# Patient Record
Sex: FEMALE | Race: WHITE | NOT HISPANIC OR LATINO | Employment: FULL TIME | URBAN - METROPOLITAN AREA
[De-identification: names, ages, dates, MRNs, and addresses within clinical notes are randomized per-mention and may not be internally consistent; named-entity substitution may affect disease eponyms.]

---

## 2017-03-14 ENCOUNTER — OFFICE VISIT (OUTPATIENT)
Dept: PSYCHIATRY | Facility: CLINIC | Age: 32
End: 2017-03-14
Attending: PSYCHOLOGIST
Payer: COMMERCIAL

## 2017-03-14 DIAGNOSIS — F50.20 BULIMIA NERVOSA: Primary | ICD-10-CM

## 2017-03-14 NOTE — MR AVS SNAPSHOT
After Visit Summary   3/14/2017    Arlet Segura    MRN: [id number]           Patient Information     Date Of Birth          1985        Visit Information        Provider Department      3/14/2017 8:00 AM Reyna Grey, PhD [facility]        Today's Diagnoses     Bulimia nervosa    -  1       Follow-ups after your visit        Who to contact     Please call your clinic at [phone] to:    Ask questions about your health    Make or cancel appointments    Discuss your medicines    Learn about your test results    Speak to your doctor   If you have compliments or concerns about an experience at your clinic, or if you wish to file a complaint, please contact [facility] Patient Relations at [phone] or email us at [email or web address]         Additional Information About Your Visit        MyChart Information     [employer] gives you secure access to your electronic health record. If you see a primary care provider, you can also send messages to your care team and make appointments. If you have questions, please call your primary care clinic.  If you do not have a primary care provider, please call [phone] and they will assist you.      [employer] is an electronic gateway that provides easy, online access to your medical records. With [employer], you can request a clinic appointment, read your test results, renew a prescription or communicate with your care team.     To access your existing account, please contact your [facility] or call [phone] for assistance.        Care EveryWhere ID     This is your Care EveryWhere ID. This could be used by other organizations to access your [facility] medical records  [id number]         Blood Pressure from Last 3 Encounters:   11/14/16 123/59   03/12/15 124/81   02/09/15 (!) 136/91    Weight from Last 3 Encounters:   11/14/16 56.6 kg (124 lb 11.2 oz)   03/12/15 54.9 kg  (121 lb 1.6 oz)   02/09/15 56 kg (123 lb 6.4 oz)              Today, you had the following     No orders found for display       Primary Care Provider Office Phone # Fax #    Dorothy Myesha Holder [phone]       [address] [place] [address]        Thank you!     Thank you for choosing PSY[facility]IATRY CLINIC  for your care. Our goal is always to provide you with excellent care. Hearing back from our patients is one way we can continue to improve our services. Please take a few minutes to complete the written survey that you may receive in the mail after your visit with us. Thank you!             Your Updated Medication List - Protect others around you: Learn how to safely use, store and throw away your medicines at www.disposemymeds.org.          This list is accurate as of: 3/14/17 11:59 PM.  Always use your most recent med list.                   Brand Name Dispense Instructions for use    fluticasone 50 MCG/ACT spray    FLONASE    1 Bottle    Spray 1 spray into both nostrils daily
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
## 2017-03-29 ENCOUNTER — OFFICE VISIT (OUTPATIENT)
Dept: PSYCHIATRY | Facility: CLINIC | Age: 32
End: 2017-03-29
Attending: CLINICAL NURSE SPECIALIST
Payer: COMMERCIAL

## 2017-03-29 VITALS
DIASTOLIC BLOOD PRESSURE: 83 MMHG | HEART RATE: 54 BPM | WEIGHT: 123.4 LBS | SYSTOLIC BLOOD PRESSURE: 120 MMHG | BODY MASS INDEX: 21.52 KG/M2

## 2017-03-29 DIAGNOSIS — F90.0 ATTENTION DEFICIT HYPERACTIVITY DISORDER (ADHD), PREDOMINANTLY INATTENTIVE TYPE: ICD-10-CM

## 2017-03-29 DIAGNOSIS — F50.20 BULIMIA NERVOSA, PURGING TYPE: Primary | Chronic | ICD-10-CM

## 2017-03-29 PROCEDURE — 99212 OFFICE O/P EST SF 10 MIN: CPT | Mod: ZF

## 2017-03-29 RX ORDER — DEXTROAMPHETAMINE SACCHARATE, AMPHETAMINE ASPARTATE MONOHYDRATE, DEXTROAMPHETAMINE SULFATE AND AMPHETAMINE SULFATE 5; 5; 5; 5 MG/1; MG/1; MG/1; MG/1
20 CAPSULE, EXTENDED RELEASE ORAL DAILY
Qty: 30 CAPSULE | Refills: 0 | Status: SHIPPED | OUTPATIENT
Start: 2017-03-29 | End: 2017-04-12

## 2017-03-29 RX ORDER — DEXTROAMPHETAMINE SACCHARATE, AMPHETAMINE ASPARTATE MONOHYDRATE, DEXTROAMPHETAMINE SULFATE AND AMPHETAMINE SULFATE 5; 5; 5; 5 MG/1; MG/1; MG/1; MG/1
20 CAPSULE, EXTENDED RELEASE ORAL DAILY
Qty: 20 CAPSULE | Refills: 0 | Status: SHIPPED | OUTPATIENT
Start: 2017-03-29 | End: 2017-03-29

## 2017-03-29 RX ORDER — LISDEXAMFETAMINE DIMESYLATE 20 MG/1
20 CAPSULE ORAL EVERY MORNING
Qty: 30 CAPSULE | Refills: 0 | Status: SHIPPED | OUTPATIENT
Start: 2017-03-29 | End: 2017-03-29
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
## 2017-03-29 NOTE — NURSING NOTE
Chief Complaint   Patient presents with     Eval/Assessment     Reviewed allergies, smoking status, and pharmacy preference  Administered abuse screening questions   Obtained weight, blood pressure and heart rate   Catalina Russell LPN

## 2017-03-29 NOTE — MR AVS SNAPSHOT
After Visit Summary   3/29/2017    Arlet Segura    MRN: [id number]           Patient Information     Date Of Birth          1985        Visit Information        Provider Department      3/29/2017 10:45 AM Eli Chambers APRN CNS Psychiatry Clinic        Today's Diagnoses     Bulimia nervosa, purging type    -  1    Attention deficit hyperactivity disorder (ADHD), predominantly inattentive type           Follow-ups after your visit        Follow-up notes from your care team     Return in about 2 weeks (around 4/12/2017).      Your next 10 appointments already scheduled     Apr 12, 2017  8:15 AM CDT   Adult Med Follow UP with DRISS Garduno CNS   Psychiatry Clinic ([facility])    [address] [facility] [address] [phone]              Who to contact     Please call your clinic at [phone] to:    Ask questions about your health    Make or cancel appointments    Discuss your medicines    Learn about your test results    Speak to your doctor   If you have compliments or concerns about an experience at your clinic, or if you wish to file a complaint, please contact [facility] Patient Relations at [phone] or email us at [email or web address]         Additional Information About Your Visit        MyChart Information     [employer] gives you secure access to your electronic health record. If you see a primary care provider, you can also send messages to your care team and make appointments. If you have questions, please call your primary care clinic.  If you do not have a primary care provider, please call [phone] and they will assist you.      [employer] is an electronic gateway that provides easy, online access to your medical records. With [employer], you can request a clinic appointment, read your test results, renew a prescription or communicate with your care team.     To  access your existing account, please contact your [facility] or call [phone] for assistance.        Care EveryWhere ID     This is your Care EveryWhere ID. This could be used by other organizations to access your [facility] medical records  [id number]        Your Vitals Were     Pulse BMI (Body Mass Index)                54 21.52 kg/m2           Blood Pressure from Last 3 Encounters:   03/29/17 120/83   11/14/16 123/59   03/12/15 124/81    Weight from Last 3 Encounters:   03/29/17 56 kg (123 lb 6.4 oz)   11/14/16 56.6 kg (124 lb 11.2 oz)   03/12/15 54.9 kg (121 lb 1.6 oz)              Today, you had the following     No orders found for display         Today's Medication Changes          These changes are accurate as of: 3/29/17 11:59 PM.  If you have any questions, ask your nurse or doctor.               Start taking these medicines.        Dose/Directions    amphetamine-dextroamphetamine 20 MG per 24 hr capsule   Commonly known as:  ADDERALL XR   Used for:  Attention deficit hyperactivity disorder (ADHD), predominantly inattentive type, Bulimia nervosa, purging type   Started by:  Eli Chambers APRN CNS        Dose:  20 mg   Take 1 capsule (20 mg) by mouth daily   Quantity:  30 capsule   Refills:  0            Where to get your medicines      Some of these will need a paper prescription and others can be bought over the counter.  Ask your nurse if you have questions.     Bring a paper prescription for each of these medications     amphetamine-dextroamphetamine 20 MG per 24 hr capsule                Primary Care Provider Office Phone # Fax #    Dorothy Holder [phone]       [facility] [address] [facility] [address]  [facility] [address]        Thank you!     Thank you for choosing PS[facility]ATRY CLINIC  for your care. Our goal is always to provide you with excellent care. Hearing back from our patients is one way we can continue to improve our  services. Please take a few minutes to complete the written survey that you may receive in the mail after your visit with us. Thank you!             Your Updated Medication List - Protect others around you: Learn how to safely use, store and throw away your medicines at www.disposemymeds.org.          This list is accurate as of: 3/29/17 11:59 PM.  Always use your most recent med list.                   Brand Name Dispense Instructions for use    amphetamine-dextroamphetamine 20 MG per 24 hr capsule    ADDERALL XR    30 capsule    Take 1 capsule (20 mg) by mouth daily       fluticasone 50 MCG/ACT spray    FLONASE    1 Bottle    Spray 1 spray into both nostrils daily

## 2017-04-12 ENCOUNTER — OFFICE VISIT (OUTPATIENT)
Dept: PSYCHIATRY | Facility: CLINIC | Age: 32
End: 2017-04-12
Attending: CLINICAL NURSE SPECIALIST
Payer: COMMERCIAL

## 2017-04-12 VITALS
WEIGHT: 121.2 LBS | SYSTOLIC BLOOD PRESSURE: 121 MMHG | HEART RATE: 65 BPM | DIASTOLIC BLOOD PRESSURE: 81 MMHG | BODY MASS INDEX: 21.13 KG/M2

## 2017-04-12 DIAGNOSIS — F90.0 ADHD (ATTENTION DEFICIT HYPERACTIVITY DISORDER), INATTENTIVE TYPE: Primary | ICD-10-CM

## 2017-04-12 DIAGNOSIS — F50.20 BULIMIA NERVOSA, PURGING TYPE: Chronic | ICD-10-CM

## 2017-04-12 LAB
T4 FREE SERPL-MCNC: 0.87 NG/DL (ref 0.76–1.46)
TSH SERPL DL<=0.05 MIU/L-ACNC: 5.08 MU/L (ref 0.4–4)

## 2017-04-12 PROCEDURE — 36415 COLL VENOUS BLD VENIPUNCTURE: CPT | Performed by: CLINICAL NURSE SPECIALIST

## 2017-04-12 PROCEDURE — 84443 ASSAY THYROID STIM HORMONE: CPT | Performed by: CLINICAL NURSE SPECIALIST

## 2017-04-12 PROCEDURE — 99212 OFFICE O/P EST SF 10 MIN: CPT | Mod: ZF

## 2017-04-12 PROCEDURE — 84439 ASSAY OF FREE THYROXINE: CPT | Performed by: CLINICAL NURSE SPECIALIST

## 2017-04-12 RX ORDER — DEXTROAMPHETAMINE SACCHARATE, AMPHETAMINE ASPARTATE MONOHYDRATE, DEXTROAMPHETAMINE SULFATE AND AMPHETAMINE SULFATE 5; 5; 5; 5 MG/1; MG/1; MG/1; MG/1
20 CAPSULE, EXTENDED RELEASE ORAL DAILY
Qty: 30 CAPSULE | Refills: 0 | Status: SHIPPED | OUTPATIENT
Start: 2017-04-26 | End: 2017-05-10 | Stop reason: DRUGHIGH

## 2017-04-12 NOTE — MR AVS SNAPSHOT
After Visit Summary   4/12/2017    Arlet Segura    MRN: [id number]           Patient Information     Date Of Birth          1985        Visit Information        Provider Department      4/12/2017 8:15 AM Eli Chambers APRN CNS [facility]        Today's Diagnoses     ADHD (attention deficit hyperactivity disorder), inattentive type    -  1    Bulimia nervosa, purging type           Follow-ups after your visit        Follow-up notes from your care team     Return in about 4 weeks (around 5/10/2017).      Your next 10 appointments already scheduled     Apr 25, 2017  7:30 AM CDT   (Arrive by 7:15 AM)   Return Visit with Kasey Oh MD   [facility] ([facility])    [address] [place] [address] [phone]            May 10, 2017  8:15 AM CDT   Adult Med Follow UP with DRISS Garduno CNS   Psychiatry Clinic ([facility])    [address]  [address] [facility] [id number] [phone]            Jun 01, 2017  3:00 PM CDT   (Arrive by 2:45 PM)   PHYSICAL with Dorothy Holder MD   [facility] ([facility])    [address] [place] [address] [phone]              Who to contact     Please call your clinic at [phone] to:    Ask questions about your health    Make or cancel appointments    Discuss your medicines    Learn about your test results    Speak to your doctor   If you have compliments or concerns about an experience at your clinic, or if you wish to file a complaint, please contact [facility] Patient Relations at [phone] or email us at [email or web address]         Additional Information About Your Visit        MyChart Information     MyChart gives you secure access to your electronic health record. If you see a primary care  provider, you can also send messages to your care team and make appointments. If you have questions, please call your primary care clinic.  If you do not have a primary care provider, please call [phone] and they will assist you.      BangTango is an electronic gateway that provides easy, online access to your medical records. With BangTango, you can request a clinic appointment, read your test results, renew a prescription or communicate with your care team.     To access your existing account, please contact your [facility] or call [phone] for assistance.        Care EveryWhere ID     This is your Care EveryWhere ID. This could be used by other organizations to access your [place] medical records  [id number]        Your Vitals Were     Pulse BMI (Body Mass Index)                65 21.13 kg/m2           Blood Pressure from Last 3 Encounters:   04/12/17 121/81   03/29/17 120/83   11/14/16 123/59    Weight from Last 3 Encounters:   04/12/17 55 kg (121 lb 3.2 oz)   03/29/17 56 kg (123 lb 6.4 oz)   11/14/16 56.6 kg (124 lb 11.2 oz)              We Performed the Following     T4 FREE     TSH          Today's Medication Changes          These changes are accurate as of: 4/12/17 11:59 PM.  If you have any questions, ask your nurse or doctor.               Start taking these medicines.        Dose/Directions    FLUoxetine 20 MG capsule   Commonly known as:  PROzac   Started by:  Eli Chambers APRN CNS        Dose:  20 mg   Take 1 capsule (20 mg) by mouth daily   Quantity:  30 capsule   Refills:  1            Where to get your medicines      These medications were sent to [facility] [address]  [address] [address]     Phone:  [phone]     FLUoxetine 20 MG capsule         Some of these will need a paper prescription and others can be bought over the counter.  Ask your nurse if you have questions.     Bring a  paper prescription for each of these medications     amphetamine-dextroamphetamine 20 MG per 24 hr capsule                Primary Care Provider Office Phone # Fax #    Dorothy Holder [phone]       [address]  [facility] [address]        Thank you!     Thank you for choosing PSYCHIATRY CLINIC  for your care. Our goal is always to provide you with excellent care. Hearing back from our patients is one way we can continue to improve our services. Please take a few minutes to complete the written survey that you may receive in the mail after your visit with us. Thank you!             Your Updated Medication List - Protect others around you: Learn how to safely use, store and throw away your medicines at www.disposemymeds.org.          This list is accurate as of: 4/12/17 11:59 PM.  Always use your most recent med list.                   Brand Name Dispense Instructions for use    * amphetamine-dextroamphetamine 30 MG per 24 hr capsule    ADDERALL XR    30 capsule    Take 1 capsule (30 mg) by mouth daily       * amphetamine-dextroamphetamine 20 MG per 24 hr capsule   Start taking on:  4/26/2017    ADDERALL XR    30 capsule    Take 1 capsule (20 mg) by mouth daily       FLUoxetine 20 MG capsule    PROzac    30 capsule    Take 1 capsule (20 mg) by mouth daily       fluticasone 50 MCG/ACT spray    FLONASE    1 Bottle    Spray 1 spray into both nostrils daily       * Notice:  This list has 2 medication(s) that are the same as other medications prescribed for you. Read the directions carefully, and ask your doctor or other care provider to review them with you.

## 2017-04-12 NOTE — NURSING NOTE
Chief Complaint   Patient presents with     Recheck Medication       Bulimia nervosa, purging type    Reviewed allergies, smoking status, and pharmacy preference  Administered abuse screening questions   Obtained weight, blood pressure and heart rate

## 2017-04-12 NOTE — PROGRESS NOTES
Outpatient Psychiatry Progress Note     Provider: DRISS Garduno CNS  Date: 2017  Service:  Medication follow up with counseling.   Patient Identification: Arlet Segura  : 1985   MRN: [id number]    Arlet Segura is a 31 year old year old female who presents for ongoing psychiatric care.  Arlet Segura was last seen in clinic on 3/29/17.   At that time,   Assessment & Plan      Assessment:   Arlet Segura is a 31 year old female who presents for treatment of ADHD inattentive type which she was diagnosed with in  by Dr. Epps. In addition she has history of Bulimia with binge eating, purging about 2 times per week. Bulimia started her second year of college in . At this time she would like to restart a stimulant since this was helpful with bulimia and concentration and focus. She agrees to starting Adderall XR at 20mg since she has not been taking it for several years and will consider increase back to 30mg at follow up. Vyvanse was considered but decided on Adderall due to the copay being higher on Vyvanse. We also discussed considering trial of an SSRI due to rumination about food, eating and weight. She has not been prescribed an SSRI in the past.     Diagnosis  Axis 1: Bulimia Nervosa, purging type, ADHD inattentive type  Axis 2: none  Axis 3: no current medical problems        Plan:   Medication: Adderall XR 20mg due to cost of Vyvanse  OTC Recommendations: none  Lab Orders: Consider Thyroid, nutritional, electrolytes  Referrals: none  Release of Information: none  Future Treatment Considerations: consider increase in Adderall to previous dose of 30mg, consider SSRI trial  Return for Follow Up: 2 weeks      2017  Today Arlet reports she has found Adderall to be helpful with focus and purging. Has not had any purging since she started it.  She talked with her the SSRI that she takes. Sister takes Fluoxetine at unknown dose and has found it to be  helpful.  Side effects of medication include: at times feels more anxious which she wonders if is related to Adderall  Psychiatric Review of Systems:  The patient endorses symptoms of depression: In the last 2 weeks per PHQ 9 several days anhedonia, feeling depressed, sleep disturbance, feelings of failure, concentration problems.  She  patient endorses symptoms of anxiety : continued preoccupation with food and weight.  She endorses symptoms of adrian including none.    She endorses symptoms of psychosis including no psychotic symptoms.       Review of Medical Systems:  Sleep: stable  Energy: stable  Concentration: improved with some continued difficulty  Appetite: see subjective  GI Concerns: none  Cardiac concerns: none  Neurological concerns: none  Other medical concerns: no new concerns  Current Substance Use:  Alcohol:denies frequent use or abuse  Other drugs:denies  Caffeine:not reviewed  Nicotine: none  Past Medical History:   Past Medical History:   Diagnosis Date     Abnormal cervical Pap smear with positive HPV DNA test     last pap 2/2013 nl     Bulimia      Patient Active Problem List   Diagnosis     Bulimia since 2008     ADHD (attention deficit hyperactivity disorder), inattentive type       Allergies: No Known Allergies       Current Medications     Current Outpatient Prescriptions Ordered in [facility]   Medication Sig Dispense Refill     amphetamine-dextroamphetamine (ADDERALL XR) 20 MG per 24 hr capsule Take 1 capsule (20 mg) by mouth daily 30 capsule 0     fluticasone (FLONASE) 50 MCG/ACT nasal spray Spray 1 spray into both nostrils daily 1 Bottle 0     No current Epic-ordered facility-administered medications on file.         Mental Status Exam     Appearance:  Casually dressed and Well groomed  Behavior/relationship to examiner/demeanor:  Cooperative  Orientation: Oriented to person, place, time and situation  Psychomotor: normal form  Speech Rate:  Normal  Speech Spontaneity:  Normal  Mood:   "\"okay\"  Affect:  Appropriate/mood-congruent  Thought Process (Associations):  Goal directed  Thought Content:  no overt psychosis, denies suicidal ideation, intent or thoughts and patient does not appear to be responding to internal stimuli  Abnormal Perception:  None  Attention/Concentration:  Normal  Language:  Intact  Insight:  Good  Judgment:  Good      Results     Vital signs: /81  Pulse 65  Wt 55 kg (121 lb 3.2 oz)  BMI 21.13 kg/m2    Laboratory Data:  none available    Assessment & Plan      Arlet Segura is seen today for follow up and reports she has not purged since restarting Adderall but is interested in trying an SSRI for eating disorder to help with preoccupation with food and eating.  Her sister has found Fluoxetine helpful and she agrees to trial of this.  Will continue current dose of Adderall XR 20mg instead of increase to 30mg to avoid difficulty identifying cause of side effects if they occur.  Diagnosis  Axis 1: Bulimia Nervosa Purging type, ADHD inattentive type  Axis 2: none  Axis 3: See problem list in the medical record    Plan:  Medication: Continue Adderall XR 20mg and add Fluoxetine 20mg  OTC Recommendations: none  Lab Orders:  TSH and Free T4  Referrals: none  Release of Information: none  Future Treatment Considerations:per response to medication changes  Return for Follow Up:4 weeks   The risks, benefits, alternatives and side effects have been discussed and are understood by the patient. The patient understands the risks of using street drugs or alcohol. There are no medical contraindications, the patient agrees to treatment, and has the capacity to do so. The patient understands to call 911 or come to the nearest ED if life threatening or urgent symptoms present.  Over 50% of this time was spent counseling the patient and/or coordinating care regarding review of social and occupational functioning.  In addition patient was counseled on health and wellness practices to " augment medication treatment of symptoms. See note for details.    Eli Chambers, APRN CNS 4/12/2017

## 2017-04-14 ASSESSMENT — PATIENT HEALTH QUESTIONNAIRE - PHQ9: SUM OF ALL RESPONSES TO PHQ QUESTIONS 1-9: 8

## 2017-05-04 ENCOUNTER — OFFICE VISIT (OUTPATIENT)
Dept: INTERNAL MEDICINE | Facility: CLINIC | Age: 32
End: 2017-05-04

## 2017-05-04 VITALS
WEIGHT: 117 LBS | BODY MASS INDEX: 20.4 KG/M2 | HEART RATE: 74 BPM | SYSTOLIC BLOOD PRESSURE: 131 MMHG | DIASTOLIC BLOOD PRESSURE: 87 MMHG

## 2017-05-04 DIAGNOSIS — I73.00 RAYNAUD'S DISEASE WITHOUT GANGRENE: Primary | ICD-10-CM

## 2017-05-04 ASSESSMENT — PAIN SCALES - GENERAL: PAINLEVEL: NO PAIN (0)

## 2017-05-04 NOTE — MR AVS SNAPSHOT
After Visit Summary   5/4/2017    Arlet Segura    MRN: [id number]           Patient Information     Date Of Birth          1985        Visit Information        Provider Department      5/4/2017 3:50 PM Truong Chapman MD [facility]        Today's Diagnoses     Raynaud's disease without gangrene    -  1      Care Instructions    Primary Care Center Medication Refill Request Information:  * Please contact your pharmacy regarding ANY request for medication refills.  ** [facility] Prescription Fax = [phone]  * Please allow 3 business days for routine medication refills.  * Please allow 5 business days for controlled substance medication refills.     Primary Care Center Test Result notification information:  *You will be notified within 7-10 days of your appointment day regarding the results of your test.  If you are on MyChart you will be notified as soon as the provider has reviewed the results and signed off on them.          Follow-ups after your visit        Follow-up notes from your care team     Return in 4 weeks (on 6/1/2017) for previously scheduled appt with Dr Holder.      Your next 10 appointments already scheduled     Jun 01, 2017  3:00 PM CDT   (Arrive by 2:45 PM)   PHYSICAL with Dorothy Holder MD   [facility] ([facility])    [address] [address] [phone]            Jun 08, 2017  9:15 AM CDT   Adult Med Follow UP with DRISS aGrduno CNS   Psychiatry Clinic ([facility])    [address] [facility] [id number] [phone]              Who to contact     Please call your clinic at [phone] to:    Ask questions about your health    Make or cancel appointments    Discuss your medicines    Learn about your test results    Speak to your doctor   If you have compliments or concerns about an experience at  your clinic, or if you wish to file a complaint, please contact [facility] Patient Relations at [phone] or email us at [email or web address]         Additional Information About Your Visit        The Simplehart Information     Adtradet gives you secure access to your electronic health record. If you see a primary care provider, you can also send messages to your care team and make appointments. If you have questions, please call your primary care clinic.  If you do not have a primary care provider, please call [phone] and they will assist you.      DNAdigest is an electronic gateway that provides easy, online access to your medical records. With DNAdigest, you can request a clinic appointment, read your test results, renew a prescription or communicate with your care team.     To access your existing account, please contact your [facility] or call [phone] for assistance.        Care EveryWhere ID     This is your Care EveryWhere ID. This could be used by other organizations to access your [facility] medical records  [id number]        Your Vitals Were     Pulse BMI (Body Mass Index)                74 20.4 kg/m2           Blood Pressure from Last 3 Encounters:   05/04/17 131/87   11/14/16 123/59   03/12/15 124/81    Weight from Last 3 Encounters:   05/04/17 53.1 kg (117 lb)   11/14/16 56.6 kg (124 lb 11.2 oz)   03/12/15 54.9 kg (121 lb 1.6 oz)              Today, you had the following     No orders found for display       Primary Care Provider Office Phone # Fax #    Dorothykasia Holder [phone]       [address]  [facility] [address]        Thank you!     Thank you for choosing [facility]  for your care. Our goal is always to provide you with excellent care. Hearing back from our patients is one way we can continue to improve our services. Please take a few minutes to complete  the written survey that you may receive in the mail after your visit with us. Thank you!             Your Updated Medication List - Protect others around you: Learn how to safely use, store and throw away your medicines at www.disposemymeds.org.          This list is accurate as of: 5/4/17 11:59 PM.  Always use your most recent med list.                   Brand Name Dispense Instructions for use    amphetamine-dextroamphetamine 20 MG per 24 hr capsule    ADDERALL XR    30 capsule    Take 1 capsule (20 mg) by mouth daily       fluticasone 50 MCG/ACT spray    FLONASE    1 Bottle    Spray 1 spray into both nostrils daily

## 2017-05-04 NOTE — PROGRESS NOTES
SUBJECTIVE:   Arlet Segura is a 31 year old year old female with history of bullemia and ADHD who presents with abnormal TSH, normal T4.    Patient was at a visit with her psychiatrist on 4/12 and noted that she had been feeling fatigued and noted some mild hair loss. TSH was checked and was elevated at 5.08. Her reflex T4 was normal, 0.87. She was also started on fluoxetine at that visit for bullemia. It was suggested that she visit clinic to discuss the results. Notes that her mother and sister both have hypothyroidism and that her sister also has DM I.    She does note that she has been looking up Raynaud's because her fingers are unusually sensitive to the cold and that she does note that her fingers turn blue in the cold with associated pain. Has not had ulceration of her fingers. She has been managing with gloves and avoidance of cold. She wonders if there are any other options for management of her symptoms.    Review of systems:  10 point ROS negative except as noted above.    Past Medical History:   Diagnosis Date     Abnormal cervical Pap smear with positive HPV DNA test     last pap 2/2013 nl     Bulimia      Bulimia since 2008 1/19/2014          Current Outpatient Prescriptions on File Prior to Visit:  amphetamine-dextroamphetamine (ADDERALL XR) 30 MG per 24 hr capsule Take 1 capsule (30 mg) by mouth daily   FLUoxetine (PROZAC) 20 MG capsule Take 1 capsule (20 mg) by mouth daily   amphetamine-dextroamphetamine (ADDERALL XR) 20 MG per 24 hr capsule Take 1 capsule (20 mg) by mouth daily   fluticasone (FLONASE) 50 MCG/ACT nasal spray Spray 1 spray into both nostrils daily     No current facility-administered medications on file prior to visit.      OBJECTIVE:  Physical exam:  /87  Pulse 74  Wt 53.1 kg (117 lb)  BMI 20.4 kg/m2  Body mass index is 20.4 kg/(m^2).   Gen: Pleasant, well-developed, well-nourished and in no apparent distress  HEENT: normocephalic, atraumatic, EOMI, no scleral icterus,  grossly normal hair thickness  Neck: supple, no thyromegaly, no nodules  CV: regular rate and rhythm, normal S1 S2, no S3 or S4 and no murmur, click, or rub  Resp: clear to ausculation bilaterally, no increased WOB  Ext: WWP, no LE edema  Skin: warm and dry, no color change in fingers, no pain to palpation, no sclerosis.  Neuro: alert and oriented, 5+ strength throughout, normal gait, CN II-XII grossly intact  Psych: normal mood, normal affect    ASSESSMENT and PLAN:     Subclinical hypothyroidism  Patent's T4 normal despite elevated TSH. Patient has strong family history of hypothyroidism and autoimmune disease in her mother and sister. Discussed that the risk of developing hypothyroidism is not 100% but that she is likely at increased risk given her family history. Would monitor TSH and T4 levels given this risk.  - Repeat TSH/T4 in 6 months (10/2017)  - Yearly TSH/T4 after  - Monitor for worsening weight gain, fatigue, hair loss, dry skin and return if she develops these symptoms    Raynaud phenomenon  Patient fulfills criteria for Raynaud. Her fingers are unusually sensitive to the cold and change color to blue. Symptoms relatively mild and no hx of digital ischemia. Patient has been appropriately practicing avoidance and wearing gloves. Discussed option of using low-dose amlodipine specifically in the Winter. Patient politely declined at this time but said that she would continue to consider it.    Return to clinic 6/1 for previously scheduled appt with Dr Holder    Patient seen and discussed with Dr. Conrad who agrees with this plan.    Scott Chapman MD  Internal Medicine, PGY-1  Pager [phone]    The Patient was seen in Resident Continuity Clinic by MIKE CHAPMAN. Patient was seen and examined by me with the resident.   I reviewed the history & exam. Assessment and plan were jointly made.    Magui Conrad MD

## 2017-05-04 NOTE — PATIENT INSTRUCTIONS
Primary Care Center Medication Refill Request Information:  * Please contact your pharmacy regarding ANY request for medication refills.  ** [facility] Prescription Fax = [phone]  * Please allow 3 business days for routine medication refills.  * Please allow 5 business days for controlled substance medication refills.     Primary Care Center Test Result notification information:  *You will be notified within 7-10 days of your appointment day regarding the results of your test.  If you are on MyChart you will be notified as soon as the provider has reviewed the results and signed off on them.

## 2017-05-04 NOTE — NURSING NOTE
Chief Complaint   Patient presents with     Results     Pt is here to go over lab results.      Niurka Lundberg LPN May 4, 2017 4:01 PM

## 2017-05-10 ENCOUNTER — OFFICE VISIT (OUTPATIENT)
Dept: PSYCHIATRY | Facility: CLINIC | Age: 32
End: 2017-05-10
Attending: CLINICAL NURSE SPECIALIST
Payer: COMMERCIAL

## 2017-05-10 VITALS
BODY MASS INDEX: 21.73 KG/M2 | WEIGHT: 124.6 LBS | HEART RATE: 65 BPM | SYSTOLIC BLOOD PRESSURE: 132 MMHG | DIASTOLIC BLOOD PRESSURE: 76 MMHG

## 2017-05-10 DIAGNOSIS — F90.0 ADHD (ATTENTION DEFICIT HYPERACTIVITY DISORDER), INATTENTIVE TYPE: Primary | ICD-10-CM

## 2017-05-10 DIAGNOSIS — F50.20 BULIMIA NERVOSA: ICD-10-CM

## 2017-05-10 PROCEDURE — 99212 OFFICE O/P EST SF 10 MIN: CPT | Mod: ZF

## 2017-05-10 RX ORDER — DEXTROAMPHETAMINE SACCHARATE, AMPHETAMINE ASPARTATE MONOHYDRATE, DEXTROAMPHETAMINE SULFATE AND AMPHETAMINE SULFATE 7.5; 7.5; 7.5; 7.5 MG/1; MG/1; MG/1; MG/1
30 CAPSULE, EXTENDED RELEASE ORAL DAILY
Qty: 30 CAPSULE | Refills: 0 | Status: SHIPPED | OUTPATIENT
Start: 2017-05-10 | End: 2017-06-08

## 2017-05-10 NOTE — MR AVS SNAPSHOT
After Visit Summary   5/10/2017    Arlet Segura    MRN: [id number]           Patient Information     Date Of Birth          1985        Visit Information        Provider Department      5/10/2017 8:15 AM Eli Chambers APRN CNS [facility]        Today's Diagnoses     ADHD (attention deficit hyperactivity disorder), inattentive type    -  1    Bulimia nervosa           Follow-ups after your visit        Follow-up notes from your care team     Return in about 4 weeks (around 6/7/2017).      Your next 10 appointments already scheduled     Jun 01, 2017  3:00 PM CDT   (Arrive by 2:45 PM)   PHYSICAL with Dorothy Holder MD   [facility] ([facility] [place])    [address] [facility]  [address] [facility] [address] [phone]            Jun 08, 2017  9:15 AM CDT   Adult Med Follow UP with DRISS Garduno CNS   Psychiatry Clinic ([facility])    [address] [facility] [address] [phone]              Who to contact     Please call your clinic at [phone] to:    Ask questions about your health    Make or cancel appointments    Discuss your medicines    Learn about your test results    Speak to your doctor   If you have compliments or concerns about an experience at your clinic, or if you wish to file a complaint, please contact [facility] Patient Relations at [phone] or email us at [email or web address]         Additional Information About Your Visit        Larryharbarbi Information     Joanne gives you secure access to your electronic health record. If you see a primary care provider, you can also send messages to your care team and make appointments. If you have questions, please call your primary care clinic.  If you do not have a primary care provider, please call [phone] and they will assist you.      Joanne is an  electronic gateway that provides easy, online access to your medical records. With SendtoNews, you can request a clinic appointment, read your test results, renew a prescription or communicate with your care team.     To access your existing account, please contact your [facility] or call [phone] for assistance.        Care EveryWhere ID     This is your Care EveryWhere ID. This could be used by other organizations to access your [facility] medical records  [id number]        Your Vitals Were     Pulse BMI (Body Mass Index)                65 21.73 kg/m2           Blood Pressure from Last 3 Encounters:   05/10/17 132/76   05/04/17 131/87   04/12/17 121/81    Weight from Last 3 Encounters:   05/10/17 56.5 kg (124 lb 9.6 oz)   05/04/17 53.1 kg (117 lb)   04/12/17 55 kg (121 lb 3.2 oz)              Today, you had the following     No orders found for display         Today's Medication Changes          These changes are accurate as of: 5/10/17  1:30 PM.  If you have any questions, ask your nurse or doctor.               These medicines have changed or have updated prescriptions.        Dose/Directions    amphetamine-dextroamphetamine 30 MG per 24 hr capsule   Commonly known as:  ADDERALL XR   This may have changed:  Another medication with the same name was removed. Continue taking this medication, and follow the directions you see here.   Used for:  ADHD (attention deficit hyperactivity disorder), inattentive type   Changed by:  Eli Chambers APRN CNS        Dose:  30 mg   Take 1 capsule (30 mg) by mouth daily   Quantity:  30 capsule   Refills:  0            Where to get your medicines      These medications were sent to [facility] [place], [place] - [address]  [address] [address]     Phone:  [phone]     FLUoxetine 20 MG capsule         Some of these will need a paper prescription and others can be bought over the counter.  Ask  your nurse if you have questions.     Bring a paper prescription for each of these medications     amphetamine-dextroamphetamine 30 MG per 24 hr capsule                Primary Care Provider Office Phone # Fax #    Dorothy Holder [phone]       [address]  [facility] [address]        Thank you!     Thank you for choosing PSYCHIATRY CLINIC  for your care. Our goal is always to provide you with excellent care. Hearing back from our patients is one way we can continue to improve our services. Please take a few minutes to complete the written survey that you may receive in the mail after your visit with us. Thank you!             Your Updated Medication List - Protect others around you: Learn how to safely use, store and throw away your medicines at www.disposemymeds.org.          This list is accurate as of: 5/10/17  1:30 PM.  Always use your most recent med list.                   Brand Name Dispense Instructions for use    amphetamine-dextroamphetamine 30 MG per 24 hr capsule    ADDERALL XR    30 capsule    Take 1 capsule (30 mg) by mouth daily       FLUoxetine 20 MG capsule    PROzac    30 capsule    Take 1 capsule (20 mg) by mouth daily       fluticasone 50 MCG/ACT spray    FLONASE    1 Bottle    Spray 1 spray into both nostrils daily

## 2017-05-10 NOTE — PROGRESS NOTES
Outpatient Psychiatry Progress Note     Provider: DRISS Garduno CNS  Date: 5/10/2017  Service:  Medication follow up with counseling.   Patient Identification: Arlet Segura  : 1985   MRN: [id number]    Arlet Segura is a 31 year old year old female who presents for ongoing psychiatric care.  Arlet Segura was last seen in clinic on 17.   At that time,   Assessment & Plan      Arlet Segura is seen today for follow up and reports she has not purged since restarting Adderall but is interested in trying an SSRI for eating disorder to help with preoccupation with food and eating. Her sister has found Fluoxetine helpful and she agrees to trial of this.  Will continue current dose of Adderall XR 20mg instead of increase to 30mg to avoid difficulty identifying cause of side effects if they occur.  Diagnosis  Axis 1: Bulimia Nervosa Purging type, ADHD inattentive type  Axis 2: none  Axis 3: See problem list in the medical record     Plan:  Medication: Continue Adderall XR 20mg and add Fluoxetine 20mg  OTC Recommendations: none  Lab Orders: TSH and Free T4  Referrals: none  Release of Information: none  Future Treatment Considerations:per response to medication changes  Return for Follow Up:4 weeks      ____________________________________________________________________________________________________________________________________________    05/10/2017   Patient phoned on 16 and requested the Adderall be increased back to 30mg which she had been on last year.  Today Arlet reports she continued to notice a positive effect from  Adderall. Not sure of effect of Fluoxetine yet.  Finished graduate school yesterday. Excited about being done.  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  Side effects of medication include: maybe difficulty waking up in the am.   Psychiatric Review of Systems:  The patient endorses  "symptoms of depression: denies symptoms  She  patient endorses symptoms of anxiety : no change  She endorses symptoms of adrian including none.    She endorses symptoms of psychosis including no psychotic symptoms.       Review of Medical Systems:  Sleep: little harder to get up  Energy: stable  Concentration: improved  Appetite: has had 2 episodes of binging and purging  GI Concerns: none  Cardiac concerns: none  Neurological concerns: none  Other medical concerns: no new concerns, did follow up with PCP about  TSH  Current Substance Use:  Alcohol:denies frequent use or abuse  Other drugs:none  Caffeine:no change  Nicotine: none  Past Medical History:   Past Medical History:   Diagnosis Date     Abnormal cervical Pap smear with positive HPV DNA test     last pap 2/2013 nl     Bulimia      Bulimia since 2008 1/19/2014     Patient Active Problem List   Diagnosis     Bulimia since 2008     ADHD (attention deficit hyperactivity disorder), inattentive type       Allergies: No Known Allergies       Current Medications     Current Outpatient Prescriptions Ordered in [facility]   Medication Sig Dispense Refill     amphetamine-dextroamphetamine (ADDERALL XR) 30 MG per 24 hr capsule Take 1 capsule (30 mg) by mouth daily 30 capsule 0     FLUoxetine (PROZAC) 20 MG capsule Take 1 capsule (20 mg) by mouth daily 30 capsule 1     amphetamine-dextroamphetamine (ADDERALL XR) 20 MG per 24 hr capsule Take 1 capsule (20 mg) by mouth daily 30 capsule 0     fluticasone (FLONASE) 50 MCG/ACT nasal spray Spray 1 spray into both nostrils daily 1 Bottle 0     No current Epic-ordered facility-administered medications on file.         Mental Status Exam     Appearance:  Casually dressed and Well groomed  Behavior/relationship to examiner/demeanor:  Cooperative  Orientation: Oriented to person, place, time and situation  Psychomotor: normal form  Speech Rate:  Normal  Speech Spontaneity:  Normal  Mood:  \"okay\"  Affect:  " Appropriate/mood-congruent  Thought Process (Associations):  Goal directed  Thought Content:  no overt psychosis, denies suicidal ideation, intent or thoughts and patient does not appear to be responding to internal stimuli  Abnormal Perception:  None  Attention/Concentration:  Normal  Language:  Intact  Insight:  Good  Judgment:  Good      Results     Vital signs: /76  Pulse 65  Wt 56.5 kg (124 lb 9.6 oz)  BMI 21.73 kg/m2    Laboratory Data:  reviewed data from orders at last appointment. She has followed up with   PCP for slightly elevated TSH and no treatment recommended at this time.    Assessment & Plan      Arlet Segura is seen today for follow up and reports she has noticed improvement with Adderall XR but not much from addition of Fluoxetine yet. Reviewed mechanism of action with Fluoxetine and length of time for effect.  She is also considering therapy in the future to help with obsessive thoughts and how to talk with family and friends about bulimia.    Diagnosis  Axis 1: Bulimia, ADHD inattentive type  Axis 2: none  Axis 3: See problem list in the medical record    Plan:  Medication: Continue Fluoxetine 20mg and Adderall XR 30mg  OTC Recommendations: none  Lab Orders:  none  Referrals: none  Release of Information: none  Future Treatment Considerations:consider increase in Fluoxetine if no improvement  Return for Follow Up:4 weeks   The risks, benefits, alternatives and side effects have been discussed and are understood by the patient. The patient understands the risks of using street drugs or alcohol. There are no medical contraindications, the patient agrees to treatment, and has the capacity to do so. The patient understands to call 911 or come to the nearest ED if life threatening or urgent symptoms present.  Over 50% of this time was spent counseling the patient and/or coordinating care regarding review of social and occupational functioning.  In addition patient was counseled on health  and wellness practices to augment medication treatment of symptoms. See note for details.    Eli Chambers, APRN CNS 5/10/2017

## 2017-05-10 NOTE — NURSING NOTE
Chief Complaint   Patient presents with     Recheck Medication     ADHD    Reviewed allergies, smoking status, and pharmacy preference  Administered abuse screening questions   Obtained weight, blood pressure and heart rate

## 2017-06-08 ENCOUNTER — OFFICE VISIT (OUTPATIENT)
Dept: PSYCHIATRY | Facility: CLINIC | Age: 32
End: 2017-06-08
Attending: CLINICAL NURSE SPECIALIST
Payer: COMMERCIAL

## 2017-06-08 VITALS
WEIGHT: 116.6 LBS | HEART RATE: 69 BPM | DIASTOLIC BLOOD PRESSURE: 90 MMHG | BODY MASS INDEX: 20.33 KG/M2 | SYSTOLIC BLOOD PRESSURE: 132 MMHG

## 2017-06-08 DIAGNOSIS — F50.20 BULIMIA NERVOSA: ICD-10-CM

## 2017-06-08 DIAGNOSIS — F90.0 ADHD (ATTENTION DEFICIT HYPERACTIVITY DISORDER), INATTENTIVE TYPE: Primary | ICD-10-CM

## 2017-06-08 PROCEDURE — 99212 OFFICE O/P EST SF 10 MIN: CPT | Mod: ZF

## 2017-06-08 RX ORDER — DEXTROAMPHETAMINE SACCHARATE, AMPHETAMINE ASPARTATE MONOHYDRATE, DEXTROAMPHETAMINE SULFATE AND AMPHETAMINE SULFATE 7.5; 7.5; 7.5; 7.5 MG/1; MG/1; MG/1; MG/1
30 CAPSULE, EXTENDED RELEASE ORAL DAILY
Qty: 30 CAPSULE | Refills: 0 | Status: SHIPPED | OUTPATIENT
Start: 2017-07-06 | End: 2017-07-17

## 2017-06-08 RX ORDER — DEXTROAMPHETAMINE SACCHARATE, AMPHETAMINE ASPARTATE MONOHYDRATE, DEXTROAMPHETAMINE SULFATE AND AMPHETAMINE SULFATE 7.5; 7.5; 7.5; 7.5 MG/1; MG/1; MG/1; MG/1
30 CAPSULE, EXTENDED RELEASE ORAL DAILY
Qty: 30 CAPSULE | Refills: 0 | Status: SHIPPED | OUTPATIENT
Start: 2017-06-08 | End: 2017-08-11

## 2017-06-08 NOTE — NURSING NOTE
Chief Complaint   Patient presents with     RECHECK     ADHD     Reviewed allergies, smoking status, and pharmacy preference  Administered abuse screening questions-done 3/29/17   Obtained weight, blood pressure and heart rate   Catalina Russell LPN

## 2017-06-08 NOTE — MR AVS SNAPSHOT
After Visit Summary   6/8/2017    Arlet Segura    MRN: [id number]           Patient Information     Date Of Birth          1985        Visit Information        Provider Department      6/8/2017 9:15 AM Eli Chambers APRN CNS Psych[facility]ry Clinic        Today's Diagnoses     ADHD (attention deficit hyperactivity disorder), inattentive type    -  1    Bulimia nervosa           Follow-ups after your visit        Follow-up notes from your care team     Return in about 6 weeks (around 7/20/2017).      Your next 10 appointments already scheduled     Jul 05, 2017  4:00 PM CDT   (Arrive by 3:45 PM)   PHYSICAL with Dorothy Holder MD   [facility] ([facility])    [address] [facility]  4th [facility] [address] [phone]            Jul 20, 2017  8:15 AM CDT   Adult Med Follow UP with DRISS Garduno CNS   Psychiatry Clinic ([facility])    [address] [facility] [address] [phone]              Who to contact     Please call your clinic at [phone] to:    Ask questions about your health    Make or cancel appointments    Discuss your medicines    Learn about your test results    Speak to your doctor   If you have compliments or concerns about an experience at your clinic, or if you wish to file a complaint, please contact [facility] Patient Relations at [phone] or email us at [email or web address]         Additional Information About Your Visit        Larryharbarbi Information     Joanne gives you secure access to your electronic health record. If you see a primary care provider, you can also send messages to your care team and make appointments. If you have questions, please call your primary care clinic.  If you do not have a primary care provider, please call [phone] and they will assist you.      Joanne is an  electronic gateway that provides easy, online access to your medical records. With [employer], you can request a clinic appointment, read your test results, renew a prescription or communicate with your care team.     To access your existing account, please contact your [facility] or call [phone] for assistance.        Care EveryWhere ID     This is your Care EveryWhere ID. This could be used by other organizations to access your [facility] medical records  [id number]        Your Vitals Were     Pulse BMI (Body Mass Index)                69 20.33 kg/m2           Blood Pressure from Last 3 Encounters:   06/08/17 132/90   05/10/17 132/76   05/04/17 131/87    Weight from Last 3 Encounters:   06/08/17 52.9 kg (116 lb 9.6 oz)   05/10/17 56.5 kg (124 lb 9.6 oz)   05/04/17 53.1 kg (117 lb)              Today, you had the following     No orders found for display         Today's Medication Changes          These changes are accurate as of: 6/8/17 11:59 PM.  If you have any questions, ask your nurse or doctor.               These medicines have changed or have updated prescriptions.        Dose/Directions    * amphetamine-dextroamphetamine 30 MG per 24 hr capsule   Commonly known as:  ADDERALL XR   This may have changed:  Another medication with the same name was added. Make sure you understand how and when to take each.   Used for:  ADHD (attention deficit hyperactivity disorder), inattentive type   Changed by:  Eli Chambers APRN CNS        Dose:  30 mg   Take 1 capsule (30 mg) by mouth daily   Quantity:  30 capsule   Refills:  0       * amphetamine-dextroamphetamine 30 MG per 24 hr capsule   Commonly known as:  ADDERALL XR   This may have changed:  You were already taking a medication with the same name, and this prescription was added. Make sure you understand how and when to take each.   Used for:  ADHD (attention deficit hyperactivity disorder), inattentive type   Changed by:   Eli Chambers APRN CNS        Dose:  30 mg   Start taking on:  7/6/2017   Take 1 capsule (30 mg) by mouth daily   Quantity:  30 capsule   Refills:  0       * Notice:  This list has 2 medication(s) that are the same as other medications prescribed for you. Read the directions carefully, and ask your doctor or other care provider to review them with you.         Where to get your medicines      These medications were sent to [place], [place] - [address]  [address] [facility] [address] [address]     Phone:  [phone]     FLUoxetine 20 MG capsule         Some of these will need a paper prescription and others can be bought over the counter.  Ask your nurse if you have questions.     Bring a paper prescription for each of these medications     amphetamine-dextroamphetamine 30 MG per 24 hr capsule    amphetamine-dextroamphetamine 30 MG per 24 hr capsule                Primary Care Provider Office Phone # Fax #    Dorothy Holder [phone]       [address]  [facility] [address]        Thank you!     Thank you for choosing PSYCHIATRY CLINIC  for your care. Our goal is always to provide you with excellent care. Hearing back from our patients is one way we can continue to improve our services. Please take a few minutes to complete the written survey that you may receive in the mail after your visit with us. Thank you!             Your Updated Medication List - Protect others around you: Learn how to safely use, store and throw away your medicines at www.disposemymeds.org.          This list is accurate as of: 6/8/17 11:59 PM.  Always use your most recent med list.                   Brand Name Dispense Instructions for use    * amphetamine-dextroamphetamine 30 MG per 24 hr capsule    ADDERALL XR    30 capsule    Take 1 capsule (30 mg) by mouth daily       * amphetamine-dextroamphetamine 30 MG per 24 hr capsule   Start  taking on:  7/6/2017    ADDERALL XR    30 capsule    Take 1 capsule (30 mg) by mouth daily       FLUoxetine 20 MG capsule    PROzac    30 capsule    Take 1 capsule (20 mg) by mouth daily       fluticasone 50 MCG/ACT spray    FLONASE    1 Bottle    Spray 1 spray into both nostrils daily       * Notice:  This list has 2 medication(s) that are the same as other medications prescribed for you. Read the directions carefully, and ask your doctor or other care provider to review them with you.

## 2017-06-08 NOTE — PROGRESS NOTES
"  Outpatient Psychiatry Progress Note     Provider: DRISS Garduno CNS  Date: 2017  Service:  Medication follow up with counseling.   Patient Identification: Arlet Segura  : 1985   MRN: [id number]    Arlet Segura is a 32 year old year old female who presents for ongoing psychiatric care.  Arlet Segura was last seen in clinic on 5/10/17.   At that time,   Assessment & Plan       Arlet Segura is seen today for follow up and reports she has noticed improvement with Adderall XR but not much from addition of Fluoxetine yet. Reviewed mechanism of action with Fluoxetine and length of time for effect.  She is also considering therapy in the future to help with obsessive thoughts and how to talk with family and friends about bulimia.     Diagnosis  Axis 1: Bulimia, ADHD inattentive type  Axis 2: none  Axis 3: See problem list in the medical record     Plan:  Medication: Continue Fluoxetine 20mg and Adderall XR 30mg  OTC Recommendations: none  Lab Orders:  none  Referrals: none  Release of Information: none  Future Treatment Considerations:consider increase in Fluoxetine if no improvement  Return for Follow Up:4 weeks      ____________________________________________________________________________________________________________________________________________    2017  Today Arlet reports she has not noticed much effect with Fluoxetine for sure although has not binged or purged since last appointment.  Has been on vacation last 3 weeks, so difficult to see if anxiety is stable because of vacation or medication.   Reports she set the goal of not binging or purging as she turned to 32's.  She has not shared this goal to others, but \"wanted to verbalize it.\"    Side effects of medication include: some initial insomnia, in the am, harder to get up.   Getting up is harder no matter what time in AM.    Psychiatric Review of Systems:  The patient endorses symptoms of depression: In " "the last 2 weeks per PHQ 9 several days sleep disturbance, fatigue, feelings of failure, concentration problems.   She  patient endorses symptoms of anxiety : about the same. Hasn't been in a routine and was on vacation for several weeks.   She endorses symptoms of adrian including none.    She endorses symptoms of psychosis including no psychotic symptoms.       Review of Medical Systems:  Sleep: initial insomnia, difficulty waking up  Energy: stable  Concentration: stable  Appetite: stable  GI Concerns: denies  Cardiac concerns: denies  Neurological concerns: denies  Other medical concerns: denies    Current Substance Use:  Alcohol:denies frequent use or abuse  Other drugs:denies  Caffeine:no change  Nicotine: denies use    Past Medical History:   Past Medical History:   Diagnosis Date     Abnormal cervical Pap smear with positive HPV DNA test     last pap 2/2013 nl     Bulimia      Bulimia since 2008 1/19/2014     Patient Active Problem List   Diagnosis     Bulimia since 2008     ADHD (attention deficit hyperactivity disorder), inattentive type     Bulimia nervosa       Allergies: No Known Allergies       Current Medications     Current Outpatient Prescriptions Ordered in [facility]   Medication Sig Dispense Refill     FLUoxetine (PROZAC) 20 MG capsule Take 1 capsule (20 mg) by mouth daily 30 capsule 1     amphetamine-dextroamphetamine (ADDERALL XR) 30 MG per 24 hr capsule Take 1 capsule (30 mg) by mouth daily 30 capsule 0     fluticasone (FLONASE) 50 MCG/ACT nasal spray Spray 1 spray into both nostrils daily 1 Bottle 0     No current Epic-ordered facility-administered medications on file.         Mental Status Exam     Appearance:  Casually dressed and Adequately groomed  Behavior/relationship to examiner/demeanor:  Cooperative, Engaged and Pleasant  Orientation: Oriented to person, place, time and situation  Psychomotor: normal form  Speech Rate:  Normal  Speech Spontaneity:  Normal  Mood:  \"good\"  Affect:  " Appropriate/mood-congruent  Thought Process (Associations):  Goal directed  Thought Content:  no overt psychosis, denies suicidal ideation, intent or thoughts and patient does not appear to be responding to internal stimuli  Abnormal Perception:  None  Attention/Concentration:  Fair  Language:  Intact  Insight:  Good  Judgment:  Good      Results     Vital signs: /90  Pulse 69  Wt 52.9 kg (116 lb 9.6 oz)  BMI 20.33 kg/m2    Laboratory Data:  no new data    Assessment & Plan      Arlet Segura is seen today for follow up and reports has not noticed any change since the medication change, however has not had any binging or purging.  Pt has been on vacation, so difficult to determine if any improvement in anxiety.  Will continue current medication regimen to determine if any improvement in anxiety with return from vacation.  Some difficulty with waking up, but will continue to monitor.    Diagnosis  Axis 1: Bulimia, ADHD inattentive type  Axis 2: none  Axis 3: See problem list in the medical record      Plan:  Medication: Continue current medications  OTC Recommendations: none  Lab Orders:  none  Referrals: none  Release of Information: none  Future Treatment Considerations:per symptoms  Return for Follow Up:6 weeks   The risks, benefits, alternatives and side effects have been discussed and are understood by the patient. The patient understands the risks of using street drugs or alcohol. There are no medical contraindications, the patient agrees to treatment, and has the capacity to do so. The patient understands to call 911 or come to the nearest ED if life threatening or urgent symptoms present.  Over 50% of this time was spent counseling the patient and/or coordinating care regarding review of social and occupational functioning.  In addition patient was counseled on health and wellness practices to augment medication treatment of symptoms. See note for details.    Natalie Ortiz, Psychiatric/Mental  Health Nurse Practitioner Student, 6/8/2017      Eli Chambers, APRN CNS 6/8/2017

## 2017-07-05 ENCOUNTER — OFFICE VISIT (OUTPATIENT)
Dept: INTERNAL MEDICINE | Facility: CLINIC | Age: 32
End: 2017-07-05

## 2017-07-05 VITALS
BODY MASS INDEX: 20.91 KG/M2 | HEIGHT: 63 IN | SYSTOLIC BLOOD PRESSURE: 132 MMHG | RESPIRATION RATE: 20 BRPM | DIASTOLIC BLOOD PRESSURE: 89 MMHG | HEART RATE: 59 BPM | WEIGHT: 118 LBS

## 2017-07-05 DIAGNOSIS — E03.9 HYPOTHYROIDISM, UNSPECIFIED TYPE: ICD-10-CM

## 2017-07-05 DIAGNOSIS — Z12.4 SCREENING FOR MALIGNANT NEOPLASM OF CERVIX: Primary | ICD-10-CM

## 2017-07-05 DIAGNOSIS — L70.9 ACNE, UNSPECIFIED ACNE TYPE: ICD-10-CM

## 2017-07-05 PROBLEM — I73.00 RAYNAUD'S DISEASE WITHOUT GANGRENE: Status: ACTIVE | Noted: 2017-07-05

## 2017-07-05 ASSESSMENT — ENCOUNTER SYMPTOMS
CLAUDICATION: 0
WEIGHT LOSS: 0
DISTURBANCES IN COORDINATION: 0
ORTHOPNEA: 0
JAUNDICE: 0
WHEEZING: 0
MUSCLE CRAMPS: 0
DOUBLE VISION: 0
POLYPHAGIA: 0
STIFFNESS: 0
CHILLS: 0
TINGLING: 0
LEG SWELLING: 0
BREAST MASS: 0
BREAST PAIN: 0
SNORES LOUDLY: 0
LEG PAIN: 0
DECREASED LIBIDO: 0
POSTURAL DYSPNEA: 0
SHORTNESS OF BREATH: 0
WEAKNESS: 0
JOINT SWELLING: 0
NECK PAIN: 0
PARALYSIS: 0
TACHYCARDIA: 0
DIFFICULTY URINATING: 0
EXTREMITY NUMBNESS: 0
DIARRHEA: 0
SEIZURES: 0
BRUISES/BLEEDS EASILY: 0
EYE PAIN: 0
HEMATURIA: 0
ALTERED TEMPERATURE REGULATION: 0
TASTE DISTURBANCE: 0
MEMORY LOSS: 0
HEADACHES: 0
PANIC: 0
WEIGHT GAIN: 0
SMELL DISTURBANCE: 0
SINUS PAIN: 0
POOR WOUND HEALING: 0
LOSS OF CONSCIOUSNESS: 0
SORE THROAT: 0
DIZZINESS: 0
FATIGUE: 0
HYPOTENSION: 0
SKIN CHANGES: 0
VOMITING: 0
FLANK PAIN: 0
COUGH DISTURBING SLEEP: 0
NERVOUS/ANXIOUS: 0
NUMBNESS: 0
ARTHRALGIAS: 0
NECK MASS: 0
EXERCISE INTOLERANCE: 0
MUSCLE WEAKNESS: 0
SYNCOPE: 0
PALPITATIONS: 0
INSOMNIA: 0
RECTAL BLEEDING: 0
NAUSEA: 0
SINUS CONGESTION: 0
EYE REDNESS: 0
FEVER: 0
SWOLLEN GLANDS: 0
BLOOD IN STOOL: 0
BOWEL INCONTINENCE: 0
EYE IRRITATION: 0
NIGHT SWEATS: 0
TROUBLE SWALLOWING: 0
HEARTBURN: 0
DYSURIA: 0
SPUTUM PRODUCTION: 0
CONSTIPATION: 0
TREMORS: 0
SPEECH CHANGE: 0
RESPIRATORY PAIN: 0
DECREASED APPETITE: 0
HALLUCINATIONS: 0
RECTAL PAIN: 0
DECREASED CONCENTRATION: 0
HOT FLASHES: 0
COUGH: 0
EYE WATERING: 0
DYSPNEA ON EXERTION: 0
HYPERTENSION: 0
BLOATING: 0
BACK PAIN: 0
SLEEP DISTURBANCES DUE TO BREATHING: 0
INCREASED ENERGY: 0
LIGHT-HEADEDNESS: 0
DEPRESSION: 0
NAIL CHANGES: 0
ABDOMINAL PAIN: 0
HEMOPTYSIS: 0
POLYDIPSIA: 0
MYALGIAS: 0
HOARSE VOICE: 0

## 2017-07-05 ASSESSMENT — ACTIVITIES OF DAILY LIVING (ADL)
DO_MEMBERS_OF_YOUR_HOUSEHOLD_WEAR_SEAT_BELTS?: Y
ARE_THERE_SMOKE_DETECTORS_IN_YOUR_HOME?: Y
DO_MEMBERS_OF_YOUR_HOUSEHOLD_USE_SUNSCREEN?: Y
ARE_THERE_FIREARMS_IN_YOUR_HOME?: N
ARE_THERE_CARBON_MONOXIDE_DETECTORS_IN_YOUR_HOME?: Y
DO_MEMBERS_OF_YOUR_HOUSEHOLD_USE_SAFETY_HELMETS?: Y

## 2017-07-05 ASSESSMENT — PAIN SCALES - GENERAL: PAINLEVEL: NO PAIN (0)

## 2017-07-05 NOTE — PATIENT INSTRUCTIONS
Primary Care Center Medication Refill Request Information:  * Please contact your pharmacy regarding ANY request for medication refills.  ** [facility] Prescription Fax = [phone]  * Please allow 3 business days for routine medication refills.  * Please allow 5 business days for controlled substance medication refills.     Primary Care Center Test Result notification information:  *You will be notified within 7-10 days of your appointment day regarding the results of your test.  If you are on MyChart you will be notified as soon as the provider has reviewed the results and signed off on them.

## 2017-07-05 NOTE — NURSING NOTE
Chief Complaint   Patient presents with     Physical     Pt is here for a physical.      Niurka Lundberg LPN July 5, 2017 3:58 PM

## 2017-07-05 NOTE — MR AVS SNAPSHOT
After Visit Summary   7/5/2017    Arlet Segura    MRN: [id number]           Patient Information     Date Of Birth          1985        Visit Information        Provider Department      7/5/2017 4:00 PM Dorothy Holder MD [facility]        Today's Diagnoses     Screening for malignant neoplasm of cervix    -  1    Acne, unspecified acne type        Hypothyroidism, unspecified type          Care Instructions    Primary Care Center Medication Refill Request Information:  * Please contact your pharmacy regarding ANY request for medication refills.  ** [facility] Prescription Fax = [phone]  * Please allow 3 business days for routine medication refills.  * Please allow 5 business days for controlled substance medication refills.     Primary Care Center Test Result notification information:  *You will be notified within 7-10 days of your appointment day regarding the results of your test.  If you are on MyChart you will be notified as soon as the provider has reviewed the results and signed off on them.            Follow-ups after your visit        Your next 10 appointments already scheduled     Jul 20, 2017  8:15 AM CDT   Adult Med Follow UP with DRISS Garduno CNS   Psychiatry Clinic ([facility])    [address] [facility] [address] [phone]              Future tests that were ordered for you today     Open Future Orders        Priority Expected Expires Ordered    TSH with free T4 reflex Routine 7/5/2017 7/19/2017 7/5/2017            Who to contact     Please call your clinic at [phone] to:    Ask questions about your health    Make or cancel appointments    Discuss your medicines    Learn about your test results    Speak to your doctor   If you have compliments or concerns about an experience at your clinic, or if you wish to file a complaint, please contact [facility]  "Patient Relations at [phone] or email us at [email or web address]         Additional Information About Your Visit        [employer] Information     [employer] gives you secure access to your electronic health record. If you see a primary care provider, you can also send messages to your care team and make appointments. If you have questions, please call your primary care clinic.  If you do not have a primary care provider, please call [phone] and they will assist you.      [employer] is an electronic gateway that provides easy, online access to your medical records. With [employer], you can request a clinic appointment, read your test results, renew a prescription or communicate with your care team.     To access your existing account, please contact your [facility] or call [phone] for assistance.        Care EveryWhere ID     This is your Care EveryWhere ID. This could be used by other organizations to access your [place] medical records  [id number]        Your Vitals Were     Pulse Respirations Height Last Period BMI (Body Mass Index)       59 20 1.608 m (5' 3.31\") 06/21/2017 (Approximate) 20.7 kg/m2        Blood Pressure from Last 3 Encounters:   07/05/17 132/89   06/08/17 132/90   05/10/17 132/76    Weight from Last 3 Encounters:   07/05/17 53.5 kg (118 lb)   06/08/17 52.9 kg (116 lb 9.6 oz)   05/10/17 56.5 kg (124 lb 9.6 oz)              We Performed the Following     HPV High Risk Types DNA Cervical     Pap imaged thin layer screen with HPV - recommended age 30 - 65 years (select HPV order below)          Today's Medication Changes          These changes are accurate as of: 7/5/17  4:46 PM.  If you have any questions, ask your nurse or doctor.               Start taking these medicines.        Dose/Directions    tretinoin (Facial Wrinkles) 0.02 % Crea   Commonly known as:  RENOVA   Used for:  Acne, unspecified acne type   Started by:  Dorothy Holder, " MD        Dose:  0.5 inch   Apply 0.5 inches topically At Bedtime Spread a pea size amount into affected area.  Use sunscreen SPF>20.   Quantity:  60 g   Refills:  11            Where to get your medicines      These medications were sent to [facility] [place], [place] - [address]  [address], [facility] [address]     Phone:  [phone]     tretinoin (Facial Wrinkles) 0.02 % Crea                Primary Care Provider Office Phone # Fax #    Dorothy Myesha Holder [phone]       [facility] [address]  [facility] [address]        Equal Access to Services     JOSEFINA NIX : Hadii ingris ku hadasho Soomaali, waaxda luqadaha, qaybta kaalmada adeegyada, vidhi rhoadesin haydeisi rhodes. So [facility] [phone].    ATENCIÓN: Si habla español, tiene a varela disposición servicios gratuitos de asistencia lingüística. Llame al [phone].    We comply with applicable federal civil rights laws and [place] laws. We do not discriminate on the basis of race, color, national origin, age, disability sex, sexual orientation or gender identity.            Thank you!     Thank you for choosing [facility]  for your care. Our goal is always to provide you with excellent care. Hearing back from our patients is one way we can continue to improve our services. Please take a few minutes to complete the written survey that you may receive in the mail after your visit with us. Thank you!             Your Updated Medication List - Protect others around you: Learn how to safely use, store and throw away your medicines at www.disposemymeds.org.          This list is accurate as of: 7/5/17  4:46 PM.  Always use your most recent med list.                   Brand Name Dispense Instructions for use Diagnosis    * amphetamine-dextroamphetamine 30 MG per 24 hr capsule    ADDERALL XR    30 capsule    Take 1 capsule (30 mg) by mouth daily    ADHD (attention  deficit hyperactivity disorder), inattentive type       * amphetamine-dextroamphetamine 30 MG per 24 hr capsule   Start taking on:  7/6/2017    ADDERALL XR    30 capsule    Take 1 capsule (30 mg) by mouth daily    ADHD (attention deficit hyperactivity disorder), inattentive type       FLUoxetine 20 MG capsule    PROzac    30 capsule    Take 1 capsule (20 mg) by mouth daily    Bulimia nervosa       fluticasone 50 MCG/ACT spray    FLONASE    1 Bottle    Spray 1 spray into both nostrils daily    Acute non-recurrent frontal sinusitis       tretinoin (Facial Wrinkles) 0.02 % Crea    RENOVA    60 g    Apply 0.5 inches topically At Bedtime Spread a pea size amount into affected area.  Use sunscreen SPF>20.    Acne, unspecified acne type       * Notice:  This list has 2 medication(s) that are the same as other medications prescribed for you. Read the directions carefully, and ask your doctor or other care provider to review them with you.

## 2017-07-05 NOTE — PROGRESS NOTES
CC: Annual physical exam    HPI:    Arlet Segura is here for a routine physical.  She is overall doing well.  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  Very happy with the lab and the people she works with.    Working with Eli for bulemia.  She reports this is going very well.  Considering therapy again now that she is done with school, but meds have been very helpful.    Has had trouble with acne.  Reponded well to retinoids in the past.    Gyne:  Periods are regular  Not currently sexually active.  Used nuva ring in thepast.    Depression screen:  PHQ-2 Score:     PHQ-2 ( 1999 Pfizer) 3/12/2015 2/18/2014   Q1: Little interest or pleasure in doing things 0 1   Q2: Feeling down, depressed or hopeless 0 1   PHQ-2 Score 0 2       Tobacco screen:  History   Smoking Status     Never Smoker   Smokeless Tobacco     Never Used       Obesity screen:  Body mass index is 20.7 kg/(m^2).  Exercises regularly playing soccer    Review of Systems     Constitutional:  Negative for fever, chills, weight loss, weight gain, fatigue, decreased appetite, night sweats, recent stressors, height gain, height loss, post-operative complications, incisional pain, hallucinations, increased energy, hyperactivity and confused.   HENT:  Negative for ear pain, hearing loss, tinnitus, nosebleeds, trouble swallowing, hoarse voice, mouth sores, sore throat, ear discharge, tooth pain, gum tenderness, taste disturbance, smell disturbance, hearing aid, bleeding gums, dry mouth, sinus pain, sinus congestion and neck mass.    Eyes:  Negative for double vision, pain, redness, eye pain, decreased vision, eye watering, eye bulging, eye dryness, flashing lights, spots, floaters, strabismus, tunnel vision, jaundice and eye irritation.   Respiratory:   Negative for cough, hemoptysis, sputum production, shortness of breath, wheezing, sleep disturbances due to breathing, snores loudly, respiratory pain, dyspnea on exertion, cough  disturbing sleep and postural dyspnea.    Cardiovascular:  Negative for chest pain, dyspnea on exertion, palpitations, orthopnea, claudication, leg swelling, fingers/toes turn blue, hypertension, hypotension, syncope, history of heart murmur, chest pain on exertion, chest pain at rest, pacemaker, few scattered varicosities, leg pain, sleep disturbances due to breathing, tachycardia, light-headedness, exercise intolerance and edema.   Gastrointestinal:  Negative for heartburn, nausea, vomiting, abdominal pain, diarrhea, constipation, blood in stool, melena, rectal pain, bloating, hemorrhoids, bowel incontinence, jaundice, rectal bleeding, coffee ground emesis and change in stool.   Genitourinary:  Negative for bladder incontinence, dysuria, urgency, hematuria, flank pain, vaginal discharge, difficulty urinating, genital sores, dyspareunia, decreased libido, nocturia, voiding less frequently, arousal difficulty, abnormal vaginal bleeding, excessive menstruation, menstrual changes, hot flashes, vaginal dryness and postmenopausal bleeding.   Musculoskeletal:  Negative for myalgias, back pain, joint swelling, arthralgias, stiffness, muscle cramps, neck pain, bone pain, muscle weakness and fracture.   Skin:  Positive for acne and Raynaud's phenomenon. Negative for nail changes, itching, poor wound healing, rash, hair changes, skin changes, warts, poor wound healing, scarring, flaky skin, sensitivity to sunlight and skin thickening.   Neurological:  Negative for dizziness, tingling, tremors, speech change, seizures, loss of consciousness, weakness, light-headedness, numbness, headaches, disturbances in coordination, extremity numbness, memory loss, difficulty walking and paralysis.   Endo/Heme:  Negative for anemia, swollen glands and bruises/bleeds easily.   Psychiatric/Behavioral:  Negative for depression, hallucinations, memory loss, decreased concentration, mood swings and panic attacks.    Breast:  Negative for breast  "discharge, breast mass, breast pain and nipple retraction.   Endocrine:  Negative for altered temperature regulation, polyphagia, polydipsia, unwanted hair growth and change in facial hair.      Medications, allergies, family history, and social history were reviewed and updated in the chart    Past medical history was reviewed and updated  Patient Active Problem List   Diagnosis     Bulimia since 2008     ADHD (attention deficit hyperactivity disorder), inattentive type     Bulimia nervosa       OBJECTIVE:  Physical Exam:  /89  Pulse 59  Resp 20  Ht 1.608 m (5' 3.31\")  Wt 53.5 kg (118 lb)  LMP 06/21/2017 (Approximate)  BMI 20.7 kg/m2    GENERAL APPEARANCE: healthy, alert and no distress     EYES: EOMI     NECK: no adenopathy, no asymmetry, masses, or scars and thyroid normal to palpation     RESP: lungs clear to auscultation - no rales, rhonchi or wheezes     BREAST: normal without masses, tenderness or nipple discharge and no palpable axillary masses or adenopathy     CV: regular rates and rhythm, normal S1 S2, no S3 or S4 and no murmur, click or rub -     ABDOMEN:  soft, nontender, no HSM or masses and bowel sounds normal     : normal cervix, adnexae, and uterus without masses or discharge     MS: extremities normal- no gross deformities noted     SKIN: no suspicious lesions or rashes     NEURO:  mentation intact and speech normal     PSYCH: mentation appears normal. and affect normal/bright     LYMPHATICS: No axillary, cervical, inguinal, or supraclavicular nodes      ASSESSMENT/PLAN:  # Preventive Care Visit:     Screening for malignant neoplasm of cervix  - Pap imaged thin layer screen with HPV - recommended age 30 - 65 years (select HPV order below)  - HPV High Risk Types DNA Cervical    Acne, unspecified acne type  - tretinoin, Facial Wrinkles, (RENOVA) 0.02 % CREA  Dispense: 60 g; Refill: 11    Hypothyroidism, unspecified type  Just checked and normal.  Repeat in 1 year  - TSH with free T4 " reflex      RTC: prdanielle Holder MD

## 2017-07-11 ENCOUNTER — MYC MEDICAL ADVICE (OUTPATIENT)
Dept: INTERNAL MEDICINE | Facility: CLINIC | Age: 32
End: 2017-07-11

## 2017-07-11 DIAGNOSIS — L70.9 ACNE: Primary | ICD-10-CM

## 2017-07-11 LAB
COPATH REPORT: ABNORMAL
PAP: ABNORMAL

## 2017-07-12 LAB
FINAL DIAGNOSIS: ABNORMAL
HPV HR 12 DNA CVX QL NAA+PROBE: POSITIVE
HPV16 DNA SPEC QL NAA+PROBE: POSITIVE
HPV18 DNA SPEC QL NAA+PROBE: NEGATIVE
SPECIMEN DESCRIPTION: ABNORMAL

## 2017-07-13 ENCOUNTER — MYC MEDICAL ADVICE (OUTPATIENT)
Dept: INTERNAL MEDICINE | Facility: CLINIC | Age: 32
End: 2017-07-13

## 2017-07-13 NOTE — TELEPHONE ENCOUNTER
Can we check with the pharmacy as to what her insurance will cover?  I'm happy to increase the dose if needed.

## 2017-07-14 RX ORDER — TRETINOIN 0.25 MG/G
CREAM TOPICAL
Qty: 45 G | Refills: 11 | Status: SHIPPED | OUTPATIENT
Start: 2017-07-14 | End: 2019-07-08

## 2017-07-17 ENCOUNTER — OFFICE VISIT (OUTPATIENT)
Dept: OBGYN | Facility: CLINIC | Age: 32
End: 2017-07-17
Attending: OBSTETRICS & GYNECOLOGY
Payer: COMMERCIAL

## 2017-07-17 VITALS
BODY MASS INDEX: 20.55 KG/M2 | HEART RATE: 72 BPM | WEIGHT: 116 LBS | SYSTOLIC BLOOD PRESSURE: 118 MMHG | HEIGHT: 63 IN | DIASTOLIC BLOOD PRESSURE: 76 MMHG

## 2017-07-17 DIAGNOSIS — R87.810 CERVICAL HIGH RISK HUMAN PAPILLOMAVIRUS (HPV) DNA TEST POSITIVE: ICD-10-CM

## 2017-07-17 DIAGNOSIS — R87.612 PAPANICOLAOU SMEAR OF CERVIX WITH LOW GRADE SQUAMOUS INTRAEPITHELIAL LESION (LGSIL): Primary | ICD-10-CM

## 2017-07-17 PROCEDURE — 81025 URINE PREGNANCY TEST: CPT | Mod: ZF | Performed by: OBSTETRICS & GYNECOLOGY

## 2017-07-17 PROCEDURE — 57454 BX/CURETT OF CERVIX W/SCOPE: CPT | Mod: ZF | Performed by: OBSTETRICS & GYNECOLOGY

## 2017-07-17 PROCEDURE — 88305 TISSUE EXAM BY PATHOLOGIST: CPT | Performed by: OBSTETRICS & GYNECOLOGY

## 2017-07-17 NOTE — MR AVS SNAPSHOT
After Visit Summary   7/17/2017    Arlet Segura    MRN: [id number]           Patient Information     Date Of Birth          1985        Visit Information        Provider Department      7/17/2017 1:15 PM Kim Hernandez MD; [facility] RESOURCE PROCEDURE [facility]        Today's Diagnoses     Papanicolaou smear of cervix with low grade squamous intraepithelial lesion (LGSIL)    -  1    Cervical high risk human papillomavirus (HPV) DNA test positive          Care Instructions      Colposcopy Post-Procedure Patient Instructions      You may experience any of the following for a couple of days following colposcopy:    Mild cramping    Vaginal bleeding     Vaginal discharge that looks black and clumpy    Please call your healthcare provider if you have any of the following symptoms:    Fever--Temperature greater than 100 degrees    Cramping after 48 hours    Bleeding heavier than a normal period in the first 24-48 hours    If you are bleeding and soaking more than one pad an hour    Or any other worrisome problems.    We recommend that:    You use pads, not tampons for the bleeding.    You may resume sexual activity in 2-3 days, or after bleeding stops.    You may use Tylenol or ibuprofen (Motrin or Advil) for any discomfort.      Kim Hernandez MD          Follow-ups after your visit        Follow-up notes from your care team     Return if symptoms worsen or fail to improve, for pending Pathology results.      Your next 10 appointments already scheduled     Jul 31, 2017 10:30 AM CDT   Colposcopy with Dana Deshpande MD, RD PROC [facility] ([facility])    [address] [place] [id number] [phone]              Who to contact     Please call your clinic at [phone] to:    Ask questions about your health    Make or cancel appointments    Discuss your medicines    Learn about your test results    Speak to your  "doctor   If you have compliments or concerns about an experience at your clinic, or if you wish to file a complaint, please contact [facility] Patient Relations at [phone] or email us at [email or web address]         Additional Information About Your Visit        MyChart Information     [employer] gives you secure access to your electronic health record. If you see a primary care provider, you can also send messages to your care team and make appointments. If you have questions, please call your primary care clinic.  If you do not have a primary care provider, please call [phone] and they will assist you.      [employer] is an electronic gateway that provides easy, online access to your medical records. With [employer], you can request a clinic appointment, read your test results, renew a prescription or communicate with your care team.     To access your existing account, please contact your [facility] or call [phone] for assistance.        Care EveryWhere ID     This is your Care EveryWhere ID. This could be used by other organizations to access your [place] medical records  [id number]        Your Vitals Were     Pulse Height Last Period BMI (Body Mass Index)          72 1.608 m (5' 3.31\") 06/21/2017 (Approximate) 20.35 kg/m2         Blood Pressure from Last 3 Encounters:   07/17/17 118/76   07/05/17 132/89   05/04/17 131/87    Weight from Last 3 Encounters:   07/17/17 52.6 kg (116 lb)   07/05/17 53.5 kg (118 lb)   05/04/17 53.1 kg (117 lb)              We Performed the Following     Colposcopy Cervix/Upper Vagina with Biopsy of Cervix/Endocervical Curettage [10620]     hCG qual urine POCT [POC7]     Surgical pathology exam [[id number]]          Today's Medication Changes          These changes are accurate as of: 7/17/17 11:59 PM.  If you have any questions, ask your nurse or doctor.               These medicines have changed or have " updated prescriptions.        Dose/Directions    amphetamine-dextroamphetamine 30 MG per 24 hr capsule   Commonly known as:  ADDERALL XR   This may have changed:  Another medication with the same name was removed. Continue taking this medication, and follow the directions you see here.   Used for:  ADHD (attention deficit hyperactivity disorder), inattentive type   Changed by:  Eli Chambers APRN CNS        Dose:  30 mg   Take 1 capsule (30 mg) by mouth daily   Quantity:  30 capsule   Refills:  0         Stop taking these medicines if you haven't already. Please contact your care team if you have questions.     fluticasone 50 MCG/ACT spray   Commonly known as:  FLONASE   Stopped by:  Kim Hernandez MD                    Primary Care Provider Office Phone # Fax #    Dorothy Myesha Holder [phone]       [address]  [facility] [address]        Equal Access to Services     [facility]: Hadii aad ku hadasho Soomaali, waaxda luqadaha, qaybta kaalmada adeegyada, waxay jfin hayaan tito roldan . So [facility] [phone].    ATENCIÓN: Si habla español, tiene a varela disposición servicios gratuitos de asistencia lingüística. Cinthya al [phone].    We comply with applicable federal civil rights laws and [place] laws. We do not discriminate on the basis of race, color, national origin, age, disability sex, sexual orientation or gender identity.            Thank you!     Thank you for choosing [facility]  for your care. Our goal is always to provide you with excellent care. Hearing back from our patients is one way we can continue to improve our services. Please take a few minutes to complete the written survey that you may receive in the mail after your visit with us. Thank you!             Your Updated Medication List - Protect others around you: Learn how to safely use, store and throw away your medicines at www.disposemymeds.org.           This list is accurate as of: 7/17/17 11:59 PM.  Always use your most recent med list.                   Brand Name Dispense Instructions for use Diagnosis    amphetamine-dextroamphetamine 30 MG per 24 hr capsule    ADDERALL XR    30 capsule    Take 1 capsule (30 mg) by mouth daily    ADHD (attention deficit hyperactivity disorder), inattentive type       FLUoxetine 20 MG capsule    PROzac    30 capsule    Take 1 capsule (20 mg) by mouth daily    Bulimia nervosa       tretinoin 0.025 % cream    RETIN-A    45 g    Spread a pea size amount into affected area topically at bedtime.  Use sunscreen SPF>20.    Acne

## 2017-07-17 NOTE — PROGRESS NOTES
"Colposcopy Visit/Procedure Note:    Arlet Segura is an 32 year old, , who comes in for diagnosis of abnormal pap screen.  Patient had recent pap smear that was significant for LSIL and HPV 16 and other HR HPV positive status.  As such, she was referred here for evaluation with colposcopy.  Patient states she has a history of abnormal pap smear about 6 years ago but had colposcopy and had appropriate pap follow-up afterwards and all subsequent pap smears had been normal until this most recent pap smear.  Patient is familiar with the exam and desires to proceed with this today.     Menstrual History:  Menstrual History 2016   LAST MENSTRUAL PERIOD 2016       Lab Results   Component Value Date    PAP LSIL 2017    PAP NIL 2014       Last   Lab Results   Component Value Date    HPV16 Positive 2017     Last   Lab Results   Component Value Date    HPV18 Negative 2017     Last   Lab Results   Component Value Date    HRHPV Positive 2017     History   Smoking Status     Never Smoker   Smokeless Tobacco     Never Used       Allergies as of 2017     (No Known Allergies)      Colposcopy Procedure:    Consent:  Details of the colposcopic procedure were reviewed. Risks, benefits of treatment, and alternate forms of evaluation were discussed.  Patient's questions were elicited and answered.   Written consent was obtained and scanned into medical record.     Verification of Procedure  Just before the procedure begins, through verbal and active participation of team members, I verified:   Initials   Patient Name [facility]   Patient  [facility]   Procedure to be performed [facility]       OBJECTIVE: /76  Pulse 72  Ht 1.608 m (5' 3.31\")  Wt 52.6 kg (116 lb)  LMP 2017 (Approximate)  BMI 20.35 kg/m2    Pelvic Exam:  EG/BUS: Normal genital architecture without lesions, erythema or abnormal secretions. Bartholin's, Urethra, [place]'s glands are normal.   Vagina: moist, " pink, rugae with physiologic discharge  Cervix: Nulliparous,, no lesions and pink, moist, closed, without lesion or CMT    PROCEDURE:  Acetic acid applied to cervix.  Colposcopic exam of the cervix and apex of the vagina was conducted in the usual fashion.     Findings:  SCJ was not seen entirely and the exam unsatisfactory.    There were acetowhite area from 11:00 to 12:00, and 4-5 o'clock and punctation noted at 4:00    Biopsies were obtained at 11 o'clock and 4 o'clock and placed in labeled Formalin Jar.    ECC: was obtained and placed in labeled Formalin Jar.    Assessment: 31 yo nulligravid female presents for colposcopy secondary to recent pap smear significant for LSIL, HPV 16 and other HR HPV positive    Plan:   1) Colposcopy completed today and impression of CIN1/2, more concerning at 4 o'clock due to appearance in that area, but no obvious changes significant for malignancy.  Biopsies sent to pathology.  Will contact patient with results and recommended follow-up plan.  Will call patient with results at [phone] and discuss results and appropriate follow-up given results.  Patient advised to contact clinic with heavy vaginal bleeding, fever over 101 degrees F, or any other concerns.  Verbalized understanding and agreement with plan.    Kim Hernandez MD

## 2017-07-17 NOTE — LETTER
2017       RE: Arlet Segura  [address] [facility]  [address] [place] [address]     Dear Colleague,    Thank you for referring your patient, Arlet Segura, to the [facility] at [facility]. Please see a copy of my visit note below.    Colposcopy Visit/Procedure Note:    Arlet Segura is an 32 year old, , who comes in for diagnosis of abnormal pap screen.  Patient had recent pap smear that was significant for LSIL and HPV 16 and other HR HPV positive status.  As such, she was referred here for evaluation with colposcopy.  Patient states she has a history of abnormal pap smear about 6 years ago but had colposcopy and had appropriate pap follow-up afterwards and all subsequent pap smears had been normal until this most recent pap smear.  Patient is familiar with the exam and desires to proceed with this today.     Menstrual History:  Menstrual History 2016   LAST MENSTRUAL PERIOD 2016       Lab Results   Component Value Date    PAP LSIL 2017    PAP NIL 2014       Last   Lab Results   Component Value Date    HPV16 Positive 2017     Last   Lab Results   Component Value Date    HPV18 Negative 2017     Last   Lab Results   Component Value Date    HRHPV Positive 2017     History   Smoking Status     Never Smoker   Smokeless Tobacco     Never Used       Allergies as of 2017     (No Known Allergies)      Colposcopy Procedure:    Consent:  Details of the colposcopic procedure were reviewed. Risks, benefits of treatment, and alternate forms of evaluation were discussed.  Patient's questions were elicited and answered.   Written consent was obtained and scanned into medical record.     Verification of Procedure  Just before the procedure begins, through verbal and active participation of team members, I verified:   Initials   Patient Name [facility]   Patient  [facility]   Procedure to be performed [facility]  "      OBJECTIVE: /76  Pulse 72  Ht 1.608 m (5' 3.31\")  Wt 52.6 kg (116 lb)  LMP 06/21/2017 (Approximate)  BMI 20.35 kg/m2    Pelvic Exam:  EG/BUS: Normal genital architecture without lesions, erythema or abnormal secretions. Bartholin's, Urethra, [place]'s glands are normal.   Vagina: moist, pink, rugae with physiologic discharge  Cervix: Nulliparous,, no lesions and pink, moist, closed, without lesion or CMT    PROCEDURE:  Acetic acid applied to cervix.  Colposcopic exam of the cervix and apex of the vagina was conducted in the usual fashion.     Findings:  SCJ was not seen entirely and the exam unsatisfactory.    There were acetowhite area from 11:00 to 12:00, and 4-5 o'clock and punctation noted at 4:00    Biopsies were obtained at 11 o'clock and 4 o'clock and placed in labeled Formalin Jar.    ECC: was obtained and placed in labeled Formalin Jar.    Assessment: 33 yo nulligravid female presents for colposcopy secondary to recent pap smear significant for LSIL, HPV 16 and other HR HPV positive    Plan:   1) Colposcopy completed today and impression of CIN1/2, more concerning at 4 o'clock due to appearance in that area, but no obvious changes significant for malignancy.  Biopsies sent to pathology.  Will contact patient with results and recommended follow-up plan.  Will call patient with results at [phone] and discuss results and appropriate follow-up given results.  Patient advised to contact clinic with heavy vaginal bleeding, fever over 101 degrees F, or any other concerns.  Verbalized understanding and agreement with plan.    Kim Hernandez MD            "

## 2017-07-17 NOTE — PATIENT INSTRUCTIONS

## 2017-07-19 LAB — COPATH REPORT: NORMAL

## 2017-07-25 ENCOUNTER — TELEPHONE (OUTPATIENT)
Dept: OBGYN | Facility: CLINIC | Age: 32
End: 2017-07-25

## 2017-07-25 NOTE — TELEPHONE ENCOUNTER
Called patient with results of colposcopy showing CHRISTIANE-3 in one of the biopsies.  Recommend proceeding with LEEP.  Patient is agreeable and will call to schedule when works for her in the near future,  Kim Hernandez MD

## 2017-08-11 ENCOUNTER — TELEPHONE (OUTPATIENT)
Dept: PSYCHIATRY | Facility: CLINIC | Age: 32
End: 2017-08-11

## 2017-08-11 NOTE — TELEPHONE ENCOUNTER
T/C to patient to let her know that the appointment she was given today was an error and that time was to be blocked because I have a medical appointment. Advised to call and reschedule and contact nursing staff If a refill in needed since she may not be able to get in for several weeks. Patient failed appointment in July and also yesterday at 7:45 am.  Eli MAIN, APRN

## 2017-08-24 ENCOUNTER — OFFICE VISIT (OUTPATIENT)
Dept: OBGYN | Facility: CLINIC | Age: 32
End: 2017-08-24
Attending: OBSTETRICS & GYNECOLOGY
Payer: COMMERCIAL

## 2017-08-24 VITALS
HEART RATE: 59 BPM | HEIGHT: 63 IN | SYSTOLIC BLOOD PRESSURE: 123 MMHG | BODY MASS INDEX: 20.59 KG/M2 | DIASTOLIC BLOOD PRESSURE: 79 MMHG | WEIGHT: 116.2 LBS

## 2017-08-24 DIAGNOSIS — D06.1 CARCINOMA IN SITU OF EXOCERVIX: Primary | ICD-10-CM

## 2017-08-24 LAB
HCG UR QL: NEGATIVE
INTERNAL QC OK POCT: YES

## 2017-08-24 PROCEDURE — 88307 TISSUE EXAM BY PATHOLOGIST: CPT | Performed by: STUDENT IN AN ORGANIZED HEALTH CARE EDUCATION/TRAINING PROGRAM

## 2017-08-24 PROCEDURE — 99212 OFFICE O/P EST SF 10 MIN: CPT | Mod: ZF

## 2017-08-24 PROCEDURE — 57461 CONZ OF CERVIX W/SCOPE LEEP: CPT | Mod: ZF | Performed by: OBSTETRICS & GYNECOLOGY

## 2017-08-24 NOTE — MR AVS SNAPSHOT
After Visit Summary   8/24/2017    Arlet Segura    MRN: [id number]           Patient Information     Date Of Birth          1985        Visit Information        Provider Department      8/24/2017 8:30 AM Gabby Hanks MD; [facility] RESOURCE PROCEDURE [facility]        Today's Diagnoses     Carcinoma in situ of exocervix    -  1      Care Instructions      LEEP (LOOP EXCISION ELECTROCAUTERY PROCEDURE)    The LEEP procedure is done using a local anesthetic to numb the cervix.  While visualizing the area with a colposcopy, a small thing wire loopexcises the abnormal tissue from the cervix.  Cautery is used to control any bleeding.    POST-LEEP INSTRUCTIONS      Limit your activity to 1-2 days following the LEEP procedure.  Avoid strenuous exercises for one week.    To prevent infection or trauma to the surgical site, you should not put anything into the vagina for three weeks.  This means no tampons, no intercourse and no douching.    You may have brown, yellowish, pink or red discharge for a few days after the procedure.  As the cervix heals, your vaginal discharge may be clear and watery for a few weeks then gradually tapering off.    You may also experience some bleeding immediately after the LEEP procedure and/or again 7-10 days later as the cervical area heals.  The bleeding should not be heavier than a normal menses.    Do not be concerned if your next menses is delayed and/or the flow is heavier than normal.  This can also be due to the LEEP procedure.    CALL for any of the following concerns:    Fever over 101 degrees F.    Pain not controlled by over the counter analgesics such as Tylenol or Ibuprofen.    Foul-smelling discharge.    Bleeding: more than one pad an hour for two hours.    Any questions regarding the LEEP procedure.    [facility]: [phone] 24 hours a day *Ask for the MD on call          Follow-ups after your visit       "  Follow-up notes from your care team     Return if symptoms worsen or fail to improve, for pending Pathology results.      Your next 10 appointments already scheduled     Aug 29, 2017  8:15 AM CDT   Adult Med Follow UP with DRISS Garduno CNS   Psychiatry Clinic ([facility])    [address]  [address] [facility] [id number] [phone]              Who to contact     Please call your clinic at [phone] to:    Ask questions about your health    Make or cancel appointments    Discuss your medicines    Learn about your test results    Speak to your doctor   If you have compliments or concerns about an experience at your clinic, or if you wish to file a complaint, please contact [facility] Patient Relations at [phone] or email us at [email or web address]         Additional Information About Your Visit        Must See [employer] Information     [employer] gives you secure access to your electronic health record. If you see a primary care provider, you can also send messages to your care team and make appointments. If you have questions, please call your primary care clinic.  If you do not have a primary care provider, please call [phone] and they will assist you.      [employer] is an electronic gateway that provides easy, online access to your medical records. With [employer], you can request a clinic appointment, read your test results, renew a prescription or communicate with your care team.     To access your existing account, please contact your [facility] or call [phone] for assistance.        Care EveryWhere ID     This is your Care EveryWhere ID. This could be used by other organizations to access your [facility] medical records  [id number]        Your Vitals Were     Pulse Height Last Period BMI (Body Mass Index)          59 1.608 m (5' 3.31\") 08/15/2017 (Exact Date) 20.38 kg/m2         " Blood Pressure from Last 3 Encounters:   08/24/17 123/79   07/17/17 118/76   07/05/17 132/89    Weight from Last 3 Encounters:   08/24/17 52.7 kg (116 lb 3.2 oz)   07/17/17 52.6 kg (116 lb)   07/05/17 53.5 kg (118 lb)              We Performed the Following     COLP CERVIX/UPPER VAGINA W LOOP ELEC CONIZATION CERVIX [96537]     Surgical pathology exam [[id number]]        Primary Care Provider Office Phone # Fax #    Dorothy Myesha Holder [phone]       [address] [place] [address] [address]  [facility] [address]        Equal Access to Services     JOSEFINA NIX : Hadii ingris barono Soganesh, waaxda farrahqadaha, qaybta kaalmada adefelisha, vidhi rhodes. So [facility] [phone].    ATENCIÓN: Si habla español, tiene a varela disposición servicios gratuitos de asistencia lingüística. Llame al [phone].    We comply with applicable federal civil rights laws and [place] laws. We do not discriminate on the basis of race, color, national origin, age, disability sex, sexual orientation or gender identity.            Thank you!     Thank you for choosing [facility]  for your care. Our goal is always to provide you with excellent care. Hearing back from our patients is one way we can continue to improve our services. Please take a few minutes to complete the written survey that you may receive in the mail after your visit with us. Thank you!             Your Updated Medication List - Protect others around you: Learn how to safely use, store and throw away your medicines at www.disposemymeds.org.          This list is accurate as of: 8/24/17  3:20 PM.  Always use your most recent med list.                   Brand Name Dispense Instructions for use Diagnosis    amphetamine-dextroamphetamine 30 MG per 24 hr capsule    ADDERALL XR    30 capsule    Take 1 capsule (30 mg) by mouth daily    ADHD (attention deficit hyperactivity disorder), inattentive type       FLUoxetine 20 MG  capsule    PROzac    60 capsule    Take 2 capsules (40 mg) by mouth daily    Bulimia nervosa       tretinoin 0.025 % cream    RETIN-A    45 g    Spread a pea size amount into affected area topically at bedtime.  Use sunscreen SPF>20.    Acne

## 2017-08-24 NOTE — PATIENT INSTRUCTIONS
LEEP (LOOP EXCISION ELECTROCAUTERY PROCEDURE)    The LEEP procedure is done using a local anesthetic to numb the cervix.  While visualizing the area with a colposcopy, a small thing wire loopexcises the abnormal tissue from the cervix.  Cautery is used to control any bleeding.    POST-LEEP INSTRUCTIONS      Limit your activity to 1-2 days following the LEEP procedure.  Avoid strenuous exercises for one week.    To prevent infection or trauma to the surgical site, you should not put anything into the vagina for three weeks.  This means no tampons, no intercourse and no douching.    You may have brown, yellowish, pink or red discharge for a few days after the procedure.  As the cervix heals, your vaginal discharge may be clear and watery for a few weeks then gradually tapering off.    You may also experience some bleeding immediately after the LEEP procedure and/or again 7-10 days later as the cervical area heals.  The bleeding should not be heavier than a normal menses.    Do not be concerned if your next menses is delayed and/or the flow is heavier than normal.  This can also be due to the LEEP procedure.    CALL for any of the following concerns:    Fever over 101 degrees F.    Pain not controlled by over the counter analgesics such as Tylenol or Ibuprofen.    Foul-smelling discharge.    Bleeding: more than one pad an hour for two hours.    Any questions regarding the LEEP procedure.    [facility]: [phone] 24 hours a day *Ask for the MD on call

## 2017-08-24 NOTE — PROGRESS NOTES
"  LEEP NOTE    Arlet Segura is a 33yo  who recently had a pap smear significant for LSIL and HPV 16 and other HR HPV positive status. She then had a colposcopy with cervical biopsy showing CHRISTIANE 3 and was recommended for follow up with a LEEP procedure.     Menstrual History:  Menstrual History 2016   LAST MENSTRUAL PERIOD 2016 2017 8/15/2017       Time Out - \"Pause for the Cause\"  Just before the procedure begins, through verbal and active participation of team members, verify:   Initials   Patient Name mlz   Patient  mlz   Procedure to be performed [facility]                     Faculty:  Dr. Hanks  Resident:  Valorie Mccormack, PGY-1    Indication/History:  CHRISTIANE 3, HPV16 and other HR HPV+    Consent: Verbal and written consent obtained, prior to sedative, discussed risks and benefits of treatment including damage to surrounding tissues, increased risk of  labor, bleeding, infection, and pain and no treatment including risk of dysplasia progression.    UPT: Negative    Colposcopy findings: Decreased uptake of Lugol's solution from 8:00 to 10:00 and at 12:00.     Anesthesia: Intracervical block using 1% Lidocaine with EPI in radial pattern: 15 mL    Prep: Lugol's solution    EBL:  2 mL   Complications:  none    Settings:   Cut 50      Coag 50       Blend 1       LOOP size/type: 12x15  Post LEEP ECC was not done:     Bleeding controlled with ball tipped cautery and Monsel's solution.    Pathology:  Jar #1:  cervix  Jar #2:  Posterior lip of cervix      Follow up:     Discharge Instructions:  Nothing in the vagina and no swimming for at least 3 weeks  Call if bleeding > 1 pph, fever, abdominal pain, foul smelling discharge.      Valorie Mccormack MD  OB/Gyn PGY-1  2017    I was present and participated for entire procedure.   Gabby Hanks MD    "

## 2017-08-24 NOTE — LETTER
"2017       RE: Arlet Segura  [address]  [address] [place] [address]     Dear Colleague,    Thank you for referring your patient, Arlet Segura, to the [facility] at [facility]. Please see a copy of my visit note below.      LEEP NOTE    Arlet Segura is a 31yo  who recently had a pap smear significant for LSIL and HPV 16 and other HR HPV positive status. She then had a colposcopy with cervical biopsy showing CHRISTIANE 3 and was recommended for follow up with a LEEP procedure.     Menstrual History:  Menstrual History 2016   LAST MENSTRUAL PERIOD 2016 2017 8/15/2017       Time Out - \"Pause for the Cause\"  Just before the procedure begins, through verbal and active participation of team members, verify:   Initials   Patient Name z   Patient  mlz   Procedure to be performed [facility]                Faculty:  Dr. Hanks  Resident:  Valorie Mccormack, PGY-1    Indication/History:  CHRISTIANE 3, HPV16 and other HR HPV+    Consent: Verbal and written consent obtained, prior to sedative, discussed risks and benefits of treatment including damage to surrounding tissues, increased risk of  labor, bleeding, infection, and pain and no treatment including risk of dysplasia progression.    UPT: Negative    Colposcopy findings: Decreased uptake of Lugol's solution from 8:00 to 10:00 and at 12:00.     Anesthesia: Intracervical block using 1% Lidocaine with EPI in radial pattern: 15 mL    Prep: Lugol's solution    EBL:  2 mL   Complications:  none    Settings:   Cut 50      Coag 50       Blend 1       LOOP size/type: 12x15  Post LEEP ECC was not done:     Bleeding controlled with ball tipped cautery and Monsel's solution.    Pathology:  Jar #1:  cervix  Jar #2:  Posterior lip of cervix      Follow up:     Discharge Instructions:  Nothing in the vagina and no swimming for at least 3 weeks  Call if bleeding > 1 pph, fever, " abdominal pain, foul smelling discharge.      Valorie Mccormack MD  OB/Gyn PGY-1  8/24/2017    I was present and participated for entire procedure.   Gabby Hanks MD

## 2017-08-29 ENCOUNTER — OFFICE VISIT (OUTPATIENT)
Dept: PSYCHIATRY | Facility: CLINIC | Age: 32
End: 2017-08-29
Attending: CLINICAL NURSE SPECIALIST
Payer: COMMERCIAL

## 2017-08-29 VITALS
WEIGHT: 118.8 LBS | HEART RATE: 76 BPM | BODY MASS INDEX: 20.84 KG/M2 | DIASTOLIC BLOOD PRESSURE: 88 MMHG | SYSTOLIC BLOOD PRESSURE: 145 MMHG

## 2017-08-29 DIAGNOSIS — F90.0 ADHD (ATTENTION DEFICIT HYPERACTIVITY DISORDER), INATTENTIVE TYPE: Primary | ICD-10-CM

## 2017-08-29 DIAGNOSIS — Z86.39 H/O IRON DEFICIENCY: ICD-10-CM

## 2017-08-29 DIAGNOSIS — F50.20 BULIMIA NERVOSA: ICD-10-CM

## 2017-08-29 LAB
COPATH REPORT: NORMAL
CRP SERPL-MCNC: <2.9 MG/L (ref 0–8)
ERYTHROCYTE [DISTWIDTH] IN BLOOD BY AUTOMATED COUNT: 13.3 % (ref 10–15)
FERRITIN SERPL-MCNC: 6 NG/ML (ref 12–150)
HCT VFR BLD AUTO: 40.5 % (ref 35–47)
HGB BLD-MCNC: 12.9 G/DL (ref 11.7–15.7)
IRON SATN MFR SERPL: 13 % (ref 15–46)
IRON SERPL-MCNC: 61 UG/DL (ref 35–180)
MCH RBC QN AUTO: 27.9 PG (ref 26.5–33)
MCHC RBC AUTO-ENTMCNC: 31.9 G/DL (ref 31.5–36.5)
MCV RBC AUTO: 88 FL (ref 78–100)
PLATELET # BLD AUTO: 425 10E9/L (ref 150–450)
RBC # BLD AUTO: 4.62 10E12/L (ref 3.8–5.2)
TIBC SERPL-MCNC: 452 UG/DL (ref 240–430)
WBC # BLD AUTO: 6.7 10E9/L (ref 4–11)

## 2017-08-29 PROCEDURE — 36415 COLL VENOUS BLD VENIPUNCTURE: CPT | Performed by: CLINICAL NURSE SPECIALIST

## 2017-08-29 PROCEDURE — 86140 C-REACTIVE PROTEIN: CPT | Performed by: CLINICAL NURSE SPECIALIST

## 2017-08-29 PROCEDURE — 83540 ASSAY OF IRON: CPT | Performed by: CLINICAL NURSE SPECIALIST

## 2017-08-29 PROCEDURE — 82728 ASSAY OF FERRITIN: CPT | Performed by: CLINICAL NURSE SPECIALIST

## 2017-08-29 PROCEDURE — 99212 OFFICE O/P EST SF 10 MIN: CPT | Mod: ZF

## 2017-08-29 PROCEDURE — 85027 COMPLETE CBC AUTOMATED: CPT | Performed by: CLINICAL NURSE SPECIALIST

## 2017-08-29 PROCEDURE — 83550 IRON BINDING TEST: CPT | Performed by: CLINICAL NURSE SPECIALIST

## 2017-08-29 RX ORDER — DEXTROAMPHETAMINE SACCHARATE, AMPHETAMINE ASPARTATE MONOHYDRATE, DEXTROAMPHETAMINE SULFATE AND AMPHETAMINE SULFATE 7.5; 7.5; 7.5; 7.5 MG/1; MG/1; MG/1; MG/1
30 CAPSULE, EXTENDED RELEASE ORAL DAILY
Qty: 30 CAPSULE | Refills: 0 | Status: SHIPPED | OUTPATIENT
Start: 2017-09-11 | End: 2017-12-20 | Stop reason: DRUGHIGH

## 2017-08-29 RX ORDER — DEXTROAMPHETAMINE SACCHARATE, AMPHETAMINE ASPARTATE MONOHYDRATE, DEXTROAMPHETAMINE SULFATE AND AMPHETAMINE SULFATE 7.5; 7.5; 7.5; 7.5 MG/1; MG/1; MG/1; MG/1
30 CAPSULE, EXTENDED RELEASE ORAL DAILY
Qty: 30 CAPSULE | Refills: 0 | Status: SHIPPED | OUTPATIENT
Start: 2017-10-09 | End: 2017-12-20 | Stop reason: DRUGHIGH

## 2017-08-29 RX ORDER — FLUOXETINE 40 MG/1
40 CAPSULE ORAL DAILY
Qty: 30 CAPSULE | Refills: 2 | Status: SHIPPED | OUTPATIENT
Start: 2017-08-29 | End: 2017-12-20

## 2017-08-29 RX ORDER — DEXTROAMPHETAMINE SACCHARATE, AMPHETAMINE ASPARTATE MONOHYDRATE, DEXTROAMPHETAMINE SULFATE AND AMPHETAMINE SULFATE 7.5; 7.5; 7.5; 7.5 MG/1; MG/1; MG/1; MG/1
30 CAPSULE, EXTENDED RELEASE ORAL DAILY
Qty: 30 CAPSULE | Refills: 0 | Status: SHIPPED | OUTPATIENT
Start: 2017-11-06 | End: 2017-12-20 | Stop reason: DRUGHIGH

## 2017-08-29 NOTE — MR AVS SNAPSHOT
After Visit Summary   8/29/2017    Arlet Segura    MRN: [id number]           Patient Information     Date Of Birth          1985        Visit Information        Provider Department      8/29/2017 8:15 AM Eli Chambers APRN [facility]        Today's Diagnoses     ADHD (attention deficit hyperactivity disorder), inattentive type    -  1    Bulimia nervosa        H/O iron deficiency           Follow-ups after your visit        Follow-up notes from your care team     Return in about 3 months (around 11/29/2017).      Your next 10 appointments already scheduled     Nov 21, 2017  8:15 AM [facility]   Adult Med Follow UP with DRISS Garduno CNS   Psychiatry Clinic ([facility])    [address] [facility] [address] [phone]              Who to contact     Please call your clinic at [phone] to:    Ask questions about your health    Make or cancel appointments    Discuss your medicines    Learn about your test results    Speak to your doctor   If you have compliments or concerns about an experience at your clinic, or if you wish to file a complaint, please contact [facility] Patient Relations at [phone] or email us at [email or web address]         Additional Information About Your Visit        MyChart Information     [employer] gives you secure access to your electronic health record. If you see a primary care provider, you can also send messages to your care team and make appointments. If you have questions, please call your primary care clinic.  If you do not have a primary care provider, please call [phone] and they will assist you.      [employer] is an electronic gateway that provides easy, online access to your medical records. With [employer], you can request a clinic appointment, read your test results, renew a prescription or communicate with your care team.     To  access your existing account, please contact your [facility] or call [phone] for assistance.        Care EveryWhere ID     This is your Care EveryWhere ID. This could be used by other organizations to access your [facility] [place] medical records  [id number]        Your Vitals Were     Pulse Last Period BMI (Body Mass Index)             76 08/15/2017 (Exact Date) 20.84 kg/m2          Blood Pressure from Last 3 Encounters:   08/29/17 145/88   08/24/17 123/79   07/17/17 118/76    Weight from Last 3 Encounters:   08/29/17 53.9 kg (118 lb 12.8 oz)   08/24/17 52.7 kg (116 lb 3.2 oz)   07/17/17 52.6 kg (116 lb)              We Performed the Following     CBC with platelets     CRP inflammation     Ferritin     Iron and iron binding capacity          Today's Medication Changes          These changes are accurate as of: 8/29/17 11:01 AM.  If you have any questions, ask your nurse or doctor.               These medicines have changed or have updated prescriptions.        Dose/Directions    * amphetamine-dextroamphetamine 30 MG per 24 hr capsule   Commonly known as:  ADDERALL XR   This may have changed:  You were already taking a medication with the same name, and this prescription was added. Make sure you understand how and when to take each.   Used for:  ADHD (attention deficit hyperactivity disorder), inattentive type   Changed by:  Eli Chambers APRN CNS        Dose:  30 mg   Start taking on:  9/11/2017   Take 1 capsule (30 mg) by mouth daily   Quantity:  30 capsule   Refills:  0       * amphetamine-dextroamphetamine 30 MG per 24 hr capsule   Commonly known as:  ADDERALL XR   This may have changed:  You were already taking a medication with the same name, and this prescription was added. Make sure you understand how and when to take each.   Used for:  ADHD (attention deficit hyperactivity disorder), inattentive type   Changed by:  Eli Chambers APRN CNS        Dose:  30 mg    Start taking on:  10/9/2017   Take 1 capsule (30 mg) by mouth daily   Quantity:  30 capsule   Refills:  0       * amphetamine-dextroamphetamine 30 MG per 24 hr capsule   Commonly known as:  ADDERALL XR   This may have changed:  Another medication with the same name was added. Make sure you understand how and when to take each.   Used for:  ADHD (attention deficit hyperactivity disorder), inattentive type   Changed by:  Eli Chambers APRN CNS        Dose:  30 mg   Start taking on:  11/6/2017   Take 1 capsule (30 mg) by mouth daily   Quantity:  30 capsule   Refills:  0       FLUoxetine 40 MG capsule   Commonly known as:  PROzac   This may have changed:  medication strength   Used for:  Bulimia nervosa   Changed by:  Eli Chambers APRN CNS        Dose:  40 mg   Take 1 capsule (40 mg) by mouth daily   Quantity:  30 capsule   Refills:  2       * Notice:  This list has 3 medication(s) that are the same as other medications prescribed for you. Read the directions carefully, and ask your doctor or other care provider to review them with you.         Where to get your medicines      These medications were sent to [facility] [address]  [address] [facility] [address] [address]     Phone:  [phone]     FLUoxetine 40 MG capsule         Some of these will need a paper prescription and others can be bought over the counter.  Ask your nurse if you have questions.     Bring a paper prescription for each of these medications     amphetamine-dextroamphetamine 30 MG per 24 hr capsule    amphetamine-dextroamphetamine 30 MG per 24 hr capsule    amphetamine-dextroamphetamine 30 MG per 24 hr capsule                Primary Care Provider Office Phone # Fax #    Dorothy Holder [phone]       [address] [facility] [address]  [facility] [address]        Equal Access to Services     JOSEFINA NIX AH: lauri Albright qaybta  vidhi rubiogi roldan ah. [facility] [phone].    ATENCIÓN: Si mie murguia, tiene a varela disposición servicios gratuitos de asistencia lingüística. Cinthya al [phone].    We comply with applicable federal civil rights laws and [place] laws. We do not discriminate on the basis of race, color, national origin, age, disability sex, sexual orientation or gender identity.            Thank you!     Thank you for choosing PSYCHIATRY CLINIC  for your care. Our goal is always to provide you with excellent care. Hearing back from our patients is one way we can continue to improve our services. Please take a few minutes to complete the written survey that you may receive in the mail after your visit with us. Thank you!             Your Updated Medication List - Protect others around you: Learn how to safely use, store and throw away your medicines at www.disposemymeds.org.          This list is accurate as of: 8/29/17 11:01 AM.  Always use your most recent med list.                   Brand Name Dispense Instructions for use Diagnosis    * amphetamine-dextroamphetamine 30 MG per 24 hr capsule   Start taking on:  9/11/2017    ADDERALL XR    30 capsule    Take 1 capsule (30 mg) by mouth daily    ADHD (attention deficit hyperactivity disorder), inattentive type       * amphetamine-dextroamphetamine 30 MG per 24 hr capsule   Start taking on:  10/9/2017    ADDERALL XR    30 capsule    Take 1 capsule (30 mg) by mouth daily    ADHD (attention deficit hyperactivity disorder), inattentive type       * amphetamine-dextroamphetamine 30 MG per 24 hr capsule   Start taking on:  11/6/2017    ADDERALL XR    30 capsule    Take 1 capsule (30 mg) by mouth daily    ADHD (attention deficit hyperactivity disorder), inattentive type       FLUoxetine 40 MG capsule    PROzac    30 capsule    Take 1 capsule (40 mg) by mouth daily    Bulimia nervosa       tretinoin 0.025 % cream    RETIN-A    45 g    Spread a  pea size amount into affected area topically at bedtime.  Use sunscreen SPF>20.    Acne       * Notice:  This list has 3 medication(s) that are the same as other medications prescribed for you. Read the directions carefully, and ask your doctor or other care provider to review them with you.
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
## 2017-08-29 NOTE — NURSING NOTE
Chief Complaint   Patient presents with     Recheck Medication     ADHD     Reviewed allergies, smoking status, and pharmacy preference  Administered abuse screening question  Obtained weight, blood pressure and heart rate

## 2017-12-20 ENCOUNTER — OFFICE VISIT (OUTPATIENT)
Dept: PSYCHIATRY | Facility: CLINIC | Age: 32
End: 2017-12-20
Attending: CLINICAL NURSE SPECIALIST
Payer: COMMERCIAL

## 2017-12-20 VITALS
DIASTOLIC BLOOD PRESSURE: 76 MMHG | HEART RATE: 81 BPM | SYSTOLIC BLOOD PRESSURE: 126 MMHG | BODY MASS INDEX: 19.79 KG/M2 | WEIGHT: 112.8 LBS

## 2017-12-20 DIAGNOSIS — F90.0 ADHD (ATTENTION DEFICIT HYPERACTIVITY DISORDER), INATTENTIVE TYPE: Primary | ICD-10-CM

## 2017-12-20 DIAGNOSIS — F50.20 BULIMIA NERVOSA: ICD-10-CM

## 2017-12-20 PROCEDURE — 99212 OFFICE O/P EST SF 10 MIN: CPT | Mod: ZF

## 2017-12-20 RX ORDER — FLUOXETINE 40 MG/1
40 CAPSULE ORAL DAILY
Qty: 90 CAPSULE | Refills: 2 | Status: SHIPPED | OUTPATIENT
Start: 2017-12-20 | End: 2019-07-08

## 2017-12-20 RX ORDER — DEXTROAMPHETAMINE SACCHARATE, AMPHETAMINE ASPARTATE MONOHYDRATE, DEXTROAMPHETAMINE SULFATE AND AMPHETAMINE SULFATE 5; 5; 5; 5 MG/1; MG/1; MG/1; MG/1
20 CAPSULE, EXTENDED RELEASE ORAL DAILY
Qty: 30 CAPSULE | Refills: 0 | Status: SHIPPED | OUTPATIENT
Start: 2017-12-20 | End: 2018-01-19 | Stop reason: ALTCHOICE

## 2017-12-20 ASSESSMENT — PATIENT HEALTH QUESTIONNAIRE - PHQ9: SUM OF ALL RESPONSES TO PHQ QUESTIONS 1-9: 3
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
## 2017-12-20 NOTE — MR AVS SNAPSHOT
After Visit Summary   12/20/2017    Arlet Segura    MRN: [id number]           Patient Information     Date Of Birth          1985        Visit Information        Provider Department      12/20/2017 8:15 AM Eli Chambers APRN [facility]        Today's Diagnoses     ADHD (attention deficit hyperactivity disorder), inattentive type    -  1    Bulimia nervosa           Follow-ups after your visit        Follow-up notes from your care team     Return in about 4 weeks (around 1/17/2018).      Who to contact     Please call your clinic at [phone] to:    Ask questions about your health    Make or cancel appointments    Discuss your medicines    Learn about your test results    Speak to your doctor   If you have compliments or concerns about an experience at your clinic, or if you wish to file a complaint, please contact [facility] Patient Relations at [phone] or email us at [email or web address]         Additional Information About Your Visit        MyChart Information     Riiid gives you secure access to your electronic health record. If you see a primary care provider, you can also send messages to your care team and make appointments. If you have questions, please call your primary care clinic.  If you do not have a primary care provider, please call [phone] and they will assist you.      Riiid is an electronic gateway that provides easy, online access to your medical records. With Riiid, you can request a clinic appointment, read your test results, renew a prescription or communicate with your care team.     To access your existing account, please contact your [facility] or call [phone] for assistance.        Care EveryWhere ID     This is your Care EveryWhere ID. This could be used by other organizations to access your [facility] medical records  [id number]        Your Vitals Were      Pulse BMI (Body Mass Index)                81 19.79 kg/m2           Blood Pressure from Last 3 Encounters:   12/20/17 126/76   08/29/17 145/88   08/24/17 123/79    Weight from Last 3 Encounters:   12/20/17 51.2 kg (112 lb 12.8 oz)   08/29/17 53.9 kg (118 lb 12.8 oz)   08/24/17 52.7 kg (116 lb 3.2 oz)              Today, you had the following     No orders found for display         Today's Medication Changes          These changes are accurate as of: 12/20/17 11:59 PM.  If you have any questions, ask your nurse or doctor.               Start taking these medicines.        Dose/Directions    amphetamine-dextroamphetamine 20 MG per 24 hr capsule   Commonly known as:  ADDERALL XR   Used for:  ADHD (attention deficit hyperactivity disorder), inattentive type   Replaces:  amphetamine-dextroamphetamine 30 MG per 24 hr capsule   Started by:  Eli Chambers APRN CNS        Dose:  20 mg   Take 1 capsule (20 mg) by mouth daily   Quantity:  30 capsule   Refills:  0         Stop taking these medicines if you haven't already. Please contact your care team if you have questions.     amphetamine-dextroamphetamine 30 MG per 24 hr capsule   Commonly known as:  ADDERALL XR   Replaced by:  amphetamine-dextroamphetamine 20 MG per 24 hr capsule   Stopped by:  Eli Chambers APRN CNS                Where to get your medicines      These medications were sent to [facility] [place], [place] - [address]  [address] [address]     Phone:  [phone]     FLUoxetine 40 MG capsule         Some of these will need a paper prescription and others can be bought over the counter.  Ask your nurse if you have questions.     Bring a paper prescription for each of these medications     amphetamine-dextroamphetamine 20 MG per 24 hr capsule                Primary Care Provider Office Phone # Fax #    Dorothy Holder [phone]       [address] [place] [address]  7[address]  [facility] [address]        Equal Access to Services     [facility] DINAH : Hadii ingris moreno loraineelena Ryderali, wageorgida jacquesjavidha, qakavonta rebeccajaunvidhi mohamud. So [facility] [phone].    ATENCIÓN: Si habla español, tiene a varela disposición servicios gratuitos de asistencia lingüística. Domoniqueame al [phone].    We comply with applicable federal civil rights laws and [place] laws. We do not discriminate on the basis of race, color, national origin, age, disability, sex, sexual orientation, or gender identity.            Thank you!     Thank you for choosing [facility]  for your care. Our goal is always to provide you with excellent care. Hearing back from our patients is one way we can continue to improve our services. Please take a few minutes to complete the written survey that you may receive in the mail after your visit with us. Thank you!             Your Updated Medication List - Protect others around you: Learn how to safely use, store and throw away your medicines at www.disposemymeds.org.          This list is accurate as of: 12/20/17 11:59 PM.  Always use your most recent med list.                   Brand Name Dispense Instructions for use Diagnosis    amphetamine-dextroamphetamine 20 MG per 24 hr capsule    ADDERALL XR    30 capsule    Take 1 capsule (20 mg) by mouth daily    ADHD (attention deficit hyperactivity disorder), inattentive type       FLUoxetine 40 MG capsule    PROzac    90 capsule    Take 1 capsule (40 mg) by mouth daily    Bulimia nervosa       tretinoin 0.025 % cream    RETIN-A    45 g    Spread a pea size amount into affected area topically at bedtime.  Use sunscreen SPF>20.    Acne

## 2018-01-11 ENCOUNTER — MYC REFILL (OUTPATIENT)
Dept: PSYCHIATRY | Facility: CLINIC | Age: 33
End: 2018-01-11

## 2018-01-11 DIAGNOSIS — F90.0 ADHD (ATTENTION DEFICIT HYPERACTIVITY DISORDER), INATTENTIVE TYPE: ICD-10-CM

## 2018-01-11 DIAGNOSIS — F50.20 BULIMIA NERVOSA: ICD-10-CM

## 2018-01-11 RX ORDER — DEXTROAMPHETAMINE SACCHARATE, AMPHETAMINE ASPARTATE MONOHYDRATE, DEXTROAMPHETAMINE SULFATE AND AMPHETAMINE SULFATE 5; 5; 5; 5 MG/1; MG/1; MG/1; MG/1
20 CAPSULE, EXTENDED RELEASE ORAL DAILY
Qty: 30 CAPSULE | Refills: 0 | Status: CANCELLED | OUTPATIENT
Start: 2018-01-11

## 2018-01-11 RX ORDER — FLUOXETINE 40 MG/1
40 CAPSULE ORAL DAILY
Qty: 90 CAPSULE | Refills: 2 | Status: CANCELLED | OUTPATIENT
Start: 2018-01-11

## 2018-01-18 NOTE — TELEPHONE ENCOUNTER
Message from [facility]:   From: JESSICA SHAH   Sent: Thu Jan 18, 2018 12:56 PM   To: Psychiatry Nurses[facility]  Subject: RE: Medication Renewal Request  Tomorrow morning 1/19 at 8:15 works for me.     Thanks!  Flora  ----- Message -----  From: Rubia DANG  Sent: 1/18/2018 9:09 AM CST  To: Flora Shah  Subject: RE: Medication Renewal Request    Hi Flora,    Would you be able to make an appointment tomorrow morning (1/19/18) at one of these times:     7:45 am  8:15 am  8:45 am    Thanks !  RACHEL Garcia    ----- Message -----   From: Flora Shah   Sent: 1/17/2018 4:26 PM CST   To: Eli CHAU  Subject: RE: Medication Renewal Request    Hi Rubia,    Sounds good. Im available between 7:00-9:30am any day this week and next week if she has any openings.   If she's willing to refill the amphetamine-dextroamphetamine XR once so that I'm able to pick it up at the pharmacy tomorrow afternoon that would be great, and I'll see her for an appointment as soon as possible.     Thanks!  Flora  ----- Message -----  From: Rubia DANG  Sent: 1/11/2018 8:17 AM CST  To: Flora Shah  Subject: RE: Medication Renewal Request    Doyle Hines,    You should have refills of the fluoxetine at the pharmacy. Please contact them directly so they are aware you need a refill (ph: [phone]).     It looks like you last refilled the amphetamine-dextroamphetamine XR on 12/20/17 and Eli had wanted to see you back in clinic in a month. Ideally we would like you to come in for an appointment in order to obtain the next refill. Eli has appointments available this week and next. Please let me know what dates and times would work best for you.     Thanks !  RACHEL Garcia    ----- Message -----   From: Flora Shah   Sent: 1/11/2018 1:10 AM CST   To: Eli CHAU  Subject: Medication Renewal Request    Original authorizing provider: DRISS Garduno would like a refill of the following medications:  FLUoxetine (PROZAC) 40 MG  capsule [Eli Chambers, DRISS CNS]  amphetamine-dextroamphetamine (ADDERALL XR) 20 MG per 24 hr capsule [DRISS Garduno CNS]    Preferred pharmacy: [place], [place] - [address]    Comment:

## 2018-01-19 ENCOUNTER — OFFICE VISIT (OUTPATIENT)
Dept: PSYCHIATRY | Facility: CLINIC | Age: 33
End: 2018-01-19
Attending: CLINICAL NURSE SPECIALIST
Payer: COMMERCIAL

## 2018-01-19 VITALS
WEIGHT: 116.6 LBS | BODY MASS INDEX: 20.45 KG/M2 | HEART RATE: 60 BPM | SYSTOLIC BLOOD PRESSURE: 126 MMHG | DIASTOLIC BLOOD PRESSURE: 85 MMHG

## 2018-01-19 DIAGNOSIS — F90.0 ADHD (ATTENTION DEFICIT HYPERACTIVITY DISORDER), INATTENTIVE TYPE: Primary | ICD-10-CM

## 2018-01-19 DIAGNOSIS — F50.20 BULIMIA NERVOSA: ICD-10-CM

## 2018-01-19 DIAGNOSIS — E61.1 IRON DEFICIENCY: ICD-10-CM

## 2018-01-19 PROCEDURE — G0463 HOSPITAL OUTPT CLINIC VISIT: HCPCS | Mod: ZF

## 2018-01-19 RX ORDER — DEXTROAMPHETAMINE SACCHARATE, AMPHETAMINE ASPARTATE, DEXTROAMPHETAMINE SULFATE AND AMPHETAMINE SULFATE 2.5; 2.5; 2.5; 2.5 MG/1; MG/1; MG/1; MG/1
10 TABLET ORAL 2 TIMES DAILY
Qty: 60 TABLET | Refills: 0 | Status: SHIPPED | OUTPATIENT
Start: 2018-01-19 | End: 2019-07-08

## 2018-01-19 RX ORDER — PRENATAL VIT/IRON FUM/FOLIC AC 27MG-0.8MG
1 TABLET ORAL DAILY
COMMUNITY
Start: 2018-01-19

## 2018-01-19 ASSESSMENT — PAIN SCALES - GENERAL: PAINLEVEL: NO PAIN (0)

## 2018-01-19 NOTE — PROGRESS NOTES
Outpatient Psychiatry Progress Note     Provider: DRISS Garduno CNS  Date: 2018  Service:  Medication follow up with counseling.   Patient Identification: Arlet Segura  : 1985   MRN: [id number]    Arlet Segura is a 32 year old year old female who presents for ongoing psychiatric care.  Arlet Segura was last seen in clinic on 17.   At that time,   Assessment & Plan       Arlet Segura is seen today for follow up and reports her mood has been stable. Discussed weight loss since first appointment and patient notes concern that Adderall maybe adding to problems with bulimia.  She requests to decrease the dose and taper off. Had been unable to return at recommended time due to jury duty.      Diagnosis  Bulimia Nervosa  ADHD inattentive type  Iron deficiency        Plan:  Medication: Decrease Adderall XR to 20mg and continue Fluoxetine 40mg   OTC Recommendations: consider prenatal vitamin. Increase dose of iron to twice in daily  Lab Orders:  Recheck Ferritin 2  Months  Referrals: none  Release of Information: none  Future Treatment Considerations:per symptoms  Return for Follow Up:4 weeks      ____________________________________________________________________________________________________________________________________________    2018  Today Arlet reports she does think that iron is helping. She also started a prenatal.  She has noticed being tired since decrease in Adderall but purging is better and only once a week.   Stress is better now. Maternal uncle passes away complications from colon cancer but was not expected to die.  Her mother was very upset.   Has found that she keeps busy in the late afternoon, evening it is easier.   Side effects of medication include: no new side effects.  Psychiatric Review of Systems:  Depression: In the last 2 weeks per PHQ 9 several days anhedonia, feeling depressed, sleep disturbance, fatigue, appetite disturbance,  "feelings of failure, concentration problems  Anxiety : stable  Corinne none   Psychosis  none.   ADHD decrease in focus and concentration    Review of Medical Systems:  Sleep: mild  Energy: mild  Concentration: mild   Appetite: see subjective  GI Concerns: none  Cardiac concerns: none  Neurological concerns: none  Other medical concerns: no new concerns  Current Substance Use:  Alcohol:denies frequent use or abuse  Other drugs:denies  Caffeine:no change  Nicotine: none  Past Medical History:   Past Medical History:   Diagnosis Date     Abnormal cervical Pap smear with positive HPV DNA test     last pap 2/2013 nl     Bulimia      Bulimia since 2008 1/19/2014     Patient Active Problem List   Diagnosis     ADHD (attention deficit hyperactivity disorder), inattentive type     Bulimia nervosa     Raynaud's disease without gangrene       Allergies: No Known Allergies       Current Medications     Current Outpatient Prescriptions Ordered in [facility]   Medication Sig Dispense Refill     FLUoxetine (PROZAC) 40 MG capsule Take 1 capsule (40 mg) by mouth daily 90 capsule 2     amphetamine-dextroamphetamine (ADDERALL XR) 20 MG per 24 hr capsule Take 1 capsule (20 mg) by mouth daily 30 capsule 0     tretinoin (RETIN-A) 0.025 % cream Spread a pea size amount into affected area topically at bedtime.  Use sunscreen SPF>20. 45 g 11     No current [facility]-ordered facility-administered medications on file.         Mental Status Exam     Appearance:  Casually dressed and Well groomed  Behavior/relationship to examiner/demeanor:  Cooperative  Orientation: Oriented to person, place, time and situation  Psychomotor: normal form  Speech Rate:  Normal  Speech Spontaneity:  Normal  Mood:  \"okay\"  Affect:  Appropriate/mood-congruent  Thought Process (Associations):  Goal directed  Thought Content:  no overt psychosis, denies suicidal ideation, intent or thoughts and patient does not appear to be responding to internal stimuli  Abnormal Perception:  " None  Attention/Concentration:  Normal  Insight:  Good  Judgment:  Good      Results     Vital signs: /85  Pulse 60  Wt 52.9 kg (116 lb 9.6 oz)  BMI 20.45 kg/m2    Laboratory Data:  no new data    Assessment & Plan      Arlet Segura is seen today for follow up and reports she would like to consider increase in fluoxetine since decrease in Adderall has helped but bulimia symptoms still occur at least once a week. Will also try IR formulation of Adderall which might help she might be able to take it at a time that will not cause overeating in the early evening.    Diagnosis  Bulimia Nervosa  ADHD inattentive type  Iron deficiency      Plan:  Medication: Increase Fluoxetine to 60mg and also change Adderall to IR 10mg bid.    OTC Recommendations: none  Lab Orders:  none  Referrals: none  Release of Information: none  Future Treatment Considerations:per response to changes made today  Return for Follow Up:4 weeks   The risks, benefits, alternatives and side effects have been discussed and are understood by the patient. The patient understands the risks of using street drugs or alcohol. There are no medical contraindications, the patient agrees to treatment, and has the capacity to do so. The patient understands to call 911 or come to the nearest ED if life threatening or urgent symptoms present.  In addition time was spent counseling the patient and/or coordinating care regarding review of social and occupational functioning.  In addition patient was counseled on health and wellness practices to augment medication treatment of symptoms. See note for details.    Eli Chambers, APRN CNS 1/19/2018

## 2018-01-19 NOTE — MR AVS SNAPSHOT
After Visit Summary   1/19/2018    Arlet Segura    MRN: [id number]           Patient Information     Date Of Birth          1985        Visit Information        Provider Department      1/19/2018 8:15 AM Eli Chambers APRN [facility]        Today's Diagnoses     ADHD (attention deficit hyperactivity disorder), inattentive type    -  1    Bulimia nervosa        Iron deficiency           Follow-ups after your visit        Follow-up notes from your care team     Return in about 4 weeks (around 2/16/2018).      Who to contact     Please call your clinic at [phone] to:    Ask questions about your health    Make or cancel appointments    Discuss your medicines    Learn about your test results    Speak to your doctor   If you have compliments or concerns about an experience at your clinic, or if you wish to file a complaint, please contact [facility] Patient Relations at [phone] or email us at [email or web address]         Additional Information About Your Visit        MyChart Information     Mobicious gives you secure access to your electronic health record. If you see a primary care provider, you can also send messages to your care team and make appointments. If you have questions, please call your primary care clinic.  If you do not have a primary care provider, please call [phone] and they will assist you.      Mobicious is an electronic gateway that provides easy, online access to your medical records. With Mobicious, you can request a clinic appointment, read your test results, renew a prescription or communicate with your care team.     To access your existing account, please contact your [facility] or call [phone] for assistance.        Care EveryWhere ID     This is your Care EveryWhere ID. This could be used by other organizations to access your [facility] medical records  [id number]         Your Vitals Were     Pulse BMI (Body Mass Index)                60 20.45 kg/m2           Blood Pressure from Last 3 Encounters:   01/19/18 126/85   12/20/17 126/76   08/29/17 145/88    Weight from Last 3 Encounters:   01/19/18 52.9 kg (116 lb 9.6 oz)   12/20/17 51.2 kg (112 lb 12.8 oz)   08/29/17 53.9 kg (118 lb 12.8 oz)              Today, you had the following     No orders found for display         Today's Medication Changes          These changes are accurate as of 1/19/18 11:59 PM.  If you have any questions, ask your nurse or doctor.               Start taking these medicines.        Dose/Directions    amphetamine-dextroamphetamine 10 MG per tablet   Commonly known as:  ADDERALL   Used for:  ADHD (attention deficit hyperactivity disorder), inattentive type   Replaces:  amphetamine-dextroamphetamine 20 MG per 24 hr capsule   Started by:  Eli Chambers APRN CNS        Dose:  10 mg   Take 1 tablet (10 mg) by mouth 2 times daily   Quantity:  60 tablet   Refills:  0       FLUoxetine HCl (PMDD) 20 MG Caps   Used for:  Bulimia nervosa   Started by:  Eli Chambers APRN CNS        Dose:  20 mg   Take 20 mg by mouth daily With 40mg for total dose of 60mg every morning   Quantity:  90 capsule   Refills:  1         Stop taking these medicines if you haven't already. Please contact your care team if you have questions.     amphetamine-dextroamphetamine 20 MG per 24 hr capsule   Commonly known as:  ADDERALL XR   Replaced by:  amphetamine-dextroamphetamine 10 MG per tablet   Stopped by:  Eli Chambers APRN CNS                Where to get your medicines      These medications were sent to [facility] [place], [place] - [address]  [address] [place] [address], [facility] [address]     Phone:  [phone]     FLUoxetine HCl (PMDD) 20 MG Caps         Some of these will need a paper prescription and others can be bought over the counter.  Ask your nurse if you have questions.     Bring a  paper prescription for each of these medications     amphetamine-dextroamphetamine 10 MG per tablet                Primary Care Provider Office Phone # Fax #    Dorothy Myesha Holder [phone]       [address] [place] [address] [address] [place] [address]        Equal Access to Services     JOSEFINA NIX : Hadii aad ku hadasho Soomaali, waaxda luqadaha, qaybta kaalmada adeegyada, waxay german haydeisi valladaresnadinejavier rhodes. So [facility] [phone].    ATENCIÓN: Si habla español, tiene a varela disposición servicios gratuitos de asistencia lingüística. Ll[facility] [phone].    We comply with applicable federal civil rights laws and [place] laws. We do not discriminate on the basis of race, color, national origin, age, disability, sex, sexual orientation, or gender identity.            Thank you!     Thank you for choosing [facility]  for your care. Our goal is always to provide you with excellent care. Hearing back from our patients is one way we can continue to improve our services. Please take a few minutes to complete the written survey that you may receive in the mail after your visit with us. Thank you!             Your Updated Medication List - Protect others around you: Learn how to safely use, store and throw away your medicines at www.disposemymeds.org.          This list is accurate as of 1/19/18 11:59 PM.  Always use your most recent med list.                   Brand Name Dispense Instructions for use Diagnosis    amphetamine-dextroamphetamine 10 MG per tablet    ADDERALL    60 tablet    Take 1 tablet (10 mg) by mouth 2 times daily    ADHD (attention deficit hyperactivity disorder), inattentive type       Ferrous Bisglycinate Chelate 15 MG Tabs      Taking 27 mg once daily    Iron deficiency       FLUoxetine 40 MG capsule    PROzac    90 capsule    Take 1 capsule (40 mg) by mouth daily    Bulimia nervosa       FLUoxetine HCl (PMDD) 20 MG Caps     90 capsule    Take 20 mg by mouth daily With  40mg for total dose of 60mg every morning    Bulimia nervosa       prenatal multivitamin plus iron 27-0.8 MG Tabs per tablet      Take 1 tablet by mouth daily    Iron deficiency       tretinoin 0.025 % cream    RETIN-A    45 g    Spread a pea size amount into affected area topically at bedtime.  Use sunscreen SPF>20.    Acne

## 2018-01-19 NOTE — NURSING NOTE
Chief Complaint   Patient presents with     Recheck Medication     Reviewed allergies, medications, pharmacy and smoking status.     Obtained weight, pain level, blood pressure and heart rate

## 2018-10-30 ENCOUNTER — HEALTH MAINTENANCE LETTER (OUTPATIENT)
Age: 33
End: 2018-10-30

## 2019-05-28 ENCOUNTER — RESULT FOLLOW UP (OUTPATIENT)
Dept: MIDWIFE SERVICES | Facility: CLINIC | Age: 34
End: 2019-05-28

## 2019-05-28 ENCOUNTER — OFFICE VISIT (OUTPATIENT)
Dept: MIDWIFE SERVICES | Facility: CLINIC | Age: 34
End: 2019-05-28
Payer: COMMERCIAL

## 2019-05-28 VITALS
WEIGHT: 121 LBS | SYSTOLIC BLOOD PRESSURE: 116 MMHG | DIASTOLIC BLOOD PRESSURE: 82 MMHG | BODY MASS INDEX: 21.22 KG/M2 | HEART RATE: 52 BPM

## 2019-05-28 DIAGNOSIS — N63.24 BREAST LUMP ON LEFT SIDE AT 8 O'CLOCK POSITION: Primary | ICD-10-CM

## 2019-05-28 DIAGNOSIS — Z98.890 H/O LEEP: ICD-10-CM

## 2019-05-28 DIAGNOSIS — Z11.51 SCREENING FOR HUMAN PAPILLOMAVIRUS: ICD-10-CM

## 2019-05-28 DIAGNOSIS — Z12.4 SCREENING FOR MALIGNANT NEOPLASM OF CERVIX: ICD-10-CM

## 2019-05-28 PROCEDURE — 99214 OFFICE O/P EST MOD 30 MIN: CPT | Mod: 25 | Performed by: ADVANCED PRACTICE MIDWIFE

## 2019-05-28 PROCEDURE — 99385 PREV VISIT NEW AGE 18-39: CPT | Performed by: ADVANCED PRACTICE MIDWIFE

## 2019-05-28 PROCEDURE — 88175 CYTOPATH C/V AUTO FLUID REDO: CPT | Performed by: ADVANCED PRACTICE MIDWIFE

## 2019-05-28 PROCEDURE — 87624 HPV HI-RISK TYP POOLED RSLT: CPT | Performed by: ADVANCED PRACTICE MIDWIFE

## 2019-05-28 NOTE — PROGRESS NOTES
Arlet is a 33 year old  female who presents for annual exam.     Menses are regular q 28-30 days and normal lasting 5 days.  Menses flow: normal.  Patient's last menstrual period was 2019.. Using condoms for contraception.  She is not currently considering pregnancy.  Besides routine health maintenance, she has no other health concerns today .  Patient concerned about left breast lump , states first felt lump 3 days ago, needs annual today.    GYNECOLOGIC HISTORY:  Menarche: 17  Arlet is sexually active with 1 male partner(s) and is currently in monogamous relationship with boyfriend.    History sexually transmitted infections:No STD history  STI testing offered?  Declined  RAPHAEL exposure: Unknown  History of abnormal Pap smear:yes   Family history of breast CA: No  Family history of uterine/ovarian CA: No    Family history of colon CA: No    HEALTH MAINTENANCE:  Cholesterol: (No results found for: CHOL History of abnormal lipids: No    Mammo:  . History of abnormal Mammo: Not applicable.  Regular Self Breast Exams: Yes    Current or Past (Physical, Sexual or Emotional):  No  Do you feel safe in your environment:  Yes    Calcium/Vitamin D intake: source:  dairy, dietary supplement(s) Adequate? Yes   Last TDAP?  Offered today? No    TSH: (  TSH   Date Value Ref Range Status   2017 5.08 (H) 0.40 - 4.00 mU/L Final    )  Pap; (  Lab Results   Component Value Date    PAP LSIL 2017    PAP NIL 2014    )    HISTORY:  OB History    Para Term  AB Living   0 0 0 0 0 0   SAB TAB Ectopic Multiple Live Births   0 0 0 0 0   Obstetric Comments   lmp 14     Past Medical History:   Diagnosis Date     Abnormal cervical Pap smear with positive HPV DNA test     last pap 2013 nl     Bulimia      Bulimia since 2014     Past Surgical History:   Procedure Laterality Date     NO HISTORY OF SURGERY       Family History   Problem Relation Age of Onset     Hypertension Father       Diabetes Sister         type 1 dx age 3     Thyroid Disease Mother         hypo     Psychotic Disorder Maternal Aunt         bi-polar     Psychotic Disorder Sister         depression     Hypertension Brother      Social History     Socioeconomic History     Marital status: Single     Spouse name: None     Number of children: None     Years of education: None     Highest education level: None   Occupational History     None   Social Needs     Financial resource strain: None     Food insecurity:     Worry: None     Inability: None     Transportation needs:     Medical: None     Non-medical: None   Tobacco Use     Smoking status: Never Smoker     Smokeless tobacco: Never Used   Substance and Sexual Activity     Alcohol use: Yes     Comment: Five drinks a week, average     Drug use: No     Sexual activity: Not Currently     Partners: Male   Lifestyle     Physical activity:     Days per week: None     Minutes per session: None     Stress: None   Relationships     Social connections:     Talks on phone: None     Gets together: None     Attends Quaker service: None     Active member of club or organization: None     Attends meetings of clubs or organizations: None     Relationship status: None     Intimate partner violence:     Fear of current or ex partner: None     Emotionally abused: None     Physically abused: None     Forced sexual activity: None   Other Topics Concern      Service No     Blood Transfusions No     Caffeine Concern No     Occupational Exposure No     Hobby Hazards No     Sleep Concern No     Stress Concern Yes     Weight Concern Yes     Special Diet No     Back Care No     Exercise Yes     Comment: running and soccer 6-7 x a week      Bike Helmet Yes     Seat Belt Yes     Self-Exams Not Asked   Social History Narrative      Moved here mid-November from [facility].  She is working here at the [employer]. Lives with a roommate.       Current Outpatient Medications:       amphetamine-dextroamphetamine (ADDERALL) 10 MG per tablet, Take 1 tablet (10 mg) by mouth 2 times daily (Patient not taking: Reported on 5/28/2019), Disp: 60 tablet, Rfl: 0     Ferrous Bisglycinate Chelate 15 MG TABS, Taking 27 mg once daily, Disp: , Rfl:      FLUoxetine (PROZAC) 40 MG capsule, Take 1 capsule (40 mg) by mouth daily (Patient not taking: Reported on 5/28/2019), Disp: 90 capsule, Rfl: 2     FLUoxetine HCl, PMDD, 20 MG CAPS, Take 20 mg by mouth daily With 40mg for total dose of 60mg every morning (Patient not taking: Reported on 5/28/2019), Disp: 90 capsule, Rfl: 1     Prenatal Vit-Fe Fumarate-FA (PRENATAL MULTIVITAMIN PLUS IRON) 27-0.8 MG TABS per tablet, Take 1 tablet by mouth daily, Disp: , Rfl:      tretinoin (RETIN-A) 0.025 % cream, Spread a pea size amount into affected area topically at bedtime.  Use sunscreen SPF>20. (Patient not taking: Reported on 5/28/2019), Disp: 45 g, Rfl: 11   No Known Allergies    Past medical, surgical, social and family history were reviewed and updated in EPIC.    ROS:   C:     NEGATIVE for fever, chills, change in weight  I:       NEGATIVE for worrisome rashes, moles or lesions  E:     NEGATIVE for vision changes or irritation  E/M: NEGATIVE for ear, mouth and throat problems  R:     NEGATIVE for significant cough or SOB  CV:   NEGATIVE for chest pain, palpitations or peripheral edema  GI:     NEGATIVE for nausea, abdominal pain, heartburn, or change in bowel habits  :   NEGATIVE for frequency, dysuria, hematuria, vaginal discharge, or irregular bleeding  M:     NEGATIVE for significant arthralgias or myalgia  N:      NEGATIVE for weakness, dizziness or paresthesias  E:      NEGATIVE for temperature intolerance, skin/hair changes  P:      NEGATIVE for changes in mood or affect.    EXAM:  /82   Pulse 52   Wt 54.9 kg (121 lb)   LMP 04/29/2019   BMI 21.22 kg/m     BMI: Body mass index is 21.22 kg/m .  Constitutional: healthy, alert and no distress  Head:  Normocephalic. No masses, lesions, tenderness or abnormalities  Neck: Neck supple. Trachea midline. No adenopathy. Thyroid symmetric, normal size.   Cardiovascular: RRR.   Respiratory: Negative.   Breast: Breasts reveal mild symmetric fibrocystic densities, but there are no dominant, discrete, fixed or suspicious masses found.  Multiple fibrocystic areas,   Left breast lump @ 0800 about 1 cm away from nipple, mobile, 1 cm x 2 cm long, slightly tender  Gastrointestinal: Abdomen soft, non-tender, non-distended. No masses, organomegaly.  :  Vulva:  No external lesions, normal female hair distribution, no inguinal adenopathy.    Urethra:  Midline, non-tender, well supported, no discharge  Vagina:  Moist, pink, no abnormal discharge, no lesions  Uterus:  Normal size, anteverted , non-tender, freely mobile  Ovaries:  No masses appreciated, non-tender, mobile  Rectal Exam: deferred  Musculoskeletal: extremities normal  Skin: no suspicious lesions or rashes  Psychiatric: Affect appropriate, cooperative,mentation appears normal.     COUNSELING:   Reviewed preventive health counseling, as reflected in patient instructions  Special attention given to:        Contraception       breast lump   reports that she has never smoked. She has never used smokeless tobacco.    Body mass index is 21.22 kg/m .    FRAX Risk Assessment    ASSESSMENT:  33 year old female with satisfactory annual exam  (N63.24) Breast lump on left side at 8 o'clock position  (primary encounter diagnosis)    Plan: MA Diagnostic Digital Bilateral            (Z98.890) H/O LEEP    Discussed breast lump and reassured that likely fibrocystic cyst, will send to breast center for mammogram and likely breast ultrasound.  Patient will continue to use condoms.  Pap smear obtained.   Suggested vitamin E daily if having pain before menses.     rtc as needed or next year  Irene Centeno CNM

## 2019-05-28 NOTE — NURSING NOTE
"Chief Complaint   Patient presents with     Physical       Initial /82   Pulse 52   Wt 54.9 kg (121 lb)   LMP 2019   BMI 21.22 kg/m   Estimated body mass index is 21.22 kg/m  as calculated from the following:    Height as of 17: 1.608 m (5' 3.31\").    Weight as of this encounter: 54.9 kg (121 lb).  BP completed using cuff size: regular    Questioned patient about current smoking habits.  Pt. has never smoked.          The following HM Due: NONE      The following patient reported/Care Every where data was sent to:  P ABSTRACT QUALITY INITIATIVES [[id number]]        N/a      Heidi Dodd MA                "

## 2019-05-29 NOTE — PROGRESS NOTES
Asked by lab to enter PAP order for specimen that was collected by Irene Centeno CNM at clinic visit on 5/28/19.  Soto Trinidad CNM

## 2019-05-31 LAB
COPATH REPORT: NORMAL
PAP: NORMAL

## 2019-06-04 LAB
FINAL DIAGNOSIS: ABNORMAL
HPV HR 12 DNA CVX QL NAA+PROBE: POSITIVE
HPV16 DNA SPEC QL NAA+PROBE: NEGATIVE
HPV18 DNA SPEC QL NAA+PROBE: NEGATIVE
SPECIMEN DESCRIPTION: ABNORMAL
SPECIMEN SOURCE CVX/VAG CYTO: ABNORMAL

## 2019-06-04 NOTE — PROGRESS NOTES
07/05/17: LSIL Pap, + HR HPV types 16 and other.  07/17/17:[place] Bx CHRISTIANE 3  08/24/17: LEEP CHRISTIANE I, margins free of Dysplasia.  5/28/19: NIL Pap, + HR HPV (not 16 or 18) result. Plan [place].   06/5/19:Pt was advised.  07/08/19: [place] Bx CHRISTIANE I, ECC Neg for Dysplasia. Plan cotest in 1 year. (done at the [facility]). Provider advised the pt of the results and recommendations.

## 2019-07-08 ENCOUNTER — OFFICE VISIT (OUTPATIENT)
Dept: OBGYN | Facility: CLINIC | Age: 34
End: 2019-07-08
Attending: OBSTETRICS & GYNECOLOGY
Payer: COMMERCIAL

## 2019-07-08 VITALS
BODY MASS INDEX: 22.2 KG/M2 | HEART RATE: 54 BPM | SYSTOLIC BLOOD PRESSURE: 129 MMHG | WEIGHT: 125.3 LBS | DIASTOLIC BLOOD PRESSURE: 77 MMHG | HEIGHT: 63 IN

## 2019-07-08 DIAGNOSIS — Z98.890 H/O LEEP: ICD-10-CM

## 2019-07-08 DIAGNOSIS — R87.618 PAP SMEAR ABNORMALITY OF CERVIX/HUMAN PAPILLOMAVIRUS (HPV) POSITIVE: Primary | ICD-10-CM

## 2019-07-08 DIAGNOSIS — R87.810 PAP SMEAR OF CERVIX SHOWS HIGH RISK HPV PRESENT: ICD-10-CM

## 2019-07-08 PROCEDURE — 88305 TISSUE EXAM BY PATHOLOGIST: CPT | Performed by: OBSTETRICS & GYNECOLOGY

## 2019-07-08 PROCEDURE — 57454 BX/CURETT OF CERVIX W/SCOPE: CPT

## 2019-07-08 ASSESSMENT — MIFFLIN-ST. JEOR: SCORE: 1252.41

## 2019-07-08 NOTE — LETTER
7/8/2019       RE: Arlet Segura  [address]  [place] [address]     Dear Colleague,    Thank you for referring your patient, Arlet Segura, to the [facility] at [facility]. Please see a copy of my visit note below.        COLPOSCOPY PROCEDURE NOTE    34 year old  female presents for colposcopy.     Pap 5/28/2019: NILM; HPV non 16/18: (+)  LEEP 8/24/2019: LGSIL (ECC not done)  07/05/17: LSIL Pap, + HR HPV types 16 and other.  07/17/17:[place] Bx CHRISTIANE 3        Procedure for colposcopy and biopsy has been explained to the patient.  Consent:  Written consent signed and scanned into medical record.    PROCEDURE:  Speculum placed in vagina and excellent visualization of cervix   acheived, cervix swabbed x 3 with acetic acid solution.    FINDINGS:  Cervix: acetowhite lesion(s) noted at 8-12 o'clock; .  Cx bx and ECC done    ASSESSMENT: HPV related changes.    PLAN:   -Specimens sent to pathology  -Will base further treatment on pathology findings.    -Will call to discuss Pathology results when they are available        Again, thank you for allowing me to participate in the care of your patient.      Sincerely,    Avinash Calvin MD

## 2019-07-08 NOTE — PROGRESS NOTES
COLPOSCOPY PROCEDURE NOTE    34 year old  female presents for colposcopy.     Pap 5/28/2019: NILM; HPV non 16/18: (+)  LEEP 8/24/2019: LGSIL (ECC not done)  07/05/17: LSIL Pap, + HR HPV types 16 and other.  07/17/17:[place] Bx CHRISTIANE 3        Procedure for colposcopy and biopsy has been explained to the patient.  Consent:  Written consent signed and scanned into medical record.    PROCEDURE:  Speculum placed in vagina and excellent visualization of cervix   acheived, cervix swabbed x 3 with acetic acid solution.    FINDINGS:  Cervix: acetowhite lesion(s) noted at 8-12 o'clock; .  Cx bx and ECC done    ASSESSMENT: HPV related changes.    PLAN:   -Specimens sent to pathology  -Will base further treatment on pathology findings.    -Will call to discuss Pathology results when they are available

## 2019-07-10 LAB — COPATH REPORT: NORMAL

## 2019-07-12 ENCOUNTER — ANCILLARY PROCEDURE (OUTPATIENT)
Dept: MAMMOGRAPHY | Facility: CLINIC | Age: 34
End: 2019-07-12
Attending: ADVANCED PRACTICE MIDWIFE
Payer: COMMERCIAL

## 2019-07-12 DIAGNOSIS — N63.24 BREAST LUMP ON LEFT SIDE AT 8 O'CLOCK POSITION: ICD-10-CM

## 2019-07-15 ENCOUNTER — ANCILLARY PROCEDURE (OUTPATIENT)
Dept: MAMMOGRAPHY | Facility: CLINIC | Age: 34
End: 2019-07-15
Attending: ADVANCED PRACTICE MIDWIFE
Payer: COMMERCIAL

## 2019-07-15 DIAGNOSIS — N63.24 BREAST LUMP ON LEFT SIDE AT 8 O'CLOCK POSITION: ICD-10-CM

## 2019-07-15 RX ORDER — LIDOCAINE HYDROCHLORIDE 10 MG/ML
10 INJECTION, SOLUTION EPIDURAL; INFILTRATION; INTRACAUDAL; PERINEURAL ONCE
Status: COMPLETED | OUTPATIENT
Start: 2019-07-15 | End: 2019-07-15

## 2019-07-15 RX ADMIN — LIDOCAINE HYDROCHLORIDE 10 ML: 10 INJECTION, SOLUTION EPIDURAL; INFILTRATION; INTRACAUDAL; PERINEURAL at 14:27

## 2019-07-17 LAB — COPATH REPORT: NORMAL

## 2019-07-18 ENCOUNTER — TELEPHONE (OUTPATIENT)
Dept: MAMMOGRAPHY | Facility: CLINIC | Age: 34
End: 2019-07-18

## 2019-07-18 NOTE — TELEPHONE ENCOUNTER
Spoke to Flora about the benign finding of a fibroadenoma found during her breast biopsy done earlier this week.  We discussed the Radiologist's recommendation of clinical follow up if she notices the area getting bigger or if it becomes painful.  Flora verbalized understanding and all questions and concerns were answered at this time.

## 2019-10-15 ENCOUNTER — OFFICE VISIT (OUTPATIENT)
Dept: PSYCHIATRY | Facility: CLINIC | Age: 34
End: 2019-10-15
Attending: CLINICAL NURSE SPECIALIST
Payer: COMMERCIAL

## 2019-10-15 VITALS
HEART RATE: 57 BPM | BODY MASS INDEX: 19.72 KG/M2 | DIASTOLIC BLOOD PRESSURE: 79 MMHG | WEIGHT: 112.4 LBS | SYSTOLIC BLOOD PRESSURE: 118 MMHG

## 2019-10-15 DIAGNOSIS — E61.1 IRON DEFICIENCY: ICD-10-CM

## 2019-10-15 DIAGNOSIS — Z23 NEED FOR PROPHYLACTIC VACCINATION AND INOCULATION AGAINST INFLUENZA: Primary | ICD-10-CM

## 2019-10-15 DIAGNOSIS — F50.20 BULIMIA NERVOSA: ICD-10-CM

## 2019-10-15 DIAGNOSIS — F90.0 ADHD (ATTENTION DEFICIT HYPERACTIVITY DISORDER), INATTENTIVE TYPE: ICD-10-CM

## 2019-10-15 LAB
ERYTHROCYTE [DISTWIDTH] IN BLOOD BY AUTOMATED COUNT: 12.6 % (ref 10–15)
FERRITIN SERPL-MCNC: 18 NG/ML (ref 12–150)
HCT VFR BLD AUTO: 41.8 % (ref 35–47)
HGB BLD-MCNC: 13.3 G/DL (ref 11.7–15.7)
MCH RBC QN AUTO: 30 PG (ref 26.5–33)
MCHC RBC AUTO-ENTMCNC: 31.8 G/DL (ref 31.5–36.5)
MCV RBC AUTO: 94 FL (ref 78–100)
PLATELET # BLD AUTO: 348 10E9/L (ref 150–450)
RBC # BLD AUTO: 4.43 10E12/L (ref 3.8–5.2)
WBC # BLD AUTO: 5.6 10E9/L (ref 4–11)

## 2019-10-15 PROCEDURE — 25000128 H RX IP 250 OP 636: Mod: ZF

## 2019-10-15 PROCEDURE — 36415 COLL VENOUS BLD VENIPUNCTURE: CPT | Performed by: CLINICAL NURSE SPECIALIST

## 2019-10-15 PROCEDURE — 82728 ASSAY OF FERRITIN: CPT | Performed by: CLINICAL NURSE SPECIALIST

## 2019-10-15 PROCEDURE — 85027 COMPLETE CBC AUTOMATED: CPT | Performed by: CLINICAL NURSE SPECIALIST

## 2019-10-15 PROCEDURE — G0463 HOSPITAL OUTPT CLINIC VISIT: HCPCS | Mod: ZF

## 2019-10-15 PROCEDURE — G0008 ADMIN INFLUENZA VIRUS VAC: HCPCS

## 2019-10-15 PROCEDURE — G0008 ADMIN INFLUENZA VIRUS VAC: HCPCS | Mod: ZF

## 2019-10-15 PROCEDURE — 90686 IIV4 VACC NO PRSV 0.5 ML IM: CPT | Mod: ZF

## 2019-10-15 RX ORDER — DEXTROAMPHETAMINE SACCHARATE, AMPHETAMINE ASPARTATE, DEXTROAMPHETAMINE SULFATE AND AMPHETAMINE SULFATE 2.5; 2.5; 2.5; 2.5 MG/1; MG/1; MG/1; MG/1
10 TABLET ORAL 2 TIMES DAILY
Qty: 60 TABLET | Refills: 0 | Status: SHIPPED | OUTPATIENT
Start: 2019-10-15 | End: 2019-10-15

## 2019-10-15 RX ORDER — DEXTROAMPHETAMINE SACCHARATE, AMPHETAMINE ASPARTATE, DEXTROAMPHETAMINE SULFATE AND AMPHETAMINE SULFATE 2.5; 2.5; 2.5; 2.5 MG/1; MG/1; MG/1; MG/1
10 TABLET ORAL 2 TIMES DAILY
Qty: 60 TABLET | Refills: 0 | Status: SHIPPED | OUTPATIENT
Start: 2019-10-15 | End: 2019-11-13

## 2019-10-15 ASSESSMENT — PATIENT HEALTH QUESTIONNAIRE - PHQ9: SUM OF ALL RESPONSES TO PHQ QUESTIONS 1-9: 9

## 2019-10-15 ASSESSMENT — PAIN SCALES - GENERAL: PAINLEVEL: NO PAIN (0)

## 2019-10-15 NOTE — NURSING NOTE
Clinic Administered Medication Documentation    MEDICATION LIST:   Injectable Medication Documentation    Patient was given FLUZONE. Prior to medication administration, verified patients identity using patient s name and date of birth. Please see MAR and medication order for additional information. Patient instructed to remain in clinic for 15 minutes and report any adverse reaction to staff immediately .      Was entire vial of medication used? Yes  Vial/Syringe: Syringe  Expiration Date:  6/30/20  Was this medication supplied by the patient? No

## 2019-10-15 NOTE — PROGRESS NOTES
Outpatient Psychiatry Progress Note     Provider: DRISS Garduno CNS  Date: 10/15/2019  Service:  Medication follow up with counseling.   Patient Identification: Arlet Segura  : 1985   MRN: [id number]    Arlet Segura is a 34 year old year old female who presents for ongoing psychiatric care.  Arlet Segura was last seen in clinic on 18.   At that time,   Assessment & Plan       Arlet Segura is seen today for follow up and reports she would like to consider increase in fluoxetine since decrease in Adderall has helped but bulimia symptoms still occur at least once a week. Will also try IR formulation of Adderall which might help she might be able to take it at a time that will not cause overeating in the early evening.     Diagnosis  Bulimia Nervosa  ADHD inattentive type  Iron deficiency        Plan:  Medication: Increase Fluoxetine to 60mg and also change Adderall to IR 10mg bid.    OTC Recommendations: none  Lab Orders:  none  Referrals: none  Release of Information: none  Future Treatment Considerations:per response to changes made today  Return for Follow Up:4 weeks      ____________________________________________________________________________________________________________________________________________    10/15/2019  Today Arlet reports she is not taking medication at this time besides iron. Didn't really notice improvement with Fluoxetine. Thought she would just try and tough it out but concentration and focus have been difficult. 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 Bulimia is much better. Eliientiny is now here ( was in [place]) and they are living together. Evenings are better with less thoughts of binging and purging. Attending a group for this now and that helps.  Eliiend now knows about this and is support.  She decided to come back in and consider medication due to continued problems with concentration and  focus  Side effects of medication include: na  Psychiatric Review of Systems:  Depression: In the last 2 weeks per PHQ-9 score:   PHQ-9 SCORE 3/29/2017 12/20/2017 10/15/2019   PHQ-9 Total Score - - -   PHQ-9 Total Score 8 3 9        Anxiety : managable and seems related to ADHD  Corinne na   Psychosis  na.   ADHD difficulty concentration, focus, staying on task, starting and completing things.     Review of Medical Systems:  Sleep: some problems falling asleep - only about 10 minutes sleeps through the night  Energy: mild  Concentration: see above  Appetite: occasional binging and purging, lost weight over the summer but was doing marathon training with a friend  GI Concerns: none  Cardiac concerns: none  Neurological concerns: none  Other medical concerns: no new concerns  Current Substance Use:  Alcohol:denies frequent use or abuse  Other drugs:none  Caffeine:one cup coffee a day  Nicotine: none  Past Medical History:   Past Medical History:   Diagnosis Date     Abnormal cervical Pap smear with positive HPV DNA test     last pap 2/2013 nl     Bulimia      Bulimia since 2008 1/19/2014     Cervical high risk HPV (human papillomavirus) test positive 05/28/2019    See problem list.      Patient Active Problem List   Diagnosis     ADHD (attention deficit hyperactivity disorder), inattentive type     Bulimia nervosa     Raynaud's disease without gangrene     H/O LEEP       Allergies: No Known Allergies       Current Medications     Current Outpatient Medications Ordered in Epic   Medication Sig Dispense Refill     Ferrous Bisglycinate Chelate 15 MG TABS Taking 27 mg once daily       Prenatal Vit-Fe Fumarate-FA (PRENATAL MULTIVITAMIN PLUS IRON) 27-0.8 MG TABS per tablet Take 1 tablet by mouth daily       No current [facility]-ordered facility-administered medications on file.         Mental Status Exam     Appearance:  Casually dressed and Well groomed  Behavior/relationship to examiner/demeanor:  Cooperative  Orientation:  "Oriented to person, place, time and situation  Psychomotor: normal form  Speech Rate:  Normal  Speech Spontaneity:  Normal  Mood:  \"good\"  Affect:  Appropriate/mood-congruent  Thought Process (Associations):  Goal directed  Thought Content:  no overt psychosis, denies suicidal ideation, intent or thoughts and patient does not appear to be responding to internal stimuli  Abnormal Perception:  None  Attention/Concentration:  Normal  Insight:  Good  Judgment:  Good      Results     Vital signs: /79   Pulse 57   Wt 51 kg (112 lb 6.4 oz)   BMI 19.72 kg/m      Laboratory Data:  no new data    Assessment & Plan      Arlet Segura is seen today for follow up she would like to restart ADHD medication. Stopped fluoxetine since even at 60 mg she didn't think it had been helping. In a better place now that she has finished graduate school and her boyfriend is living in [place] with her now.  Diagnosis  Encounter Diagnoses   Name Primary?     ADHD (attention deficit hyperactivity disorder), inattentive type      Iron deficiency      Need for prophylactic vaccination and inoculation against influenza Yes     Bulimia nervosa        Plan:  Medication:  Start Adderall IR 10mg bid.  After 2 weeks consider increase to 15mg bid but to send me a My Chart message first  OTC Recommendations: none  Lab Orders:  Ferritin. CBC  Referrals: none  Release of Information: none,  Future Treatment Considerations:per response   Return for Follow Up:3 weeks   The risks, benefits, alternatives and side effects have been discussed and are understood by the patient. The patient understands the risks of using street drugs or alcohol. There are no medical contraindications, the patient agrees to treatment, and has the capacity to do so. The patient understands to call 911 or come to the nearest ED if life threatening or urgent symptoms present.  In addition time was spent counseling the patient and/or coordinating care regarding review of social " and occupational functioning.  In addition patient was counseled on health and wellness practices to augment medication treatment of symptoms. See note for details.    Eli Chambers, APRN CNS 10/15/2019

## 2019-11-13 ENCOUNTER — OFFICE VISIT (OUTPATIENT)
Dept: PSYCHIATRY | Facility: CLINIC | Age: 34
End: 2019-11-13
Attending: CLINICAL NURSE SPECIALIST
Payer: COMMERCIAL

## 2019-11-13 VITALS
DIASTOLIC BLOOD PRESSURE: 85 MMHG | BODY MASS INDEX: 19.3 KG/M2 | SYSTOLIC BLOOD PRESSURE: 126 MMHG | WEIGHT: 110 LBS | HEART RATE: 68 BPM

## 2019-11-13 DIAGNOSIS — F90.0 ADHD (ATTENTION DEFICIT HYPERACTIVITY DISORDER), INATTENTIVE TYPE: Primary | ICD-10-CM

## 2019-11-13 DIAGNOSIS — Z23 NEED FOR PROPHYLACTIC VACCINATION AND INOCULATION AGAINST INFLUENZA: ICD-10-CM

## 2019-11-13 PROCEDURE — G0463 HOSPITAL OUTPT CLINIC VISIT: HCPCS | Mod: ZF

## 2019-11-13 RX ORDER — DEXTROAMPHETAMINE SACCHARATE, AMPHETAMINE ASPARTATE, DEXTROAMPHETAMINE SULFATE AND AMPHETAMINE SULFATE 5; 5; 5; 5 MG/1; MG/1; MG/1; MG/1
TABLET ORAL
Qty: 60 TABLET | Refills: 0 | Status: SHIPPED | OUTPATIENT
Start: 2019-11-13 | End: 2019-12-10

## 2019-11-13 RX ORDER — DEXTROAMPHETAMINE SACCHARATE, AMPHETAMINE ASPARTATE, DEXTROAMPHETAMINE SULFATE AND AMPHETAMINE SULFATE 2.5; 2.5; 2.5; 2.5 MG/1; MG/1; MG/1; MG/1
TABLET ORAL
Qty: 120 TABLET | Refills: 0 | Status: SHIPPED | OUTPATIENT
Start: 2019-11-13 | End: 2019-11-13

## 2019-11-13 RX ORDER — DEXTROAMPHETAMINE SACCHARATE, AMPHETAMINE ASPARTATE, DEXTROAMPHETAMINE SULFATE AND AMPHETAMINE SULFATE 1.25; 1.25; 1.25; 1.25 MG/1; MG/1; MG/1; MG/1
TABLET ORAL
Qty: 60 TABLET | Refills: 0 | Status: SHIPPED | OUTPATIENT
Start: 2019-11-13 | End: 2019-12-10

## 2019-11-13 ASSESSMENT — PATIENT HEALTH QUESTIONNAIRE - PHQ9: SUM OF ALL RESPONSES TO PHQ QUESTIONS 1-9: 4

## 2019-11-13 ASSESSMENT — PAIN SCALES - GENERAL: PAINLEVEL: NO PAIN (0)

## 2019-11-13 NOTE — PROGRESS NOTES
Outpatient Psychiatry Progress Note     Provider: DRISS Garduno CNS  Date: 2019  Service:  Medication follow up with counseling.   Patient Identification: Arlet Segura  : 1985   MRN: [id number]    Arlet Segura is a 34 year old year old female who presents for ongoing psychiatric care.  Arlet Segura was last seen in clinic on 10/15/19.   At that time,   Assessment & Plan       Arlet Segura is seen today for follow up she would like to restart ADHD medication. Stopped fluoxetine since even at 60 mg she didn't think it had been helping. In a better place now that she has finished graduate school and her boyfriend is living in [place] with her now.  Diagnosis       Encounter Diagnoses   Name Primary?     ADHD (attention deficit hyperactivity disorder), inattentive type       Iron deficiency       Need for prophylactic vaccination and inoculation against influenza Yes     Bulimia nervosa           Plan:  Medication:  Start Adderall IR 10mg bid.  After 2 weeks consider increase to 15mg bid but to send me a My Chart message first  OTC Recommendations: none  Lab Orders:  Ferritin. CBC  Referrals: none  Release of Information: none,  Future Treatment Considerations:per response   Return for Follow Up:3 weeks      ____________________________________________________________________________________________________________________________________________    2019  Today Arlet reports she has been functioning a lot better since restarting Adderall. Has had one been busy traveling with boyfriend to wedding.   Has been taking Adderall as 20mg in the am as 10mg did not seem to have much effect. It wears off around 3pm and sometimes this is problem.  Had one incident of binging and purging since last appointment.  Mild depression/feelings of failure.  Side effects of medication include: none  Psychiatric Review of Systems:  Depression: In the last 2 weeks per PHQ-9 score:   PHQ-9 SCORE  3/29/2017 12/20/2017 10/15/2019   PHQ-9 Total Score - - -   PHQ-9 Total Score 8 3 9        Anxiety : stable, had one binging and purging episode  Corinne na   Psychosis  na.   ADHD improved but 10mg at a time is not very effective. Has been taking 20mg in the am which wears off at around 3pm    Review of Medical Systems:  Sleep: no problem, better than when taking XR Adderall  Energy: stable  Concentration: some improvement  Appetite: stable  GI Concerns: none   Cardiac concerns: none  Neurological concerns: none  Other medical concerns: no new concerns  Current Substance Use:  Alcohol:denies frequent use or abuse  Other drugs:denies  Caffeine:no change  Nicotine: none  Past Medical History:   Past Medical History:   Diagnosis Date     Abnormal cervical Pap smear with positive HPV DNA test     last pap 2/2013 nl     Bulimia      Bulimia since 2008 1/19/2014     Cervical high risk HPV (human papillomavirus) test positive 05/28/2019    See problem list.      Patient Active Problem List   Diagnosis     ADHD (attention deficit hyperactivity disorder), inattentive type     Bulimia nervosa     Raynaud's disease without gangrene     H/O LEEP       Allergies: No Known Allergies       Current Medications     Current Outpatient Medications Ordered in Epic   Medication Sig Dispense Refill     amphetamine-dextroamphetamine (ADDERALL) 10 MG tablet Take 1 tablet (10 mg) by mouth 2 times daily 60 tablet 0     Ferrous Bisglycinate Chelate 15 MG TABS Taking 27 mg once daily       Prenatal Vit-Fe Fumarate-FA (PRENATAL MULTIVITAMIN PLUS IRON) 27-0.8 MG TABS per tablet Take 1 tablet by mouth daily       No current [facility]-ordered facility-administered medications on file.         Mental Status Exam     Appearance:  Casually dressed and Well groomed  Behavior/relationship to examiner/demeanor:  Cooperative  Orientation: Oriented to person, place, time and situation  Psychomotor: normal form  Speech Rate:  Normal  Speech Spontaneity:   "Normal  Mood:  \"good\"  Affect:  Appropriate/mood-congruent  Thought Process (Associations):  Goal directed  Thought Content:  no overt psychosis, denies suicidal ideation, intent or thoughts and patient does not appear to be responding to internal stimuli  Abnormal Perception:  None  Attention/Concentration:  Normal  Insight:  Good  Judgment:  Good      Results     Vital signs: /85   Pulse 68   Wt 49.9 kg (110 lb)   BMI 19.30 kg/m      Laboratory Data:  Ferritin of 18    Assessment & Plan      Arlet Segura is seen today for follow up and reports restarting Adderall has been helpful but dose needs adjustment. Some improvement in binging/purging but weight is down 2 lbs so need to monitor.    Diagnosis  Encounter Diagnoses   Name Primary?     Need for prophylactic vaccination and inoculation against influenza      ADHD (attention deficit hyperactivity disorder), inattentive type Yes       Plan:  Medication: Continue Adderall IR but increase to 15mg or 20mg bid  OTC Recommendations: discussed dose of iron supplement  Lab Orders:  None today. Will recheck at next appointment  Referrals: none  Release of Information: none  Future Treatment Considerations:per symptoms,side effects, episodes of eating disorder symptoms  Return for Follow Up:3 weeks   The risks, benefits, alternatives and side effects have been discussed and are understood by the patient. The patient understands the risks of using street drugs or alcohol. There are no medical contraindications, the patient agrees to treatment, and has the capacity to do so. The patient understands to call 911 or come to the nearest ED if life threatening or urgent symptoms present.  In addition time was spent counseling the patient and/or coordinating care regarding review of social and occupational functioning.  In addition patient was counseled on health and wellness practices to augment medication treatment of symptoms. See note for details.    Eli Cortez" DRISS Chambers [facility] 11/13/2019

## 2019-11-13 NOTE — NURSING NOTE
Chief Complaint   Patient presents with     Recheck Medication     ADHD (attention deficit hyperactivity disorder), inattentive type

## 2019-12-10 ENCOUNTER — OFFICE VISIT (OUTPATIENT)
Dept: PSYCHIATRY | Facility: CLINIC | Age: 34
End: 2019-12-10
Attending: CLINICAL NURSE SPECIALIST
Payer: COMMERCIAL

## 2019-12-10 VITALS
WEIGHT: 113.4 LBS | DIASTOLIC BLOOD PRESSURE: 85 MMHG | SYSTOLIC BLOOD PRESSURE: 127 MMHG | HEART RATE: 59 BPM | BODY MASS INDEX: 19.89 KG/M2

## 2019-12-10 DIAGNOSIS — E61.1 IRON DEFICIENCY: ICD-10-CM

## 2019-12-10 DIAGNOSIS — F90.0 ADHD (ATTENTION DEFICIT HYPERACTIVITY DISORDER), INATTENTIVE TYPE: Primary | ICD-10-CM

## 2019-12-10 DIAGNOSIS — F50.20 BULIMIA NERVOSA: ICD-10-CM

## 2019-12-10 PROCEDURE — G0463 HOSPITAL OUTPT CLINIC VISIT: HCPCS | Mod: ZF

## 2019-12-10 RX ORDER — DEXTROAMPHETAMINE SACCHARATE, AMPHETAMINE ASPARTATE, DEXTROAMPHETAMINE SULFATE AND AMPHETAMINE SULFATE 5; 5; 5; 5 MG/1; MG/1; MG/1; MG/1
10 TABLET ORAL 2 TIMES DAILY
Qty: 30 TABLET | Refills: 0 | Status: SHIPPED | OUTPATIENT
Start: 2019-12-10 | End: 2020-03-20

## 2019-12-10 RX ORDER — DEXTROAMPHETAMINE SACCHARATE, AMPHETAMINE ASPARTATE, DEXTROAMPHETAMINE SULFATE AND AMPHETAMINE SULFATE 5; 5; 5; 5 MG/1; MG/1; MG/1; MG/1
TABLET ORAL
Qty: 30 TABLET | Refills: 0 | Status: SHIPPED | OUTPATIENT
Start: 2019-12-10 | End: 2020-01-30

## 2019-12-10 RX ORDER — DEXTROAMPHETAMINE SACCHARATE, AMPHETAMINE ASPARTATE, DEXTROAMPHETAMINE SULFATE AND AMPHETAMINE SULFATE 1.25; 1.25; 1.25; 1.25 MG/1; MG/1; MG/1; MG/1
TABLET ORAL
Qty: 60 TABLET | Refills: 0 | Status: SHIPPED | OUTPATIENT
Start: 2020-02-04 | End: 2020-03-20

## 2019-12-10 RX ORDER — DEXTROAMPHETAMINE SACCHARATE, AMPHETAMINE ASPARTATE, DEXTROAMPHETAMINE SULFATE AND AMPHETAMINE SULFATE 1.25; 1.25; 1.25; 1.25 MG/1; MG/1; MG/1; MG/1
5 TABLET ORAL 2 TIMES DAILY
Qty: 60 TABLET | Refills: 0 | Status: SHIPPED | OUTPATIENT
Start: 2019-12-10 | End: 2020-03-20

## 2019-12-10 RX ORDER — DEXTROAMPHETAMINE SACCHARATE, AMPHETAMINE ASPARTATE, DEXTROAMPHETAMINE SULFATE AND AMPHETAMINE SULFATE 5; 5; 5; 5 MG/1; MG/1; MG/1; MG/1
10 TABLET ORAL 2 TIMES DAILY
Qty: 30 TABLET | Refills: 0 | Status: SHIPPED | OUTPATIENT
Start: 2020-02-04 | End: 2020-03-20

## 2019-12-10 RX ORDER — DEXTROAMPHETAMINE SACCHARATE, AMPHETAMINE ASPARTATE, DEXTROAMPHETAMINE SULFATE AND AMPHETAMINE SULFATE 1.25; 1.25; 1.25; 1.25 MG/1; MG/1; MG/1; MG/1
TABLET ORAL
Qty: 60 TABLET | Refills: 0 | Status: SHIPPED | OUTPATIENT
Start: 2020-01-07 | End: 2020-03-20

## 2019-12-10 ASSESSMENT — PAIN SCALES - GENERAL: PAINLEVEL: NO PAIN (0)

## 2019-12-10 NOTE — PROGRESS NOTES
Outpatient Psychiatry Progress Note     Provider: DRISS Garduno CNS  Date: 12/10/2019  Service:  Medication follow up with counseling.   Patient Identification: Arlet Segura  : 1985   MRN: [id number]    Arlet Segura is a 34 year old year old female who presents for ongoing psychiatric care.  Arlet Segura was last seen in clinic on 19.   At that time,   Assessment & Plan       Arlet Segura is seen today for follow up and reports restarting Adderall has been helpful but dose needs adjustment. Some improvement in binging/purging but weight is down 2 lbs so need to monitor.     Diagnosis       Encounter Diagnoses   Name Primary?     Need for prophylactic vaccination and inoculation against influenza       ADHD (attention deficit hyperactivity disorder), inattentive type Yes         Plan:  Medication: Continue Adderall IR but increase to 15mg or 20mg bid  OTC Recommendations: discussed dose of iron supplement  Lab Orders:  None today. Will recheck at next appointment  Referrals: none  Release of Information: none  Future Treatment Considerations:per symptoms,side effects, episodes of eating disorder symptoms  Return for Follow Up:3 weeks      ____________________________________________________________________________________________________________________________________________    12/10/2019  Today Arlet reports she feels the current dose of Adderall is working much better and taking 15mg twice a day most days.  Side effects of medication include: not daily but sometimes feels a little on edge seems to get better if she drinks water.   She continues to run regularly.   Psychiatric Review of Systems:  Depression: In the last 2 weeks per PHQ-9 score:   PHQ-9 SCORE 10/15/2019 2019 12/10/2019   PHQ-9 Total Score - - -   PHQ-9 Total Score 9 4 5        Anxiety : stable  Corinne na   Psychosis  na.   ADHD improved, getting more done staying on task    Review of Medical  "Systems:  Sleep: stable  Energy: stable  Concentration: improved  Appetite: stable, might binge and purge a few times a month but this is much better than it was.   GI Concerns: none  Cardiac concerns: none  Neurological concerns: none  Other medical concerns: no new concerns  Current Substance Use:  Alcohol:occasional denies frequent use or abuse  Other drugs:none  Caffeine:coffee no change  Nicotine: none  Past Medical History:   Past Medical History:   Diagnosis Date     Abnormal cervical Pap smear with positive HPV DNA test     last pap 2/2013 nl     Bulimia      Bulimia since 2008 1/19/2014     Cervical high risk HPV (human papillomavirus) test positive 05/28/2019    See problem list.      Patient Active Problem List   Diagnosis     ADHD (attention deficit hyperactivity disorder), inattentive type     Bulimia nervosa     Raynaud's disease without gangrene     H/O LEEP       Allergies: No Known Allergies       Current Medications     Current Outpatient Medications Ordered in Epic   Medication Sig Dispense Refill     amphetamine-dextroamphetamine (ADDERALL) 20 MG tablet Take one half to one tablet by mouth twice daily 60 tablet 0     amphetamine-dextroamphetamine (ADDERALL) 5 MG tablet Take one tablet by mouth twice daily with 10mg in order to achieve 15mg dose. 60 tablet 0     Ferrous Bisglycinate Chelate 15 MG TABS Taking 27 mg once daily       Prenatal Vit-Fe Fumarate-FA (PRENATAL MULTIVITAMIN PLUS IRON) 27-0.8 MG TABS per tablet Take 1 tablet by mouth daily       No current [facility]-ordered facility-administered medications on file.         Mental Status Exam     Appearance:  Casually dressed and Well groomed  Behavior/relationship to examiner/demeanor:  Cooperative  Orientation: Oriented to person, place, time and situation  Psychomotor: normal form  Speech Rate:  Normal  Speech Spontaneity:  Normal  Mood:  \"okay\"  Affect:  Appropriate/mood-congruent  Thought Process (Associations):  Goal directed  Thought " Content:  no overt psychosis, denies suicidal ideation, intent or thoughts and patient does not appear to be responding to internal stimuli  Abnormal Perception:  None  Attention/Concentration:  Normal  Insight:  Good  Judgment:  Good      Results     Vital signs: /85   Pulse 59   Wt 51.4 kg (113 lb 6.4 oz)   BMI 19.89 kg/m      Laboratory Data:  no new data    Assessment & Plan      Arlet Segura is seen today for follow up and reports she has been using Adderall at 15mg twice a day most days and is focusing on work much better. Eating is mostly stable and has not worsened. She would like to continue current dose of Adderall.    Diagnosis  Encounter Diagnoses   Name Primary?     ADHD (attention deficit hyperactivity disorder), inattentive type Yes     Bulimia nervosa        Plan:  Medication: Continue current dose of Adderall 15mg bid  OTC Recommendations: none  Lab Orders:  none  Referrals: none  Release of Information: none  Future Treatment Considerations:per symptoms  Return for Follow Up:6 months   The risks, benefits, alternatives and side effects have been discussed and are understood by the patient. The patient understands the risks of using street drugs or alcohol. There are no medical contraindications, the patient agrees to treatment, and has the capacity to do so. The patient understands to call 911 or come to the nearest ED if life threatening or urgent symptoms present.  In addition time was spent counseling the patient and/or coordinating care regarding review of social and occupational functioning.  In addition patient was counseled on health and wellness practices to augment medication treatment of symptoms. See note for details.    Eli Chambers, DRISS CNS 12/10/2019

## 2019-12-26 ASSESSMENT — PATIENT HEALTH QUESTIONNAIRE - PHQ9: SUM OF ALL RESPONSES TO PHQ QUESTIONS 1-9: 5

## 2020-01-30 ENCOUNTER — MYC REFILL (OUTPATIENT)
Dept: PSYCHIATRY | Facility: CLINIC | Age: 35
End: 2020-01-30

## 2020-01-30 DIAGNOSIS — F90.0 ADHD (ATTENTION DEFICIT HYPERACTIVITY DISORDER), INATTENTIVE TYPE: ICD-10-CM

## 2020-01-30 RX ORDER — DEXTROAMPHETAMINE SACCHARATE, AMPHETAMINE ASPARTATE, DEXTROAMPHETAMINE SULFATE AND AMPHETAMINE SULFATE 5; 5; 5; 5 MG/1; MG/1; MG/1; MG/1
TABLET ORAL
Qty: 30 TABLET | Refills: 0 | Status: SHIPPED | OUTPATIENT
Start: 2020-01-30 | End: 2020-01-31

## 2020-01-30 NOTE — TELEPHONE ENCOUNTER
Received RF request from patient     Last seen: 12/10/2019  RTC: 6 months  Cancel: none  No-show: none   Next appt: none scheduled     Medication requested: amphetamine-dextroamphetamine (ADDERALL) 20 MG tablet  Directions: Take 1/2 tablet (10mg) by mouth daily in the afternoon With 5mg for total dose of 15mg twice daily  Qty: 30  Last Rx written 12/10/2019  [place]  checked and last refilled on 12/11, 11/13         Plan per 12/10  office visit:    Medication: Continue current dose of Adderall 15mg bid       Medication refill approved per refill protocol. Pended 30 ds and routed to provider to sign off on for controlled substances.

## 2020-01-31 DIAGNOSIS — F90.0 ADHD (ATTENTION DEFICIT HYPERACTIVITY DISORDER), INATTENTIVE TYPE: ICD-10-CM

## 2020-01-31 RX ORDER — DEXTROAMPHETAMINE SACCHARATE, AMPHETAMINE ASPARTATE, DEXTROAMPHETAMINE SULFATE AND AMPHETAMINE SULFATE 5; 5; 5; 5 MG/1; MG/1; MG/1; MG/1
TABLET ORAL
Qty: 30 TABLET | Refills: 0 | Status: SHIPPED | OUTPATIENT
Start: 2020-01-31 | End: 2020-03-20

## 2020-01-31 NOTE — PROGRESS NOTES
Phone call from pharmacy to confirm order for Adderall 20mg sent yesterday.  Script sent yesterday reads on tablet daily in the afternoon for total dose of 15mg twice daily.   Correction made so that the directions read, take one half tablet twice daily with 5mg total dose of 15mg twice daily.  Eli Chambers CNS, APRN

## 2020-03-20 ENCOUNTER — MYC REFILL (OUTPATIENT)
Dept: PSYCHIATRY | Facility: CLINIC | Age: 35
End: 2020-03-20

## 2020-03-20 DIAGNOSIS — F90.0 ADHD (ATTENTION DEFICIT HYPERACTIVITY DISORDER), INATTENTIVE TYPE: ICD-10-CM

## 2020-03-20 RX ORDER — DEXTROAMPHETAMINE SACCHARATE, AMPHETAMINE ASPARTATE, DEXTROAMPHETAMINE SULFATE AND AMPHETAMINE SULFATE 1.25; 1.25; 1.25; 1.25 MG/1; MG/1; MG/1; MG/1
TABLET ORAL
Qty: 60 TABLET | Refills: 0 | Status: CANCELLED | OUTPATIENT
Start: 2020-03-20

## 2020-03-20 NOTE — TELEPHONE ENCOUNTER
Last seen: 12/10/19  RTC: 6 months  Cancel: None  No-show: None  Next appt: None     Medication requested: amphetamine-dextroamphetamine (ADDERALL) 5 MG tablet   Directions: Take 1 tablet (5 mg) by mouth 2 times daily with 10mg in order to achieve 15mg dose  Qty: 60  Last refilled: 2/27/20, 1/30/20, 12/11/19 per [place]     Medication requested: amphetamine-dextroamphetamine (ADDERALL) 20 MG tablet   Directions: Take 0.5 tablets (10 mg) by mouth 2 times daily With 5mg for total dose of 15mg twice daily - Oral   Qty: 30  Last refilled: 2/28/20, 1/30/20, 12/11/19 per [place]      Medication refill approved per refill protocol. Pended a 30 d/s x3 and routed to provider to review/sign. (start dates: 3/27/20, 4/24/20, 5/22/20)

## 2020-03-22 RX ORDER — DEXTROAMPHETAMINE SACCHARATE, AMPHETAMINE ASPARTATE, DEXTROAMPHETAMINE SULFATE AND AMPHETAMINE SULFATE 1.25; 1.25; 1.25; 1.25 MG/1; MG/1; MG/1; MG/1
TABLET ORAL
Qty: 60 TABLET | Refills: 0 | Status: SHIPPED | OUTPATIENT
Start: 2020-04-24 | End: 2020-06-29

## 2020-03-22 RX ORDER — DEXTROAMPHETAMINE SACCHARATE, AMPHETAMINE ASPARTATE, DEXTROAMPHETAMINE SULFATE AND AMPHETAMINE SULFATE 5; 5; 5; 5 MG/1; MG/1; MG/1; MG/1
TABLET ORAL
Qty: 30 TABLET | Refills: 0 | Status: SHIPPED | OUTPATIENT
Start: 2020-03-27 | End: 2020-06-29

## 2020-03-22 RX ORDER — DEXTROAMPHETAMINE SACCHARATE, AMPHETAMINE ASPARTATE, DEXTROAMPHETAMINE SULFATE AND AMPHETAMINE SULFATE 5; 5; 5; 5 MG/1; MG/1; MG/1; MG/1
10 TABLET ORAL 2 TIMES DAILY
Qty: 30 TABLET | Refills: 0 | Status: SHIPPED | OUTPATIENT
Start: 2020-05-22 | End: 2020-09-16

## 2020-03-22 RX ORDER — DEXTROAMPHETAMINE SACCHARATE, AMPHETAMINE ASPARTATE, DEXTROAMPHETAMINE SULFATE AND AMPHETAMINE SULFATE 1.25; 1.25; 1.25; 1.25 MG/1; MG/1; MG/1; MG/1
TABLET ORAL
Qty: 60 TABLET | Refills: 0 | Status: SHIPPED | OUTPATIENT
Start: 2020-05-22 | End: 2020-09-16

## 2020-03-22 RX ORDER — DEXTROAMPHETAMINE SACCHARATE, AMPHETAMINE ASPARTATE, DEXTROAMPHETAMINE SULFATE AND AMPHETAMINE SULFATE 5; 5; 5; 5 MG/1; MG/1; MG/1; MG/1
10 TABLET ORAL 2 TIMES DAILY
Qty: 30 TABLET | Refills: 0 | Status: SHIPPED | OUTPATIENT
Start: 2020-04-24 | End: 2020-09-16

## 2020-03-22 RX ORDER — DEXTROAMPHETAMINE SACCHARATE, AMPHETAMINE ASPARTATE, DEXTROAMPHETAMINE SULFATE AND AMPHETAMINE SULFATE 1.25; 1.25; 1.25; 1.25 MG/1; MG/1; MG/1; MG/1
5 TABLET ORAL 2 TIMES DAILY
Qty: 60 TABLET | Refills: 0 | Status: SHIPPED | OUTPATIENT
Start: 2020-03-27 | End: 2020-09-16

## 2020-06-23 ENCOUNTER — VIRTUAL VISIT (OUTPATIENT)
Dept: PSYCHIATRY | Facility: CLINIC | Age: 35
End: 2020-06-23
Attending: PSYCHIATRY & NEUROLOGY
Payer: COMMERCIAL

## 2020-06-23 DIAGNOSIS — F90.0 ADHD (ATTENTION DEFICIT HYPERACTIVITY DISORDER), INATTENTIVE TYPE: Primary | ICD-10-CM

## 2020-06-23 DIAGNOSIS — F50.20 BULIMIA NERVOSA: ICD-10-CM

## 2020-06-23 ASSESSMENT — PAIN SCALES - GENERAL: PAINLEVEL: NO PAIN (0)

## 2020-06-23 NOTE — PATIENT INSTRUCTIONS
Plan Today  - no med change  - will refill  - RTC 3 months  --------------------------------------------    Thank you for coming to the PSYCHIATRY CLINIC.    Lab Testing:  If you had lab testing today and your results are reassuring or normal they will be mailed to you or sent through [employer] within 7 days. If the lab tests need quick action we will call you with the results. The phone number we will call with results is # [phone] (home) [phone] (work). If this is not the best number please call our clinic and change the number.    Medication Refills:  If you need any refills please call your pharmacy and they will contact us. Our fax number for refills is [phone]. Please allow three business for refill processing. If you need to  your refill at a new pharmacy, please contact the new pharmacy directly. The new pharmacy will help you get your medications transferred.     Scheduling:  If you have any concerns about today's visit or wish to schedule another appointment please call our office during normal business hours [phone] (8-5:00 M-F)    Contact Us:  Please call [phone] during business hours (8-5:00 M-F).  If after clinic hours, or on the weekend, please call  [phone].    Financial Assistance [phone]  [facility] Billing [phone]  [place] Billing Office, [facility]: [phone]  [place] Billing [phone]  Medical Records [phone]      MENTAL HEALTH CRISIS NUMBERS:  For a medical emergency please call  911 or go to the nearest ER.     [facility]:   [facility] -[phone]   Crisis Residence [facility] Residence -[phone]   Walk-In Counseling Center [facility] -[phone]   COPE 24/7 [place] Mobile Team -[phone] (adults)/[phone] (child)  CHILD: [facility] needs assessment team - [phone]      [facility]:   [facility] - [phone]   Walk-in counseling [facility] - [phone]   Walk-in  counseling [facility] - [phone]   Crisis Residence  [facility] Residence - [phone]  Urgent Care Adult Mental [facility]-[phone] mobile unit/ 24/7 crisis line    National Crisis Numbers:   National Suicide Prevention Lifeline: 6-943-433-TALK (738-041-5583)  Poison Control Center - 5-425-569-7746  [employer]/resources for a list of additional resources (SOS)  Trans Lifeline a hotline for transgender people 5-021-929-6903  The [employer] [place] a hotline for LGBT youth 5-002-149-9537  Crisis Text Line: For any crisis 24/7   To: 621909  see www.crisistextline.org  - IF MAKING A CALL FEELS TOO HARD, send a text!         Again thank you for choosing PSYCHIATRY CLINIC and please let us know how we can best partner with you to improve you and your family's health.    You may be receiving a survey regarding this appointment. We would love to have your feedback, both positive and negative. The survey is done by an external company, so your answers are anonymous.

## 2020-06-23 NOTE — PROGRESS NOTES
"VIDEO VISIT  Arlet Segura is a 35 year old patient who is being evaluated via a billable video visit.      The patient has been notified of following:   \"This video visit will be conducted via a call between you and your physician/provider. We have found that certain health care needs can be provided without the need for an in-person physical exam. This service lets us provide the care you need with a video conversation. If a prescription is necessary we can send it directly to your pharmacy. If lab work is needed we can place an order for that and you can then stop by our lab to have the test done at a later time. Insurers are generally covering virtual visits as they would in-office visits so billing should not be different than normal.  If for some reason you do get billed incorrectly, you should contact the billing office to correct it and that number is in the AVS .    Patient has given verbal consent for video visit?:  Yes     How would you like to obtain your AVS?:  OpenQ    Video invitation for patient:  DOXY provider, invite not needed      AVS SmartPhrase [PsychAVS] has been placed in 'Patient Instructions':  Yes     "

## 2020-06-29 ENCOUNTER — MYC REFILL (OUTPATIENT)
Dept: PSYCHIATRY | Facility: CLINIC | Age: 35
End: 2020-06-29

## 2020-06-29 DIAGNOSIS — F90.0 ADHD (ATTENTION DEFICIT HYPERACTIVITY DISORDER), INATTENTIVE TYPE: ICD-10-CM

## 2020-06-29 NOTE — TELEPHONE ENCOUNTER
Patient requesting a call back when this gets filled so she knows when she can pick them up. She reports being downtown already and would love to save a trip going out if possible

## 2020-06-29 NOTE — TELEPHONE ENCOUNTER
Last seen: 6/23  RTC: 3 months  Non-provider cancel: None  No-show: None  Next appt: None     Incoming refill from patient via MyChart     Medication requested:   Disp  Refills  Start  End  BEE    amphetamine-dextroamphetamine (ADDERALL) 20 MG tablet  30 tablet  0  5/22/2020   No    Sig - Route: Take 0.5 tablets (10 mg) by mouth 2 times daily With 5mg for total dose of 15mg twice daily    Last refilled: 5/28, 4/29, 3/27 (all 3/22 orders) per [place]      Disp  Refills  Start  End  BEE    amphetamine-dextroamphetamine (ADDERALL) 5 MG tablet  60 tablet  0  5/22/2020   No    Sig: Take one tablet by mouth twice daily with 10mg in order to achieve 15mg dose   Last refilled: 5/28, 4/29, 3/27 (all 3/22 orders) per [place]      Refill routed to provider due to refill protocol (last visit note not signed).

## 2020-06-30 RX ORDER — DEXTROAMPHETAMINE SACCHARATE, AMPHETAMINE ASPARTATE, DEXTROAMPHETAMINE SULFATE AND AMPHETAMINE SULFATE 1.25; 1.25; 1.25; 1.25 MG/1; MG/1; MG/1; MG/1
5 TABLET ORAL 2 TIMES DAILY
Qty: 60 TABLET | Refills: 0 | Status: SHIPPED | OUTPATIENT
Start: 2020-07-27 | End: 2020-09-16

## 2020-06-30 RX ORDER — DEXTROAMPHETAMINE SACCHARATE, AMPHETAMINE ASPARTATE, DEXTROAMPHETAMINE SULFATE AND AMPHETAMINE SULFATE 1.25; 1.25; 1.25; 1.25 MG/1; MG/1; MG/1; MG/1
5 TABLET ORAL 2 TIMES DAILY
Qty: 60 TABLET | Refills: 0 | Status: SHIPPED | OUTPATIENT
Start: 2020-08-24 | End: 2020-09-16

## 2020-06-30 RX ORDER — DEXTROAMPHETAMINE SACCHARATE, AMPHETAMINE ASPARTATE, DEXTROAMPHETAMINE SULFATE AND AMPHETAMINE SULFATE 5; 5; 5; 5 MG/1; MG/1; MG/1; MG/1
10 TABLET ORAL 2 TIMES DAILY
Qty: 30 TABLET | Refills: 0 | Status: SHIPPED | OUTPATIENT
Start: 2020-07-27 | End: 2020-09-16

## 2020-06-30 RX ORDER — DEXTROAMPHETAMINE SACCHARATE, AMPHETAMINE ASPARTATE, DEXTROAMPHETAMINE SULFATE AND AMPHETAMINE SULFATE 5; 5; 5; 5 MG/1; MG/1; MG/1; MG/1
10 TABLET ORAL 2 TIMES DAILY
Qty: 30 TABLET | Refills: 0 | Status: SHIPPED | OUTPATIENT
Start: 2020-06-30 | End: 2020-09-16

## 2020-06-30 RX ORDER — DEXTROAMPHETAMINE SACCHARATE, AMPHETAMINE ASPARTATE, DEXTROAMPHETAMINE SULFATE AND AMPHETAMINE SULFATE 1.25; 1.25; 1.25; 1.25 MG/1; MG/1; MG/1; MG/1
5 TABLET ORAL 2 TIMES DAILY
Qty: 60 TABLET | Refills: 0 | Status: SHIPPED | OUTPATIENT
Start: 2020-06-30 | End: 2020-09-16

## 2020-06-30 RX ORDER — DEXTROAMPHETAMINE SACCHARATE, AMPHETAMINE ASPARTATE, DEXTROAMPHETAMINE SULFATE AND AMPHETAMINE SULFATE 5; 5; 5; 5 MG/1; MG/1; MG/1; MG/1
10 TABLET ORAL 2 TIMES DAILY
Qty: 30 TABLET | Refills: 0 | Status: SHIPPED | OUTPATIENT
Start: 2020-08-24 | End: 2020-09-16

## 2020-06-30 NOTE — TELEPHONE ENCOUNTER
Patient called to check on med refill. She has been out of medication for a few days and is hoping to  from pharmacy ASAP. Patient confirmed she prefers [facility] [place], [place] - [address] [place] [address] [address].

## 2020-07-01 NOTE — TELEPHONE ENCOUNTER
06/30/20 1624  Candida Rao MD    E-Prescribing Status: Receipt confirmed by pharmacy (6/30/2020  4:24 PM CDT)     Routed message to pt via [employer].
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
## 2020-07-23 ENCOUNTER — OFFICE VISIT (OUTPATIENT)
Dept: FAMILY MEDICINE | Facility: CLINIC | Age: 35
End: 2020-07-23
Payer: COMMERCIAL

## 2020-07-23 VITALS
HEIGHT: 63 IN | WEIGHT: 101.5 LBS | SYSTOLIC BLOOD PRESSURE: 124 MMHG | DIASTOLIC BLOOD PRESSURE: 85 MMHG | OXYGEN SATURATION: 100 % | BODY MASS INDEX: 17.98 KG/M2 | HEART RATE: 81 BPM | TEMPERATURE: 97.3 F

## 2020-07-23 DIAGNOSIS — S80.12XA CONTUSION OF LEFT LOWER LEG, INITIAL ENCOUNTER: ICD-10-CM

## 2020-07-23 DIAGNOSIS — S81.812A LACERATION OF LEFT LOWER LEG, INITIAL ENCOUNTER: Primary | ICD-10-CM

## 2020-07-23 ASSESSMENT — MIFFLIN-ST. JEOR: SCORE: 1140.65

## 2020-07-23 NOTE — NURSING NOTE
"35 year old  Chief Complaint   Patient presents with     Foreign Body in Skin     left shin skin injury foreign underneth skin x 3 wks        Blood pressure 124/85, pulse 81, temperature 97.3  F (36.3  C), temperature source Temporal, height 1.61 m (5' 3.39\"), weight 46 kg (101 lb 8 oz), last menstrual period 06/25/2020, SpO2 100 %, not currently breastfeeding. Body mass index is 17.76 kg/m .  Patient Active Problem List   Diagnosis     ADHD (attention deficit hyperactivity disorder), inattentive type     Bulimia nervosa     Raynaud's disease without gangrene     H/O LEEP       Wt Readings from Last 2 Encounters:   07/23/20 46 kg (101 lb 8 oz)   07/08/19 56.8 kg (125 lb 4.8 oz)     BP Readings from Last 3 Encounters:   07/23/20 124/85   07/08/19 129/77   05/28/19 116/82         Current Outpatient Medications   Medication     amphetamine-dextroamphetamine (ADDERALL) 20 MG tablet     [START ON 7/27/2020] amphetamine-dextroamphetamine (ADDERALL) 20 MG tablet     [START ON 8/24/2020] amphetamine-dextroamphetamine (ADDERALL) 20 MG tablet     amphetamine-dextroamphetamine (ADDERALL) 20 MG tablet     amphetamine-dextroamphetamine (ADDERALL) 20 MG tablet     amphetamine-dextroamphetamine (ADDERALL) 5 MG tablet     [START ON 7/27/2020] amphetamine-dextroamphetamine (ADDERALL) 5 MG tablet     [START ON 8/24/2020] amphetamine-dextroamphetamine (ADDERALL) 5 MG tablet     amphetamine-dextroamphetamine (ADDERALL) 5 MG tablet     amphetamine-dextroamphetamine (ADDERALL) 5 MG tablet     Ferrous Bisglycinate Chelate 15 MG TABS     Prenatal Vit-Fe Fumarate-FA (PRENATAL MULTIVITAMIN PLUS IRON) 27-0.8 MG TABS per tablet     No current facility-administered medications for this visit.        Social History     Tobacco Use     Smoking status: Never Smoker     Smokeless tobacco: Never Used   Substance Use Topics     Alcohol use: Yes     Comment: Five drinks a week, average     Drug use: No       Health Maintenance Due   Topic Date Due     " HIV SCREENING  06/01/2002     PHQ-2  01/01/2020     PREVENTIVE CARE VISIT  05/28/2020     HPV TEST  07/08/2020     PAP  07/08/2020       Lab Results   Component Value Date    PAP NIL 05/28/2019 July 23, 2020 9:12 AM
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
## 2020-09-15 ENCOUNTER — VIRTUAL VISIT (OUTPATIENT)
Dept: PSYCHIATRY | Facility: CLINIC | Age: 35
End: 2020-09-15
Attending: PSYCHIATRY & NEUROLOGY
Payer: COMMERCIAL

## 2020-09-15 DIAGNOSIS — F90.0 ADHD (ATTENTION DEFICIT HYPERACTIVITY DISORDER), INATTENTIVE TYPE: Primary | ICD-10-CM

## 2020-09-15 ASSESSMENT — PAIN SCALES - GENERAL: PAINLEVEL: NO PAIN (0)

## 2020-09-15 NOTE — PATIENT INSTRUCTIONS
Thank you for coming to the PSYCHIATRY CLINIC.    Lab Testing:  If you had lab testing today and your results are reassuring or normal they will be mailed to you or sent through [facility] within 7 days. If the lab tests need quick action we will call you with the results. The phone number we will call with results is # [phone] (home) [phone] (work). If this is not the best number please call our clinic and change the number.    Medication Refills:  If you need any refills please call your pharmacy and they will contact us. Our fax number for refills is [phone]. Please allow three business for refill processing. If you need to  your refill at a new pharmacy, please contact the new pharmacy directly. The new pharmacy will help you get your medications transferred.     Scheduling:  If you have any concerns about today's visit or wish to schedule another appointment please call our office during normal business hours [phone] (8-5:00 M-F)    Contact Us:  Please call [phone] during business hours (8-5:00 M-F).  If after clinic hours, or on the weekend, please call  [phone].    Financial Assistance [phone]  [facility] Billing [phone]  [facility] Billing Office, [facility]: [phone]  [facility] Billing [phone]  Medical Records [phone]      MENTAL HEALTH CRISIS NUMBERS:  For a medical emergency please call  911 or go to the nearest ER.     [facility]:   [facility] -[phone]   Crisis Residence [facility] -[phone]   Walk-In Counseling [facility] -[phone]   COPE 24/7 [facility] Mobile Team -[phone] (adults)/[phone] (child)  CHILD: [facility] needs assessment team - [phone]      [facility]:   [facility] - [phone]   Walk-in counseling [facility] - [phone]   Walk-in counseling [facility] - [phone]   Crisis Residence [facility] -  [phone]  Urgent Care Adult Mental [facility]-[phone] mobile unit/ 24/7 crisis line    National Crisis Numbers:   National Suicide Prevention Lifeline: 5-926-138-TALK (964-766-0616)  Poison Control Center - 8-478-171-5103  3yy game platform/resources for a list of additional resources (SOS)  Trans Lifeline a hotline for transgender people 6-601-851-0364  The [employer]a Project a hotline for LGBT youth 1-673.352.6493  Crisis Text Line: For any crisis 24/7   To: 715157  see www.crisistextline.org  - IF MAKING A CALL FEELS TOO HARD, send a text!         Again thank you for choosing PSYCHIATRY CLINIC and please let us know how we can best partner with you to improve you and your family's health.    You may be receiving a survey regarding this appointment. We would love to have your feedback, both positive and negative. The survey is done by an external company, so your answers are anonymous.

## 2020-09-15 NOTE — PROGRESS NOTES
"VIDEO VISIT  Arlet Segura is a 35 year old patient who is being evaluated via a billable video visit.      The patient has been notified of following:   \"This video visit will be conducted via a call between you and your physician/provider. We have found that certain health care needs can be provided without the need for an in-person physical exam. This service lets us provide the care you need with a video conversation. If a prescription is necessary we can send it directly to your pharmacy. If lab work is needed we can place an order for that and you can then stop by our lab to have the test done at a later time. Insurers are generally covering virtual visits as they would in-office visits so billing should not be different than normal.  If for some reason you do get billed incorrectly, you should contact the billing office to correct it and that number is in the AVS .    Video Conference to be completed via:  [facility]    Patient has given verbal consent for video visit?:  Yes    Patient would prefer that any video invitations be sent by: Send to e-mail at: [email or web address]      How would patient like to obtain AVS?:  [facility]    AVS SmartPhrase [PsychAVS] has been placed in 'Patient Instructions':  Yes    "
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
## 2020-09-16 RX ORDER — DEXTROAMPHETAMINE SACCHARATE, AMPHETAMINE ASPARTATE, DEXTROAMPHETAMINE SULFATE AND AMPHETAMINE SULFATE 5; 5; 5; 5 MG/1; MG/1; MG/1; MG/1
TABLET ORAL
Qty: 30 TABLET | Refills: 0 | Status: SHIPPED | OUTPATIENT
Start: 2020-10-14 | End: 2020-12-28

## 2020-09-16 RX ORDER — DEXTROAMPHETAMINE SACCHARATE, AMPHETAMINE ASPARTATE, DEXTROAMPHETAMINE SULFATE AND AMPHETAMINE SULFATE 1.25; 1.25; 1.25; 1.25 MG/1; MG/1; MG/1; MG/1
TABLET ORAL
Qty: 60 TABLET | Refills: 0 | Status: SHIPPED | OUTPATIENT
Start: 2020-09-16 | End: 2020-12-28

## 2020-09-16 RX ORDER — DEXTROAMPHETAMINE SACCHARATE, AMPHETAMINE ASPARTATE, DEXTROAMPHETAMINE SULFATE AND AMPHETAMINE SULFATE 1.25; 1.25; 1.25; 1.25 MG/1; MG/1; MG/1; MG/1
TABLET ORAL
Qty: 60 TABLET | Refills: 0 | Status: SHIPPED | OUTPATIENT
Start: 2020-10-14 | End: 2020-12-28

## 2020-09-16 RX ORDER — DEXTROAMPHETAMINE SACCHARATE, AMPHETAMINE ASPARTATE, DEXTROAMPHETAMINE SULFATE AND AMPHETAMINE SULFATE 1.25; 1.25; 1.25; 1.25 MG/1; MG/1; MG/1; MG/1
TABLET ORAL
Qty: 60 TABLET | Refills: 0 | Status: SHIPPED | OUTPATIENT
Start: 2020-11-14 | End: 2021-02-01

## 2020-09-16 RX ORDER — DEXTROAMPHETAMINE SACCHARATE, AMPHETAMINE ASPARTATE, DEXTROAMPHETAMINE SULFATE AND AMPHETAMINE SULFATE 1.25; 1.25; 1.25; 1.25 MG/1; MG/1; MG/1; MG/1
5 TABLET ORAL 2 TIMES DAILY
Qty: 60 TABLET | Refills: 0 | Status: SHIPPED | OUTPATIENT
Start: 2020-09-16 | End: 2020-09-16

## 2020-09-16 RX ORDER — DEXTROAMPHETAMINE SACCHARATE, AMPHETAMINE ASPARTATE, DEXTROAMPHETAMINE SULFATE AND AMPHETAMINE SULFATE 5; 5; 5; 5 MG/1; MG/1; MG/1; MG/1
TABLET ORAL
Qty: 30 TABLET | Refills: 0 | Status: SHIPPED | OUTPATIENT
Start: 2020-09-16 | End: 2020-12-28

## 2020-09-16 RX ORDER — DEXTROAMPHETAMINE SACCHARATE, AMPHETAMINE ASPARTATE, DEXTROAMPHETAMINE SULFATE AND AMPHETAMINE SULFATE 5; 5; 5; 5 MG/1; MG/1; MG/1; MG/1
TABLET ORAL
Qty: 60 TABLET | Refills: 0 | Status: SHIPPED | OUTPATIENT
Start: 2020-09-16 | End: 2020-09-16

## 2020-09-16 RX ORDER — DEXTROAMPHETAMINE SACCHARATE, AMPHETAMINE ASPARTATE, DEXTROAMPHETAMINE SULFATE AND AMPHETAMINE SULFATE 5; 5; 5; 5 MG/1; MG/1; MG/1; MG/1
TABLET ORAL
Qty: 30 TABLET | Refills: 0 | Status: SHIPPED | OUTPATIENT
Start: 2020-11-14 | End: 2021-02-01

## 2020-12-20 ENCOUNTER — HEALTH MAINTENANCE LETTER (OUTPATIENT)
Age: 35
End: 2020-12-20

## 2020-12-28 ENCOUNTER — MYC MEDICAL ADVICE (OUTPATIENT)
Dept: PSYCHIATRY | Facility: CLINIC | Age: 35
End: 2020-12-28

## 2020-12-28 DIAGNOSIS — F90.0 ADHD (ATTENTION DEFICIT HYPERACTIVITY DISORDER), INATTENTIVE TYPE: ICD-10-CM

## 2020-12-28 RX ORDER — DEXTROAMPHETAMINE SACCHARATE, AMPHETAMINE ASPARTATE, DEXTROAMPHETAMINE SULFATE AND AMPHETAMINE SULFATE 1.25; 1.25; 1.25; 1.25 MG/1; MG/1; MG/1; MG/1
TABLET ORAL
Qty: 60 TABLET | Refills: 0 | Status: SHIPPED | OUTPATIENT
Start: 2020-12-28 | End: 2021-02-01

## 2020-12-28 RX ORDER — DEXTROAMPHETAMINE SACCHARATE, AMPHETAMINE ASPARTATE, DEXTROAMPHETAMINE SULFATE AND AMPHETAMINE SULFATE 1.25; 1.25; 1.25; 1.25 MG/1; MG/1; MG/1; MG/1
TABLET ORAL
Qty: 60 TABLET | Refills: 0 | Status: SHIPPED | OUTPATIENT
Start: 2021-01-26 | End: 2021-02-01

## 2020-12-28 RX ORDER — DEXTROAMPHETAMINE SACCHARATE, AMPHETAMINE ASPARTATE, DEXTROAMPHETAMINE SULFATE AND AMPHETAMINE SULFATE 5; 5; 5; 5 MG/1; MG/1; MG/1; MG/1
TABLET ORAL
Qty: 30 TABLET | Refills: 0 | Status: SHIPPED | OUTPATIENT
Start: 2021-01-26 | End: 2021-02-01

## 2020-12-28 RX ORDER — DEXTROAMPHETAMINE SACCHARATE, AMPHETAMINE ASPARTATE, DEXTROAMPHETAMINE SULFATE AND AMPHETAMINE SULFATE 5; 5; 5; 5 MG/1; MG/1; MG/1; MG/1
TABLET ORAL
Qty: 30 TABLET | Refills: 0 | Status: SHIPPED | OUTPATIENT
Start: 2020-12-28 | End: 2021-02-01

## 2020-12-28 NOTE — TELEPHONE ENCOUNTER
Received RF request from patient     Last seen: 9/15/20  RTC: 3 months  Cancel: none  No-show: none  Next appt: none scheduled     Medication requested: amphetamine-dextroamphetamine (ADDERALL) 20 MG tablet  Directions: Take one half tab (10mg) twice daily along with one 5mg tab twice daily for total dose 15mg twice daily  Qty: 30  Last Rx written 9/16/20 with start date 11/14/20  [place]  checked and last refilled on 11/30, 10/28, 9/25       Medication requested: amphetamine-dextroamphetamine (ADDERALL) 5 MG tablet  Directions: Take one tab (5mg) twice daily along with one half 20mg tab (10mg) twice daily for  total dose of 15mg twice daily  Qty: 60  Last Rx written 9/16/20 with start date 11/14/20  [place]  checked and last refilled on 12/1, 10/28, 9/25      Plan per 9/15 virtual visit:     continue Adderall 20mg tabs- 1/2 tab BID along with a 5mg tab BID for a total of 15mg BID  - continue Adderall   5mg tabs-  1 tab BID along with 1/2 of 20mg BID for at total of 15mg BID  (I assume 20mg tabs are in use because 10mg are not covered, will check this next time)  - gave 3 months each med       Medication refill approved per refill protocol. Pended 30 ds and routed to Dr. Redd to sign off on for controlled substances.    Separate message sent to scheduling to contact patient for a follow up appointment.

## 2021-02-01 ENCOUNTER — ANCILLARY PROCEDURE (OUTPATIENT)
Dept: ULTRASOUND IMAGING | Facility: CLINIC | Age: 36
End: 2021-02-01
Attending: OBSTETRICS & GYNECOLOGY
Payer: COMMERCIAL

## 2021-02-01 ENCOUNTER — OFFICE VISIT (OUTPATIENT)
Dept: OBGYN | Facility: CLINIC | Age: 36
End: 2021-02-01
Attending: ADVANCED PRACTICE MIDWIFE
Payer: COMMERCIAL

## 2021-02-01 VITALS
WEIGHT: 113.2 LBS | HEIGHT: 63 IN | SYSTOLIC BLOOD PRESSURE: 116 MMHG | DIASTOLIC BLOOD PRESSURE: 80 MMHG | HEART RATE: 65 BPM | BODY MASS INDEX: 20.06 KG/M2

## 2021-02-01 DIAGNOSIS — Z34.01 NORMAL FIRST PREGNANCY IN FIRST TRIMESTER: Primary | ICD-10-CM

## 2021-02-01 DIAGNOSIS — Z34.91 ENCOUNTER FOR SUPERVISION OF NORMAL PREGNANCY IN FIRST TRIMESTER, UNSPECIFIED GRAVIDITY: ICD-10-CM

## 2021-02-01 DIAGNOSIS — Z86.59 HX OF BULIMIA NERVOSA: ICD-10-CM

## 2021-02-01 LAB
ABO + RH BLD: NORMAL
ABO + RH BLD: NORMAL
BASOPHILS # BLD AUTO: 0.1 10E9/L (ref 0–0.2)
BASOPHILS NFR BLD AUTO: 0.6 %
BLD GP AB SCN SERPL QL: NORMAL
BLOOD BANK CMNT PATIENT-IMP: NORMAL
DEPRECATED CALCIDIOL+CALCIFEROL SERPL-MC: 26 UG/L (ref 20–75)
DIFFERENTIAL METHOD BLD: NORMAL
EOSINOPHIL # BLD AUTO: 0.2 10E9/L (ref 0–0.7)
EOSINOPHIL NFR BLD AUTO: 2.3 %
ERYTHROCYTE [DISTWIDTH] IN BLOOD BY AUTOMATED COUNT: 11.9 % (ref 10–15)
HBA1C MFR BLD: 5.1 % (ref 0–5.6)
HBV SURFACE AB SERPL IA-ACNC: 164.01 M[IU]/ML
HBV SURFACE AG SERPL QL IA: NONREACTIVE
HCT VFR BLD AUTO: 38.8 % (ref 35–47)
HCV AB SERPL QL IA: NONREACTIVE
HGB BLD-MCNC: 13.3 G/DL (ref 11.7–15.7)
HIV 1+2 AB+HIV1 P24 AG SERPL QL IA: NONREACTIVE
IMM GRANULOCYTES # BLD: 0 10E9/L (ref 0–0.4)
IMM GRANULOCYTES NFR BLD: 0.2 %
LYMPHOCYTES # BLD AUTO: 1.6 10E9/L (ref 0.8–5.3)
LYMPHOCYTES NFR BLD AUTO: 18.4 %
MCH RBC QN AUTO: 30.1 PG (ref 26.5–33)
MCHC RBC AUTO-ENTMCNC: 34.3 G/DL (ref 31.5–36.5)
MCV RBC AUTO: 88 FL (ref 78–100)
MONOCYTES # BLD AUTO: 0.6 10E9/L (ref 0–1.3)
MONOCYTES NFR BLD AUTO: 7.3 %
NEUTROPHILS # BLD AUTO: 6.1 10E9/L (ref 1.6–8.3)
NEUTROPHILS NFR BLD AUTO: 71.2 %
NRBC # BLD AUTO: 0 10*3/UL
NRBC BLD AUTO-RTO: 0 /100
PLATELET # BLD AUTO: 441 10E9/L (ref 150–450)
RBC # BLD AUTO: 4.42 10E12/L (ref 3.8–5.2)
SPECIMEN EXP DATE BLD: NORMAL
WBC # BLD AUTO: 8.6 10E9/L (ref 4–11)

## 2021-02-01 PROCEDURE — 83036 HEMOGLOBIN GLYCOSYLATED A1C: CPT | Performed by: ADVANCED PRACTICE MIDWIFE

## 2021-02-01 PROCEDURE — G0463 HOSPITAL OUTPT CLINIC VISIT: HCPCS | Mod: 25

## 2021-02-01 PROCEDURE — 99207 PR PRENATAL VISIT: CPT | Performed by: ADVANCED PRACTICE MIDWIFE

## 2021-02-01 PROCEDURE — 87389 HIV-1 AG W/HIV-1&-2 AB AG IA: CPT | Performed by: ADVANCED PRACTICE MIDWIFE

## 2021-02-01 PROCEDURE — 86850 RBC ANTIBODY SCREEN: CPT | Performed by: ADVANCED PRACTICE MIDWIFE

## 2021-02-01 PROCEDURE — 87340 HEPATITIS B SURFACE AG IA: CPT | Performed by: ADVANCED PRACTICE MIDWIFE

## 2021-02-01 PROCEDURE — 82306 VITAMIN D 25 HYDROXY: CPT | Performed by: ADVANCED PRACTICE MIDWIFE

## 2021-02-01 PROCEDURE — 87086 URINE CULTURE/COLONY COUNT: CPT | Performed by: ADVANCED PRACTICE MIDWIFE

## 2021-02-01 PROCEDURE — 36415 COLL VENOUS BLD VENIPUNCTURE: CPT | Performed by: ADVANCED PRACTICE MIDWIFE

## 2021-02-01 PROCEDURE — 86803 HEPATITIS C AB TEST: CPT | Performed by: ADVANCED PRACTICE MIDWIFE

## 2021-02-01 PROCEDURE — 76801 OB US < 14 WKS SINGLE FETUS: CPT | Mod: 26 | Performed by: OBSTETRICS & GYNECOLOGY

## 2021-02-01 PROCEDURE — 86901 BLOOD TYPING SEROLOGIC RH(D): CPT | Performed by: ADVANCED PRACTICE MIDWIFE

## 2021-02-01 PROCEDURE — 86900 BLOOD TYPING SEROLOGIC ABO: CPT | Performed by: ADVANCED PRACTICE MIDWIFE

## 2021-02-01 PROCEDURE — 85025 COMPLETE CBC W/AUTO DIFF WBC: CPT | Performed by: ADVANCED PRACTICE MIDWIFE

## 2021-02-01 PROCEDURE — 86780 TREPONEMA PALLIDUM: CPT | Performed by: ADVANCED PRACTICE MIDWIFE

## 2021-02-01 PROCEDURE — 86706 HEP B SURFACE ANTIBODY: CPT | Performed by: ADVANCED PRACTICE MIDWIFE

## 2021-02-01 PROCEDURE — 86762 RUBELLA ANTIBODY: CPT | Performed by: ADVANCED PRACTICE MIDWIFE

## 2021-02-01 PROCEDURE — 76801 OB US < 14 WKS SINGLE FETUS: CPT

## 2021-02-01 SDOH — ECONOMIC STABILITY: FOOD INSECURITY: WITHIN THE PAST 12 MONTHS, YOU WORRIED THAT YOUR FOOD WOULD RUN OUT BEFORE YOU GOT THE MONEY TO BUY MORE.: NOT ASKED

## 2021-02-01 SDOH — ECONOMIC STABILITY: TRANSPORTATION INSECURITY
IN THE PAST 12 MONTHS, HAS THE LACK OF TRANSPORTATION KEPT YOU FROM MEDICAL APPOINTMENTS OR FROM GETTING MEDICATIONS?: NOT ASKED

## 2021-02-01 SDOH — ECONOMIC STABILITY: FOOD INSECURITY: WITHIN THE PAST 12 MONTHS, THE FOOD YOU BOUGHT JUST DIDN'T LAST AND YOU DIDN'T HAVE MONEY TO GET MORE.: NOT ASKED

## 2021-02-01 SDOH — ECONOMIC STABILITY: FOOD INSECURITY: HOW HARD IS IT FOR YOU TO PAY FOR THE VERY BASICS LIKE FOOD, HOUSING, MEDICAL CARE, AND HEATING?: NOT ASKED

## 2021-02-01 SDOH — ECONOMIC STABILITY: TRANSPORTATION INSECURITY
IN THE PAST 12 MONTHS, HAS LACK OF TRANSPORTATION KEPT YOU FROM MEDICAL APPOINTMENTS OR FROM GETTING MEDICATIONS?: NOT ASKED

## 2021-02-01 SDOH — ECONOMIC STABILITY: TRANSPORTATION INSECURITY
IN THE PAST 12 MONTHS, HAS LACK OF TRANSPORTATION KEPT YOU FROM MEETINGS, WORK, OR FROM GETTING THINGS NEEDED FOR DAILY LIVING?: NOT ASKED

## 2021-02-01 SDOH — ECONOMIC STABILITY: INCOME INSECURITY: HOW HARD IS IT FOR YOU TO PAY FOR THE VERY BASICS LIKE FOOD, HOUSING, MEDICAL CARE, AND HEATING?: NOT ASKED

## 2021-02-01 SDOH — ECONOMIC STABILITY: FOOD INSECURITY: WITHIN THE PAST 12 MONTHS, YOU WORRIED THAT YOUR FOOD WOULD RUN OUT BEFORE YOU GOT MONEY TO BUY MORE.: NOT ASKED

## 2021-02-01 ASSESSMENT — MIFFLIN-ST. JEOR: SCORE: 1193.79

## 2021-02-01 ASSESSMENT — PAIN SCALES - GENERAL: PAINLEVEL: NO PAIN (0)

## 2021-02-01 ASSESSMENT — ACTIVITIES OF DAILY LIVING (ADL): LACK_OF_TRANSPORTATION: NOT ASKED

## 2021-02-01 NOTE — PROGRESS NOTES
[facility] OB Intake note  Subjective   35 year old female presents to clinic for initiation of OB care. Patient's last menstrual period was 2020.  at 10w0d by Estimated Date of Delivery: Aug 30, 2021 based on LMP. Reviewed dating ultrasound. Pregnancy is planned, occurred sooner than expected.      Symptoms since LMP include fatigue. Patient has tried these relief measures: none.    - Genetic/Infection questionnaire completed, risks include grandfather with hematochromatosis. Pt  does not have a recent known exposure to Parvo or CMV so IgG/IgM testing WILL NOT be ordered.   Have you traveled during the pregnancy? No  Have your sexual partner(s) travelled during the pregnancy? No    - Current Medications: PNV, was on Adderall 20mg prior to pregnancy, at beginning of pregnancy cut down to 5 mg and has now mostly weaned off medication.    - Co-morbids    Past Medical History:   Diagnosis Date     Abnormal cervical Pap smear with positive HPV DNA test     last pap 2013 nl     ADHD      Breast disorder 2019    fibroadenoma     Bulimia      Bulimia since 2014     Cervical high risk HPV (human papillomavirus) test positive 2019    See problem list.      - Risk for GDM : First degree relative with GDM or DM so  WILL consider early GCT and Hgb A1C  - High risk factors for Pre E-  No known risk factors of High risk for Pre E       - Moderate risk factor for Pre E Nulliparity   Meets one high risk factors or one of the moderate risk facrtors  so WILL NOT consider starting low dose aspirin (81mg) starting between 12 and 28 weeks to prevent early onset preeclampsia    - The patient  does not have a history of spontaneous  birth so  WILL NOT consider progesterone starting at 16-20 weeks and/or serial transvaginal cervical length ultrasounds from 16-24 weeks.     -The patient does not have a history of immunosuppresion or HIV so Toxoplasma IgG/IgM WILL NOT be ordered.    PERSONAL/SOCIAL  HISTORY  , lives with their family.  Partner, Alex.  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  Her partner works in accounting.   History of anxiety or depression: + ADHD, was previously on medication.   - Hx of bulimia, has mental health provider, feels well supported at this time. Denies current issues.  Additional items: None    Objective  -VS: reviewed and within normal limits   -General appearance: no acute distress, patient is comfortable   NEUROLOGICAL/PSYCHIATRIC   - Orientated x 3   -Mood and affect: normal     Assessment/Plan:  35 year old  10w0d weeks of pregnancy with DEREK of Aug 30, 2021 by LMP of Patient's last menstrual period was 2020. Ultrasound confirms.      Orders Placed This Encounter   Procedures     Vitamin D Deficiency     CBC with platelets differential     Hepatitis B Surface Antibody     Hepatitis B surface antigen     Hepatitis C antibody     HIV Antigen Antibody Combo     Rubella Antibody IgG Quantitative     Treponema Abs w Reflex to RPR and Titer     Hgb A1c     ABO/Rh type and screen     - Oriented to Practice, types of care, and how to reach a provider.  Pt prefers Undecided between CNM and MD, will continue to consider.   - Patient received 1st trimester new OB education packet complete with aide of The Expectant Family booklet including information on genetic screening test options.    - Patient would like to disucss options further at new OB visit.  - Educational handout on the prevention of infections diseases during pregnancy provided.  - Patient was encouraged to start prenatal vitamins as tolerated.    - Patient was sent to lab for routine OB labs including Hgb A1c.   - Briefly reviewed risk for diabetes in pregnancy, did not discuss GCT.  - Pregnancy concerns to be addressed by provider at new OB exam include: early GCT d/t family hx, needs repeat pap, genetic screening.  - Scheduled with Dr. Limon to discuss ADHD  medication options, risks/benefits in pregnancy.  - Pt to RTO for NOB visit in 2 weeks and prn if questions or concerns    DRISS MtzM

## 2021-02-01 NOTE — LETTER
2021       RE: Arlet Segura  [address]  [facility] [address]     Dear Colleague,    Thank you for referring your patient, Arlet Segura, to the [facility] [place] at [facility]. Please see a copy of my visit note below.    [facility] OB Intake note  Subjective   35 year old female presents to clinic for initiation of OB care. Patient's last menstrual period was 2020.  at 10w0d by Estimated Date of Delivery: Aug 30, 2021 based on LMP. Reviewed dating ultrasound. Pregnancy is planned, occurred sooner than expected.      Symptoms since LMP include fatigue. Patient has tried these relief measures: none.    - Genetic/Infection questionnaire completed, risks include grandfather with hematochromatosis. Pt  does not have a recent known exposure to Parvo or CMV so IgG/IgM testing WILL NOT be ordered.   Have you traveled during the pregnancy? No  Have your sexual partner(s) travelled during the pregnancy? No    - Current Medications: PNV, was on Adderall 20mg prior to pregnancy, at beginning of pregnancy cut down to 5 mg and has now mostly weaned off medication.    - Co-morbids    Past Medical History:   Diagnosis Date     Abnormal cervical Pap smear with positive HPV DNA test     last pap 2013 nl     ADHD      Breast disorder 2019    fibroadenoma     Bulimia      Bulimia since 2014     Cervical high risk HPV (human papillomavirus) test positive 2019    See problem list.      - Risk for GDM : First degree relative with GDM or DM so  WILL consider early GCT and Hgb A1C  - High risk factors for Pre E-  No known risk factors of High risk for Pre E       - Moderate risk factor for Pre E Nulliparity   Meets one high risk factors or one of the moderate risk facrtors  so WILL NOT consider starting low dose aspirin (81mg) starting between 12 and 28 weeks to prevent early onset preeclampsia    - The patient  does not have  a history of spontaneous  birth so  WILL NOT consider progesterone starting at 16-20 weeks and/or serial transvaginal cervical length ultrasounds from 16-24 weeks.     -The patient does not have a history of immunosuppresion or HIV so Toxoplasma IgG/IgM WILL NOT be ordered.    PERSONAL/SOCIAL HISTORY  , lives with their family.  Partner, Alex.  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  Her partner works in accounting.   History of anxiety or depression: + ADHD, was previously on medication.   - Hx of bulimia, has mental health provider, feels well supported at this time. Denies current issues.  Additional items: None    Objective  -VS: reviewed and within normal limits   -General appearance: no acute distress, patient is comfortable   NEUROLOGICAL/PSYCHIATRIC   - Orientated x 3   -Mood and affect: normal     Assessment/Plan:  35 year old  10w0d weeks of pregnancy with DEREK of Aug 30, 2021 by LMP of Patient's last menstrual period was 2020. Ultrasound confirms.      Orders Placed This Encounter   Procedures     Vitamin D Deficiency     CBC with platelets differential     Hepatitis B Surface Antibody     Hepatitis B surface antigen     Hepatitis C antibody     HIV Antigen Antibody Combo     Rubella Antibody IgG Quantitative     Treponema Abs w Reflex to RPR and Titer     Hgb A1c     ABO/Rh type and screen     - Oriented to Practice, types of care, and how to reach a provider.  Pt prefers Undecided between CNM and MD, will continue to consider.   - Patient received 1st trimester new OB education packet complete with aide of [employer] Expectant Family booklet including information on genetic screening test options.    - Patient would like to disucss options further at new OB visit.  - Educational handout on the prevention of infections diseases during pregnancy provided.  - Patient was encouraged to start prenatal vitamins as tolerated.    - Patient was sent to lab  for routine OB labs including Hgb A1c.   - Briefly reviewed risk for diabetes in pregnancy, did not discuss GCT.  - Pregnancy concerns to be addressed by provider at new OB exam include: early GCT d/t family hx, needs repeat pap, genetic screening.  - Scheduled with Dr. Limon to discuss ADHD medication options, risks/benefits in pregnancy.  - Pt to RTO for NOB visit in 2 weeks and prn if questions or concerns    DRISS Mtz CNM

## 2021-02-02 ENCOUNTER — VIRTUAL VISIT (OUTPATIENT)
Dept: PHARMACY | Facility: CLINIC | Age: 36
End: 2021-02-02
Payer: COMMERCIAL

## 2021-02-02 ENCOUNTER — MYC MEDICAL ADVICE (OUTPATIENT)
Dept: OBGYN | Facility: CLINIC | Age: 36
End: 2021-02-02

## 2021-02-02 DIAGNOSIS — F90.0 ADHD (ATTENTION DEFICIT HYPERACTIVITY DISORDER), INATTENTIVE TYPE: Primary | ICD-10-CM

## 2021-02-02 LAB
BACTERIA SPEC CULT: NO GROWTH
Lab: NORMAL
RUBV IGG SERPL IA-ACNC: 25 IU/ML
SPECIMEN SOURCE: NORMAL
T PALLIDUM AB SER QL: NONREACTIVE

## 2021-02-02 PROCEDURE — 99207 PR NO CHARGE LOS: CPT | Mod: TEL | Performed by: PHARMACIST

## 2021-02-02 NOTE — PROGRESS NOTES
Clinical Pharmacy Consult:                                                    Arlet Segura is a 35 year old female called for a clinical pharmacist consult.  She was referred to me from Soto Foreman CNM.     Reason for Consult: safety of stimulants in pregnancy;  Currently 10w1d gestation    Discussion: Had been on Adderall 20 mg in the past.  Reduced her dose in pregnancy to 5 mg morning and 5 mg afternoon currently.  Reports that she was first prescribed stimulant 3 years ago -- had been seen for eating disorder (bulimia) and ADD.  Has a therapist for CBT -- feels ability to focus is much better on treatment, and also reports that her binging is less and eating is better with stimulant use.   Notes that focus has been a little difficult on the lower dose, but is doing OK.       We reviewed that stimulants have not been linked to specific physical birth defects, but that they can increase risk of small for gestational age and  birth.  Discussed that stimulants can increase BP, so if her BP were to increase in pregnancy, we would strongly recommend stopping, but could monitor for now.  Also discussed risk of withdrawal for the infant after delivery;  Stopping the stimulant in 3rd trimester would reduce this risk.    Plan:  1. Patient will consider this information as she seeks to make a decision with her psychiatrist regarding whether or not to continue the Adderall throughout pregnancy, stop it now, or stop it later in pregnancy to prevent infant withdrawal.    2.  Encourage minimizing dose to lowest effective dose of the Adderall.  2. Given patient's bulimia history, encouraged patient to also consider the importance of good nutrition in pregnancy;  If the stimulant helps her make good nutritional food choices, there are benefits to her infant in this regard as well.    Kim Limon, Pharm.D., BCPS

## 2021-02-15 ASSESSMENT — ANXIETY QUESTIONNAIRES
1. FEELING NERVOUS, ANXIOUS, OR ON EDGE: SEVERAL DAYS
GAD7 TOTAL SCORE: 3
2. NOT BEING ABLE TO STOP OR CONTROL WORRYING: NOT AT ALL
7. FEELING AFRAID AS IF SOMETHING AWFUL MIGHT HAPPEN: NOT AT ALL
3. WORRYING TOO MUCH ABOUT DIFFERENT THINGS: NOT AT ALL
5. BEING SO RESTLESS THAT IT IS HARD TO SIT STILL: SEVERAL DAYS
6. BECOMING EASILY ANNOYED OR IRRITABLE: NOT AT ALL
4. TROUBLE RELAXING: SEVERAL DAYS
7. FEELING AFRAID AS IF SOMETHING AWFUL MIGHT HAPPEN: NOT AT ALL
GAD7 TOTAL SCORE: 3

## 2021-02-15 ASSESSMENT — ENCOUNTER SYMPTOMS
POOR WOUND HEALING: 0
HOARSE VOICE: 0
TROUBLE SWALLOWING: 0
BREAST MASS: 1
TASTE DISTURBANCE: 0
SORE THROAT: 0
SKIN CHANGES: 1
BREAST PAIN: 0
SMELL DISTURBANCE: 0
SINUS PAIN: 0
SINUS CONGESTION: 1
NECK MASS: 0
NAIL CHANGES: 0

## 2021-02-16 ENCOUNTER — OFFICE VISIT (OUTPATIENT)
Dept: OBGYN | Facility: CLINIC | Age: 36
End: 2021-02-16
Attending: ADVANCED PRACTICE MIDWIFE
Payer: COMMERCIAL

## 2021-02-16 VITALS
HEIGHT: 63 IN | WEIGHT: 113 LBS | HEART RATE: 67 BPM | SYSTOLIC BLOOD PRESSURE: 113 MMHG | BODY MASS INDEX: 20.02 KG/M2 | DIASTOLIC BLOOD PRESSURE: 76 MMHG

## 2021-02-16 DIAGNOSIS — Z34.01 NORMAL FIRST PREGNANCY IN FIRST TRIMESTER: Primary | ICD-10-CM

## 2021-02-16 DIAGNOSIS — Z98.890 H/O LEEP: ICD-10-CM

## 2021-02-16 DIAGNOSIS — D22.9 CHANGE IN MOLE: ICD-10-CM

## 2021-02-16 DIAGNOSIS — F90.0 ADHD (ATTENTION DEFICIT HYPERACTIVITY DISORDER), INATTENTIVE TYPE: ICD-10-CM

## 2021-02-16 DIAGNOSIS — Z86.59 HX OF BULIMIA NERVOSA: ICD-10-CM

## 2021-02-16 LAB — GLUCOSE 1H P 50 G GLC PO SERPL-MCNC: 89 MG/DL (ref 60–129)

## 2021-02-16 PROCEDURE — 36415 COLL VENOUS BLD VENIPUNCTURE: CPT | Performed by: ADVANCED PRACTICE MIDWIFE

## 2021-02-16 PROCEDURE — 99207 PR PRENATAL VISIT: CPT | Performed by: ADVANCED PRACTICE MIDWIFE

## 2021-02-16 PROCEDURE — G0145 SCR C/V CYTO,THINLAYER,RESCR: HCPCS | Performed by: ADVANCED PRACTICE MIDWIFE

## 2021-02-16 PROCEDURE — 87491 CHLMYD TRACH DNA AMP PROBE: CPT | Performed by: ADVANCED PRACTICE MIDWIFE

## 2021-02-16 PROCEDURE — 87591 N.GONORRHOEAE DNA AMP PROB: CPT | Performed by: ADVANCED PRACTICE MIDWIFE

## 2021-02-16 PROCEDURE — 82950 GLUCOSE TEST: CPT | Performed by: ADVANCED PRACTICE MIDWIFE

## 2021-02-16 PROCEDURE — 87624 HPV HI-RISK TYP POOLED RSLT: CPT | Performed by: ADVANCED PRACTICE MIDWIFE

## 2021-02-16 PROCEDURE — G0463 HOSPITAL OUTPT CLINIC VISIT: HCPCS | Mod: 25

## 2021-02-16 ASSESSMENT — ANXIETY QUESTIONNAIRES
GAD7 TOTAL SCORE: 3
7. FEELING AFRAID AS IF SOMETHING AWFUL MIGHT HAPPEN: NOT AT ALL
3. WORRYING TOO MUCH ABOUT DIFFERENT THINGS: NOT AT ALL
GAD7 TOTAL SCORE: 3
1. FEELING NERVOUS, ANXIOUS, OR ON EDGE: SEVERAL DAYS
5. BEING SO RESTLESS THAT IT IS HARD TO SIT STILL: SEVERAL DAYS
2. NOT BEING ABLE TO STOP OR CONTROL WORRYING: NOT AT ALL
6. BECOMING EASILY ANNOYED OR IRRITABLE: NOT AT ALL

## 2021-02-16 ASSESSMENT — PATIENT HEALTH QUESTIONNAIRE - PHQ9: 5. POOR APPETITE OR OVEREATING: SEVERAL DAYS

## 2021-02-16 ASSESSMENT — MIFFLIN-ST. JEOR: SCORE: 1186.69

## 2021-02-16 NOTE — LETTER
2021       RE: Arlet Segura  [address]  [facility] [address]     Dear Colleague,    Thank you for referring your patient, Arlet Segura, to the [facility] [place] at [facility]. Please see a copy of my visit note below.    SUBJECTIVE:   Arlet Segura is a 35 year old female who presents to clinic for a new OB visit.   at 12w0d with Estimated Date of Delivery: Aug 30, 2021 based on US confirms. Feels well. Has started PNV.     She has not had bleeding since her LMP. No leakage of fluid, no cramping.  Has not had headaches, changes in vision, or abdominal pain.  She has had mild nausea most AMs, is gradually improving. Weight loss has not occurred.   This was a planned pregnancy.   FOB is involved, , live together     OTHER CONCERNS: Overall feels well today    - Flora has questions on first trimester screening options, most interested in NIPT testing. Wondering what the process looks like for obtaining the test, what is involved, whether she needs to see a genetic counselor, and what the results could possibly mean.    - Has noticed new suspicious mole on her left inner thigh. She states it is new but not sure when she first noticed it. It does not hurt or bleed.    - States that she is due for a pap, would like to take care of that today.    - Spoke with pharmacist about changing her Adderall dosage, which she takes for ADHD. They came to decision that she can continue on lowered dose, with options to change as needed. Has follow up with psychiatrist next week.    - Her appetite has improved over last couple of weeks, now experiencing less nausea.    ===========================================   ROS: 10 point ROS neg other than the symptoms noted above in the HPI.    PSYCHIATRIC:  Denies mood changes, depression or anxiety  PHQ-9 score:  No flowsheet data found.  ETELVINA-7 SCORE 2/15/2021 2021   Total  Score 3 (minimal anxiety) -   Total Score 3 3       Past History:  Her past medical history   Past Medical History:   Diagnosis Date     Abnormal cervical Pap smear with positive HPV DNA test     last pap 2/2013 nl     ADHD      Breast disorder 07/2019    fibroadenoma     Bulimia since 2008 01/19/2014     Cervical high risk HPV (human papillomavirus) test positive 05/28/2019    See problem list.    .This is her first pregnancy  Since her last LMP she denies use of alcohol, tobacco and street drugs.    HISTORY:  Family History   Problem Relation Age of Onset     Hypertension Father      Diabetes Sister         type 1 dx age 3     Thyroid Disease Mother         hypo     Psychotic Disorder Maternal Aunt         bi-polar     Psychotic Disorder Sister         depression     Hypertension Brother      Breast Cancer Maternal Grandmother 70     Social History     Socioeconomic History     Marital status:      Spouse name: Alex     Number of children: Not on file     Years of education: Not on file     Highest education level: Not on file   Occupational History     Not on file   Social Needs     Financial resource strain: Not on file     Food insecurity     Worry: Not on file     Inability: Not on file     Transportation needs     Medical: Not on file     Non-medical: Not on file   Tobacco Use     Smoking status: Never Smoker     Smokeless tobacco: Never Used   Substance and Sexual Activity     Alcohol use: Not Currently     Comment: Five drinks a week, average     Drug use: No     Sexual activity: Yes     Partners: Male     Birth control/protection: None   Lifestyle     Physical activity     Days per week: Not on file     Minutes per session: Not on file     Stress: Not on file   Relationships     Social connections     Talks on phone: Not on file     Gets together: Not on file     Attends Orthodoxy service: Not on file     Active member of club or organization: Not on file     Attends meetings of clubs or  organizations: Not on file     Relationship status: Not on file     Intimate partner violence     Fear of current or ex partner: Not on file     Emotionally abused: Not on file     Physically abused: Not on file     Forced sexual activity: Not on file   Other Topics Concern      Service No     Blood Transfusions No     Caffeine Concern No     Occupational Exposure No     Hobby Hazards No     Sleep Concern No     Stress Concern Yes     Weight Concern Yes     Special Diet No     Back Care No     Exercise Yes     Comment: running and soccer 6-7 x a week      Bike Helmet Yes     Seat Belt Yes     Self-Exams Not Asked   Social History Narrative      Moved here mid-November from [facility].  She is working here at the "[employer]. Lives with a roommate.     Current Outpatient Medications   Medication Sig     Ferrous Bisglycinate Chelate 15 MG TABS Taking 27 mg twice daily     Prenatal Vit-Fe Fumarate-FA (PRENATAL MULTIVITAMIN PLUS IRON) 27-0.8 MG TABS per tablet Take 1 tablet by mouth daily     No current facility-administered medications for this visit.      No Known Allergies    ============================================  MEDICAL HISTORY  Past Medical History:   Diagnosis Date     Abnormal cervical Pap smear with positive HPV DNA test     last pap 2013 nl     ADHD      Breast disorder 2019    fibroadenoma     Bulimia since 2014     Cervical high risk HPV (human papillomavirus) test positive 2019    See problem list.      Past Surgical History:   Procedure Laterality Date     BIOPSY  2019     GYN SURGERY  2017    leep       OB History    Para Term  AB Living   1 0 0 0 0 0   SAB TAB Ectopic Multiple Live Births   0 0 0 0 0      # Outcome Date GA Lbr Darren/2nd Weight Sex Delivery Anes PTL Lv   1 Current              GYN History- Multiple Abnormal Pap Smears w/ high risk HPV (Last )                        Cervical procedures: LEEP , biopsy                        "  History of STI: None    I personally reviewed the past social/family/medical and surgical history on the date of service.   I reviewed lab work done at Intake visit with patient.    OBJECTIVE:    EXAM:  /76 (BP Location: Left arm, Patient Position: Chair)   Pulse 67   Ht 1.6 m (5' 3\")   Wt 51.3 kg (113 lb)   LMP 2020   BMI 20.02 kg/m       GENERAL:  Pleasant pregnant female, alert, cooperative and well groomed.  SKIN:  Warm and dry. Nevi on left inner thigh. 3-4 mm diameter, irregularly shaped, dark brown with black lesions.  HEAD: Symmetrical features.  MOUTH:  Buccal mucosa pink, moist without lesions.   NECK:  Thyroid without enlargement and nodules.  Lymph nodes not palpable.  LUNGS:  Clear to auscultation.  BREAST:    No dominant, fixed or suspicious masses are noted.  No skin or nipple changes or axillary nodes.      HEART:  RRR without murmur.  ABDOMEN: Soft without masses , tenderness or organomegaly.  No CVA tenderness.  Uterus not palpable.  No scars noted.. Fetal heart tones present at 165 bpm.  MUSCULOSKELETAL:  Full range of motion  EXTREMITIES:  No edema. No significant varicosities.     PELVIC EXAM:  GENITALIA: EGBUS  External genitalia, Bartholin's glands, urethra & Ballantine's glands:normal. Vulva reveals no erythema or lesions.        VAGINA:  pink, normal rugae and discharge, no lesions, good tone.   CERVIX:  smooth, without discharge or CMT.                UTERUS: Anteverted,  nontender  RECTAL:   Small, non-thrombotic hemorrhoid noted on the anterior aspect of rectum.  Digital exam deferred.  WET PREP:Not done  GC/CHLAMYDIA CULTURE OBTAINED:YES    Lab Results   Component Value Date    PAP NIL 2019      ASSESSMENT:  35 year old , 12w0d weeks of pregnancy with DEREK of Aug 30, 2021 by US confirms  Intrauterine pregnancy 12w0d size consistent with dates  Genetic Screening: First Trimester Screen discussed, patient will reach out to clinic.      ICD-10-CM    1. Normal first " pregnancy in first trimester  Z34.01 Chlamydia trachomatis PCR (Clinic Collect)     Neisseria gonorrhoeae PCR     Obtaining, preparing and conveyance of cervical or vaginal smear to laboratory.     Pap imaged thin layer screen with HPV - recommended age 30 - 65 years (select HPV order below)     HPV High Risk Types DNA Cervical     Glucose 1 Hour   2. ADHD (attention deficit hyperactivity disorder), inattentive type  F90.0    3. Hx of bulimia nervosa  Z86.59    4. H/O LEEP  Z98.890    5. Change in mole  D22.9 DERMATOLOGY ADULT REFERRAL     PLAN:    First trimester screening: Discussed options for genetic screening, Ms. Segura expressed interest in NIPT testing. Had conversation on what results would mean from a screening test, and on need for a genetic counseling appointment. She wants to discuss with her  before making any decisions, and will reach out to clinic with her decision.  - Genetic counseling referral pending pt decision    Dermatology referral: New nevi on left anterior thigh. It is suspicious due to its irregular shape, recent appearance, and 2-3 colors of dark brown-black.   - Referral made to dermatology for mole check.    Pap: History of abnormal paps with high risk HPV and cervical biopsies. Last pap 2019. Ms. Segura agreed to pap today in clinic.   - Will reach out with abnormal results    - Reviewed use of triage nurse line and contacting the on-call provider after hours for an urgent need such as fever, vagina bleeding, bladder or vaginal infection, rupture of membranes,  or term labor.    - Reviewed best evidence for: weight gain for her weight and height for pregnancy:  Based on pre-pregnancy weight of 113 lb. RECOMMENDED WEIGHT GAIN: 25-35 lbs.  - Reviewed healthy diet and foods to avoid; exercise and activity during pregnancy; avoiding exposure to toxoplasmosis; and maintenance of a generally healthy lifestyle.   - Discussed the harms, benefits, side effects and alternative  therapies for current prescribed and OTC medications.  - Reviewed high risk for gestational diabetes, early 1 hour.  - Reviewed high risk for pre term labor  - All pt's questions discussed and answered.  Pt verbalized understanding of and agreement to plan of care.     - Continue scheduled prenatal care, clinic visit in 4 weeks, and prn if questions or concerns    ICristian, am serving as a scribe; to document services personally performed by  Kellie Johnston CNM,  based on data collection and the provider's statements to me.     Cristian Pena, MS3    I agree with the PFSH and ROS as completed by the student, except for changes made by me. The remainder of the encounter was performed by me and scribed by the student. The scribed note accurately reflects my personal services and decisions made by me.  Kellie Johnston, RADHA, CNM, APRN    Answers for HPI/ROS submitted by the patient on 2/15/2021   ETELVINA 7 TOTAL SCORE: 3  General Symptoms: No  Skin Symptoms: Yes  HENT Symptoms: Yes  EYE SYMPTOMS: No  HEART SYMPTOMS: No  LUNG SYMPTOMS: No  INTESTINAL SYMPTOMS: No  URINARY SYMPTOMS: No  GYNECOLOGIC SYMPTOMS: No  BREAST SYMPTOMS: Yes  SKELETAL SYMPTOMS: No  BLOOD SYMPTOMS: No  NERVOUS SYSTEM SYMPTOMS: No  MENTAL HEALTH SYMPTOMS: No  Changes in hair: No  Changes in moles/birth marks: Yes  Itching: No  Rashes: No  Changes in nails: No  Acne: No  Hair in places you don't want it: No  Change in facial hair: No  Warts: No  Non-healing sores: No  Scarring: No  Flaking of skin: No  Color changes of hands/feet in cold : No  Sun sensitivity: No  Skin thickening: No  Ear pain: No  Ear discharge: No  Hearing loss: No  Tinnitus: No  Nosebleeds: No  Congestion: Yes  Sinus pain: No  Trouble swallowing: No   Voice hoarseness: No  Mouth sores: No  Sore throat: No  Tooth pain: No  Gum tenderness: No  Bleeding gums: No  Change in taste: No  Change in sense of smell: No  Dry mouth: No  Hearing aid used: No  Neck lump: No  Discharge:  No  Lumps: Yes  Pain: No  Nipple retraction: No

## 2021-02-16 NOTE — PROGRESS NOTES
SUBJECTIVE:   Arlet Segura is a 35 year old female who presents to clinic for a new OB visit.   at 12w0d with Estimated Date of Delivery: Aug 30, 2021 based on US confirms. Feels well. Has started PNV.     She has not had bleeding since her LMP. No leakage of fluid, no cramping.  Has not had headaches, changes in vision, or abdominal pain.  She has had mild nausea most AMs, is gradually improving. Weight loss has not occurred.   This was a planned pregnancy.   FOB is involved, , live together     OTHER CONCERNS: Overall feels well today    - Flora has questions on first trimester screening options, most interested in NIPT testing. Wondering what the process looks like for obtaining the test, what is involved, whether she needs to see a genetic counselor, and what the results could possibly mean.    - Has noticed new suspicious mole on her left inner thigh. She states it is new but not sure when she first noticed it. It does not hurt or bleed.    - States that she is due for a pap, would like to take care of that today.    - Spoke with pharmacist about changing her Adderall dosage, which she takes for ADHD. They came to decision that she can continue on lowered dose, with options to change as needed. Has follow up with psychiatrist next week.    - Her appetite has improved over last couple of weeks, now experiencing less nausea.    ===========================================   ROS: 10 point ROS neg other than the symptoms noted above in the HPI.    PSYCHIATRIC:  Denies mood changes, depression or anxiety  PHQ-9 score:  No flowsheet data found.  ETELVINA-7 SCORE 2/15/2021 2021   Total Score 3 (minimal anxiety) -   Total Score 3 3       Past History:  Her past medical history   Past Medical History:   Diagnosis Date     Abnormal cervical Pap smear with positive HPV DNA test     last pap 2013 nl     ADHD      Breast disorder 2019    fibroadenoma     Bulimia since 2014     Cervical high risk  HPV (human papillomavirus) test positive 05/28/2019    See problem list.    .This is her first pregnancy  Since her last LMP she denies use of alcohol, tobacco and street drugs.    HISTORY:  Family History   Problem Relation Age of Onset     Hypertension Father      Diabetes Sister         type 1 dx age 3     Thyroid Disease Mother         hypo     Psychotic Disorder Maternal Aunt         bi-polar     Psychotic Disorder Sister         depression     Hypertension Brother      Breast Cancer Maternal Grandmother 70     Social History     Socioeconomic History     Marital status:      Spouse name: Alex     Number of children: Not on file     Years of education: Not on file     Highest education level: Not on file   Occupational History     Not on file   Social Needs     Financial resource strain: Not on file     Food insecurity     Worry: Not on file     Inability: Not on file     Transportation needs     Medical: Not on file     Non-medical: Not on file   Tobacco Use     Smoking status: Never Smoker     Smokeless tobacco: Never Used   Substance and Sexual Activity     Alcohol use: Not Currently     Comment: Five drinks a week, average     Drug use: No     Sexual activity: Yes     Partners: Male     Birth control/protection: None   Lifestyle     Physical activity     Days per week: Not on file     Minutes per session: Not on file     Stress: Not on file   Relationships     Social connections     Talks on phone: Not on file     Gets together: Not on file     Attends Confucianist service: Not on file     Active member of club or organization: Not on file     Attends meetings of clubs or organizations: Not on file     Relationship status: Not on file     Intimate partner violence     Fear of current or ex partner: Not on file     Emotionally abused: Not on file     Physically abused: Not on file     Forced sexual activity: Not on file   Other Topics Concern      Service No     Blood Transfusions No      "Caffeine Concern No     Occupational Exposure No     Hobby Hazards No     Sleep Concern No     Stress Concern Yes     Weight Concern Yes     Special Diet No     Back Care No     Exercise Yes     Comment: running and soccer 6-7 x a week      Bike Helmet Yes     Seat Belt Yes     Self-Exams Not Asked   Social History Narrative      Moved here mid-November from [facility].  She is working here at the [employer]. Lives with a roommate.     Current Outpatient Medications   Medication Sig     Ferrous Bisglycinate Chelate 15 MG TABS Taking 27 mg twice daily     Prenatal Vit-Fe Fumarate-FA (PRENATAL MULTIVITAMIN PLUS IRON) 27-0.8 MG TABS per tablet Take 1 tablet by mouth daily     No current facility-administered medications for this visit.      No Known Allergies    ============================================  MEDICAL HISTORY  Past Medical History:   Diagnosis Date     Abnormal cervical Pap smear with positive HPV DNA test     last pap 2013 nl     ADHD      Breast disorder 2019    fibroadenoma     Bulimia since 2014     Cervical high risk HPV (human papillomavirus) test positive 2019    See problem list.      Past Surgical History:   Procedure Laterality Date     BIOPSY  2019     GYN SURGERY  2017    leep       OB History    Para Term  AB Living   1 0 0 0 0 0   SAB TAB Ectopic Multiple Live Births   0 0 0 0 0      # Outcome Date GA Lbr Darren/2nd Weight Sex Delivery Anes PTL Lv   1 Current              GYN History- Multiple Abnormal Pap Smears w/ high risk HPV (Last )                        Cervical procedures: LEEP , biopsy                         History of STI: None    I personally reviewed the past social/family/medical and surgical history on the date of service.   I reviewed lab work done at Intake visit with patient.    OBJECTIVE:    EXAM:  /76 (BP Location: Left arm, Patient Position: Chair)   Pulse 67   Ht 1.6 m (5' 3\")   Wt 51.3 kg (113 lb)   LMP " 2020   BMI 20.02 kg/m       GENERAL:  Pleasant pregnant female, alert, cooperative and well groomed.  SKIN:  Warm and dry. Nevi on left inner thigh. 3-4 mm diameter, irregularly shaped, dark brown with black lesions.  HEAD: Symmetrical features.  MOUTH:  Buccal mucosa pink, moist without lesions.   NECK:  Thyroid without enlargement and nodules.  Lymph nodes not palpable.  LUNGS:  Clear to auscultation.  BREAST:    No dominant, fixed or suspicious masses are noted.  No skin or nipple changes or axillary nodes.      HEART:  RRR without murmur.  ABDOMEN: Soft without masses , tenderness or organomegaly.  No CVA tenderness.  Uterus not palpable.  No scars noted.. Fetal heart tones present at 165 bpm.  MUSCULOSKELETAL:  Full range of motion  EXTREMITIES:  No edema. No significant varicosities.     PELVIC EXAM:  GENITALIA: EGBUS  External genitalia, Bartholin's glands, urethra & Capitan's glands:normal. Vulva reveals no erythema or lesions.        VAGINA:  pink, normal rugae and discharge, no lesions, good tone.   CERVIX:  smooth, without discharge or CMT.                UTERUS: Anteverted,  nontender  RECTAL:   Small, non-thrombotic hemorrhoid noted on the anterior aspect of rectum.  Digital exam deferred.  WET PREP:Not done  GC/CHLAMYDIA CULTURE OBTAINED:YES    Lab Results   Component Value Date    PAP NIL 2019      ASSESSMENT:  35 year old , 12w0d weeks of pregnancy with DEREK of Aug 30, 2021 by US confirms  Intrauterine pregnancy 12w0d size consistent with dates  Genetic Screening: First Trimester Screen discussed, patient will reach out to clinic.      ICD-10-CM    1. Normal first pregnancy in first trimester  Z34.01 Chlamydia trachomatis PCR (Clinic Collect)     Neisseria gonorrhoeae PCR     Obtaining, preparing and conveyance of cervical or vaginal smear to laboratory.     Pap imaged thin layer screen with HPV - recommended age 30 - 65 years (select HPV order below)     HPV High Risk Types DNA  Cervical     Glucose 1 Hour   2. ADHD (attention deficit hyperactivity disorder), inattentive type  F90.0    3. Hx of bulimia nervosa  Z86.59    4. H/O LEEP  Z98.890    5. Change in mole  D22.9 DERMATOLOGY ADULT REFERRAL     PLAN:    First trimester screening: Discussed options for genetic screening, Ms. Segura expressed interest in NIPT testing. Had conversation on what results would mean from a screening test, and on need for a genetic counseling appointment. She wants to discuss with her  before making any decisions, and will reach out to clinic with her decision.  - Genetic counseling referral pending pt decision    Dermatology referral: New nevi on left anterior thigh. It is suspicious due to its irregular shape, recent appearance, and 2-3 colors of dark brown-black.   - Referral made to dermatology for mole check.    Pap: History of abnormal paps with high risk HPV and cervical biopsies. Last pap . Ms. Segura agreed to pap today in clinic.   - Will reach out with abnormal results    - Reviewed use of triage nurse line and contacting the on-call provider after hours for an urgent need such as fever, vagina bleeding, bladder or vaginal infection, rupture of membranes,  or term labor.    - Reviewed best evidence for: weight gain for her weight and height for pregnancy:  Based on pre-pregnancy weight of 113 lb. RECOMMENDED WEIGHT GAIN: 25-35 lbs.  - Reviewed healthy diet and foods to avoid; exercise and activity during pregnancy; avoiding exposure to toxoplasmosis; and maintenance of a generally healthy lifestyle.   - Discussed the harms, benefits, side effects and alternative therapies for current prescribed and OTC medications.  - Reviewed high risk for gestational diabetes, early 1 hour.  - Reviewed high risk for pre term labor  - All pt's questions discussed and answered.  Pt verbalized understanding of and agreement to plan of care.     - Continue scheduled prenatal care, clinic visit in 4  weeks, and prn if questions or concerns    I, Cristian Pena, am serving as a scribe; to document services personally performed by  Kellie Johnston CNM,  based on data collection and the provider's statements to me.     Cristian Pena, MS3    I agree with the PFSH and ROS as completed by the student, except for changes made by me. The remainder of the encounter was performed by me and scribed by the student. The scribed note accurately reflects my personal services and decisions made by me.  Kellie Johnston, RADHA, CNM, APRN    Answers for HPI/ROS submitted by the patient on 2/15/2021   ETELVINA 7 TOTAL SCORE: 3  General Symptoms: No  Skin Symptoms: Yes  HENT Symptoms: Yes  EYE SYMPTOMS: No  HEART SYMPTOMS: No  LUNG SYMPTOMS: No  INTESTINAL SYMPTOMS: No  URINARY SYMPTOMS: No  GYNECOLOGIC SYMPTOMS: No  BREAST SYMPTOMS: Yes  SKELETAL SYMPTOMS: No  BLOOD SYMPTOMS: No  NERVOUS SYSTEM SYMPTOMS: No  MENTAL HEALTH SYMPTOMS: No  Changes in hair: No  Changes in moles/birth marks: Yes  Itching: No  Rashes: No  Changes in nails: No  Acne: No  Hair in places you don't want it: No  Change in facial hair: No  Warts: No  Non-healing sores: No  Scarring: No  Flaking of skin: No  Color changes of hands/feet in cold : No  Sun sensitivity: No  Skin thickening: No  Ear pain: No  Ear discharge: No  Hearing loss: No  Tinnitus: No  Nosebleeds: No  Congestion: Yes  Sinus pain: No  Trouble swallowing: No   Voice hoarseness: No  Mouth sores: No  Sore throat: No  Tooth pain: No  Gum tenderness: No  Bleeding gums: No  Change in taste: No  Change in sense of smell: No  Dry mouth: No  Hearing aid used: No  Neck lump: No  Discharge: No  Lumps: Yes  Pain: No  Nipple retraction: No

## 2021-02-17 ENCOUNTER — TRANSCRIBE ORDERS (OUTPATIENT)
Dept: MATERNAL FETAL MEDICINE | Facility: CLINIC | Age: 36
End: 2021-02-17

## 2021-02-17 ENCOUNTER — MYC MEDICAL ADVICE (OUTPATIENT)
Dept: OBGYN | Facility: CLINIC | Age: 36
End: 2021-02-17

## 2021-02-17 DIAGNOSIS — Z36.89 ENCOUNTER FOR FETAL ANATOMIC SURVEY: ICD-10-CM

## 2021-02-17 DIAGNOSIS — O26.90 PREGNANCY RELATED CONDITION, ANTEPARTUM: Primary | ICD-10-CM

## 2021-02-17 DIAGNOSIS — Z13.71 SCREENING FOR GENETIC DISEASE CARRIER STATUS: Primary | ICD-10-CM

## 2021-02-17 LAB
C TRACH DNA SPEC QL NAA+PROBE: NEGATIVE
N GONORRHOEA DNA SPEC QL NAA+PROBE: NEGATIVE
SPECIMEN SOURCE: NORMAL
SPECIMEN SOURCE: NORMAL

## 2021-02-19 LAB
COPATH REPORT: NORMAL
PAP: NORMAL

## 2021-02-22 ENCOUNTER — PRE VISIT (OUTPATIENT)
Dept: MATERNAL FETAL MEDICINE | Facility: CLINIC | Age: 36
End: 2021-02-22

## 2021-02-22 NOTE — PROGRESS NOTES
[facility]  Genetic Counseling Consult    Patient: Arlet Segura YOB: 1985   Date of Service: 21      Arlet Segura was seen at [facility] for genetic consultation to discuss the options for routine screening for fetal chromosome abnormalities. The patient was unaccompanied today's visit.       Impression/Plan:   1.  Arlet had an ultrasound and blood draw for NIPT (Innatal test through [employer]).  Results are expected within 7 days, and will be available in [employer].  We will contact her to discuss the results, and a copy will be forwarded to the office of the referring OB provider. Arlet provided verbal permission for results to be left on her voicemail. She requested the results do not include predicted fetal sex information.     2. Maternal serum AFP (single marker screen) is recommended after 15 weeks to screen for open neural tube defects. A quad screen should not be performed.    Pregnancy History:   /Parity:    Age at Delivery: 36 year old  DEREK: 2021, by Last Menstrual Period  Gestational Age: 13w0d    No significant complications or exposures were reported in the current pregnancy.    Medical History:   Arlet has a history of ADHD managed with adderall, which was discussed in detail with pharmacy and psychiatry.       Family History:   A three-generation pedigree was obtained, and is scanned under the  Media  tab.   The following significant findings were reported by Arlet:    Arlet's partner, Alex is 30 and healthy.   It was reported that Arlet's maternal aunt and maternal grandmother have a history of bipolar disorder. We discussed how mental illnesses are thought to be inherited in a multifactorial fashion, meaning many factors are involved in the development of this condition. These factors usually include both genetic and environmental aspects and a  combination of these aspects lead to the condition. Given that there is a genetic component, the couple's children may be at increased risk to develop a mental illness and were encouraged to share this information with their pediatrician and have awareness for early signs and symptoms.   It was reported that Arlet's maternal grandfather had a history of hemochromatosis. We reviewed that hemochromatosis is a disease in which too much iron builds up in your body. Primary hemochromatosis is typically inherited whereas secondary hemochromatosis is typically related to an underlying condition. HFE hemochromotosis is the most common form of hereditary hemochromatosis with onset in adulthood and is inherited in an autosomal recessive fashion. Given how common this condition is in the general population and the reported family history, Arlet and close relatives are at increased risk. She was encouraged to share this family history information with her primary care provider to ensure appropriate screening. The couple was also encouraged to share this information with their pediatrician.   Arlet reported that when her mother was pregnant with her sister, she received a positive prenatal screen for Down syndrome and subsequently elected to pursue an amniocentesis. Arlet shared that her sister does not have a diagnosis of Down syndrome and it was thought that the pregnancy may have had mosaicism. We briefly reviewed that sometimes there can be confined placental mosaicism where the placenta has abnormal cell line and fetus does not possess any abnormal cells/ is unaffected. It's difficult to conclude if this could have been the explanation. Additionally screening tests have the chance of false positive results. We also reviewed that over 95% of cases of Down syndrome result from trisomy 21 caused by nondisjunction.  Rarely, Down syndrome occurs due to a translocation involving chromosome 21, which, if carried by  other family members, puts them at increased risk to have a baby with Down syndrome. It is unlikely that this history would put Arlet at a significantly increased risk for having a child with Down syndrome.     Otherwise, the reported family history is negative for multiple miscarriages, stillbirths, birth defects, intellectual disability, known genetic conditions, and consanguinity.       Carrier Screening:   The patient reports that she and the father of the pregnancy have  ancestry:     Cystic fibrosis is an autosomal recessive genetic condition that occurs with increased frequency in individuals of  ancestry and carrier screening for this condition is available.  In addition,  screening in the [facility] includes cystic fibrosis.      Expanded carrier screening for mutations in a large panel of genes associated with autosomal recessive conditions including cystic fibrosis, spinal muscular atrophy, and others, is now available.      The patient was not certain about whether to pursue carrier screening today.  She was provided with a brochure about her testing options, and contact information if she has questions or wishes to pursue screening.       Risk Assessment for Chromosome Conditions:   We explained that the risk for fetal chromosome abnormalities increases with maternal age. We discussed specific features of common chromosome abnormalities, including Down syndrome, trisomy 13, trisomy 18, and sex chromosome trisomies.      - At age 36 at midtrimester, the risk to have a baby with Down syndrome is 1 in 342.     - At age 36 at midtrimester, the risk to have a baby with any chromosome abnormality is 1 in  172.     Testing Options:   We discussed the following options:   First trimester screening    First trimester ultrasound with nuchal translucency and nasal bone assessments, maternal plasma hCG, JOSEPH-A, and AFP measurement    Screens for fetal trisomy 21, trisomy 13, and  trisomy 18    Cannot screen for open neural tube defects; maternal serum AFP after 15 weeks is recommended     Non-invasive Prenatal Testing (NIPT)    Maternal plasma cell-free DNA testing; first trimester ultrasound with nuchal translucency and nasal bone assessment is recommended, when appropriate    Screens for fetal trisomy 21, trisomy 13, trisomy 18, and sex chromosome aneuploidy    Cannot screen for open neural tube defects; maternal serum AFP after 15 weeks is recommended     Chorionic villus sampling (CVS)    Invasive procedure typically performed in the first trimester by which placental villi are obtained for the purpose of chromosome analysis and/or other prenatal genetic analysis    Diagnostic results; >99% sensitivity for fetal chromosome abnormalities    Cannot test for open neural tube defects; maternal serum AFP after 15 weeks is recommended     Genetic Amniocentesis    Invasive procedure typically performed in the second trimester by which amniotic fluid is obtained for the purpose of chromosome analysis and/or other prenatal genetic analysis    Diagnostic results; >99% sensitivity for fetal chromosome abnormalities    AFAFP measurement tests for open neural tube defects      We reviewed the benefits and limitations of this testing.  Screening tests provide a risk assessment specific to the pregnancy for certain fetal chromosome abnormalities, but cannot definitively diagnose or exclude a fetal chromosome abnormality.  Follow-up genetic counseling and consideration of diagnostic testing is recommended with any abnormal screening result.      It was a pleasure to be involved with Arlet [facility]. Face-to-face time of the meeting was 30 minutes.    Lulu Ortez MS, [facility]  Licensed Genetic Counselor  [facility]  Maternal Fetal Medicine  Ph: [phone]  [email or web address]

## 2021-02-23 ENCOUNTER — HOSPITAL ENCOUNTER (OUTPATIENT)
Dept: ULTRASOUND IMAGING | Facility: CLINIC | Age: 36
End: 2021-02-23
Attending: ADVANCED PRACTICE MIDWIFE
Payer: COMMERCIAL

## 2021-02-23 ENCOUNTER — VIRTUAL VISIT (OUTPATIENT)
Dept: PSYCHIATRY | Facility: CLINIC | Age: 36
End: 2021-02-23
Attending: PSYCHIATRY & NEUROLOGY
Payer: COMMERCIAL

## 2021-02-23 ENCOUNTER — OFFICE VISIT (OUTPATIENT)
Dept: MATERNAL FETAL MEDICINE | Facility: CLINIC | Age: 36
End: 2021-02-23
Attending: ADVANCED PRACTICE MIDWIFE
Payer: COMMERCIAL

## 2021-02-23 DIAGNOSIS — O26.90 PREGNANCY RELATED CONDITION, ANTEPARTUM: ICD-10-CM

## 2021-02-23 DIAGNOSIS — Z36.9 ENCOUNTER FOR ANTENATAL SCREENING: ICD-10-CM

## 2021-02-23 DIAGNOSIS — Z36.82 ENCOUNTER FOR NUCHAL TRANSLUCENCY TESTING: Primary | ICD-10-CM

## 2021-02-23 DIAGNOSIS — Z36.9 ENCOUNTER FOR ANTENATAL SCREENING: Primary | ICD-10-CM

## 2021-02-23 DIAGNOSIS — F90.0 ADHD (ATTENTION DEFICIT HYPERACTIVITY DISORDER), INATTENTIVE TYPE: Primary | ICD-10-CM

## 2021-02-23 PROCEDURE — 76813 OB US NUCHAL MEAS 1 GEST: CPT

## 2021-02-23 PROCEDURE — 96040 HC GENETIC COUNSELING, EACH 30 MINUTES: CPT | Performed by: GENETIC COUNSELOR, MS

## 2021-02-23 PROCEDURE — 999N001100 HC STATISTIC VERIFI PRENATAL TRISOMY 21,18,13: Performed by: OBSTETRICS & GYNECOLOGY

## 2021-02-23 PROCEDURE — 36415 COLL VENOUS BLD VENIPUNCTURE: CPT | Performed by: OBSTETRICS & GYNECOLOGY

## 2021-02-23 PROCEDURE — 999N001193 HC VIDEO/TELEPHONE VISIT; NO CHARGE

## 2021-02-23 PROCEDURE — 76813 OB US NUCHAL MEAS 1 GEST: CPT | Mod: 26 | Performed by: OBSTETRICS & GYNECOLOGY

## 2021-02-23 ASSESSMENT — PAIN SCALES - GENERAL: PAINLEVEL: NO PAIN (0)

## 2021-02-23 NOTE — Clinical Note
Juan,  The Adderall is not on the med list.  I don't know why that would be the case.  Can you please investigate and determine if she needs a RX for the new lower dose of 5mg BID  Thanks!

## 2021-02-23 NOTE — PROGRESS NOTES
"Not seen       [facility]  Psychiatry Clinic  PSYCHIATRY PROGRESS NOTE     CARE TEAM:  PCP- Dorothy Holder.  Arlet Segura is a 35 year old   patient who prefers the name Flora and pronouns she, her.    DIAGNOSES                                                                                 ADHD, inattentive type  Bulimia Nervousa, purging type (low frequency)  Fe deficiency    MEDICAL HISTORY  and ALLERGY     ALLERGIES: Patient has no known allergies.     Patient Active Problem List   Diagnosis     ADHD (attention deficit hyperactivity disorder), inattentive type     Hx of bulimia nervosa     Raynaud's disease without gangrene     H/O LEEP     Normal first pregnancy in first trimester     Change in mole     CURRENT MEDS       Current Outpatient Medications   Medication Sig Dispense Refill     Ferrous Bisglycinate Chelate 15 MG TABS Taking 27 mg twice daily       Prenatal Vit-Fe Fumarate-FA (PRENATAL MULTIVITAMIN PLUS IRON) 27-0.8 MG TABS per tablet Take 1 tablet by mouth daily         MISSED  VISIT                                                                              I was not able to connect with this pt today. Looks like she had a series of appts   prior to this one, I assume she was running late. I called her, checked Doxy and I   remained in [facility] for most of the visit. Will need to reschedule. We were going to   discuss the issue of pregnancy and stimulant use, will make another plan for that.    ATTENDING PHYSICIAN:  Candida Redd MD    ADDENDUM  The pt communicated the following to the clinic:  \" I'm ~13 weeks pregnant, and have met with a prenatal care pharmacist about adderal use in pregnancy. She recommended lowering dosage to 5mg in AM/5mg in PM, so that's what I've been doing. If you have any upcoming availability, perhaps we could reschedule out visit to discuss further.\"    Of note, the previous dose was-  Adderall 20mg tabs- 1/2 tab BID along with a " 5mg tab BID for a total of 15mg BID.  Now taking 5mg BID.  I am not sure why this drug is no longer on the med list, will look into that and need for a refill.    I will ask the clinic to reschedule in April and I can call the pt this Friday AM the 26th.  DBass

## 2021-02-23 NOTE — PROGRESS NOTES
"VIDEO VISIT  Arlet Segura is a 35 year old patient who is being evaluated via a billable video visit.      The patient has been notified of following:   \"This video visit will be conducted via a call between you and your physician/provider. We have found that certain health care needs can be provided without the need for an in-person physical exam. This service lets us provide the care you need with a video conversation. If a prescription is necessary we can send it directly to your pharmacy. If lab work is needed we can place an order for that and you can then stop by our lab to have the test done at a later time. Insurers are generally covering virtual visits as they would in-office visits so billing should not be different than normal.  If for some reason you do get billed incorrectly, you should contact the billing office to correct it and that number is in the AVS .    Video Conference to be completed via:  Carine    Patient has given verbal consent for video visit?:  Yes    Patient would prefer that any video invitations be sent by: Send to e-mail at: [email or web address]      How would patient like to obtain AVS?:  LarryChouteau    AVS SmartPhrase [PsychAVS] has been placed in 'Patient Instructions':  Yes  "

## 2021-02-23 NOTE — PROGRESS NOTES
Arlet Segura was seen for an ultrasound today at the M[facility]Fetal Medicine center.      For the details of the ultrasound please see the report which can be found under the imaging tab.      Kim Han MD  , OB/GYN  Maternal-Fetal Medicine  [email or web address]  [phone] (Main MFM Office)  [id number] or [phone] (for 24 hour MFM questions)  [phone] (Pager)

## 2021-02-23 NOTE — PATIENT INSTRUCTIONS
Pt not seen today, will reschedule  MD Chao          **For crisis resources, please see the information at the end of this document**     Patient Education      Thank you for coming to the [facility] [place] [facility].    Lab Testing:  If you had lab testing today and your results are reassuring or normal they will be mailed to you or sent through [employer] within 7 days. If the lab tests need quick action we will call you with the results. The phone number we will call with results is # [phone] (home) [phone] (work). If this is not the best number please call our clinic and change the number.    Medication Refills:  If you need any refills please call your pharmacy and they will contact us. Our fax number for refills is [phone]. Please allow three business for refill processing. If you need to  your refill at a new pharmacy, please contact the new pharmacy directly. The new pharmacy will help you get your medications transferred.     Scheduling:  If you have any concerns about today's visit or wish to schedule another appointment please call our office during normal business hours [phone] (8-5:00 M-F)    Contact Us:  Please call [phone] during business hours (8-5:00 M-F).  If after clinic hours, or on the weekend, please call  [phone].    Financial Assistance [phone]  [facility] Billing [phone]  Central Billing Office, [facility]: [phone]  [facility] Billing [phone]  Medical Records [phone]  [facility] Patient Bill of Rights [email or web address]       MENTAL HEALTH CRISIS NUMBERS:  For a medical emergency please call  911 or go to the nearest ER.     [facility]:   [facility] -[phone]   Crisis Residence [facility] -[phone]   Walk-In Counseling Center [facility] -[phone]   COPE 24/7 [facility] Team -[phone]  (adults)/[phone] (child)  CHILD: [facility] needs assessment team - [phone]      [facility]:   [facility] - [phone]   Walk-in counseling [facility] - [phone]   Walk-in counseling [facility] - [phone]   Crisis Residence [facility] - [phone]  Urgent Care Adult [facility]-[phone] mobile unit/ 24/7 crisis line    National Crisis Numbers:   National Suicide Prevention Lifeline: 7-056-715-TALK (345-253-3356)  Poison Control Center - 7-797-111-4140  [employer]/resources for a list of additional resources (SOS)  Trans Lifeline a hotline for transgender people 0-730-114-6790  The [employer] a hotline for LGBT youth 5-720-392-7529  Crisis Text Line: For any crisis 24/7   To: [address]  see www.crisistextline.org  - IF MAKING A CALL FEELS TOO HARD, send a text!         Again thank you for choosing [facility] [place] [facility] and please let us know how we can best partner with you to improve you and your family's health.    You may be receiving a survey regarding this appointment. We would love to have your feedback, both positive and negative. The survey is done by an external company, so your answers are anonymous.

## 2021-02-24 ENCOUNTER — MYC MEDICAL ADVICE (OUTPATIENT)
Dept: PSYCHIATRY | Facility: CLINIC | Age: 36
End: 2021-02-24

## 2021-02-24 DIAGNOSIS — F90.0 ADHD (ATTENTION DEFICIT HYPERACTIVITY DISORDER), INATTENTIVE TYPE: Primary | ICD-10-CM

## 2021-02-25 NOTE — TELEPHONE ENCOUNTER
Writer attempted to contact the pt via phone at [phone] and was informed that the mail box was full.  Routed message to pt via [employer].

## 2021-02-28 LAB — LAB SCANNED RESULT: NORMAL

## 2021-03-01 ENCOUNTER — TELEPHONE (OUTPATIENT)
Dept: MATERNAL FETAL MEDICINE | Facility: CLINIC | Age: 36
End: 2021-03-01

## 2021-03-01 RX ORDER — DEXTROAMPHETAMINE SACCHARATE, AMPHETAMINE ASPARTATE, DEXTROAMPHETAMINE SULFATE AND AMPHETAMINE SULFATE 1.25; 1.25; 1.25; 1.25 MG/1; MG/1; MG/1; MG/1
5 TABLET ORAL 2 TIMES DAILY
Qty: 60 TABLET | Refills: 0 | Status: SHIPPED | OUTPATIENT
Start: 2021-03-01 | End: 2021-03-24

## 2021-03-01 NOTE — TELEPHONE ENCOUNTER
Per [place]  last filled 1/29/21.  Routed to provider to sign and send.      03/01/21 1433 Sign Candida Redd MD   E-Prescribing Status: Receipt confirmed by pharmacy (3/1/2021  2:34 PM CST)      Routed message to pt via [employer].

## 2021-03-01 NOTE — TELEPHONE ENCOUNTER
March 1, 2021    Received call from Arlet requesting sex of baby information from NIPT. Disclosed XY (male) to Arlet over the phone.     Lulu Ortez MS, [facility]  Licensed Genetic Counselor  [facility]  Maternal Fetal Medicine  Ph: [phone]  [email or web address]

## 2021-03-01 NOTE — TELEPHONE ENCOUNTER
March 1, 2021  Left a message for Arlet regarding her NIPT results.     Results indicate NO ANEUPLOIDY DETECTED for chromosomes 21, 18, 13, or sex chromosomes. Sex of baby information NOT disclosed per patient request.      This puts her current pregnancy at low risk for Down syndrome, trisomy 18, trisomy 13 and sex chromosome abnormalities. This test is reported to have the following sensitivities: Down syndrome: 99.2%, trisomy 18: >99.9%, and trisomy 13: >99.9%. Although these results are reassuring, this does not replace a standard chromosome analysis from a chorionic villus sampling or amniocentesis.     MSAFP is the appropriate second trimester screening test for open neural tube defects; the maternal quad screen is not recommended.    Her results are available in her Epic chart for her primary OB to review.    This information was left in Arlet's voicemail per plan established at her genetic counseling appointment, and Arlet was encouraged to reach out if she has any questions or concerns.      Lulu Ortez MS, [facility]  Genetic Counselor  Phone: [phone]  Pager: [phone]

## 2021-03-17 PROBLEM — Z34.00 NORMAL FIRST PREGNANCY CONFIRMED, ANTEPARTUM: Status: ACTIVE | Noted: 2021-02-01

## 2021-03-17 PROBLEM — N83.202 LEFT OVARIAN CYST: Status: ACTIVE | Noted: 2021-03-17

## 2021-03-17 NOTE — PROGRESS NOTES
"Subjective:      35 year old  at 16w3d presents for a routine prenatal appointment.     No vaginal bleeding or leakage of fluid.  No contractions or cramping.    No fetal movement yet. No HA, visual changes, RUQ or epigastric pain.   No s/s of COVID, is wearing a mask.     The patient presents with the following concerns:    - back pain started on . Better with standing, worse with sitting. Tylenol dose only on , which helped. Hasn't started wearing belly band or using more supports while standing at work.    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    Level II US  Scheduled 3/31/21.   Pt had normal NT and NIPT, Offered AFP, Pt declined today but will consider - aware needs to be done by 20 weeks for valid result, recommended by 18.     - Adderall dose - now on 5mg BID. Has appt with psychiatrist to discuss further. Is considering if she can discontinue. Has done well with dose decrease thus far.   - Normal Pap test 2021; HPV+ (HPV 16 and 18 NEGATIVE; HPV Other POSITIVE)  - s/p LEEP cervical length follow up not recommended per Dr. Hanks at high risk rounds.      Objective:  Vitals:    21 0807   BP: 122/78   Pulse: 69   Weight: 52.1 kg (114 lb 12.8 oz)   Height: 1.6 m (5' 2.99\")     See OB flowsheet    Assessment/Plan     Encounter Diagnoses   Name Primary?     Normal first pregnancy confirmed, antepartum Yes     ADHD (attention deficit hyperactivity disorder), inattentive type      Hx of bulimia nervosa      H/O LEEP      Left ovarian cyst      - Reinforced COVID-19 precautions including: social distancing of at least 6 feet, avoiding gatherings with large numbers of people, frequent hand hygiene, avoiding discretionary travel, avoiding touching of face, and cleaning and disinfecting frequently touched surfaces daily.  Encouraged household members to follow the same guidelines.    - If suspected " "exposure to COVID-19 or onset of fever and symptoms, such as cough or difficulty breathing visit [email or web address] promptly to be directed to the appropriate care and, if pregnant, call [phone] to notify your healthcare team.       - Reinforced current hospital policy restricting visitors to one healthy adult (age >18) on labor and delivery, postpartum, and in the NICU.   - Reviewed routine COVID testing at time of hospital admission or just prior to scheduled procedure including induction of labor or . Discussed need for patient and partner to wear mask at hospital while staff is in the room. Reviewed option to use of nitrous oxide and have waterbirth if COVID negative.     - Reviewed total weight gain, encouraged continued healthy diet and exercise.      - Reviewed why/how to contact provider.   - Regarding back pain, reviewed footwear, stretches, abdominal support bands.   - Pt given letter for work regarding delaying tetanus booster.   Patient education/orders or handouts today: PTL signs/symptoms, AFP only, Fetal movement and Level 2 u/s scheduled   Return to clinic in 4 weeks and prn if questions or concerns.       I, Yamil Anderson MS4, am serving as a scribe to document services personally performed by CNM based on the provider's statements to me.\" - Yamli Anderson  MS4 signature     The encounter was performed by me and scribed by the MS4. The scribed note accurately reflects my personal services and decisions made by me.     Lizabeth Story, DRISS IBARRA                  "

## 2021-03-18 ENCOUNTER — OFFICE VISIT (OUTPATIENT)
Dept: OBGYN | Facility: CLINIC | Age: 36
End: 2021-03-18
Attending: ADVANCED PRACTICE MIDWIFE
Payer: COMMERCIAL

## 2021-03-18 VITALS
DIASTOLIC BLOOD PRESSURE: 78 MMHG | WEIGHT: 114.8 LBS | BODY MASS INDEX: 20.34 KG/M2 | HEIGHT: 63 IN | SYSTOLIC BLOOD PRESSURE: 122 MMHG | HEART RATE: 69 BPM

## 2021-03-18 DIAGNOSIS — Z98.890 H/O LEEP: ICD-10-CM

## 2021-03-18 DIAGNOSIS — Z86.59 HX OF BULIMIA NERVOSA: ICD-10-CM

## 2021-03-18 DIAGNOSIS — N83.202 LEFT OVARIAN CYST: ICD-10-CM

## 2021-03-18 DIAGNOSIS — F90.0 ADHD (ATTENTION DEFICIT HYPERACTIVITY DISORDER), INATTENTIVE TYPE: ICD-10-CM

## 2021-03-18 DIAGNOSIS — Z34.00 NORMAL FIRST PREGNANCY CONFIRMED, ANTEPARTUM: Primary | ICD-10-CM

## 2021-03-18 PROCEDURE — G0463 HOSPITAL OUTPT CLINIC VISIT: HCPCS

## 2021-03-18 PROCEDURE — 99207 PR PRENATAL VISIT: CPT | Performed by: ADVANCED PRACTICE MIDWIFE

## 2021-03-18 ASSESSMENT — MIFFLIN-ST. JEOR: SCORE: 1194.73

## 2021-03-18 NOTE — LETTER
"3/18/2021       RE: Arlet Segura  [address]  [facility] [address]     Dear Colleague,    Thank you for referring your patient, Arlet Segura, to the [facility] [place] at [facility]. Please see a copy of my visit note below.    Subjective:      35 year old  at 16w3d presents for a routine prenatal appointment.     No vaginal bleeding or leakage of fluid.  No contractions or cramping.    No fetal movement yet. No HA, visual changes, RUQ or epigastric pain.   No s/s of COVID, is wearing a mask.     The patient presents with the following concerns:    - back pain started on . Better with standing, worse with sitting. Tylenol dose only on , which helped. Hasn't started wearing belly band or using more supports while standing at work.    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    Level II US  Scheduled 3/31/21.   Pt had normal NT and NIPT, Offered AFP, Pt declined today but will consider - aware needs to be done by 20 weeks for valid result, recommended by 18.     - Adderall dose - now on 5mg BID. Has appt with psychiatrist to discuss further. Is considering if she can discontinue. Has done well with dose decrease thus far.   - Normal Pap test 2021; HPV+ (HPV 16 and 18 NEGATIVE; HPV Other POSITIVE)  - s/p LEEP cervical length follow up not recommended per Dr. Hanks at high risk rounds.      Objective:  Vitals:    21 0807   BP: 122/78   Pulse: 69   Weight: 52.1 kg (114 lb 12.8 oz)   Height: 1.6 m (5' 2.99\")     See OB flowsheet    Assessment/Plan     Encounter Diagnoses   Name Primary?     Normal first pregnancy confirmed, antepartum Yes     ADHD (attention deficit hyperactivity disorder), inattentive type      Hx of bulimia nervosa      H/O LEEP      Left ovarian cyst      - Reinforced COVID-19 precautions " "including: social distancing of at least 6 feet, avoiding gatherings with large numbers of people, frequent hand hygiene, avoiding discretionary travel, avoiding touching of face, and cleaning and disinfecting frequently touched surfaces daily.  Encouraged household members to follow the same guidelines.    - If suspected exposure to COVID-19 or onset of fever and symptoms, such as cough or difficulty breathing visit [email or web address] promptly to be directed to the appropriate care and, if pregnant, call [phone] to notify your healthcare team.       - Reinforced current hospital policy restricting visitors to one healthy adult (age >18) on labor and delivery, postpartum, and in the NICU.   - Reviewed routine COVID testing at time of hospital admission or just prior to scheduled procedure including induction of labor or . Discussed need for patient and partner to wear mask at hospital while staff is in the room. Reviewed option to use of nitrous oxide and have waterbirth if COVID negative.     - Reviewed total weight gain, encouraged continued healthy diet and exercise.      - Reviewed why/how to contact provider.   - Regarding back pain, reviewed footwear, stretches, abdominal support bands.   - Pt given letter for work regarding delaying tetanus booster.   Patient education/orders or handouts today: PTL signs/symptoms, AFP only, Fetal movement and Level 2 u/s scheduled   Return to clinic in 4 weeks and prn if questions or concerns.       I, Yamil Anderson MS4, am serving as a scribe to document services personally performed by CNM based on the provider's statements to me.\" - Yamil Anderson  MS4 signature     The encounter was performed by me and scribed by the MS4. The scribed note accurately reflects my personal services and decisions made by me.     Lizabeth Story, APRN TYLERM      "

## 2021-03-18 NOTE — LETTER
2021        To Whom it May Concern:    Arlet Segura was seen in our office on 3/18/2021 and was given the following instructions:    - Due to the CDC recommendation for pregnant people to receive Tdap between 27-32 weeks of pregnancy for Pertussis protection of the  we recommend Flora be allowed to delay her Tdap booster until that time.     Please feel free to contact to contact us once you have obtained Flora's permission should you require any additional information or clarification.       Sincerely,        DRISS Brown CNM

## 2021-03-23 ENCOUNTER — TELEPHONE (OUTPATIENT)
Dept: PSYCHIATRY | Facility: CLINIC | Age: 36
End: 2021-03-23

## 2021-03-23 ENCOUNTER — VIRTUAL VISIT (OUTPATIENT)
Dept: PSYCHIATRY | Facility: CLINIC | Age: 36
End: 2021-03-23
Attending: PSYCHIATRY & NEUROLOGY
Payer: COMMERCIAL

## 2021-03-23 DIAGNOSIS — F98.8 ATTENTION DEFICIT DISORDER (ADD) WITHOUT HYPERACTIVITY: ICD-10-CM

## 2021-03-23 DIAGNOSIS — F90.0 ADHD (ATTENTION DEFICIT HYPERACTIVITY DISORDER), INATTENTIVE TYPE: ICD-10-CM

## 2021-03-23 PROCEDURE — 99215 OFFICE O/P EST HI 40 MIN: CPT | Mod: GT | Performed by: PSYCHIATRY & NEUROLOGY

## 2021-03-23 NOTE — TELEPHONE ENCOUNTER
On March 23, 2021, at 12:17 PM, writer called patient at [phone] to confirm Virtual Visit. Writer unable to make contact with patient. Writer unable to leave detailed voice mail message due to voice mail box full. Kat Greer CMA    On 3/23/2021, at 1333, writer called patient at mobile to confirm Virtual Visit. Writer unable to make contact with patient. KELSEY Counsell, EMT

## 2021-03-23 NOTE — PROGRESS NOTES
Video- Visit Details  Type of service:  video visit for medication management  Time of service:    Date:  2021    Video Start Time:  2:04 PM        Video End Time:  3:05    Reason for video visit:  Patient unable to travel due to Covid-19  Originating Site (patient location):  [facility]   Location- Patient's home or workplace  Distant Site (provider location):  Remote location  Mode of Communication:  Video Conference via [email or web address]  Consent:  Patient has given verbal consent for video visit?: Yes        [facility]  Psychiatry Clinic  PSYCHIATRY PROGRESS NOTE     CARE TEAM:  PCP- Dorothy Holder.  Arlet Segura is a 35 year old   patient who prefers the name Flora and pronouns she, her.    DIAGNOSES                                                                                 ADHD, inattentive type  Bulimia Nervousa, purging type  Fe deficiency    ASSESSMENT                                                                                Due to Flora's pregnancy OB decreased Adderall to the current dose of 5mg BID,   down from 15mg BID. Today, we discussed pros & cons of remaining on Adderall 5mg   BID vs increasing or decreasing the dose. Definitely would not decrease further d/t   symptoms described in Interim History section and it is ok to continue use. Not a good   idea to change meds at this point and would not use Wellbutrin anyway d/t bulimia.  Risk w/current Adderall dose is very low, and Flora has a good understanding of what   the risk is (low birth wt,  delivery). Since binge/purge episodes are fairly freq,   I have recommended Flora seek treatment at ED programs- [facility] or [facility]. [facility]   agreed and will call them soon. OB provider team is aware of B/P. Otherwise,   re: Adderall use-no adverse effects, no HTN, no evidence of misuse & has been taking  a stimulant since ~2018.    [place]: checked, no issues    PLAN                                                                                            1) Meds  - continue Adderall 5mg tabs- 1 tab BID   - will check need for refills    2) Other: pt will contact [facility] eating disorder programs  3) RTC: June 1   2:00   4) CRISIS Numbers:   Provided routinely in AVS      After hours:  [phone]    PERTINENT BACKGROUND                                            [evals 2014, 2017]   This pt first entered care in this clinic in 2014 with Dr Epps, diagnosed with bulimia   nervosa (BN), ADHD and was taking bupropion SR 100mg every day. Up to 300mg did not   further improve things so Adderall XR was used up to 30mg. There was improvement in   attentional problems as well as purging episodes. Flora dates BN back to 2008. After a   period of steady treatment there was a gap MH for 2 years from [id number]. Upon returning   to this clinic in early 2017, the pt entered care with DRISS Tong and Dr Grey   for eating disorder treatment. Other med hx: by early 2018 Prozac was added, dosed up to   60mg with no improvement, mood improved once completed graduate school. Switched to   IR d/t sleep problems on XR.     Psych pertinent item history includes [no critical items]    INTERIM HISTORY                                                                         Since the last visit:   -now 17 weeks into pregnancy, very happy about this  -OB reduced Adderall from the previous dose of 15mg BID to the current 5mg BID  -there has been an impact on symptoms, both ADHD and bulimia  -impact on ED is more B/P than on higher dose, now one episode every 2-3 days   -impact on attn- difficulty w/focus, task completion and distractoin during driving  -discussed that it is not a good idea to change meds at this point, also Wellbutrin  not really helpful in the past and, importantly, Flora has an active eating disorder  so Wellbutrin should be avoided.  - discussed [facility]    Recent Symptoms:  No depressed  mood, anhedonia, insomnia, racing thoughts,   cognitive and eating disorder sxs as described above.  Adverse Effects:  none  Pertinent Negatives:  No suicidal or violent ideation, psychosis and adrian  Recent Substance Use:  none reported    SOCIAL and FAMILY HISTORY                          [per pt report]            Family Hx- maternal aunt w/bipolar disorder, sister w/depression, HTN in father and brother,   DM in sister and thyroid dz in mother    Social Hx- Originally from [place], [place] and moved to [place] in 2014 for graduate school.   School performance has been without difficulty or delay. Good social support with friends   and family.  Working in [employer] here at [facility] [employer]. Lives in house with  Alex   and her dog 'Ewach'. Likes to play soccer, garden, run and ski.    MEDICAL HISTORY  and ALLERGY     ALLERGIES: Patient has no known allergies.  Avoid use of Wellbutrin     Patient Active Problem List   Diagnosis     ADHD (attention deficit hyperactivity disorder), inattentive type     Hx of bulimia nervosa     Raynaud's disease without gangrene     H/O LEEP     Normal first pregnancy confirmed, antepartum     Change in mole     Left ovarian cyst       MEDICAL REVIEW OF SYSTEMS                                                               A comprehensive review of systems was performed and is negative other than noted in the HPI.  CURRENT MEDS       Current Outpatient Medications   Medication Sig Dispense Refill     amphetamine-dextroamphetamine (ADDERALL) 5 MG tablet Take 1 tablet (5 mg) by mouth 2 times daily 60 tablet 0     Ferrous Bisglycinate Chelate 15 MG TABS Taking 27 mg twice daily       Prenatal Vit-Fe Fumarate-FA (PRENATAL MULTIVITAMIN PLUS IRON) 27-0.8 MG TABS per tablet Take 1 tablet by mouth daily       VITALS                                                                                                  Pulse Readings from Last 3 Encounters:   03/18/21 69   02/16/21 67   02/01/21 65     BP  Readings from Last 3 Encounters:   03/18/21 122/78   02/16/21 113/76   02/01/21 116/80     MENTAL STATUS EXAM                                                        Alertness: alert  and oriented  Appearance: well groomed  Behavior/Demeanor: cooperative, pleasant and calm, with good  eye contact   Speech: regular rate and rhythm  Language: no obvious problem  Psychomotor: normal or unremarkable  Mood: description consistent with euthymia  Affect: full range; was congruent to mood; was congruent to content  Thought Process/Associations: unremarkable  Thought Content:  Reports none;  Denies suicidal & violent ideation and delusions   Perception:  Reports none;  Denies hallucinations  Insight: good  Judgment: good  Cognition: (6) oriented: time, person, and place  attention span: intact  concentration: intact  recent memory: intact  remote memory: intact  fund of knowledge: appropriate  Gait and Station: N/A (telehealth)    LABS and DATA     PHQ9 Today:  None today  PHQ 10/15/2019 11/13/2019 12/10/2019   PHQ-9 Total Score 9 4 5   Q9: Thoughts of better off dead/self-harm past 2 weeks Not at all Not at all Not at all     PSYCHOTROPIC DRUG INTERACTIONS   none  MANAGEMENT:  N/A    RISK STATEMENT for SAFETY   Arlet Segura did not appear to be an imminent safety risk to self or others.    TREATMENT RISK STATEMENT:  The risks, benefits, alternatives and potential adverse   effects have been discussed and are understood by the pt. The pt understands the risks of   using street drugs or alcohol. There are no medical contraindications, the pt agrees to   treatment with the ability to do so. The pt knows to call the clinic for any problems or to   access emergency care if needed.  Medical and substance use concerns are documented   above.  Psychotropic drug interaction check was done, including changes made today.    ATTENDING PHYSICIAN:  Candida Redd MD

## 2021-03-23 NOTE — TELEPHONE ENCOUNTER
Writer also called at 1:50 but there was no answer.  Noreen Daniels, [facility]

## 2021-03-24 RX ORDER — DEXTROAMPHETAMINE SACCHARATE, AMPHETAMINE ASPARTATE, DEXTROAMPHETAMINE SULFATE AND AMPHETAMINE SULFATE 1.25; 1.25; 1.25; 1.25 MG/1; MG/1; MG/1; MG/1
TABLET ORAL
Qty: 60 TABLET | Refills: 0 | Status: SHIPPED | OUTPATIENT
Start: 2021-05-22 | End: 2021-06-01

## 2021-03-24 RX ORDER — DEXTROAMPHETAMINE SACCHARATE, AMPHETAMINE ASPARTATE, DEXTROAMPHETAMINE SULFATE AND AMPHETAMINE SULFATE 1.25; 1.25; 1.25; 1.25 MG/1; MG/1; MG/1; MG/1
5 TABLET ORAL 2 TIMES DAILY
Qty: 60 TABLET | Refills: 0 | Status: SHIPPED | OUTPATIENT
Start: 2021-03-24 | End: 2021-06-01

## 2021-03-24 RX ORDER — DEXTROAMPHETAMINE SACCHARATE, AMPHETAMINE ASPARTATE, DEXTROAMPHETAMINE SULFATE AND AMPHETAMINE SULFATE 1.25; 1.25; 1.25; 1.25 MG/1; MG/1; MG/1; MG/1
TABLET ORAL
Qty: 60 TABLET | Refills: 0 | Status: SHIPPED | OUTPATIENT
Start: 2021-04-22 | End: 2021-06-01

## 2021-03-25 NOTE — PATIENT INSTRUCTIONS
Treatment Plan Today:     1) Medications- no change    2) Follow-up appt with Dr Redd  June 1 at 2:00    3) Crisis numbers are below and clinic after hours number is [phone]    4) Please contact Patricia and/or Reyna Velarde to seek help with bulimia    ------------------------------------------------------------------------    Thank you for coming to the [facility]    Lab Testing:  If you had lab testing today and your results are reassuring or normal they will be mailed to you or sent through [facility] within 7 days. If the lab tests need quick action we will call you with the results. The phone number we will call with results is # [phone] (home) [phone] (work). If this is not the best number please call our clinic and change the number.    Medication Refills:  If you need any refills please call your pharmacy and they will contact us. Our fax number for refills is [phone]. Please allow three business for refill processing. If you need to  your refill at a new pharmacy, please contact the new pharmacy directly. The new pharmacy will help you get your medications transferred.     Scheduling:  If you have any concerns about today's visit or wish to schedule another appointment please call our office during normal business hours [phone] (8-5:00 M-F)    Contact Us:  Please call [phone] during business hours (8-5:00 M-F).  If after clinic hours, or on the weekend, please call  [phone].    Financial Assistance [phone]  [facility] Billing [phone]  Central Billing Office, [facility]: [phone]  [facility] Billing [phone]  Medical Records [phone]      MENTAL HEALTH CRISIS NUMBERS:  For a medical emergency please call  911 or go to the nearest ER.     [facility] ([facility]  [phone]    [facility]:   [facility] -[phone]   Crisis Residence [facility] -[phone]   Walk-In Counseling Center  [facility] -[phone]   COPE 24/7 [facility] Mobile Team -[phone] (adults)/[phone] (child)  CHILD: [facility] needs assessment team - [phone]      [facility]:   [facility] - [phone]   Walk-in counseling [facility] - [phone]   Walk-in counseling [facility] - [phone]   Crisis Residence [facility] - [phone]  Urgent Care Adult Mental [facility]-[phone] mobile unit/ 24/7 crisis line    National Crisis Numbers:   National Suicide Prevention Lifeline: 8-858-618-TALK (855-307-8446)  Poison Control Center - 1-849.659.5883  CM Sistemi/resources for a list of additional resources (SOS)  Trans Lifeline a hotline for transgender people 1-222.907.1134  The [employer] a hotline for LGBT youth 1-341.859.3790  Crisis Text Line: For any crisis 24/7   To: 879344  see www.crisistextline.org  - IF MAKING A CALL FEELS TOO HARD, send a text!       Again thank you for choosing [facility] and please let us know how we can best partner with you to improve you and your family's health.    You may be receiving a survey regarding this appointment. We would love to have your feedback, both positive and negative. The survey is done by an external company, so your answers are anonymous.

## 2021-03-31 ENCOUNTER — OFFICE VISIT (OUTPATIENT)
Dept: MATERNAL FETAL MEDICINE | Facility: CLINIC | Age: 36
End: 2021-03-31
Attending: ADVANCED PRACTICE MIDWIFE
Payer: COMMERCIAL

## 2021-03-31 ENCOUNTER — HOSPITAL ENCOUNTER (OUTPATIENT)
Dept: ULTRASOUND IMAGING | Facility: CLINIC | Age: 36
End: 2021-03-31
Attending: ADVANCED PRACTICE MIDWIFE
Payer: COMMERCIAL

## 2021-03-31 DIAGNOSIS — O35.9XX0 SUSPECTED FETAL ANOMALY, ANTEPARTUM, SINGLE OR UNSPECIFIED FETUS: Primary | ICD-10-CM

## 2021-03-31 DIAGNOSIS — O26.90 PREGNANCY RELATED CONDITION, ANTEPARTUM: ICD-10-CM

## 2021-03-31 PROCEDURE — 76811 OB US DETAILED SNGL FETUS: CPT

## 2021-03-31 PROCEDURE — 76811 OB US DETAILED SNGL FETUS: CPT | Mod: 26 | Performed by: OBSTETRICS & GYNECOLOGY

## 2021-04-01 NOTE — PROGRESS NOTES
Please see ultrasound report under Imaging tab for details of today's ultrasound.    Fara Welch MD  Maternal-Fetal Medicine

## 2021-04-07 ENCOUNTER — TELEPHONE (OUTPATIENT)
Dept: OBGYN | Facility: CLINIC | Age: 36
End: 2021-04-07

## 2021-04-07 NOTE — TELEPHONE ENCOUNTER
----- Message from Chiquis Lua RN sent at 2021 10:49 AM CDT -----  Regardin weeks pregnant, left lower abdominal pain increasing intensity

## 2021-04-08 NOTE — TELEPHONE ENCOUNTER
"Called Flora.  She states that she is doing somewhat better today.  The pain is intermittent, comes in \"waves\", and is on left side at about level of umbilicus.  She reports that she has had some \"mild\" constipation despite daily use of metamucil.  Reports fairly regular, but hard bowel movements.    Asking about kidney stones, but no frequency, urgency, flank pain, fever, blood in urine.    Discussed bowel, musculoskeletal pain or kidney stones as potential causes.  She will add a stool softener, prunes/prune juice or additional dietary fiber along with increased fluid intake.  If she is having increased or prolonged discomfort, to call back.  She has CNM appointment on 4/15/21  "

## 2021-04-13 ENCOUNTER — OFFICE VISIT (OUTPATIENT)
Dept: DERMATOLOGY | Facility: CLINIC | Age: 36
End: 2021-04-13
Attending: ADVANCED PRACTICE MIDWIFE
Payer: COMMERCIAL

## 2021-04-13 DIAGNOSIS — D48.5 NEOPLASM OF UNCERTAIN BEHAVIOR OF SKIN: Primary | ICD-10-CM

## 2021-04-13 DIAGNOSIS — D22.9 CHANGE IN MOLE: ICD-10-CM

## 2021-04-13 PROCEDURE — 88305 TISSUE EXAM BY PATHOLOGIST: CPT | Performed by: DERMATOLOGY

## 2021-04-13 PROCEDURE — 99203 OFFICE O/P NEW LOW 30 MIN: CPT | Mod: 25 | Performed by: DERMATOLOGY

## 2021-04-13 PROCEDURE — 11102 TANGNTL BX SKIN SINGLE LES: CPT | Performed by: DERMATOLOGY

## 2021-04-13 ASSESSMENT — PAIN SCALES - GENERAL
PAINLEVEL: NO PAIN (0)
PAINLEVEL: NO PAIN (0)

## 2021-04-13 NOTE — PROGRESS NOTES
[facility] Dermatology Note  Encounter Date: Apr 13, 2021  Office Visit     Dermatology Problem List:  1. NUB left anterior thigh biopsy 4/13/2021  2. 20 weeks pregnant    ____________________________________________    Assessment & Plan:     # NUB left anterior thigh.   - Shave biopsy performed today (see procedure note(s) below).      Procedures Performed:   - Shave biopsy procedure note, location(s): left anterior thigh. After discussion of benefits and risks including but not limited to bleeding, infection, scar, incomplete removal, recurrence, and non-diagnostic biopsy, written consent and photographs were obtained. The area was cleaned with isopropyl alcohol. 0.5mL of 1% lidocaine with no epinephrine (due to pregnancy) was injected to obtain adequate anesthesia of lesion(s). Shave biopsy at site(s) performed. Hemostasis was achieved with aluminium chloride. Petrolatum ointment and a sterile dressing were applied. The patient tolerated the procedure and no complications were noted. The patient was provided with verbal and written post care instructions.       Follow-up: pending path results    Staff:     Chiquis Costa MD  ____________________________________________    CC: Mole (pt states she has a changing mole on her left leg)    HPI:  Ms. Arelt Segura is a(n) 35 year old female who presents today as a new patient for a changing mole on the left thigh.  She is currently 20 weeks pregnant but noted the change in the mole before the pregnancy.  It is now darker.  No significant sun exposure in youth.  Grandmother had an unknown skin cancer.    Patient is otherwise feeling well, without additional skin concerns.     Labs Reviewed:  N/A    Physical Exam:  Vitals: LMP 11/23/2020   SKIN: Focused examination of left thigh was performed.  - darkly pigmented papule with irregular pigment on dermoscopy.   - No other lesions of concern on areas examined.     Medications:  Current Outpatient  Medications   Medication     amphetamine-dextroamphetamine (ADDERALL) 5 MG tablet     [START ON 4/22/2021] amphetamine-dextroamphetamine (ADDERALL) 5 MG tablet     [START ON 5/22/2021] amphetamine-dextroamphetamine (ADDERALL) 5 MG tablet     Ferrous Bisglycinate Chelate 15 MG TABS     Prenatal Vit-Fe Fumarate-FA (PRENATAL MULTIVITAMIN PLUS IRON) 27-0.8 MG TABS per tablet     No current facility-administered medications for this visit.       Past Medical History:   Patient Active Problem List   Diagnosis     ADHD (attention deficit hyperactivity disorder), inattentive type     Hx of bulimia nervosa     Raynaud's disease without gangrene     H/O LEEP     Normal first pregnancy confirmed, antepartum     Change in mole     Left ovarian cyst     Past Medical History:   Diagnosis Date     Abnormal cervical Pap smear with positive HPV DNA test     last pap 2/2013 nl     ADHD      Breast disorder 07/2019    fibroadenoma     Bulimia since 2008 01/19/2014     Cervical high risk HPV (human papillomavirus) test positive 05/28/2019    See problem list.        CC Kellie Johnston CNM  [address] [facility] [address]  [place] [address] on close of this encounter.

## 2021-04-13 NOTE — NURSING NOTE
Chief Complaint   Patient presents with     Mole     pt states she has a changing mole on her left leg     Kailyn Lua MA

## 2021-04-13 NOTE — LETTER
4/13/2021       RE: Arlet Segura  [address]  [place] [address]     Dear Colleague,    Thank you for referring your patient, Arlet Seugra, to the [facility] [place] at [facility]. Please see a copy of my visit note below.    [facility] Dermatology Note  Encounter Date: Apr 13, 2021  Office Visit     Dermatology Problem List:  1. NUB left anterior thigh biopsy 4/13/2021  2. 20 weeks pregnant    ____________________________________________    Assessment & Plan:     # NUB left anterior thigh.   - Shave biopsy performed today (see procedure note(s) below).      Procedures Performed:   - Shave biopsy procedure note, location(s): left anterior thigh. After discussion of benefits and risks including but not limited to bleeding, infection, scar, incomplete removal, recurrence, and non-diagnostic biopsy, written consent and photographs were obtained. The area was cleaned with isopropyl alcohol. 0.5mL of 1% lidocaine with no epinephrine (due to pregnancy) was injected to obtain adequate anesthesia of lesion(s). Shave biopsy at site(s) performed. Hemostasis was achieved with aluminium chloride. Petrolatum ointment and a sterile dressing were applied. The patient tolerated the procedure and no complications were noted. The patient was provided with verbal and written post care instructions.       Follow-up: pending path results    Staff:     Chiquis Costa MD  ____________________________________________    CC: Mole (pt states she has a changing mole on her left leg)    HPI:  Ms. Arlet Segura is a(n) 35 year old female who presents today as a new patient for a changing mole on the left thigh.  She is currently 20 weeks pregnant but noted the change in the mole before the pregnancy.  It is now darker.  No significant sun exposure in youth.  Grandmother had an unknown skin cancer.    Patient is  otherwise feeling well, without additional skin concerns.     Labs Reviewed:  N/A    Physical Exam:  Vitals: LMP 11/23/2020   SKIN: Focused examination of left thigh was performed.  - darkly pigmented papule with irregular pigment on dermoscopy.   - No other lesions of concern on areas examined.     Medications:  Current Outpatient Medications   Medication     amphetamine-dextroamphetamine (ADDERALL) 5 MG tablet     [START ON 4/22/2021] amphetamine-dextroamphetamine (ADDERALL) 5 MG tablet     [START ON 5/22/2021] amphetamine-dextroamphetamine (ADDERALL) 5 MG tablet     Ferrous Bisglycinate Chelate 15 MG TABS     Prenatal Vit-Fe Fumarate-FA (PRENATAL MULTIVITAMIN PLUS IRON) 27-0.8 MG TABS per tablet     No current facility-administered medications for this visit.       Past Medical History:   Patient Active Problem List   Diagnosis     ADHD (attention deficit hyperactivity disorder), inattentive type     Hx of bulimia nervosa     Raynaud's disease without gangrene     H/O LEEP     Normal first pregnancy confirmed, antepartum     Change in mole     Left ovarian cyst     Past Medical History:   Diagnosis Date     Abnormal cervical Pap smear with positive HPV DNA test     last pap 2/2013 nl     ADHD      Breast disorder 07/2019    fibroadenoma     Bulimia since 2008 01/19/2014     Cervical high risk HPV (human papillomavirus) test positive 05/28/2019    See problem list.        CC Kellie Johnston CNM  [address] [facility] [address]  [place] [address] on close of this encounter.

## 2021-04-13 NOTE — NURSING NOTE
xylocaine  1mL once for one use, starting 4/13/2021 ending 4/13/2021,  2mL disp, R-0, injection  Injected by Dr. Matta

## 2021-04-13 NOTE — PATIENT INSTRUCTIONS

## 2021-04-19 ENCOUNTER — OFFICE VISIT (OUTPATIENT)
Dept: OBGYN | Facility: CLINIC | Age: 36
End: 2021-04-19
Attending: ADVANCED PRACTICE MIDWIFE
Payer: COMMERCIAL

## 2021-04-19 VITALS
HEART RATE: 72 BPM | SYSTOLIC BLOOD PRESSURE: 106 MMHG | BODY MASS INDEX: 21.35 KG/M2 | WEIGHT: 120.5 LBS | DIASTOLIC BLOOD PRESSURE: 70 MMHG | HEIGHT: 63 IN

## 2021-04-19 DIAGNOSIS — Z34.00 NORMAL FIRST PREGNANCY CONFIRMED, ANTEPARTUM: Primary | ICD-10-CM

## 2021-04-19 LAB — COPATH REPORT: NORMAL

## 2021-04-19 PROCEDURE — 99207 PR PRENATAL VISIT: CPT | Performed by: ADVANCED PRACTICE MIDWIFE

## 2021-04-19 PROCEDURE — G0463 HOSPITAL OUTPT CLINIC VISIT: HCPCS

## 2021-04-19 ASSESSMENT — PAIN SCALES - GENERAL: PAINLEVEL: NO PAIN (0)

## 2021-04-19 ASSESSMENT — MIFFLIN-ST. JEOR: SCORE: 1220.71

## 2021-04-19 NOTE — PROGRESS NOTES
"Subjective:      35 year old  at 21w0d presents for a routine prenatal appointment.         Denies cramping/contractions, vaginal bleeding, discharge or leakage of fluid.  Not yet feeling fetal movement.   No HA, vision changes, ruq/epigastric pain.       Patient concerns: Feeling well overall.  - Noticed left flank pain a few weeks ago. Called clinic and was told it was likely a kidney stone or constipation.  -Blood in urine about a week ago, \"fair amount of blood\".: Left side pain improved since then. No cramping. Did not have return bleeding again. She is hopeful that it was a kidney tone that has resolved, now trying to hydrate more.     Had level 2 ultrasound on 3/31/21- placenta anterior; having a boy! Repeat M ultrasound scheduled d/t suboptimal views of anatomy.     Objective:  Vitals:    21 1148   BP: 106/70   Pulse: 72   Weight: 54.7 kg (120 lb 8 oz)   Height: 1.6 m (5' 3\")     See OB flowsheet    Assessment/Plan     Encounter Diagnosis   Name Primary?     Normal first pregnancy confirmed, antepartum Yes     Flora was seen in clinic for routine prenatal care at 21w0d gestation. States she is feeling well overall, especially now that left flank pain has resolved.  - Encouraged hydration to prevent kidney stones. Call if pain or bleeding returns or worsens.  - Reviewed total weight gain, encouraged continued healthy diet and exercise.      - Reviewed why/how to contact provider.      Patient education/orders or handouts today:  PTL signs/symptoms and fetal movement    - M ultrasound on 21.  - Plan EOB next visit in 4-6 weeks.    Continue scheduled prenatal care, RTC in 5-6 weeks for EOB and prn if questions or concerns.      IElise SNM, am serving as a scribe; to document services personally performed by  Chin Mckeon CNM based on data collection and the provider's statements to me.     BRANDEN Foreman    The encounter was performed by me and scribed by the " SNM. The scribed note accurately reflects my personal services and decisions made by me.     DRISS Caldwell, TYLERM

## 2021-04-19 NOTE — LETTER
"2021       RE: Arlet Segura  [address]  [place] [facility] [address]     Dear Colleague,    Thank you for referring your patient, Arlet Segura, to the [facility] [place] at [facility]. Please see a copy of my visit note below.    Subjective:      35 year old  at 21w0d presents for a routine prenatal appointment.         Denies cramping/contractions, vaginal bleeding, discharge or leakage of fluid.  Not yet feeling fetal movement.   No HA, vision changes, ruq/epigastric pain.       Patient concerns: Feeling well overall.  - Noticed left flank pain a few weeks ago. Called clinic and was told it was likely a kidney stone or constipation.  -Blood in urine about a week ago, \"fair amount of blood\".: Left side pain improved since then. No cramping. Did not have return bleeding again. She is hopeful that it was a kidney tone that has resolved, now trying to hydrate more.     Had level 2 ultrasound on 3/31/21- placenta anterior; having a boy! Repeat MFM ultrasound scheduled d/t suboptimal views of anatomy.     Objective:  Vitals:    21 1148   BP: 106/70   Pulse: 72   Weight: 54.7 kg (120 lb 8 oz)   Height: 1.6 m (5' 3\")     See OB flowsheet    Assessment/Plan     Encounter Diagnosis   Name Primary?     Normal first pregnancy confirmed, antepartum Yes     Flora was seen in clinic for routine prenatal care at 21w0d gestation. States she is feeling well overall, especially now that left flank pain has resolved.  - Encouraged hydration to prevent kidney stones. Call if pain or bleeding returns or worsens.  - Reviewed total weight gain, encouraged continued healthy diet and exercise.      - Reviewed why/how to contact provider.      Patient education/orders or handouts today:  PTL signs/symptoms and fetal movement    - MFM ultrasound on 21.  - Plan EOB next visit in 4-6 weeks.    Continue scheduled prenatal care, RTC in 5-6 " weeks for EOB and prn if questions or concerns.      I, BRANDEN Foreman, am serving as a scribe; to document services personally performed by  Chin Mckeon CNM based on data collection and the provider's statements to me.     BRANDEN Foreman    The encounter was performed by me and scribed by the SNM. The scribed note accurately reflects my personal services and decisions made by me.     DRISS Caldwell CNM

## 2021-04-26 ENCOUNTER — OFFICE VISIT (OUTPATIENT)
Dept: MATERNAL FETAL MEDICINE | Facility: CLINIC | Age: 36
End: 2021-04-26
Attending: OBSTETRICS & GYNECOLOGY
Payer: COMMERCIAL

## 2021-04-26 ENCOUNTER — HOSPITAL ENCOUNTER (OUTPATIENT)
Dept: ULTRASOUND IMAGING | Facility: CLINIC | Age: 36
End: 2021-04-26
Attending: OBSTETRICS & GYNECOLOGY
Payer: COMMERCIAL

## 2021-04-26 DIAGNOSIS — Z36.3 ENCOUNTER FOR ROUTINE SCREENING FOR MALFORMATION USING ULTRASOUND: Primary | ICD-10-CM

## 2021-04-26 DIAGNOSIS — O35.9XX0 SUSPECTED FETAL ANOMALY, ANTEPARTUM, SINGLE OR UNSPECIFIED FETUS: ICD-10-CM

## 2021-04-26 PROCEDURE — 76816 OB US FOLLOW-UP PER FETUS: CPT

## 2021-04-26 PROCEDURE — 76816 OB US FOLLOW-UP PER FETUS: CPT | Mod: 26 | Performed by: OBSTETRICS & GYNECOLOGY

## 2021-04-26 NOTE — PROGRESS NOTES
Arlet Segura was seen for an ultrasound today at the Maternal-Fetal Medicine center.      For the details of the ultrasound please see the report which can be found under the imaging tab.      Kim Han MD  , OB/GYN  Maternal-Fetal Medicine  [email or web address]  [phone] (Main MFM Office)  [id number] or [phone] (for 24 hour MFM questions)  [phone] (Pager)

## 2021-05-27 ENCOUNTER — OFFICE VISIT (OUTPATIENT)
Dept: OBGYN | Facility: CLINIC | Age: 36
End: 2021-05-27
Attending: OBSTETRICS & GYNECOLOGY
Payer: COMMERCIAL

## 2021-05-27 VITALS
HEART RATE: 74 BPM | HEIGHT: 63 IN | SYSTOLIC BLOOD PRESSURE: 104 MMHG | DIASTOLIC BLOOD PRESSURE: 66 MMHG | WEIGHT: 124 LBS | BODY MASS INDEX: 21.97 KG/M2

## 2021-05-27 DIAGNOSIS — Z34.02 SUPERVISION OF NORMAL FIRST PREGNANCY IN SECOND TRIMESTER: Primary | ICD-10-CM

## 2021-05-27 PROCEDURE — G0463 HOSPITAL OUTPT CLINIC VISIT: HCPCS

## 2021-05-27 PROCEDURE — 99207 PR PRENATAL VISIT: CPT | Performed by: OBSTETRICS & GYNECOLOGY

## 2021-05-27 ASSESSMENT — MIFFLIN-ST. JEOR: SCORE: 1236.59

## 2021-05-27 NOTE — LETTER
2021       RE: Arlet Segura  [address]  [place] [address]     Dear Colleague,    Thank you for referring your patient, Arlet Segura, to the [facility] WOMEN'S CLINIC [place] at [facility]. Please see a copy of my visit note below.    S:  Overall doing well-feeling FM, no contractions.  Her follow up comprehensive ultrasound was normal.  Wondering about birth classes    O:  See flow    A:  36 y/o  at 26+3 weeks, doing well.  Pregnancy is uncomplicated to date.    P:  EOB with GCT in 2 weeks.  Referred to [facility] website to review birth classes options-also to consider adding infant and child CPR class.     Dorothy Rios MD, FACOG

## 2021-05-27 NOTE — PROGRESS NOTES
S:  Overall doing well-feeling FM, no contractions.  Her follow up comprehensive ultrasound was normal.  Wondering about birth classes    O:  See flow    A:  36 y/o  at 26+3 weeks, doing well.  Pregnancy is uncomplicated to date.    P:  EOB with GCT in 2 weeks.  Referred to [facility] website to review birth classes options-also to consider adding infant and child CPR class.     Dorothy Rios MD, FACOG

## 2021-06-01 ENCOUNTER — VIRTUAL VISIT (OUTPATIENT)
Dept: PSYCHIATRY | Facility: CLINIC | Age: 36
End: 2021-06-01
Attending: PSYCHIATRY & NEUROLOGY
Payer: COMMERCIAL

## 2021-06-01 DIAGNOSIS — F90.0 ADHD (ATTENTION DEFICIT HYPERACTIVITY DISORDER), INATTENTIVE TYPE: ICD-10-CM

## 2021-06-01 DIAGNOSIS — F98.8 ATTENTION DEFICIT DISORDER (ADD) WITHOUT HYPERACTIVITY: ICD-10-CM

## 2021-06-01 PROCEDURE — 99214 OFFICE O/P EST MOD 30 MIN: CPT | Mod: GT | Performed by: PSYCHIATRY & NEUROLOGY

## 2021-06-01 RX ORDER — DEXTROAMPHETAMINE SACCHARATE, AMPHETAMINE ASPARTATE, DEXTROAMPHETAMINE SULFATE AND AMPHETAMINE SULFATE 1.25; 1.25; 1.25; 1.25 MG/1; MG/1; MG/1; MG/1
5 TABLET ORAL 2 TIMES DAILY
Qty: 60 TABLET | Refills: 0 | Status: SHIPPED | OUTPATIENT
Start: 2021-06-01 | End: 2021-08-21

## 2021-06-01 RX ORDER — DEXTROAMPHETAMINE SACCHARATE, AMPHETAMINE ASPARTATE, DEXTROAMPHETAMINE SULFATE AND AMPHETAMINE SULFATE 1.25; 1.25; 1.25; 1.25 MG/1; MG/1; MG/1; MG/1
TABLET ORAL
Qty: 60 TABLET | Refills: 0 | Status: SHIPPED | OUTPATIENT
Start: 2021-07-29 | End: 2021-06-11

## 2021-06-01 RX ORDER — DEXTROAMPHETAMINE SACCHARATE, AMPHETAMINE ASPARTATE, DEXTROAMPHETAMINE SULFATE AND AMPHETAMINE SULFATE 1.25; 1.25; 1.25; 1.25 MG/1; MG/1; MG/1; MG/1
TABLET ORAL
Qty: 60 TABLET | Refills: 0 | Status: SHIPPED | OUTPATIENT
Start: 2021-06-29 | End: 2021-06-11

## 2021-06-01 ASSESSMENT — PAIN SCALES - GENERAL: PAINLEVEL: NO PAIN (0)

## 2021-06-01 NOTE — PROGRESS NOTES
Video- Visit Details  Type of service:  video visit for medication management  Time of service:    Date:  2021    Video Start Time:  2:13 PM        Video End Time:  2:35 PM    Reason for video visit:  Patient unable to travel due to Covid-19  Originating Site (patient location):  [facility]   Location- Patient's home or workplace  Distant Site (provider location):  Remote location  Mode of Communication:  Video Conference via AmWell  Consent:  Patient has given verbal consent for video visit?: Yes        [facility]  Psychiatry Clinic  PSYCHIATRY PROGRESS NOTE     CARE TEAM:  PCP- Dorothy Holder.  Arlet Segura is a 36 year old   patient who prefers the name Flora and pronouns she, her.    DIAGNOSES                                                                                 ADHD, inattentive type  Bulimia Nervousa, purging type  Fe deficiency    ASSESSMENT                                                                                Doing well overall and making the lower dose of Adderall 5mg BID work. Would prefer  to return to higher dose in the future. Use of Wellbutrin is not being considered d/t   bulimia and not being helpful in the past. Pregnancy is going well and Flora has a good understanding of what the risk is w/use of stimulant (low birth wt,  delivery). Binge/purge episodes are reduced from every 2-3 days to about once a week. Not impacting pregnancy but still fairly frequent. Not ready to pursue assessment at [place]   or [place] program, may do so in the future. Has never taken selective serotonin reuptake inhibitor for BN/ will not add this type of med at this time. With pregnancy and breast-feeding, I would like to avoid use of 2 meds geronimo absent an assessment at ED program. Flora agrees. OB provider team is aware of B/P. Otherwise, re: Adderall use-no adverse effects, no HTN, no evidence of misuse & has been taking a stimulant since   ~2018. Also,  briefly discussed use of stimulant during breast-feeding, will provide   follow-up info per getbetter! message.    MNPMP: checked today, no indication of misuse     PLAN                                                                                           1) Meds  - continue Adderall 5mg tabs- 1 tab BID (refilled for 3 months today)    2) Other: None today  3) RTC:  Aug 17    2:00  4) CRISIS Numbers:   Provided routinely in AVS      After hours:  [phone]    PERTINENT BACKGROUND                                            [evals 2014, 2017]   This pt first entered care in this clinic in 2014 with Dr Epps, diagnosed with bulimia   nervosa (BN), ADHD and was taking bupropion SR 100mg every day. Up to 300mg did not   further improve things so Adderall XR was used up to 30mg. There was improvement in   attentional problems as well as purging episodes. Flora dates BN back to 2008. After a   period of steady treatment there was a gap MH for 2 years from 0658-1726. Upon returning   to this clinic in early 2017, the pt entered care with DRISS Tong and Dr Grey   for eating disorder treatment. Other med hx: by early 2018 Prozac was added, dosed up to   60mg with no improvement, mood improved once completed graduate school. Switched to   IR d/t sleep problems on XR.      Psych pertinent item history includes [no critical items]    INTERIM HISTORY                                                                         Since the last visit:   -feels good, 28 weeks and due August 30th, pregnancy going well  -everyone is back at work per usual, doing well with that  -today is her birthday and [place] plans [place] dinner [place]  -comfortable with Adderall 5mg BID (down from 15mg BID), does prefer higher dose  -briefly discussed breast-feeding with stimulant  -B/P down to once a week from every 2-3 days/ discussed pursuing ED tx at   [place] or [place]/ has not done that because of improvement but does  agree this is  still frequent  -knows we cannot use stimulant for BN treatment (higher stimulant dose helps w/this   as well as ADHD)  -stimulant helps attn/focus, task completion and tracking environment when driving    Recent Symptoms:  No depressed mood, anhedonia, insomnia, racing thoughts,   cognitive and eating disorder sxs as described above.  Adverse Effects:  none  Pertinent Negatives:  No suicidal or violent ideation, psychosis and adrian  Recent Substance Use:  none reported    SOCIAL and FAMILY HISTORY                          [per pt report]            Family Hx- maternal aunt w/bipolar disorder, sister w/depression, HTN in father and brother, DM in sister and thyroid dz in mother    Social Hx- Originally from [place], [place] and moved to [place] in 2014 for graduate school.   School performance has been without difficulty or delay. Good social support with friends   and family.  Working in [employer] here at [employer]. Lives in house with  Alex   and her dog 'Hutch'. Likes to play soccer, garden, run and ski.    MEDICAL HISTORY  and ALLERGY     ALLERGIES: Patient has no known allergies.  Avoid use of Wellbutrin     Patient Active Problem List   Diagnosis     ADHD (attention deficit hyperactivity disorder), inattentive type     Hx of bulimia nervosa     Raynaud's disease without gangrene     H/O LEEP     Normal first pregnancy confirmed, antepartum     Change in mole     Left ovarian cyst     MEDICAL REVIEW OF SYSTEMS                                                               A comprehensive review of systems was performed and is negative other than noted in the HPI.  CURRENT MEDS       Current Outpatient Medications   Medication Sig Dispense Refill     amphetamine-dextroamphetamine (ADDERALL) 5 MG tablet Take 1 tablet (5 mg) by mouth 2 times daily 60 tablet 0     amphetamine-dextroamphetamine (ADDERALL) 5 MG tablet Take 1 tablet (5 mg) by mouth 2 times daily 60 tablet 0     amphetamine-dextroamphetamine  (ADDERALL) 5 MG tablet Take 1 tablet (5 mg) by mouth 2 times daily 60 tablet 0     Ferrous Bisglycinate Chelate 15 MG TABS Taking 27 mg twice daily       Prenatal Vit-Fe Fumarate-FA (PRENATAL MULTIVITAMIN PLUS IRON) 27-0.8 MG TABS per tablet Take 1 tablet by mouth daily       VITALS                                                                                                  Pulse Readings from Last 3 Encounters:   05/27/21 74   04/19/21 72   03/18/21 69     BP Readings from Last 3 Encounters:   05/27/21 104/66   04/19/21 106/70   03/18/21 122/78     MENTAL STATUS EXAM                                                        Alertness: alert  and oriented  Appearance: well groomed  Behavior/Demeanor: cooperative, pleasant and calm, with good  eye contact   Speech: regular rate and rhythm  Language: no obvious problem  Psychomotor: normal or unremarkable  Mood: description consistent with euthymia  Affect: full range; was congruent to mood; was congruent to content  Thought Process/Associations: unremarkable  Thought Content:  Reports none;  Denies suicidal & violent ideation and delusions   Perception:  Reports none;  Denies hallucinations  Insight: good  Judgment: good  Cognition: (6) oriented: time, person, and place  attention span: intact  concentration: intact  recent memory: intact  remote memory: intact  fund of knowledge: appropriate  Gait and Station: N/A (telehealth)    LABS and DATA     PHQ9 Today:  None today  PHQ 10/15/2019 11/13/2019 12/10/2019   PHQ-9 Total Score 9 4 5   Q9: Thoughts of better off dead/self-harm past 2 weeks Not at all Not at all Not at all     PSYCHOTROPIC DRUG INTERACTIONS   none  MANAGEMENT:  N/A    RISK STATEMENT for SAFETY   Arlet Segura did not appear to be an imminent safety risk to self or others.    TREATMENT RISK STATEMENT:  The risks, benefits, alternatives and potential adverse   effects have been discussed and are understood by the pt. The pt understands the risks of    using street drugs or alcohol. There are no medical contraindications, the pt agrees to   treatment with the ability to do so. The pt knows to call the clinic for any problems or to   access emergency care if needed.  Medical and substance use concerns are documented   above.  Psychotropic drug interaction check was done, including changes made today.    ATTENDING PHYSICIAN:  Candida Redd MD

## 2021-06-01 NOTE — PROGRESS NOTES
"VIDEO VISIT  Arlet Segura is a 36 year old patient who is being evaluated via a billable video visit.      The patient has been notified of following:   \"This video visit will be conducted via a call between you and your physician/provider. We have found that certain health care needs can be provided without the need for an in-person physical exam. This service lets us provide the care you need with a video conversation. If a prescription is necessary we can send it directly to your pharmacy. If lab work is needed we can place an order for that and you can then stop by our lab to have the test done at a later time. Insurers are generally covering virtual visits as they would in-office visits so billing should not be different than normal.  If for some reason you do get billed incorrectly, you should contact the billing office to correct it and that number is in the AVS .    Video Conference to be completed via:  Carine    Patient has given verbal consent for video visit?:  Yes    Patient would prefer that any video invitations be sent by: Send to e-mail at: [email or web address]      How would patient like to obtain AVS?:  [facility]    AVS SmartPhrase [PsychAVS] has been placed in 'Patient Instructions':  Yes    "

## 2021-06-01 NOTE — PATIENT INSTRUCTIONS
**For crisis resources, please see the information at the end of this document**     Patient Education      Treatment Plan Today:     1) Medications- no change today, refills given    2) Follow-up appt with Dr Redd  Aug 17   2:00    3) Crisis numbers are below and clinic after hours number is [phone]      ------------------------------------------------------------------------    Thank you for coming to the [facility] Psychiatry [facility]    Lab Testing:  If you had lab testing today and your results are reassuring or normal they will be mailed to you or sent through makerist within 7 days. If the lab tests need quick action we will call you with the results. The phone number we will call with results is # [phone] (home) [phone] (work). If this is not the best number please call our clinic and change the number.    Medication Refills:  If you need any refills please call your pharmacy and they will contact us. Our fax number for refills is [phone]. Please allow three business for refill processing. If you need to  your refill at a new pharmacy, please contact the new pharmacy directly. The new pharmacy will help you get your medications transferred.     Scheduling:  If you have any concerns about today's visit or wish to schedule another appointment please call our office during normal business hours [phone] (8-5:00 M-F)    Contact Us:  Please call [phone] during business hours (8-5:00 M-F).  If after clinic hours, or on the weekend, please call  [phone].    Financial Assistance [phone]  [facility] Billing [phone]  Central Billing Office, ealth: [phone]  [place] Billing [phone]  Medical Records [phone]  [place] Patient Bill of Rights [email or web address]       MENTAL HEALTH CRISIS NUMBERS:  For a medical emergency please call  911 or go to the nearest ER.     [facility]  [facility]  [phone]    [facility]:   [facility] -[phone]   Crisis Residence [facility] -[phone]   Walk-In Counseling Center [facility] -[phone]   COPE 24/7 [facility] Mobile Team -[phone] (adults)/[phone] (child)  CHILD: [facility] needs assessment team - [phone]      [facility]:   [facility] - [phone]   Walk-in counseling [facility] - [phone]   Walk-in counseling [facility] - [phone]   Crisis Residence [facility] - [phone]  Urgent Care Adult Mental Ycgdlj-[phone] mobile unit/ 24/7 crisis line    National Crisis Numbers:   National Suicide Prevention Lifeline: 2-771-920-TALK (780-680-5120)  Poison Control Center - 1-101-078-8460  Bounce Exchange/resources for a list of additional resources (SOS)  Trans Lifeline a hotline for transgender people 9-323-774-2333  The Compa Project a hotline for LGBT youth 1-885.364.9753  Crisis Text Line: For any crisis 24/7   To: 398794  see www.crisistextline.org  - IF MAKING A CALL FEELS TOO HARD, send a text!       Again thank you for choosing [facility] Psychiatry Clinic and please let us know how we can best partner with you to improve you and your family's health.    You may be receiving a survey regarding this appointment. We would love to have your feedback, both positive and negative. The survey is done by an external company, so your answers are anonymous.

## 2021-06-11 ENCOUNTER — OFFICE VISIT (OUTPATIENT)
Dept: OBGYN | Facility: CLINIC | Age: 36
End: 2021-06-11
Attending: OBSTETRICS & GYNECOLOGY
Payer: COMMERCIAL

## 2021-06-11 VITALS
HEART RATE: 64 BPM | DIASTOLIC BLOOD PRESSURE: 72 MMHG | BODY MASS INDEX: 21.88 KG/M2 | HEIGHT: 63 IN | SYSTOLIC BLOOD PRESSURE: 109 MMHG | WEIGHT: 123.5 LBS

## 2021-06-11 DIAGNOSIS — Z34.00 NORMAL FIRST PREGNANCY CONFIRMED, ANTEPARTUM: Primary | ICD-10-CM

## 2021-06-11 DIAGNOSIS — O92.70 LACTATION DISORDER: ICD-10-CM

## 2021-06-11 LAB
ERYTHROCYTE [DISTWIDTH] IN BLOOD BY AUTOMATED COUNT: 12.2 % (ref 10–15)
GLUCOSE 1H P 50 G GLC PO SERPL-MCNC: 119 MG/DL (ref 60–129)
HCT VFR BLD AUTO: 32.3 % (ref 35–47)
HGB BLD-MCNC: 10.6 G/DL (ref 11.7–15.7)
MCH RBC QN AUTO: 30.5 PG (ref 26.5–33)
MCHC RBC AUTO-ENTMCNC: 32.8 G/DL (ref 31.5–36.5)
MCV RBC AUTO: 93 FL (ref 78–100)
PLATELET # BLD AUTO: 328 10E9/L (ref 150–450)
RBC # BLD AUTO: 3.48 10E12/L (ref 3.8–5.2)
WBC # BLD AUTO: 9.1 10E9/L (ref 4–11)

## 2021-06-11 PROCEDURE — G0463 HOSPITAL OUTPT CLINIC VISIT: HCPCS

## 2021-06-11 PROCEDURE — 90715 TDAP VACCINE 7 YRS/> IM: CPT

## 2021-06-11 PROCEDURE — 250N000011 HC RX IP 250 OP 636

## 2021-06-11 PROCEDURE — 99207 PR PRENATAL VISIT: CPT | Performed by: ADVANCED PRACTICE MIDWIFE

## 2021-06-11 PROCEDURE — 82306 VITAMIN D 25 HYDROXY: CPT | Performed by: ADVANCED PRACTICE MIDWIFE

## 2021-06-11 PROCEDURE — 86780 TREPONEMA PALLIDUM: CPT | Performed by: ADVANCED PRACTICE MIDWIFE

## 2021-06-11 PROCEDURE — 36415 COLL VENOUS BLD VENIPUNCTURE: CPT | Performed by: ADVANCED PRACTICE MIDWIFE

## 2021-06-11 PROCEDURE — 82950 GLUCOSE TEST: CPT | Performed by: ADVANCED PRACTICE MIDWIFE

## 2021-06-11 PROCEDURE — 90471 IMMUNIZATION ADMIN: CPT

## 2021-06-11 PROCEDURE — 85027 COMPLETE CBC AUTOMATED: CPT | Performed by: ADVANCED PRACTICE MIDWIFE

## 2021-06-11 RX ORDER — BREAST PUMP
1 EACH MISCELLANEOUS DAILY PRN
Qty: 1 EACH | Refills: 0 | Status: SHIPPED | OUTPATIENT
Start: 2021-06-11 | End: 2022-02-04

## 2021-06-11 ASSESSMENT — MIFFLIN-ST. JEOR: SCORE: 1229.19

## 2021-06-11 ASSESSMENT — PAIN SCALES - GENERAL: PAINLEVEL: NO PAIN (0)

## 2021-06-11 NOTE — PROGRESS NOTES
36 year old, , 28w4d,   The patient presents with the following concerns:   Education completed today includes breast feeding, [facility] hand out , contraception, counting movements, signs of pre-term labor, when to present to birthplace, post partum depression, GBS, getting enough iron and labor induction.  Birth preferences reviewed: Medicated  Labor support:     [place] Feeding plans :    Contraception planned:  Used nuvaring before. Used condoms.  ~ 2 years.   The following labs were ordered today:       GCT, CBC w platelets, Vitamin D and Anti-treponema  Water birth consent form was not given.    - birth at hospital. Would prefer epidural.  there.   - will consider .   - Adderall dosing followed by MD team.   - some trouble sleeping.  Up at 4:30-5 every morning.  Usually gets a nap in.   - Mom retired recently - will come for as long as she'll can stay (may be a year!)  - Starting to look at pediatrician teams  - is now considering CNM team at delivery because the emergency back up form anesthesia and OB teams are always available in the hospital.   - Is gardening, wears  gloves        Blood type:   ABO   Date Value Ref Range Status   2021 A  Final     RH(D)   Date Value Ref Range Status   2021 Pos  Final     Antibody Screen   Date Value Ref Range Status   2021 Neg  Final    Rhogam  was not given.  TDAP  wasgiven.      A/P:  Encounter Diagnoses   Name Primary?     Normal first pregnancy confirmed, antepartum Yes     Lactation disorder      Orders Placed This Encounter   Procedures     TDAP VACCINE (Adacel, Boostrix)  [[id number]]     Glucose tolerance gest screen 1 hour [[id number]]     25- OH-Vitamin D     CBC with platelets     Treponema Abs w Reflex to RPR and Titer [[id number]]     Orders Placed This Encounter   Medications     Misc. Devices (BREAST PUMP) MISC     Si each daily as needed (Breastfeeding)     Dispense:  1 each     Refill:  0     Continue scheduled  prenatal care  DRISS FloresM

## 2021-06-11 NOTE — LETTER
2021       RE: Arlet Segura  [address]  [place] [facility] [place] [address]     Dear Colleague,    Thank you for referring your patient, Arlet Segura, to the [facility] WOMEN'S CLINIC [place] at [facility]. Please see a copy of my visit note below.       36 year old, , 28w4d,   The patient presents with the following concerns:   Education completed today includes breast feeding, [facility] hand out , contraception, counting movements, signs of pre-term labor, when to present to birthplace, post partum depression, GBS, getting enough iron and labor induction.  Birth preferences reviewed: Medicated  Labor support:      Feeding plans :    Contraception planned:  Used nuvaring before. Used condoms.  ~ 2 years.   The following labs were ordered today:       GCT, CBC w platelets, Vitamin D and Anti-treponema  Water birth consent form was not given.    - birth at hospital. Would prefer epidural.  there.   - will consider .   - Adderall dosing followed by MD team.   - some trouble sleeping.  Up at 4:30-5 every morning.  Usually gets a nap in.   - Mom retired recently - will come for as long as she'll can stay (may be a year!)  - Starting to look at pediatrician teams  - is now considering CNM team at delivery because the emergency back up form anesthesia and OB teams are always available in the hospital.   - Is gardening, wears  gloves        Blood type:   ABO   Date Value Ref Range Status   2021 A  Final     RH(D)   Date Value Ref Range Status   2021 Pos  Final     Antibody Screen   Date Value Ref Range Status   2021 Neg  Final    Rhogam  was not given.  TDAP  wasgiven.      A/P:  Encounter Diagnoses   Name Primary?     Normal first pregnancy confirmed, antepartum Yes     Lactation disorder      Orders Placed This Encounter   Procedures     TDAP VACCINE (Adacel, Boostrix)  [[id number]]     Glucose tolerance  gest screen 1 hour [[id number]]     25- OH-Vitamin D     CBC with platelets     Treponema Abs w Reflex to RPR and Titer [[id number]]     Orders Placed This Encounter   Medications     Misc. Devices (BREAST PUMP) MISC     Si each daily as needed (Breastfeeding)     Dispense:  1 each     Refill:  0     Continue scheduled prenatal care  DRISS FloresM

## 2021-06-12 DIAGNOSIS — D50.9 IRON DEFICIENCY ANEMIA, UNSPECIFIED IRON DEFICIENCY ANEMIA TYPE: Primary | ICD-10-CM

## 2021-06-12 LAB — T PALLIDUM AB SER QL: NONREACTIVE

## 2021-06-12 RX ORDER — MULTIVIT WITH MINERALS/LUTEIN
250 TABLET ORAL DAILY
Qty: 60 TABLET | Refills: 1 | Status: ON HOLD | OUTPATIENT
Start: 2021-06-12 | End: 2021-09-06

## 2021-06-12 RX ORDER — LANOLIN ALCOHOL/MO/W.PET/CERES
1000 CREAM (GRAM) TOPICAL DAILY
Qty: 60 TABLET | Refills: 1 | Status: ON HOLD | OUTPATIENT
Start: 2021-06-12 | End: 2021-09-06

## 2021-06-12 RX ORDER — FERROUS SULFATE 325(65) MG
325 TABLET, DELAYED RELEASE (ENTERIC COATED) ORAL DAILY
Qty: 60 TABLET | Refills: 1 | Status: ON HOLD | OUTPATIENT
Start: 2021-06-12 | End: 2021-09-06

## 2021-06-14 LAB — DEPRECATED CALCIDIOL+CALCIFEROL SERPL-MC: 38 UG/L (ref 20–75)

## 2021-06-30 ENCOUNTER — OFFICE VISIT (OUTPATIENT)
Dept: OBGYN | Facility: CLINIC | Age: 36
End: 2021-06-30
Attending: ADVANCED PRACTICE MIDWIFE
Payer: COMMERCIAL

## 2021-06-30 VITALS
WEIGHT: 123.3 LBS | DIASTOLIC BLOOD PRESSURE: 69 MMHG | BODY MASS INDEX: 22.69 KG/M2 | HEIGHT: 62 IN | HEART RATE: 84 BPM | SYSTOLIC BLOOD PRESSURE: 108 MMHG

## 2021-06-30 DIAGNOSIS — Z34.93 NORMAL PREGNANCY, THIRD TRIMESTER: Primary | ICD-10-CM

## 2021-06-30 DIAGNOSIS — Z34.00 NORMAL FIRST PREGNANCY CONFIRMED, ANTEPARTUM: ICD-10-CM

## 2021-06-30 PROCEDURE — G0463 HOSPITAL OUTPT CLINIC VISIT: HCPCS

## 2021-06-30 PROCEDURE — 99207 PR PRENATAL VISIT: CPT | Performed by: ADVANCED PRACTICE MIDWIFE

## 2021-06-30 ASSESSMENT — MIFFLIN-ST. JEOR: SCORE: 1212.54

## 2021-06-30 NOTE — LETTER
"2021       RE: Arlet Segura  [address]  [place] [address]     Dear Colleague,    Thank you for referring your patient, Arlet Segura, to the [facility] WOMEN'S CLINIC [place] at [facility]. Please see a copy of my visit note below.    Subjective:      36 year old  at 31w2d presents for a routine prenatal appointment.    Denies vaginal bleeding or leakage of fluid.  Denies contractions. Reports regular fetal movement.       No HA, visual changes, RUQ or epigastric pain.   Patient concerns: No concerns today.   Feeling well overall.  Reviewed EOB labs with patient.    Objective:  Vitals:    21 1525   BP: 108/69   BP Location: Left arm   Patient Position: Chair   Pulse: 84   Weight: 55.9 kg (123 lb 4.8 oz)   Height: 1.575 m (5' 2\")   , see ob flowsheet  Assessment/Plan   No diagnosis found.  No orders of the defined types were placed in this encounter.    No orders of the defined types were placed in this encounter.    ABO   Date Value Ref Range Status   2021 A  Final     RH(D)   Date Value Ref Range Status   2021 Pos  Final     Antibody Screen   Date Value Ref Range Status   2021 Neg  Final       - Reviewed total weight gain, encouraged continued healthy diet and exercise.  Reviewed importance of daily fetal kick count and why/how to contact provider.    - Reviewed why/how to contact provider if headache/visual changes/RUQ or epigastric pain, decreased fetal movement, vaginal bleeding, leakage of fluid or more than 4 contractions in an hour.     Patient education/orders or handouts today:  PTL signs/symptoms and meet and greet with peds  Reviewed GBS screening at 35-36 wks.    Return to clinic in 2 weeks and prn if questions or concerns.     DRISS Hernandez CNM    "

## 2021-06-30 NOTE — PROGRESS NOTES
"Subjective:      36 year old  at 31w2d presents for a routine prenatal appointment.    Denies vaginal bleeding or leakage of fluid.  Denies contractions. Reports regular fetal movement.       No HA, visual changes, RUQ or epigastric pain.   Patient concerns: No concerns today.   Feeling well overall.  Reviewed EOB labs with patient.    Objective:  Vitals:    21 1525   BP: 108/69   BP Location: Left arm   Patient Position: Chair   Pulse: 84   Weight: 55.9 kg (123 lb 4.8 oz)   Height: 1.575 m (5' 2\")   , see ob flowsheet  Assessment/Plan   No diagnosis found.  No orders of the defined types were placed in this encounter.    No orders of the defined types were placed in this encounter.    ABO   Date Value Ref Range Status   2021 A  Final     RH(D)   Date Value Ref Range Status   2021 Pos  Final     Antibody Screen   Date Value Ref Range Status   2021 Neg  Final       - Reviewed total weight gain, encouraged continued healthy diet and exercise.  Reviewed importance of daily fetal kick count and why/how to contact provider.    - Reviewed why/how to contact provider if headache/visual changes/RUQ or epigastric pain, decreased fetal movement, vaginal bleeding, leakage of fluid or more than 4 contractions in an hour.     Patient education/orders or handouts today:  PTL signs/symptoms and meet and greet with peds  Reviewed GBS screening at 35-36 wks.    Return to clinic in 2 weeks and prn if questions or concerns.     DRISS Hernandez CNM    "

## 2021-07-12 ENCOUNTER — MYC MEDICAL ADVICE (OUTPATIENT)
Dept: OBGYN | Facility: CLINIC | Age: 36
End: 2021-07-12

## 2021-07-15 ENCOUNTER — OFFICE VISIT (OUTPATIENT)
Dept: OBGYN | Facility: CLINIC | Age: 36
End: 2021-07-15
Attending: ADVANCED PRACTICE MIDWIFE
Payer: COMMERCIAL

## 2021-07-15 VITALS
WEIGHT: 125.6 LBS | SYSTOLIC BLOOD PRESSURE: 118 MMHG | HEIGHT: 63 IN | BODY MASS INDEX: 22.25 KG/M2 | HEART RATE: 75 BPM | DIASTOLIC BLOOD PRESSURE: 74 MMHG

## 2021-07-15 DIAGNOSIS — Z34.93 NORMAL PREGNANCY, THIRD TRIMESTER: Primary | ICD-10-CM

## 2021-07-15 PROCEDURE — G0463 HOSPITAL OUTPT CLINIC VISIT: HCPCS

## 2021-07-15 PROCEDURE — 99207 PR PRENATAL VISIT: CPT | Performed by: ADVANCED PRACTICE MIDWIFE

## 2021-07-15 ASSESSMENT — MIFFLIN-ST. JEOR: SCORE: 1238.85

## 2021-07-15 NOTE — LETTER
"7/15/2021       RE: Arlet Segura  [address]  [place] [address]     Dear Colleague,    Thank you for referring your patient, Arlet Segura, to the [facility] WOMEN'S CLINIC [place] at [facility]. Please see a copy of my visit note below.    Subjective:      36 year old  at 33w3d presentst for a routine prenatal appointment.   Denies vaginal bleeding or leakage of fluid.  Denies contractions. Active fetal movement.      No HA, visual changes, RUQ or epigastric pain.   Patient concerns:     -Feeling well overall.  Feeling tired but previous insomnia has improved.    - Feels appetite has been normal, eating regularly.  Lower end of weight gain (12 lbs so far)      Objective:  Vitals:    07/15/21 1630   BP: 118/74   BP Location: Left arm   Patient Position: Chair   Pulse: 75   Weight: 57 kg (125 lb 9.6 oz)   Height: 1.6 m (5' 3\")   See ob flowsheet    Assessment/Plan:   Encounter Diagnosis   Name Primary?     Normal pregnancy, third trimester Yes     - Reviewed total weight gain, encouraged continued healthy diet and exercise.    - Reviewed importance of daily fetal kick count and why/how to contact provider.  - Reviewed why/how to contact provider if headache/visual changes/RUQ or epigastric pain, decreased fetal movement, vaginal bleeding, leakage of fluid or more than 4 contractions in an hour.  - Consider growth US if continues to measure S<D with low weight gain.   - Return to clinic in 2 weeks and prn if questions or concerns.     I, KATHARINE Phillips student, am serving as a scribe to document services personally performed by CNAZUL based on the provider's statements to me.\" - KATHARINE Phillips student  The encounter was performed by me and scribed by the student. The scribed note accurately reflects my personal services and decisions made by me. - DRISS Mtz CNM    "

## 2021-07-30 ENCOUNTER — OFFICE VISIT (OUTPATIENT)
Dept: OBGYN | Facility: CLINIC | Age: 36
End: 2021-07-30
Attending: INTERNAL MEDICINE
Payer: COMMERCIAL

## 2021-07-30 VITALS
SYSTOLIC BLOOD PRESSURE: 114 MMHG | HEART RATE: 71 BPM | DIASTOLIC BLOOD PRESSURE: 76 MMHG | HEIGHT: 63 IN | WEIGHT: 126 LBS | BODY MASS INDEX: 22.32 KG/M2

## 2021-07-30 DIAGNOSIS — Z34.00 NORMAL FIRST PREGNANCY CONFIRMED, ANTEPARTUM: Primary | ICD-10-CM

## 2021-07-30 DIAGNOSIS — Z98.890 H/O LEEP: ICD-10-CM

## 2021-07-30 DIAGNOSIS — O36.5930 POOR FETAL GROWTH AFFECTING MANAGEMENT OF MOTHER IN THIRD TRIMESTER, SINGLE OR UNSPECIFIED FETUS: ICD-10-CM

## 2021-07-30 PROCEDURE — 99207 PR PRENATAL VISIT: CPT | Performed by: REGISTERED NURSE

## 2021-07-30 PROCEDURE — G0463 HOSPITAL OUTPT CLINIC VISIT: HCPCS

## 2021-07-30 ASSESSMENT — PAIN SCALES - GENERAL: PAINLEVEL: NO PAIN (0)

## 2021-07-30 ASSESSMENT — MIFFLIN-ST. JEOR: SCORE: 1240.53

## 2021-07-30 NOTE — PROGRESS NOTES
"Subjective:      36 year old  at 35w4d presents for a routine prenatal appointment. No vaginal bleeding,  leakage of fluid, or change in vaginal discharge.  no contractions.  + fetal movement. Minor intermittent cramping. May have been dehydration per patient. Drank water and this improved.  No HA, visual changes, RUQ or epigastric pain.     Patient concerns:  Feeling well overall.   -insomnia: not taking any medication at this time. Has improved in the last couple of weeks and doesn't feel needs. Just wakes up. Not to void or due to discomfort. Around 0400 wakes up and difficulty with getting back to sleep. Has been going to bed earlier and this is also helping.   -hemoglobin level: has been taking iron supplements and increasing dietary iron.   -GBS at next visit  - jeanna will be labor support person    Objective:  Vitals:    21 0850   BP: 114/76   Pulse: 71   Weight: 57.2 kg (126 lb)   Height: 1.6 m (5' 2.99\")    See OB flowsheet    Assessment/Plan     Encounter Diagnoses   Name Primary?     Normal first pregnancy confirmed, antepartum Yes     H/O LEEP      Poor fetal growth affecting management of mother in third trimester, single or unspecified fetus      Orders Placed This Encounter   Procedures     US OB >14 Weeks Follow Up     Breast Pump Order for DME - ONLY FOR DME     Growth ultrasound ordered for size less than dates. Patient to schedule today or early next week due to work schedule.  Breast pump ordered  GBS screening: at next visit  Birth preferences reviewed: Medicated--planning epidural  Labor signs discussed. Reinforced daily fetal movement counts.  Reviewed why/how to contact provider if headache/visual changes/RUQ or epigastric pain, decreased fetal movement, vaginal bleeding, leakage of fluid.    Return to clinic in 1-2 weeks and prn if questions or concerns.     DRISS Fernandez CNM  "

## 2021-07-30 NOTE — LETTER
"2021       RE: Arlet Segura  [address]  [facility] [address]     Dear Colleague,    Thank you for referring your patient, Arlet Segura, to the [facility] WOMEN'S CLINIC [place] at [facility]. Please see a copy of my visit note below.    Subjective:      36 year old  at 35w4d presents for a routine prenatal appointment. No vaginal bleeding,  leakage of fluid, or change in vaginal discharge.  no contractions.  + fetal movement. Minor intermittent cramping. May have been dehydration per patient. Drank water and this improved.  No HA, visual changes, RUQ or epigastric pain.     Patient concerns:  Feeling well overall.   -insomnia: not taking any medication at this time. Has improved in the last couple of weeks and doesn't feel needs. Just wakes up. Not to void or due to discomfort. Around 0400 wakes up and difficulty with getting back to sleep. Has been going to bed earlier and this is also helping.   -hemoglobin level: has been taking iron supplements and increasing dietary iron.   -GBS at next visit  - jeanna will be labor support person    Objective:  Vitals:    21 0850   BP: 114/76   Pulse: 71   Weight: 57.2 kg (126 lb)   Height: 1.6 m (5' 2.99\")    See OB flowsheet    Assessment/Plan     Encounter Diagnoses   Name Primary?     Normal first pregnancy confirmed, antepartum Yes     H/O LEEP      Poor fetal growth affecting management of mother in third trimester, single or unspecified fetus      Orders Placed This Encounter   Procedures     US OB >14 Weeks Follow Up     Breast Pump Order for DME - ONLY FOR DME     Growth ultrasound ordered for size less than dates. Patient to schedule today or early next week due to work schedule.  Breast pump ordered  GBS screening: at next visit  Birth preferences reviewed: Medicated--planning epidural  Labor signs discussed. Reinforced daily fetal movement counts.  Reviewed why/how " to contact provider if headache/visual changes/RUQ or epigastric pain, decreased fetal movement, vaginal bleeding, leakage of fluid.    Return to clinic in 1-2 weeks and prn if questions or concerns.     DRISS Fernandez CNM

## 2021-08-02 ENCOUNTER — ANCILLARY PROCEDURE (OUTPATIENT)
Dept: ULTRASOUND IMAGING | Facility: CLINIC | Age: 36
End: 2021-08-02
Attending: REGISTERED NURSE
Payer: COMMERCIAL

## 2021-08-02 PROCEDURE — 76816 OB US FOLLOW-UP PER FETUS: CPT | Mod: 26 | Performed by: OBSTETRICS & GYNECOLOGY

## 2021-08-02 PROCEDURE — 76816 OB US FOLLOW-UP PER FETUS: CPT

## 2021-08-03 PROBLEM — O26.849: Status: ACTIVE | Noted: 2021-07-30

## 2021-08-09 NOTE — PROGRESS NOTES
Subjective:     36 year old  at 37w1d presents for a routine prenatal appointment.  No vaginal bleeding, leakage of fluid, or change in vaginal discharge.  No contractions, but reports slight intermittent cramping. Reports active fetal movement.       No HA, visual changes, RUQ or epigastric pain.   Patient concerns: Feeling well overall. She says that has been sleeping much better lately. No other concerns were noted.  - Reviewed GBS swab. Pt consents. GBS was collected.    Objective:  Vitals:    08/10/21 0846   BP: 113/73   Pulse: 74   Weight: 57.2 kg (126 lb 3.2 oz)    See OB flowsheet    Assessment/Plan     Encounter Diagnoses   Name Primary?     Normal first pregnancy confirmed, antepartum Yes     Uterine size date discrepancy pregnancy, unspecified trimester      Orders Placed This Encounter   Procedures     CBC with platelets     -GBS swab was collected.    Labor signs discussed.   Reinforced daily fetal movement counts.  Reviewed why/how to contact provider if headache/visual changes/RUQ or epigastric pain, decreased fetal movement, vaginal bleeding, leakage of fluid.    Return for visit in 1 week  Call prn if questions or concerns.     I, SUREKHA WALTERS, am serving as a scribe; to document services personally performed by  Kathi Chinchilla CNM  based on data collection and the provider's statements to me.     SUREKHA WALTERS, MS3    The encounter was performed by me, DRISS Rutledge CNM, and scribed by the [facility]. The scribed note accurately reflects my personal services and decisions made by me.      DRISS Rutledge CNM

## 2021-08-10 ENCOUNTER — OFFICE VISIT (OUTPATIENT)
Dept: OBGYN | Facility: CLINIC | Age: 36
End: 2021-08-10
Attending: MIDWIFE
Payer: COMMERCIAL

## 2021-08-10 VITALS
DIASTOLIC BLOOD PRESSURE: 73 MMHG | SYSTOLIC BLOOD PRESSURE: 113 MMHG | BODY MASS INDEX: 22.36 KG/M2 | HEART RATE: 74 BPM | WEIGHT: 126.2 LBS

## 2021-08-10 DIAGNOSIS — O26.849 UTERINE SIZE DATE DISCREPANCY PREGNANCY, UNSPECIFIED TRIMESTER: ICD-10-CM

## 2021-08-10 DIAGNOSIS — Z34.00 NORMAL FIRST PREGNANCY CONFIRMED, ANTEPARTUM: Primary | ICD-10-CM

## 2021-08-10 PROCEDURE — 99207 PR PRENATAL VISIT: CPT | Performed by: MIDWIFE

## 2021-08-10 PROCEDURE — 87653 STREP B DNA AMP PROBE: CPT | Performed by: MIDWIFE

## 2021-08-10 PROCEDURE — G0463 HOSPITAL OUTPT CLINIC VISIT: HCPCS

## 2021-08-10 NOTE — LETTER
8/10/2021       RE: Arlet Segura  [address]  [facility] [address]     Dear Colleague,    Thank you for referring your patient, Arlet Segura, to the [facility]OMEN[facility]S CLINIC [place] at [facility]. Please see a copy of my visit note below.    Subjective:     36 year old  at 37w1d presents for a routine prenatal appointment.  No vaginal bleeding, leakage of fluid, or change in vaginal discharge.  No contractions, but reports slight intermittent cramping. Reports active fetal movement.       No HA, visual changes, RUQ or epigastric pain.   Patient concerns: Feeling well overall. She says that has been sleeping much better lately. No other concerns were noted.  - Reviewed GBS swab. Pt consents. GBS was collected.    Objective:  Vitals:    08/10/21 0846   BP: 113/73   Pulse: 74   Weight: 57.2 kg (126 lb 3.2 oz)    See OB flowsheet    Assessment/Plan     Encounter Diagnoses   Name Primary?     Normal first pregnancy confirmed, antepartum Yes     Uterine size date discrepancy pregnancy, unspecified trimester      Orders Placed This Encounter   Procedures     CBC with platelets     -GBS swab was collected.    Labor signs discussed.   Reinforced daily fetal movement counts.  Reviewed why/how to contact provider if headache/visual changes/RUQ or epigastric pain, decreased fetal movement, vaginal bleeding, leakage of fluid.    Return for visit in 1 week  Call prn if questions or concerns.     ISUREKHA, am serving as a scribe; to document services personally performed by  Kathi Chinchilla CNM  based on data collection and the provider's statements to me.     SUREKHA WALTERS, MS3    The encounter was performed by me, DRISS Rutledge, STACEY, and scribed by the [facility]. The scribed note accurately reflects my personal services and decisions made by me.      DRISS Rutledge CNM

## 2021-08-11 LAB
GP B STREP DNA SPEC QL NAA+PROBE: NEGATIVE
PATIENT PENICILLIN, AMOXICILLIN, CEPHALOSPORINS ALLERGY: NO

## 2021-08-18 ENCOUNTER — VIRTUAL VISIT (OUTPATIENT)
Dept: PSYCHIATRY | Facility: CLINIC | Age: 36
End: 2021-08-18
Attending: PSYCHIATRY & NEUROLOGY
Payer: COMMERCIAL

## 2021-08-18 DIAGNOSIS — F90.0 ATTENTION DEFICIT HYPERACTIVITY DISORDER (ADHD), PREDOMINANTLY INATTENTIVE TYPE: Primary | ICD-10-CM

## 2021-08-18 DIAGNOSIS — F90.0 ADHD (ATTENTION DEFICIT HYPERACTIVITY DISORDER), INATTENTIVE TYPE: ICD-10-CM

## 2021-08-18 PROCEDURE — 99214 OFFICE O/P EST MOD 30 MIN: CPT | Mod: GT | Performed by: PSYCHIATRY & NEUROLOGY

## 2021-08-18 ASSESSMENT — PAIN SCALES - GENERAL: PAINLEVEL: NO PAIN (0)

## 2021-08-18 NOTE — PATIENT INSTRUCTIONS
**For crisis resources, please see the information at the end of this document**     Patient Education      Treatment Plan Today:     1) Medications-no changes    2) Follow-up appt with Dr Redd  Oct 12   3:00    3) Crisis numbers are below and clinic after hours number is [phone]      ------------------------------------------------------------------------    Thank you for coming to the [facility]    Lab Testing:  If you had lab testing today and your results are reassuring or normal they will be mailed to you or sent through Shoutlet within 7 days. If the lab tests need quick action we will call you with the results. The phone number we will call with results is # [phone] (home) [phone] (work). If this is not the best number please call our clinic and change the number.    Medication Refills:  If you need any refills please call your pharmacy and they will contact us. Our fax number for refills is [phone]. Please allow three business for refill processing. If you need to  your refill at a new pharmacy, please contact the new pharmacy directly. The new pharmacy will help you get your medications transferred.     Scheduling:  If you have any concerns about today's visit or wish to schedule another appointment please call our office during normal business hours [phone] (8-5:00 M-F)    Contact Us:  Please call [phone] during business hours (8-5:00 M-F).  If after clinic hours, or on the weekend, please call  [phone].    Financial Assistance [phone]  [facility] Billing [phone]  Central Billing Office, [facility]: [phone]  [facility] Billing [phone]  Medical Records [phone]  [facility] Patient Bill of Rights [email or web address]       MENTAL HEALTH CRISIS NUMBERS:  For a medical emergency please call  911 or go to the nearest ER.     [facility] ([facility]) [facility] ER   [phone]    [facility]:   [facility] -[phone]   Crisis Residence [facility] -[phone]   Walk-In Counseling Center [facility] -[phone]   COPE 24/7 [place] Mobile Team -[phone] (adults)/[phone] (child)  CHILD: [facility] needs assessment team - [phone]      [facility]:   [facility] - [phone]   Walk-in counseling [facility] - [phone]   Walk-in counseling [facility] - [phone]   Crisis Residence [facility] Residence - [phone]  Urgent Care Adult Mental Hpdhid-[phone] mobile unit/ 24/7 crisis line    National Crisis Numbers:   National Suicide Prevention Lifeline: 9-096-442-TALK (162-409-9979)  Poison Control Center - 1-447.848.2540  [employer]/resources for a list of additional resources (SOS)  Trans Lifeline a hotline for transgender people 1-986-465-9679  The [employer] a hotline for LGBT youth 1-404.838.8186  Crisis Text Line: For any crisis 24/7   To: 987062  see www.crisistextline.org  - IF MAKING A CALL FEELS TOO HARD, send a text!       Again thank you for choosing [facility] and please let us know how we can best partner with you to improve you and your family's health.    You may be receiving a survey regarding this appointment. We would love to have your feedback, both positive and negative. The survey is done by an external company, so your answers are anonymous.

## 2021-08-18 NOTE — PROGRESS NOTES
Video- Visit Details  Type of service:  video visit for medication management  Time of service:    Date:  08/18/2021    Video Start Time:  1:11 PM        Video End Time:  1:30 PM    Reason for video visit:  Patient unable to travel due to Covid-19  Originating Site (patient location):  [facility]   Location- Patient's home or workplace  Distant Site (provider location):  Remote location  Mode of Communication:  Video Conference via AmWell  Consent:  Patient has given verbal consent for video visit?: Yes        [facility]  Psychiatry Clinic  PSYCHIATRY PROGRESS NOTE     CARE TEAM:  PCP- Dorothy Holder.  Arlet Segura is a 36 year old   patient who uses the name Flora and pronouns she, her.    DIAGNOSES                                                                                 ADHD, inattentive type  Bulimia Nervousa, purging type  Fe deficiency    ASSESSMENT                                                                                Doing well overall, will deliver end of August. Previously discussed her preference for  returning to the higher Adderall dose sometime after delivery.  Use of Wellbutrin is not   being considered d/t bulimia and not being helpful in the past. B/P episodes limited,  may pursue tx within next few months, not impacting pregnancy and OB provider team  is aware. Will not add selective serotonin reuptake inhibitor now, has not ever used. We  are avoiding use of multiple meds at this time. Adderall use--no adverse effects, no HTN,   no evidence of misuse & has been taking a stimulant since ~2018. Most of the discussion  today was focused on use of stimulant during breast feeding.    Discussed these points:  -L3 safety risk [moderatel/likey safe:studies in breastfeeding have shown evidence for mild non-threatening   adverse effects OR there are no studies in breastfeeding (limited data)]  -Relative Infant Dose (2.46-7.25%), range less than the cut  off of 10% which we deem compatible   with breastfeeding. At normal therapeutic doses, amount in breastmilk is likely subclinical   -Can try to feed in the AM prior to taking dose. However, if this is too stressful it is ok to not pursue  -Monitor in infant (same as in adults): insomnia, irritability, dec appetite, reduced weight gain (all are  rare secondary to stimulant use) contact psych or medical provider if sxs occur  -Although rare, potential for stimulants to decrease prolactin and decreasemilk supply (rare)  -What is best for mom is best for baby (usually)    MNPMP: checked today, no indication of misuse     PLAN                                                                                           1) Meds  - continue Adderall 5mg tabs- 1 tab BID (refilled for 3 months today)    2) Other: None today  3) RTC:   Oct 12   3:00  4) CRISIS Numbers:   Provided routinely in AVS          PERTINENT BACKGROUND                                            [evals 2014, 2017]   This pt first entered care in this clinic in 2014 with Dr Epps, diagnosed with bulimia   nervosa (BN), ADHD and was taking bupropion SR 100mg every day. Up to 300mg did not   further improve things so Adderall XR was used up to 30mg. There was improvement in   attentional problems as well as purging episodes. Flora dates BN back to 2008. After a   period of steady treatment there was a gap MH for 2 years from [id number]. Upon returning   to this clinic in early 2017, the pt entered care with DRISS Tong and Dr Grey   for eating disorder treatment. Other med hx: by early 2018 Prozac was added, dosed up to   60mg with no improvement, mood improved once completed graduate school. Switched to   IR d/t sleep problems on XR.      Psych pertinent item history includes [no critical items]    INTERIM HISTORY                                                                         Since the last visit:   -due August 30th, pregnancy going  well  -taking 12 weeks parental leave  -no changes in med or sxs to date  -B/P 1x every 1-2 weeks which is down per report last visit  -work is going well  -comfortable with Adderall 5mg BID (down from 15mg BID), does prefer higher dose  -discussed breast-feeding with stimulant  -stimulant helps attn/focus, task completion and tracking environment when driving    Recent Symptoms:  No depressed mood, anhedonia, insomnia, racing thoughts,   cognitive and eating disorder sxs as described above.  Adverse Effects:  none  Pertinent Negatives:  No suicidal or violent ideation, psychosis and adrian  Recent Substance Use:  none reported    SOCIAL and FAMILY HISTORY                          [per pt report]            Family Hx- maternal aunt w/bipolar disorder, sister w/depression, HTN in father and   brother, DM in sister and thyroid dz in mother    Social Hx- Originally from [place], [place] and moved to [place] in 2014 for graduate school.   School performance has been without difficulty or delay. Good social support with friends   and family.  Working in [employer] here at [employer]. Lives in house with  Alex   and her dog 'Hutch'. Likes to play soccer, garden, run and ski.    MEDICAL HISTORY  and ALLERGY     ALLERGIES: Patient has no known allergies.  Avoid use of Wellbutrin (d/t BN)     Patient Active Problem List   Diagnosis     ADHD (attention deficit hyperactivity disorder), inattentive type     Hx of bulimia nervosa     Raynaud's disease without gangrene     H/O LEEP     Normal first pregnancy confirmed, antepartum     Change in mole     Left ovarian cyst     Uterine size date discrepancy pregnancy, unspecified trimester     MEDICAL REVIEW OF SYSTEMS                                                               A comprehensive review of systems was performed and is negative other than noted in the HPI.  CURRENT MEDS       Current Outpatient Medications   Medication Sig Dispense Refill     amphetamine-dextroamphetamine  (ADDERALL) 5 MG tablet Take 1 tablet (5 mg) by mouth 2 times daily 60 tablet 0     cyanocobalamin (VITAMIN B-12) 1000 MCG tablet Take 1 tablet (1,000 mcg) by mouth daily (Patient not taking: Reported on 6/30/2021) 60 tablet 1     Ferrous Bisglycinate Chelate 15 MG TABS Taking 27 mg twice daily (Patient not taking: Reported on 7/15/2021)       ferrous sulfate (FE TABS) 325 (65 Fe) MG EC tablet Take 1 tablet (325 mg) by mouth daily (Patient not taking: Reported on 8/10/2021) 60 tablet 1     Misc. Devices (BREAST PUMP) MISC 1 each daily as needed (Breastfeeding) (Patient not taking: Reported on 8/10/2021) 1 each 0     Prenatal Vit-Fe Fumarate-FA (PRENATAL MULTIVITAMIN PLUS IRON) 27-0.8 MG TABS per tablet Take 1 tablet by mouth daily       vitamin C (ASCORBIC ACID) 250 MG tablet Take 1 tablet (250 mg) by mouth daily (Patient not taking: Reported on 6/30/2021) 60 tablet 1     VITALS                                                                                                  Pulse Readings from Last 3 Encounters:   08/10/21 74   07/30/21 71   07/15/21 75     BP Readings from Last 3 Encounters:   08/10/21 113/73   07/30/21 114/76   07/15/21 118/74     MENTAL STATUS EXAM                                                        Alertness: alert  and oriented  Appearance: well groomed  Behavior/Demeanor: cooperative, pleasant and calm, with good  eye contact   Speech: regular rate and rhythm  Language: no obvious problem  Psychomotor: normal or unremarkable  Mood: description consistent with euthymia  Affect: full range; was congruent to mood; was congruent to content  Thought Process/Associations: unremarkable  Thought Content:  Reports none;  Denies suicidal & violent ideation and delusions   Perception:  Reports none;  Denies hallucinations  Insight: good  Judgment: good  Cognition: (6) oriented: time, person, and place  attention span: intact  concentration: intact  recent memory: intact  remote memory: intact  fund of  knowledge: appropriate  Gait and Station: N/A (telehealth)    LABS and DATA     PHQ9 Today:  None today    PSYCHOTROPIC DRUG INTERACTIONS   none  MANAGEMENT:  N/A    RISK STATEMENT for SAFETY   Arlet Segura did not appear to be an imminent safety risk to self or others.    TREATMENT RISK STATEMENT:  The risks, benefits, alternatives and potential adverse   effects have been discussed and are understood by the pt. The pt understands the risks of   using street drugs or alcohol. There are no medical contraindications, the pt agrees to   treatment with the ability to do so. The pt knows to call the clinic for any problems or to   access emergency care if needed.  Medical and substance use concerns are documented   above.  Psychotropic drug interaction check was done, including changes made today.    ATTENDING PHYSICIAN:  Candida Redd MD

## 2021-08-18 NOTE — PROGRESS NOTES
"VIDEO VISIT  Arlet Segura is a 36 year old patient who is being evaluated via a billable video visit.      The patient has been notified of following:   \"This video visit will be conducted via a call between you and your physician/provider. We have found that certain health care needs can be provided without the need for an in-person physical exam. This service lets us provide the care you need with a video conversation. If a prescription is necessary we can send it directly to your pharmacy. If lab work is needed we can place an order for that and you can then stop by our lab to have the test done at a later time. Insurers are generally covering virtual visits as they would in-office visits so billing should not be different than normal.  If for some reason you do get billed incorrectly, you should contact the billing office to correct it and that number is in the AVS .    Video Conference to be completed via:  Carine    Patient has given verbal consent for video visit?:  Yes    Patient would prefer that any video invitations be sent by: Send to e-mail at: [email or web address]      How would patient like to obtain AVS?:  [facility]    AVS SmartPhrase [PsychAVS] has been placed in 'Patient Instructions':  Yes  "

## 2021-08-21 RX ORDER — DEXTROAMPHETAMINE SACCHARATE, AMPHETAMINE ASPARTATE, DEXTROAMPHETAMINE SULFATE AND AMPHETAMINE SULFATE 1.25; 1.25; 1.25; 1.25 MG/1; MG/1; MG/1; MG/1
TABLET ORAL
Qty: 60 TABLET | Refills: 0 | Status: ON HOLD | OUTPATIENT
Start: 2021-09-18 | End: 2021-09-06

## 2021-08-21 RX ORDER — DEXTROAMPHETAMINE SACCHARATE, AMPHETAMINE ASPARTATE, DEXTROAMPHETAMINE SULFATE AND AMPHETAMINE SULFATE 1.25; 1.25; 1.25; 1.25 MG/1; MG/1; MG/1; MG/1
5 TABLET ORAL 2 TIMES DAILY
Qty: 60 TABLET | Refills: 0 | Status: SHIPPED | OUTPATIENT
Start: 2021-08-21 | End: 2021-08-26

## 2021-08-21 RX ORDER — DEXTROAMPHETAMINE SACCHARATE, AMPHETAMINE ASPARTATE, DEXTROAMPHETAMINE SULFATE AND AMPHETAMINE SULFATE 1.25; 1.25; 1.25; 1.25 MG/1; MG/1; MG/1; MG/1
TABLET ORAL
Qty: 60 TABLET | Refills: 0 | Status: SHIPPED | OUTPATIENT
Start: 2021-10-18 | End: 2021-12-14

## 2021-08-23 ENCOUNTER — OFFICE VISIT (OUTPATIENT)
Dept: OBGYN | Facility: CLINIC | Age: 36
End: 2021-08-23
Attending: ADVANCED PRACTICE MIDWIFE
Payer: COMMERCIAL

## 2021-08-23 VITALS
WEIGHT: 128.9 LBS | HEIGHT: 63 IN | BODY MASS INDEX: 22.84 KG/M2 | DIASTOLIC BLOOD PRESSURE: 79 MMHG | HEART RATE: 67 BPM | SYSTOLIC BLOOD PRESSURE: 120 MMHG

## 2021-08-23 DIAGNOSIS — O26.849 UTERINE SIZE DATE DISCREPANCY PREGNANCY, UNSPECIFIED TRIMESTER: ICD-10-CM

## 2021-08-23 DIAGNOSIS — Z34.00 NORMAL FIRST PREGNANCY CONFIRMED, ANTEPARTUM: Primary | ICD-10-CM

## 2021-08-23 LAB
ERYTHROCYTE [DISTWIDTH] IN BLOOD BY AUTOMATED COUNT: 12.9 % (ref 10–15)
HCT VFR BLD AUTO: 35.6 % (ref 35–47)
HGB BLD-MCNC: 11.6 G/DL (ref 11.7–15.7)
MCH RBC QN AUTO: 29.4 PG (ref 26.5–33)
MCHC RBC AUTO-ENTMCNC: 32.6 G/DL (ref 31.5–36.5)
MCV RBC AUTO: 90 FL (ref 78–100)
PLATELET # BLD AUTO: 264 10E3/UL (ref 150–450)
RBC # BLD AUTO: 3.94 10E6/UL (ref 3.8–5.2)
WBC # BLD AUTO: 8.4 10E3/UL (ref 4–11)

## 2021-08-23 PROCEDURE — 85027 COMPLETE CBC AUTOMATED: CPT | Performed by: ADVANCED PRACTICE MIDWIFE

## 2021-08-23 PROCEDURE — G0463 HOSPITAL OUTPT CLINIC VISIT: HCPCS

## 2021-08-23 PROCEDURE — 36415 COLL VENOUS BLD VENIPUNCTURE: CPT | Performed by: ADVANCED PRACTICE MIDWIFE

## 2021-08-23 PROCEDURE — 99207 PR PRENATAL VISIT: CPT | Performed by: ADVANCED PRACTICE MIDWIFE

## 2021-08-23 ASSESSMENT — MIFFLIN-ST. JEOR: SCORE: 1253.66

## 2021-08-23 NOTE — LETTER
"2021       RE: Arlet Segura  [address]  [facility] [address]     Dear Colleague,    Thank you for referring your patient, Arlet Segura, to the [facility] WOMEN'S CLINIC [place] [facility]. Please see a copy of my visit note below.    Subjective:     36 year old  at 39w0d presents for routine prenatal visit.            no vaginal bleeding or leakage of fluid.  some contractions.  pos fetal movement.        No HA, visual changes, RUQ or epigastric pain.   Patient concerns:   Feeling well overall.  Objective:  Vitals:    21 1045   BP: 120/79   Pulse: 67   Weight: 58.5 kg (128 lb 14.4 oz)   Height: 1.6 m (5' 2.99\")    See OB flowsheet  Assessment/Plan     Encounter Diagnoses   Name Primary?     Normal first pregnancy confirmed, antepartum Yes     Uterine size date discrepancy pregnancy, unspecified trimester      Orders Placed This Encounter   Procedures     US OB Fetal Biophys Prf wo NonStrs Singls Sgl     No orders of the defined types were placed in this encounter.    Patient desires  Induction at 41 wks,  BPp ordered for next week  Would like CX exam and sweep next week.  - Reviewed why/how to contact provider if headache/visual changes/RUQ or epigastric pain, decreased fetal movement, vaginal bleeding, leakage of fluid or strong/regular contractions.   Patient education/orders or handouts today:  Sign/symptoms of labor, When to call for labor or other concerns and Induction of labor  Return to clinic in 1 week and prn if questions or concerns.   Dana Howard, APRN CNM      "

## 2021-08-23 NOTE — PROGRESS NOTES
"Subjective:     36 year old  at 39w0d presents for routine prenatal visit.            no vaginal bleeding or leakage of fluid.  some contractions.  pos fetal movement.        No HA, visual changes, RUQ or epigastric pain.   Patient concerns:   Feeling well overall.  Objective:  Vitals:    21 1045   BP: 120/79   Pulse: 67   Weight: 58.5 kg (128 lb 14.4 oz)   Height: 1.6 m (5' 2.99\")    See OB flowsheet  Assessment/Plan     Encounter Diagnoses   Name Primary?     Normal first pregnancy confirmed, antepartum Yes     Uterine size date discrepancy pregnancy, unspecified trimester      Orders Placed This Encounter   Procedures     US OB Fetal Biophys Prf wo NonStrs Singls Sgl     No orders of the defined types were placed in this encounter.    Patient desires  Induction at 41 wks,  BPp ordered for next week  Would like CX exam and sweep next week.  - Reviewed why/how to contact provider if headache/visual changes/RUQ or epigastric pain, decreased fetal movement, vaginal bleeding, leakage of fluid or strong/regular contractions.   Patient education/orders or handouts today:  Sign/symptoms of labor, When to call for labor or other concerns and Induction of labor  Return to clinic in 1 week and prn if questions or concerns.   Dnaa Howard, APRN CNM      "

## 2021-08-26 ENCOUNTER — MYC MEDICAL ADVICE (OUTPATIENT)
Dept: PSYCHIATRY | Facility: CLINIC | Age: 36
End: 2021-08-26

## 2021-08-26 DIAGNOSIS — F90.0 ADHD (ATTENTION DEFICIT HYPERACTIVITY DISORDER), INATTENTIVE TYPE: ICD-10-CM

## 2021-08-26 RX ORDER — DEXTROAMPHETAMINE SACCHARATE, AMPHETAMINE ASPARTATE, DEXTROAMPHETAMINE SULFATE AND AMPHETAMINE SULFATE 1.25; 1.25; 1.25; 1.25 MG/1; MG/1; MG/1; MG/1
5 TABLET ORAL 2 TIMES DAILY
Qty: 60 TABLET | Refills: 0 | Status: ON HOLD | OUTPATIENT
Start: 2021-08-26 | End: 2021-09-06

## 2021-08-26 NOTE — TELEPHONE ENCOUNTER
Per , amphetamine-dextroamphetamine (ADDERALL) 5 MG tablet last filled: 8/4 #60 (written 6/1), 7/6 #60, 5/27 #60. Pharmacy requires new order to fill prior to 9/4. Will route to provider for input. If approved, will call pharmacy and cancel script from 8/21.

## 2021-08-27 NOTE — TELEPHONE ENCOUNTER
- medication refilled by provider  - med tab updated to reflect this  - patient notified via [employer]  - placed call to pharmacy to ensure 8/21 script was cancelled. Pharmacist stated a new script was never needed, only verbal authorization to fill early. Per pharmacist - script from 8/21 OK to fill today, script sent 8/26 cancelled, future scripts for 9/18 and 10/18 kept on file.

## 2021-08-31 ENCOUNTER — OFFICE VISIT (OUTPATIENT)
Dept: OBGYN | Facility: CLINIC | Age: 36
End: 2021-08-31
Attending: ADVANCED PRACTICE MIDWIFE
Payer: COMMERCIAL

## 2021-08-31 ENCOUNTER — ANCILLARY PROCEDURE (OUTPATIENT)
Dept: ULTRASOUND IMAGING | Facility: CLINIC | Age: 36
End: 2021-08-31
Attending: ADVANCED PRACTICE MIDWIFE
Payer: COMMERCIAL

## 2021-08-31 VITALS
SYSTOLIC BLOOD PRESSURE: 125 MMHG | WEIGHT: 127.8 LBS | HEART RATE: 65 BPM | DIASTOLIC BLOOD PRESSURE: 82 MMHG | BODY MASS INDEX: 22.65 KG/M2

## 2021-08-31 DIAGNOSIS — Z98.890 H/O LEEP: ICD-10-CM

## 2021-08-31 DIAGNOSIS — Z34.00 NORMAL FIRST PREGNANCY CONFIRMED, ANTEPARTUM: ICD-10-CM

## 2021-08-31 DIAGNOSIS — F90.0 ADHD (ATTENTION DEFICIT HYPERACTIVITY DISORDER), INATTENTIVE TYPE: ICD-10-CM

## 2021-08-31 DIAGNOSIS — Z34.00 NORMAL FIRST PREGNANCY CONFIRMED, ANTEPARTUM: Primary | ICD-10-CM

## 2021-08-31 PROCEDURE — 76819 FETAL BIOPHYS PROFIL W/O NST: CPT

## 2021-08-31 PROCEDURE — 99207 PR PRENATAL VISIT: CPT | Performed by: ADVANCED PRACTICE MIDWIFE

## 2021-08-31 PROCEDURE — G0463 HOSPITAL OUTPT CLINIC VISIT: HCPCS | Mod: 25

## 2021-08-31 PROCEDURE — 76819 FETAL BIOPHYS PROFIL W/O NST: CPT | Mod: 26 | Performed by: OBSTETRICS & GYNECOLOGY

## 2021-08-31 NOTE — LETTER
2021       RE: Arlet Segura  [address]  [facility] [address]     Dear Colleague,    Thank you for referring your patient, Arlet Segura, to the [facility] [place] at [facility]. Please see a copy of my visit note below.    Subjective:     36 year old  at 40w1d presents for routine prenatal visit.      Here w  Duy. Would like to proceed w plan for IOL at 41 wks as discussed before, we have reviewed R/B/A      no vaginal bleeding or leakage of fluid.  no contractions.  pos fetal movement.        No HA, visual changes, RUQ or epigastric pain.   Patient concerns:   Feeling well overall.  Objective:  Vitals:    21 0951   BP: 125/82   Pulse: 65   Weight: 58 kg (127 lb 12.8 oz)    See OB flowsheet  Assessment/Plan     Encounter Diagnoses   Name Primary?     Normal first pregnancy confirmed, antepartum Yes     ADHD (attention deficit hyperactivity disorder), inattentive type      H/O LEEP      No orders of the defined types were placed in this encounter.    No orders of the defined types were placed in this encounter.    - Reviewed why/how to contact provider if headache/visual changes/RUQ or epigastric pain, decreased fetal movement, vaginal bleeding, leakage of fluid or strong/regular contractions.   Patient education/orders or handouts today:  Sign/symptoms of labor, When to call for labor or other concerns, Induction of labor and hein bulb placement on  at 1500 schedule.  Return to clinic in 1 week and prn if questions or concerns.   DRISS Webb CNM

## 2021-08-31 NOTE — PROGRESS NOTES
Subjective:     36 year old  at 40w1d presents for routine prenatal visit.      Here w  Duy. Would like to proceed w plan for IOL at 41 wks as discussed before, we have reviewed R/B/A      no vaginal bleeding or leakage of fluid.  no contractions.  pos fetal movement.        No HA, visual changes, RUQ or epigastric pain.   Patient concerns:   Feeling well overall.  Objective:  Vitals:    21 0951   BP: 125/82   Pulse: 65   Weight: 58 kg (127 lb 12.8 oz)    See OB flowsheet  Assessment/Plan     Encounter Diagnoses   Name Primary?     Normal first pregnancy confirmed, antepartum Yes     ADHD (attention deficit hyperactivity disorder), inattentive type      H/O LEEP      No orders of the defined types were placed in this encounter.    No orders of the defined types were placed in this encounter.    - Reviewed why/how to contact provider if headache/visual changes/RUQ or epigastric pain, decreased fetal movement, vaginal bleeding, leakage of fluid or strong/regular contractions.   Patient education/orders or handouts today:  Sign/symptoms of labor, When to call for labor or other concerns, Induction of labor and hein bulb placement on  at 1500 schedule.  Return to clinic in 1 week and prn if questions or concerns.   DRISS Webb CNM

## 2021-09-02 ENCOUNTER — MYC MEDICAL ADVICE (OUTPATIENT)
Dept: OBGYN | Facility: CLINIC | Age: 36
End: 2021-09-02

## 2021-09-03 ENCOUNTER — MEDICAL CORRESPONDENCE (OUTPATIENT)
Dept: HEALTH INFORMATION MANAGEMENT | Facility: CLINIC | Age: 36
End: 2021-09-03

## 2021-09-03 ENCOUNTER — TRANSFERRED RECORDS (OUTPATIENT)
Dept: HEALTH INFORMATION MANAGEMENT | Facility: CLINIC | Age: 36
End: 2021-09-03

## 2021-09-03 ENCOUNTER — LAB (OUTPATIENT)
Dept: LAB | Facility: CLINIC | Age: 36
End: 2021-09-03
Payer: COMMERCIAL

## 2021-09-03 DIAGNOSIS — Z34.00 NORMAL FIRST PREGNANCY CONFIRMED, ANTEPARTUM: ICD-10-CM

## 2021-09-03 LAB — SARS-COV-2 RNA RESP QL NAA+PROBE: NEGATIVE

## 2021-09-03 PROCEDURE — U0003 INFECTIOUS AGENT DETECTION BY NUCLEIC ACID (DNA OR RNA); SEVERE ACUTE RESPIRATORY SYNDROME CORONAVIRUS 2 (SARS-COV-2) (CORONAVIRUS DISEASE [COVID-19]), AMPLIFIED PROBE TECHNIQUE, MAKING USE OF HIGH THROUGHPUT TECHNOLOGIES AS DESCRIBED BY CMS-2020-01-R: HCPCS | Performed by: ADVANCED PRACTICE MIDWIFE

## 2021-09-05 ENCOUNTER — HOSPITAL ENCOUNTER (INPATIENT)
Facility: CLINIC | Age: 36
LOS: 1 days | Discharge: HOME HEALTH | End: 2021-09-06
Attending: MIDWIFE | Admitting: MIDWIFE
Payer: COMMERCIAL

## 2021-09-05 ENCOUNTER — TELEPHONE (OUTPATIENT)
Dept: OBGYN | Facility: CLINIC | Age: 36
End: 2021-09-05

## 2021-09-05 ENCOUNTER — ANESTHESIA EVENT (OUTPATIENT)
Dept: OBGYN | Facility: CLINIC | Age: 36
End: 2021-09-05
Payer: COMMERCIAL

## 2021-09-05 ENCOUNTER — ANESTHESIA (OUTPATIENT)
Dept: OBGYN | Facility: CLINIC | Age: 36
End: 2021-09-05
Payer: COMMERCIAL

## 2021-09-05 PROBLEM — Z36.89 ENCOUNTER FOR TRIAGE IN PREGNANT PATIENT: Status: ACTIVE | Noted: 2021-09-05

## 2021-09-05 LAB
ABO/RH(D): NORMAL
ANTIBODY SCREEN: NEGATIVE
BASOPHILS # BLD AUTO: 0.1 10E3/UL (ref 0–0.2)
BASOPHILS NFR BLD AUTO: 1 %
EOSINOPHIL # BLD AUTO: 0.1 10E3/UL (ref 0–0.7)
EOSINOPHIL NFR BLD AUTO: 2 %
ERYTHROCYTE [DISTWIDTH] IN BLOOD BY AUTOMATED COUNT: 13.1 % (ref 10–15)
HCT VFR BLD AUTO: 37.6 % (ref 35–47)
HGB BLD-MCNC: 12.4 G/DL (ref 11.7–15.7)
IMM GRANULOCYTES # BLD: 0.1 10E3/UL
IMM GRANULOCYTES NFR BLD: 1 %
LYMPHOCYTES # BLD AUTO: 1.6 10E3/UL (ref 0.8–5.3)
LYMPHOCYTES NFR BLD AUTO: 20 %
MCH RBC QN AUTO: 29.1 PG (ref 26.5–33)
MCHC RBC AUTO-ENTMCNC: 33 G/DL (ref 31.5–36.5)
MCV RBC AUTO: 88 FL (ref 78–100)
MONOCYTES # BLD AUTO: 0.7 10E3/UL (ref 0–1.3)
MONOCYTES NFR BLD AUTO: 8 %
NEUTROPHILS # BLD AUTO: 5.7 10E3/UL (ref 1.6–8.3)
NEUTROPHILS NFR BLD AUTO: 68 %
NRBC # BLD AUTO: 0 10E3/UL
NRBC BLD AUTO-RTO: 0 /100
PLATELET # BLD AUTO: 277 10E3/UL (ref 150–450)
RBC # BLD AUTO: 4.26 10E6/UL (ref 3.8–5.2)
RUPTURE OF FETAL MEMBRANES BY ROM PLUS: NEGATIVE
SPECIMEN EXPIRATION DATE: NORMAL
T PALLIDUM AB SER QL: NONREACTIVE
WBC # BLD AUTO: 8.2 10E3/UL (ref 4–11)

## 2021-09-05 PROCEDURE — G0463 HOSPITAL OUTPT CLINIC VISIT: HCPCS

## 2021-09-05 PROCEDURE — 250N000013 HC RX MED GY IP 250 OP 250 PS 637: Performed by: MIDWIFE

## 2021-09-05 PROCEDURE — 85025 COMPLETE CBC W/AUTO DIFF WBC: CPT | Performed by: MIDWIFE

## 2021-09-05 PROCEDURE — 250N000009 HC RX 250

## 2021-09-05 PROCEDURE — 3E0R3BZ INTRODUCTION OF ANESTHETIC AGENT INTO SPINAL CANAL, PERCUTANEOUS APPROACH: ICD-10-PCS | Performed by: ANESTHESIOLOGY

## 2021-09-05 PROCEDURE — 250N000013 HC RX MED GY IP 250 OP 250 PS 637: Performed by: ADVANCED PRACTICE MIDWIFE

## 2021-09-05 PROCEDURE — 722N000001 HC LABOR CARE VAGINAL DELIVERY SINGLE

## 2021-09-05 PROCEDURE — 250N000009 HC RX 250: Performed by: MIDWIFE

## 2021-09-05 PROCEDURE — 86900 BLOOD TYPING SEROLOGIC ABO: CPT | Performed by: MIDWIFE

## 2021-09-05 PROCEDURE — 00HU33Z INSERTION OF INFUSION DEVICE INTO SPINAL CANAL, PERCUTANEOUS APPROACH: ICD-10-PCS | Performed by: ANESTHESIOLOGY

## 2021-09-05 PROCEDURE — 0HQ9XZZ REPAIR PERINEUM SKIN, EXTERNAL APPROACH: ICD-10-PCS | Performed by: ADVANCED PRACTICE MIDWIFE

## 2021-09-05 PROCEDURE — 370N000003 HC ANESTHESIA WARD SERVICE

## 2021-09-05 PROCEDURE — 120N000002 HC R&B MED SURG/OB UMMC

## 2021-09-05 PROCEDURE — 258N000003 HC RX IP 258 OP 636: Performed by: MIDWIFE

## 2021-09-05 PROCEDURE — 258N000003 HC RX IP 258 OP 636

## 2021-09-05 PROCEDURE — 86780 TREPONEMA PALLIDUM: CPT | Performed by: MIDWIFE

## 2021-09-05 PROCEDURE — 59400 OBSTETRICAL CARE: CPT | Performed by: ADVANCED PRACTICE MIDWIFE

## 2021-09-05 PROCEDURE — 84112 EVAL AMNIOTIC FLUID PROTEIN: CPT | Performed by: MIDWIFE

## 2021-09-05 PROCEDURE — 250N000011 HC RX IP 250 OP 636

## 2021-09-05 RX ORDER — NALOXONE HYDROCHLORIDE 0.4 MG/ML
0.2 INJECTION, SOLUTION INTRAMUSCULAR; INTRAVENOUS; SUBCUTANEOUS
Status: DISCONTINUED | OUTPATIENT
Start: 2021-09-05 | End: 2021-09-05

## 2021-09-05 RX ORDER — ONDANSETRON 4 MG/1
4 TABLET, ORALLY DISINTEGRATING ORAL EVERY 6 HOURS PRN
Status: DISCONTINUED | OUTPATIENT
Start: 2021-09-05 | End: 2021-09-05

## 2021-09-05 RX ORDER — FENTANYL/BUPIVACAINE/NS/PF 2-1250MCG
PLASTIC BAG, INJECTION (ML) INJECTION
Status: COMPLETED
Start: 2021-09-05 | End: 2021-09-05

## 2021-09-05 RX ORDER — CARBOPROST TROMETHAMINE 250 UG/ML
250 INJECTION, SOLUTION INTRAMUSCULAR
Status: DISCONTINUED | OUTPATIENT
Start: 2021-09-05 | End: 2021-09-05

## 2021-09-05 RX ORDER — METOCLOPRAMIDE HYDROCHLORIDE 5 MG/ML
10 INJECTION INTRAMUSCULAR; INTRAVENOUS EVERY 6 HOURS PRN
Status: DISCONTINUED | OUTPATIENT
Start: 2021-09-05 | End: 2021-09-05

## 2021-09-05 RX ORDER — OXYTOCIN/0.9 % SODIUM CHLORIDE 30/500 ML
PLASTIC BAG, INJECTION (ML) INTRAVENOUS
Status: DISCONTINUED
Start: 2021-09-05 | End: 2021-09-05 | Stop reason: WASHOUT

## 2021-09-05 RX ORDER — NALOXONE HYDROCHLORIDE 0.4 MG/ML
0.4 INJECTION, SOLUTION INTRAMUSCULAR; INTRAVENOUS; SUBCUTANEOUS
Status: DISCONTINUED | OUTPATIENT
Start: 2021-09-05 | End: 2021-09-05

## 2021-09-05 RX ORDER — SODIUM CHLORIDE, SODIUM LACTATE, POTASSIUM CHLORIDE, CALCIUM CHLORIDE 600; 310; 30; 20 MG/100ML; MG/100ML; MG/100ML; MG/100ML
INJECTION, SOLUTION INTRAVENOUS
Status: COMPLETED
Start: 2021-09-05 | End: 2021-09-05

## 2021-09-05 RX ORDER — MISOPROSTOL 200 UG/1
400 TABLET ORAL
Status: DISCONTINUED | OUTPATIENT
Start: 2021-09-05 | End: 2021-09-05

## 2021-09-05 RX ORDER — CARBOPROST TROMETHAMINE 250 UG/ML
250 INJECTION, SOLUTION INTRAMUSCULAR
Status: DISCONTINUED | OUTPATIENT
Start: 2021-09-05 | End: 2021-09-06 | Stop reason: HOSPADM

## 2021-09-05 RX ORDER — MODIFIED LANOLIN
OINTMENT (GRAM) TOPICAL
Status: DISCONTINUED | OUTPATIENT
Start: 2021-09-05 | End: 2021-09-06 | Stop reason: HOSPADM

## 2021-09-05 RX ORDER — PROCHLORPERAZINE MALEATE 10 MG
10 TABLET ORAL EVERY 6 HOURS PRN
Status: DISCONTINUED | OUTPATIENT
Start: 2021-09-05 | End: 2021-09-05

## 2021-09-05 RX ORDER — IBUPROFEN 800 MG/1
800 TABLET, FILM COATED ORAL EVERY 6 HOURS PRN
Status: DISCONTINUED | OUTPATIENT
Start: 2021-09-05 | End: 2021-09-06 | Stop reason: HOSPADM

## 2021-09-05 RX ORDER — KETOROLAC TROMETHAMINE 30 MG/ML
30 INJECTION, SOLUTION INTRAMUSCULAR; INTRAVENOUS
Status: DISCONTINUED | OUTPATIENT
Start: 2021-09-05 | End: 2021-09-05

## 2021-09-05 RX ORDER — OXYTOCIN/0.9 % SODIUM CHLORIDE 30/500 ML
340 PLASTIC BAG, INJECTION (ML) INTRAVENOUS CONTINUOUS PRN
Status: DISCONTINUED | OUTPATIENT
Start: 2021-09-05 | End: 2021-09-06 | Stop reason: HOSPADM

## 2021-09-05 RX ORDER — MISOPROSTOL 200 UG/1
TABLET ORAL
Status: DISCONTINUED
Start: 2021-09-05 | End: 2021-09-05 | Stop reason: HOSPADM

## 2021-09-05 RX ORDER — LIDOCAINE HYDROCHLORIDE 10 MG/ML
INJECTION, SOLUTION EPIDURAL; INFILTRATION; INTRACAUDAL; PERINEURAL
Status: COMPLETED
Start: 2021-09-05 | End: 2021-09-05

## 2021-09-05 RX ORDER — OXYTOCIN/0.9 % SODIUM CHLORIDE 30/500 ML
PLASTIC BAG, INJECTION (ML) INTRAVENOUS
Status: COMPLETED
Start: 2021-09-05 | End: 2021-09-05

## 2021-09-05 RX ORDER — DOCUSATE SODIUM 100 MG/1
100 CAPSULE, LIQUID FILLED ORAL DAILY
Status: DISCONTINUED | OUTPATIENT
Start: 2021-09-05 | End: 2021-09-06 | Stop reason: HOSPADM

## 2021-09-05 RX ORDER — MISOPROSTOL 200 UG/1
400 TABLET ORAL
Status: DISCONTINUED | OUTPATIENT
Start: 2021-09-05 | End: 2021-09-06 | Stop reason: HOSPADM

## 2021-09-05 RX ORDER — NALBUPHINE HYDROCHLORIDE 10 MG/ML
2.5-5 INJECTION, SOLUTION INTRAMUSCULAR; INTRAVENOUS; SUBCUTANEOUS EVERY 6 HOURS PRN
Status: DISCONTINUED | OUTPATIENT
Start: 2021-09-05 | End: 2021-09-05

## 2021-09-05 RX ORDER — DEXTROAMPHETAMINE SACCHARATE, AMPHETAMINE ASPARTATE, DEXTROAMPHETAMINE SULFATE AND AMPHETAMINE SULFATE 1.25; 1.25; 1.25; 1.25 MG/1; MG/1; MG/1; MG/1
5 TABLET ORAL 2 TIMES DAILY
Status: DISCONTINUED | OUTPATIENT
Start: 2021-09-05 | End: 2021-09-06 | Stop reason: HOSPADM

## 2021-09-05 RX ORDER — ACETAMINOPHEN 325 MG/1
650 TABLET ORAL EVERY 4 HOURS PRN
Status: DISCONTINUED | OUTPATIENT
Start: 2021-09-05 | End: 2021-09-06 | Stop reason: HOSPADM

## 2021-09-05 RX ORDER — OXYTOCIN/0.9 % SODIUM CHLORIDE 30/500 ML
340 PLASTIC BAG, INJECTION (ML) INTRAVENOUS CONTINUOUS PRN
Status: COMPLETED | OUTPATIENT
Start: 2021-09-05 | End: 2021-09-05

## 2021-09-05 RX ORDER — METOCLOPRAMIDE 10 MG/1
10 TABLET ORAL EVERY 6 HOURS PRN
Status: DISCONTINUED | OUTPATIENT
Start: 2021-09-05 | End: 2021-09-05

## 2021-09-05 RX ORDER — FENTANYL CITRATE-0.9 % NACL/PF 10 MCG/ML
100 PLASTIC BAG, INJECTION (ML) INTRAVENOUS EVERY 5 MIN PRN
Status: DISCONTINUED | OUTPATIENT
Start: 2021-09-05 | End: 2021-09-05

## 2021-09-05 RX ORDER — HYDROCORTISONE 2.5 %
CREAM (GRAM) TOPICAL 3 TIMES DAILY PRN
Status: DISCONTINUED | OUTPATIENT
Start: 2021-09-05 | End: 2021-09-06 | Stop reason: HOSPADM

## 2021-09-05 RX ORDER — IBUPROFEN 600 MG/1
600 TABLET, FILM COATED ORAL
Status: DISCONTINUED | OUTPATIENT
Start: 2021-09-05 | End: 2021-09-05

## 2021-09-05 RX ORDER — PROCHLORPERAZINE 25 MG
25 SUPPOSITORY, RECTAL RECTAL EVERY 12 HOURS PRN
Status: DISCONTINUED | OUTPATIENT
Start: 2021-09-05 | End: 2021-09-05

## 2021-09-05 RX ORDER — OXYTOCIN/0.9 % SODIUM CHLORIDE 30/500 ML
1-24 PLASTIC BAG, INJECTION (ML) INTRAVENOUS CONTINUOUS
Status: DISCONTINUED | OUTPATIENT
Start: 2021-09-05 | End: 2021-09-05

## 2021-09-05 RX ORDER — MISOPROSTOL 200 UG/1
800 TABLET ORAL
Status: DISCONTINUED | OUTPATIENT
Start: 2021-09-05 | End: 2021-09-06 | Stop reason: HOSPADM

## 2021-09-05 RX ORDER — OXYTOCIN 10 [USP'U]/ML
10 INJECTION, SOLUTION INTRAMUSCULAR; INTRAVENOUS
Status: DISCONTINUED | OUTPATIENT
Start: 2021-09-05 | End: 2021-09-05

## 2021-09-05 RX ORDER — MISOPROSTOL 200 UG/1
800 TABLET ORAL
Status: DISCONTINUED | OUTPATIENT
Start: 2021-09-05 | End: 2021-09-05

## 2021-09-05 RX ORDER — METHYLERGONOVINE MALEATE 0.2 MG/ML
200 INJECTION INTRAVENOUS
Status: DISCONTINUED | OUTPATIENT
Start: 2021-09-05 | End: 2021-09-06 | Stop reason: HOSPADM

## 2021-09-05 RX ORDER — ONDANSETRON 2 MG/ML
4 INJECTION INTRAMUSCULAR; INTRAVENOUS EVERY 6 HOURS PRN
Status: DISCONTINUED | OUTPATIENT
Start: 2021-09-05 | End: 2021-09-05

## 2021-09-05 RX ORDER — LIDOCAINE 40 MG/G
CREAM TOPICAL
Status: DISCONTINUED | OUTPATIENT
Start: 2021-09-05 | End: 2021-09-05

## 2021-09-05 RX ORDER — TRANEXAMIC ACID 10 MG/ML
1 INJECTION, SOLUTION INTRAVENOUS EVERY 30 MIN PRN
Status: DISCONTINUED | OUTPATIENT
Start: 2021-09-05 | End: 2021-09-05

## 2021-09-05 RX ORDER — TRANEXAMIC ACID 10 MG/ML
1 INJECTION, SOLUTION INTRAVENOUS EVERY 30 MIN PRN
Status: DISCONTINUED | OUTPATIENT
Start: 2021-09-05 | End: 2021-09-06 | Stop reason: HOSPADM

## 2021-09-05 RX ORDER — FENTANYL CITRATE 50 UG/ML
50-100 INJECTION, SOLUTION INTRAMUSCULAR; INTRAVENOUS
Status: DISCONTINUED | OUTPATIENT
Start: 2021-09-05 | End: 2021-09-05

## 2021-09-05 RX ORDER — OXYTOCIN 10 [USP'U]/ML
10 INJECTION, SOLUTION INTRAMUSCULAR; INTRAVENOUS
Status: DISCONTINUED | OUTPATIENT
Start: 2021-09-05 | End: 2021-09-06 | Stop reason: HOSPADM

## 2021-09-05 RX ORDER — OXYTOCIN/0.9 % SODIUM CHLORIDE 30/500 ML
100-340 PLASTIC BAG, INJECTION (ML) INTRAVENOUS CONTINUOUS PRN
Status: DISCONTINUED | OUTPATIENT
Start: 2021-09-05 | End: 2021-09-05

## 2021-09-05 RX ORDER — METHYLERGONOVINE MALEATE 0.2 MG/ML
200 INJECTION INTRAVENOUS
Status: DISCONTINUED | OUTPATIENT
Start: 2021-09-05 | End: 2021-09-05

## 2021-09-05 RX ADMIN — IBUPROFEN 800 MG: 800 TABLET, FILM COATED ORAL at 20:59

## 2021-09-05 RX ADMIN — Medication 2 MILLI-UNITS/MIN: at 10:30

## 2021-09-05 RX ADMIN — Medication: at 08:56

## 2021-09-05 RX ADMIN — SODIUM CHLORIDE, POTASSIUM CHLORIDE, SODIUM LACTATE AND CALCIUM CHLORIDE 1000 ML: 600; 310; 30; 20 INJECTION, SOLUTION INTRAVENOUS at 12:22

## 2021-09-05 RX ADMIN — Medication 340 ML/HR: at 14:08

## 2021-09-05 RX ADMIN — ACETAMINOPHEN 650 MG: 325 TABLET, FILM COATED ORAL at 18:39

## 2021-09-05 RX ADMIN — IBUPROFEN 600 MG: 600 TABLET ORAL at 14:56

## 2021-09-05 RX ADMIN — SODIUM CHLORIDE, POTASSIUM CHLORIDE, SODIUM LACTATE AND CALCIUM CHLORIDE 500 ML: 600; 310; 30; 20 INJECTION, SOLUTION INTRAVENOUS at 08:36

## 2021-09-05 RX ADMIN — LIDOCAINE HYDROCHLORIDE 20 ML: 10 INJECTION, SOLUTION EPIDURAL; INFILTRATION; INTRACAUDAL; PERINEURAL at 14:25

## 2021-09-05 NOTE — PLAN OF CARE
Vaginal Delivery Note   of viable Male with Moose Degroot CNM in attendance.  Nursery RN present.  Infant with spontaneous cry, to mother's abdomen, dried and stimulated.  APGAR at 1 minute:  9 and APGAR at 5 minutes:  9.  Placenta delivered with out complication, 30 mu/min., 1st laceration, with repair, henrietta cares provided.  Mother and baby in stable condition.

## 2021-09-05 NOTE — ANESTHESIA PREPROCEDURE EVALUATION
Anesthesia Pre-Procedure Evaluation    Patient: Arlet Segura   MRN: [id number] : 1985        Preoperative Diagnosis: * No pre-op diagnosis entered *   Procedure : * No procedures listed *     Past Medical History:   Diagnosis Date     Abnormal cervical Pap smear with positive HPV DNA test     last pap 2013 nl     ADHD      Breast disorder 2019    fibroadenoma     Bulimia since 2014     Cervical high risk HPV (human papillomavirus) test positive 2019    See problem list.       Past Surgical History:   Procedure Laterality Date     BIOPSY  2019     GYN SURGERY  2017    leep      No Known Allergies   Social History     Tobacco Use     Smoking status: Never Smoker     Smokeless tobacco: Never Used   Substance Use Topics     Alcohol use: Not Currently     Comment: Five drinks a week, average      Wt Readings from Last 1 Encounters:   21 58 kg (127 lb 12.8 oz)        Anesthesia Evaluation   Pt has had prior anesthetic. Type: OB Labor Epidural.        ROS/MED HX  ENT/Pulmonary:  - neg pulmonary ROS     Neurologic:  - neg neurologic ROS     Cardiovascular:  - neg cardiovascular ROS     METS/Exercise Tolerance:     Hematologic:  - neg hematologic  ROS     Musculoskeletal:       GI/Hepatic:  - neg GI/hepatic ROS     Renal/Genitourinary:       Endo:  - neg endo ROS     Psychiatric/Substance Use:  - neg psychiatric ROS     Infectious Disease:       Malignancy:       Other:            Physical Exam    Airway        Mallampati: II   TM distance: > 3 FB   Neck ROM: full   Mouth opening: > 3 cm    Respiratory Devices and Support         Dental  no notable dental history         Cardiovascular   cardiovascular exam normal          Pulmonary   pulmonary exam normal                OUTSIDE LABS:  CBC:   CBC RESULTS: Recent Labs   Lab Test 21  0557 21  1121 21  1422 21  1154   WBC 8.2 8.4 9.1 8.6   HGB 12.4 11.6* 10.6* 13.3   HCT 37.6 35.6 32.3* 38.8    264  328 441       Lab Results   Component Value Date    WBC 8.2 09/05/2021    WBC 8.4 08/23/2021    HGB 12.4 09/05/2021    HGB 11.6 (L) 08/23/2021    HCT 37.6 09/05/2021    HCT 35.6 08/23/2021     09/05/2021     08/23/2021     BMP:   Lab Results   Component Value Date     02/18/2014    POTASSIUM 4.0 02/18/2014    CHLORIDE 103 02/18/2014    CO2 26 02/18/2014    BUN 7 02/18/2014    CR 0.77 02/18/2014    GLC 85 02/18/2014     COAGS: No results found for: PTT, INR, FIBR  POC:   Lab Results   Component Value Date    HCG Negative 08/24/2017     HEPATIC: No results found for: ALBUMIN, PROTTOTAL, ALT, AST, GGT, ALKPHOS, BILITOTAL, BILIDIRECT, ANGELA  OTHER:   Lab Results   Component Value Date    A1C 5.1 02/01/2021    AMANDA 9.7 02/18/2014    TSH 5.08 (H) 04/12/2017    T4 0.87 04/12/2017    CRP <2.9 08/29/2017    SED 6 03/12/2015       Anesthesia Plan    ASA Status:  2      Anesthesia Type: Epidural.              Consents    Anesthesia Plan(s) and associated risks, benefits, and realistic alternatives discussed. Questions answered and patient/representative(s) expressed understanding.     - Discussed with:  Patient         Postoperative Care            Comments:    Risks of epidural explained including risk of postdural puncture headache, hypotension, nausea/vommiting, infection, nerve injury, failed block/need for catheter replacement, and rare events including serious nerve injury paralysis or high block leading to cardiopulmonary arrest.         neg OB ROS.       Dayan Lai MD

## 2021-09-05 NOTE — PROGRESS NOTES
Blood pressure 116/75, temperature 98  F (36.7  C), resp. rate 16, last menstrual period 11/23/2020, SpO2 96 %, not currently breastfeeding.  Patient Vitals for the past 24 hrs:   BP Temp Resp SpO2   09/05/21 1142 116/75 -- 16 96 %   09/05/21 1112 113/74 -- -- 97 %   09/05/21 1042 123/79 -- -- --   09/05/21 1012 117/72 -- -- --   09/05/21 0937 112/75 -- 20 100 %   09/05/21 0932 118/78 -- 16 99 %   09/05/21 0926 114/77 -- 16 100 %   09/05/21 0923 118/76 -- 16 100 %   09/05/21 0916 110/75 -- 16 100 %   09/05/21 0911 105/71 -- 16 100 %   09/05/21 0906 102/69 -- -- --   09/05/21 0904 105/73 -- -- --   09/05/21 0902 130/73 -- -- --   09/05/21 0900 119/86 -- -- 100 %   09/05/21 0858 108/77 -- -- --   09/05/21 0420 125/85 98  F (36.7  C) 20 --     General appearance: comfortable  Feeling some pain on left, used her epidural bolus  Pt noted to have variable and early decelerations before SROM and being on back for St cath. SROM clear fluid. St cath for 700cc  Noted to have two lates, resolving on LL. SVE anterior lip, not reducible with pushing attempt x2.  CONTACTIONS: every 2-3 minutes and moderate  Pitocin- 6 mu/min.,  Antibiotics- none  FETAL HEART TONES: continuous EFM- baseline 120 with moderate variability and period of minimal variability. Decelerations now resolved..  ROM: SROM clear  PELVIC EXAM:anterior lip/0      ASSESSMENT:  ==============  IUP @ 40w6d in active labor   Fetal Heart Rate Tracing category two  GBS- negative  Patient Active Problem List   Diagnosis     ADHD (attention deficit hyperactivity disorder), inattentive type     Hx of bulimia nervosa     Raynaud's disease without gangrene     H/O LEEP     Normal first pregnancy confirmed, antepartum     Change in mole     Left ovarian cyst     Uterine size date discrepancy pregnancy, unspecified trimester     Encounter for triage in pregnant patient     Labor and delivery, indication for care     PLAN:  ===========  Close observation  Frequent position  changes to facilitate labor with epidural anesthesia.  Reevaluate in 30 minutes  Danni Garnica, DRISS CNM

## 2021-09-05 NOTE — PROGRESS NOTES
Blood pressure 102/69, temperature 98  F (36.7  C), resp. rate 20, last menstrual period 11/23/2020, SpO2 100 %, not currently breastfeeding.  Patient Vitals for the past 24 hrs:   BP Temp Resp SpO2   09/05/21 0906 102/69 -- -- --   09/05/21 0904 105/73 -- -- --   09/05/21 0902 130/73 -- -- --   09/05/21 0900 119/86 -- -- 100 %   09/05/21 0420 125/85 98  F (36.7  C) 20 --     General appearance: comfortable after epidural  CONTACTIONS: q3-8, palpate moderate  Pitocin- none,  Antibiotics- none  FETAL HEART TONES: continuous EFM- baseline 110 with moderate variability and positive accelerations. No decelerations.  ROM: not ruptured  PELVIC EXAM:deferred      ASSESSMENT:  ==============  IUP @ 40w6d in active labor   Fetal Heart Rate Tracing category one  GBS- negative  Patient Active Problem List   Diagnosis     ADHD (attention deficit hyperactivity disorder), inattentive type     Hx of bulimia nervosa     Raynaud's disease without gangrene     H/O LEEP     Normal first pregnancy confirmed, antepartum     Change in mole     Left ovarian cyst     Uterine size date discrepancy pregnancy, unspecified trimester     Encounter for triage in pregnant patient     Labor and delivery, indication for care     PLAN:  ===========  Frequent position changes to facilitate labor with epidural anesthesia.  Reevaluate in 2 hours prn  Labor augmentation with Pitocin risks and benefits reviewed with pt if needed. Agreeable to plan.   Danni Garnica, DRISS IBARRA

## 2021-09-05 NOTE — ANESTHESIA PROCEDURE NOTES
Epidural catheter Procedure Note  Pre-Procedure   Staff -        Anesthesiologist:  Dayan Lai MD       Performed By: anesthesiologist       Procedure Start/Stop Times: 9/5/2021 8:50 AM and 9/5/2021 9:05 AM       Pre-Anesthestic Checklist: patient identified, IV checked, risks and benefits discussed, informed consent, monitors and equipment checked, pre-op evaluation, at physician/surgeon's request and post-op pain management  Timeout:       Correct Patient: Yes        Correct Procedure: Yes        Correct Site: Yes        Correct Position: Yes   Procedure Documentation  Procedure: epidural catheter       Patient Position: sitting       Patient Prep/Sterile Barriers: sterile gloves, mask, patient draped       Skin prep: Chloraprep       Local skin infiltrated with 3 mL of 1% lidocaine.        Insertion Site: L4-5. (midline approach).       Technique: LORT saline        MANNIE at 4.5 cm.       Needle Type: Touhy needle       Needle Gauge: 17.        Needle Length (Inches): 3.5        Catheter: 19 G.          5 cm epidural space.         Threaded 9.5 cm at skin.         # of attempts: 1 and  # of redirects:  0    Assessment/Narrative         Paresthesias: No.      Test dose of 3 mL at 08:57 CDT.         Test dose negative, 3 minutes after injection, for signs of intravascular, subdural, or intrathecal injection.       Insertion/Infusion Method: LORT saline       Aspiration negative for Heme or CSF via Epidural Catheter.    Medication(s) Administered   0.125% Bupivacaine + 2 mcg/mL Fentanyl via CADD (Epidural), 8 mL  Medication Administration Time: 9/5/2021 9:02 AM    Comments:  Patient history and physical reviewed. Risk, benefits, alternatives discussed including risk of postdural puncture headache, hypotension, nausea/vommiting, infection, nerve injury, failed block/need for catheter replacement, and rare events including serious nerve injury paralysis or high block leading to cardiopulmonary arrest.    Patient  consented to procedure.    Sterile prep (chloroprep), drape, gloves, mask    L3-4 interspace 1% lidocaine subcutaneously infiltrated  17 G Tuohy MANNIE with saline 4.5  Catheter threaded easily  No paresthesia or paresthesia resolved with needle withdrawal    Negative aspiration, no heme or CSF    Test dose 1.5 % lidocaine + 1:200,000 epinephrine 3 cc negative  Bolused 0.125% Bupivicaine 8 cc total from pump  Infusion 0.125% Bupivicaine + 2 mcg/cc Fentanyl started at 10 cc/hr PCEA 5 mls q 15min max 25 mls/hr    Patient comfortable with T10 block  Vitals stable  FHR stable

## 2021-09-05 NOTE — PROVIDER NOTIFICATION
09/05/21 0418   Provider Notification   Provider Name/Title Merissa IBARRA    Method of Notification Electronic Page   Request Evaluate in Person   Notification Reason Patient Arrived   please come and evaluate

## 2021-09-05 NOTE — PROGRESS NOTES
Tc from patient in early labor. Painful contractions q 10 minutes began 1-2 hours ago. Scheduled for outpatient hein bulb tomorrow and IOL Tuesday.   Recommend to come in when contractions are q5 for 1 hour or sooner.   No ROM, good FM, no bleeding.

## 2021-09-05 NOTE — PROGRESS NOTES
Blood pressure 125/85, temperature 98  F (36.7  C), resp. rate 20, last menstrual period 11/23/2020, not currently breastfeeding.  Patient Vitals for the past 24 hrs:   BP Temp Resp   09/05/21 0420 125/85 98  F (36.7  C) 20     General appearance: uncomfortable with contractions  Contractions becomingmore regular. Breathing through contr. Has been on ball. Now resting in bed  CONTACTIONS: every 3-5 minutes, moderate and cramping per palpation. toco off  Pitocin- none,  Antibiotics- none  FETAL HEART TONES: Intermittent auscultation- 115. No decreases heard. Reactive NST on admission with baseline 110.  ROM: not ruptured  PELVIC EXAM:5/80/-1 posterior  EFW 6.5 per leopolds    # Pain Assessment:   - Arlet is experiencing pain due to labor. Pain management was discussed with Arlet and her spouse and the plan was created in a collaborative fashion.  Arlet's response to the current recommendations: engaged  - comfort measures now. Planning epidural  Results for orders placed or performed during the hospital encounter of 09/05/21   Rupture of Fetal Membranes by ROM Plus     Status: Normal   Result Value Ref Range    Rupture of Fetal Membranes by ROM Plus Negative Negative, Invalid, Suggest Repeat    Narrative    It is recommended that the tests to detect rupture of the amniotic membranes should not be used without other clinical assessments to make clinical patient management decision.   CBC with platelets differential     Status: Abnormal    Narrative    The following orders were created for panel order CBC with platelets differential.  Procedure                               Abnormality         Status                     ---------                               -----------         ------                     CBC with platelets and d...[[id number]]  Abnormal            Final result                 Please view results for these tests on the individual orders.   CBC with platelets and differential     Status:  Abnormal   Result Value Ref Range    WBC Count 8.2 4.0 - 11.0 10e3/uL    RBC Count 4.26 3.80 - 5.20 10e6/uL    Hemoglobin 12.4 11.7 - 15.7 g/dL    Hematocrit 37.6 35.0 - 47.0 %    MCV 88 78 - 100 fL    MCH 29.1 26.5 - 33.0 pg    MCHC 33.0 31.5 - 36.5 g/dL    RDW 13.1 10.0 - 15.0 %    Platelet Count 277 150 - 450 10e3/uL    % Neutrophils 68 %    % Lymphocytes 20 %    % Monocytes 8 %    % Eosinophils 2 %    % Basophils 1 %    % Immature Granulocytes 1 %    NRBCs per 100 WBC 0 <1 /100    Absolute Neutrophils 5.7 1.6 - 8.3 10e3/uL    Absolute Lymphocytes 1.6 0.8 - 5.3 10e3/uL    Absolute Monocytes 0.7 0.0 - 1.3 10e3/uL    Absolute Eosinophils 0.1 0.0 - 0.7 10e3/uL    Absolute Basophils 0.1 0.0 - 0.2 10e3/uL    Absolute Immature Granulocytes 0.1 (H) <=0.0 10e3/uL    Absolute NRBCs 0.0 10e3/uL   Adult Type and Screen     Status: None   Result Value Ref Range    ABO/RH(D) A POS     Antibody Screen Negative Negative    SPECIMEN EXPIRATION DATE [id number]    ABO/Rh type and screen     Status: None    Narrative    The following orders were created for panel order ABO/Rh type and screen.  Procedure                               Abnormality         Status                     ---------                               -----------         ------                     Adult Type and Screen[[id number]]                            Final result                 Please view results for these tests on the individual orders.         ASSESSMENT:  ==============  IUP @ 40w6d in early labor   Cervical change  Fetal Heart Rate Tracing no decreases heard with intermittent auscultation  GBS- negative  Patient Active Problem List   Diagnosis     ADHD (attention deficit hyperactivity disorder), inattentive type     Hx of bulimia nervosa     Raynaud's disease without gangrene     H/O LEEP     Normal first pregnancy confirmed, antepartum     Change in mole     Left ovarian cyst     Uterine size date discrepancy pregnancy, unspecified trimester      Encounter for triage in pregnant patient     Labor and delivery, indication for care     PLAN:  ===========  Ambulation, hydration, position changes, birthing ball/sling and tub options to facilitate labor.  Pain medication options of Nitrous Oxide, Fentanyl IV and epidural reviewed with pt. Pt is interested in epidural prn  Labor augmentation with Pitocin risks and benefits reviewed with pt if needed. Agreeable to plan.   Danni Garnica, DRISS SCOTTM

## 2021-09-05 NOTE — PLAN OF CARE
"Labor Eval Admit Note  Arlet Segura  MRN: [id number]  Gestational Age: 40w6d      Arlet Sgeura presents for labor evaluation with , Alex.  States gustavo (\"states cramping\") since 0100. Reports small amt of dark red/brown blood in addition to mucous at home.     STACEY Craven notified of arrival and condition. Pt admitted, moved from triage to room 442. Oriented patient to surroundings. Call light within reach.       Plan:  Provider ok with intermittent auscultation. Will let pt labor and rest. Pt will want epidural eventually.     "

## 2021-09-05 NOTE — PROVIDER NOTIFICATION
09/05/21 0627   Provider Notification   Provider Name/Title STACEY Craven   Method of Notification Electronic Page   Request Evaluate - Remote   Notification Reason Other (Comment)   Text pg verbatim: Ok for pt to be monitored via intermittent auscultation? FHR category 1.

## 2021-09-05 NOTE — PROVIDER NOTIFICATION
09/05/21 1015   Provider Notification   Provider Name/Title Moose Degroot CNM   Method of Notification Electronic Page   Notification Reason Uterine Activity   D: Patient having contractions every 6 minutes, orders obtained for Pitocin augmentation. P: Continue to monitor.

## 2021-09-05 NOTE — L&D DELIVERY NOTE
Delivery Summary  DELIVERY NOTE:  Brief Labor Course: pt arrived in spontaneous early labor,  Augmented with pitocin after epidural and progressed steadily to complete. Pushed well with coaching.  Delivery Note:    viable male with double nuchal hands, spont cry and placed on moms abd. IV with  Pitocin opened after delivery of placenta. spont mojica placenta delivered intact. Bilateral labial tears repaired with 4-0 vicryl and first degree perineal laceration repaired with 3-0 vicryl. Right labia swollen but soft to palpation. QBL 194cc. Mom and baby stable.   Pt taking po Adderal 5mg BID for ADHD. Not recommended with breastfeeding, pt has had multiple conversations with her psychiatrist and they have decided together to continue on her dose. Does not have questions about risk/ benefits. Feels all questions answered by her psychiatrist.    IUP at 40 weeks gestation delivered on 2021.     delivery of a viable Male infant.  Weight : pending  Apgars of 9 at 1 minute and 9 at 5 minutes.  Labor was augmented.  Medications administered  in labor:  Pain Rx Epidural; Antibiotics No; Other   Perineum: 1st degree, Labial laceration bilateral, swollen right labia  Placenta-mechanism: spontaneous, intact,  IV oxytocin was given After delivery of baby  Quantitative Blood Loss was 192cc.  Complications of labor and delivery: Nuchal hands  Anticipated Discharge Date: 21  Birth attendants: DRISS Morgan CNM, CNM MRN# [id number]   Age: 36 year old YOB: 1985     ASSESSMENT & PLAN:        Jameel Segura [[id number]]    Labor Event Times    Labor onset date: 21 Onset time:  9:00 AM   Dilation complete date: 21 Complete time:  1:06 PM   Start pushing date/time: 2021 1306      Labor Length    1st Stage (hrs): 4 (min): 6   2nd Stage (hrs): 0 (min): 58   3rd Stage (hrs): 0 (min): 8      Labor Events     labor?:  No   steroids: Unknown  Labor Type: Spontaneous, Augmentation  Predominate monitoring during 1st stage: continuous electronic fetal monitoring     Antibiotics received during labor?: No     Rupture date/time: 21 1150   Rupture type: Spontaneous rupture of membranes occuring during spontaneous labor or augmentation  Fluid color: Clear     Augmentation: Oxytocin  Augmentation date/time: 21 1030   Indications for augmentation: Ineffective Contraction Pattern  1:1 continuous labor support provided by?: RN Labor partogram used?: no      Delivery/Placenta Date and Time    Delivery Date: 21 Delivery Time:  2:04 PM   Placenta Date/Time: 2021  2:12 PM  Oxytocin given at the time of delivery: after delivery of baby  Delivering clinician: Danni Degroot APRN CNM          Vaginal Counts     Initial count performed by 2 team members:  Two Team Members   Kiley Cheung        [place] Suture Needles Sponges (RETIRED) Instruments   Initial counts 2  5    Added to count  3     Relief counts       Final counts 2 3 5          Placed during labor Accounted for at the end of labor   FSE NA NA   IUPC NA NA   Cervadil NA NA              Final count performed by 2 team members:  Two Team Members   Kiley Degroot      Final count correct?: Yes     Apgars    Living status: Living   1 Minute 5 Minute 10 Minute 15 Minute 20 Minute   Skin color: 1  1       Heart rate: 2  2       Reflex irritability: 2  2       Muscle tone: 2  2       Respiratory effort: 2  2       Total: 9  9          Cord    Cord Complications: None               Cord Blood Disposition: Lab    Gases Sent?: No    Delayed cord clamping?: Yes    Cord Clamping Delay (seconds):  seconds    Stem cell collection?: No       Delivery (Maternal) (Provider to Complete) ([id number])    Episiotomy: None  Perineal lacerations: 1st Repaired?: Yes   Labial laceration: bilateral Repaired?: Yes   Repair suture: 3-0 Vicryl,  4-0 Vicryl  Number of repair packets: 3  Genital tract inspection done: Pos     Blood Loss  Mother: Flora Segura #[id number]   Start of Mother's Information    Delivery Blood Loss  09/05/21 0900 - 09/05/21 1455    Delivery QBL (mL) Hospital Encounter 194 mL    Total  194 mL         End of Mother's Information  Mother: Flora Segura #[id number]          Delivery - Provider to Complete ([id number])    Delivering clinician: Danni Degroot APRN CNM  CNM Care: Exclusive CNM care in labor  Attempted Delivery Types (Choose all that apply): Spontaneous Vaginal Delivery  Delivery Type (Choose the 1 that will go to the Birth History): Vaginal, Spontaneous                                 Placenta    Date/Time: 9/5/2021  2:12 PM  Removal: Spontaneous  Disposition: Hospital disposal           Anesthesia    Method: Epidural  Cervical dilation at placement: 4-7                Presentation and Position    Presentation: Vertex    Position: Left Occiput Anterior                 DRISS Simons CNM

## 2021-09-05 NOTE — H&P
"ADMIT NOTE  =================  40w6d    Arlet Segura is a 36 year old female with an Patient's last menstrual period was 2020. and Estimated Date of Delivery: Aug 30, 2021 is [facility] on 2021 at 4:41 AM in early labor.     HPI  ================  Pt reports contractions all night she has not slept became 5 min apart and stronger in past hour   Denies fever, cough, SOB or chest pain. Denies having contact with anyone who is Covid-19 positive.  Covid-19 testing 9/3 NEGATIVE   Contractions- every 4-5 minutes  Fetal movement- active  ROM- pending RomPlus.  Vaginal bleeding- pink  GBS- negative  FOB- is involved,   Weight gain- 128 - 113 lbs, Total weight gain- 15 lbs  Height- 5'2\"  BMI- 20  First prenatal visit at 10 weeks, Total visits- 12    PROBLEM LIST  =================  Patient Active Problem List    Diagnosis Date Noted     Encounter for triage in pregnant patient 2021     Priority: Medium     Uterine size date discrepancy pregnancy, unspecified trimester 2021     Priority: Medium     Growth U/S 8/3 AGA 27%, normal fluid       Left ovarian cyst 2021     Priority: Medium     Change in mole 2021     Priority: Medium     Pt has new mole on inside of left thigh. It is 3-4 mm in diameter, irregularly shaped, dark brown with black regions, and just recently appeared.        Normal first pregnancy confirmed, antepartum 2021     Priority: Medium     [facility] CNM pt  Partner's name: Alex  [ ] Entered on Epic list  [x ] NOB folder  [x ] Dating  [ ] First tri screen ordered; declined  [ ] QS/AFP ordered declined  [ ] Fetal anatomy US ordered  [x ] Rubella immune  [ x] Hep B immune  [x] Pap--NILM 2021   [ x] NO plan utox in labor   _____________________________________  [x ] EOB folder  [ ] PP Contraception plan: If tubal,consent date:  [x ] Labor plans: epidural, open to nitrous  [x ] Infant feeding plan--breast  [ ] FLU shot  [x ] TDAP given  [x ] GCT, " passed  ________________________________________  [X] GBS neg  [x ] OTC PP meds-will at home.         H/O LEEP 2019     Priority: Medium     17: LSIL Pap, + HR HPV types 16 and other.  17:[place] Bx CHRISTIANE 3  17: LEEP CHRISTIANE I, margins free of Dysplasia.  19: NIL Pap, + HR HPV (not 16 or 18) result. Plan [place].   19: [place] Bx CHRISTIANE I, ECC Neg for Dysplasia. Plan cotest in 1 year. (done at the [facility])  20 Reminder MyChart      3/17 - HIGH RISK ROUNDS - No need for cervical length US with only one LEEP without cone-per Dr. Hanks.       Raynaud's disease without gangrene 2017     Priority: Medium     Hx of bulimia nervosa 05/10/2017     Priority: Medium     21: Pt feels well supported with current mental health team, denies any current issues.        ADHD (attention deficit hyperactivity disorder), inattentive type 2017     Priority: Medium       HISTORIES  ============  No Known Allergies  Past Medical History:   Diagnosis Date     Abnormal cervical Pap smear with positive HPV DNA test     last pap 2013 nl     ADHD      Breast disorder 2019    fibroadenoma     Bulimia since 2014     Cervical high risk HPV (human papillomavirus) test positive 2019    See problem list.      Past Surgical History:   Procedure Laterality Date     BIOPSY  2019     GYN SURGERY  2017    leep   .  Family History   Problem Relation Age of Onset     Hypertension Father      Diabetes Sister         type 1 dx age 3     Thyroid Disease Mother         hypo     Psychotic Disorder Maternal Aunt         bi-polar     Psychotic Disorder Sister         depression     Hypertension Brother      Breast Cancer Maternal Grandmother 70     Social History     Tobacco Use     Smoking status: Never Smoker     Smokeless tobacco: Never Used   Substance Use Topics     Alcohol use: Not Currently     Comment: Five drinks a week, average     OB History    Para Term  AB Living   1 0  0 0 0 0   SAB TAB Ectopic Multiple Live Births   0 0 0 0 0      # Outcome Date GA Lbr Darren/2nd Weight Sex Delivery Anes PTL Lv   1 Current                 LABS:   ===========  Prenatal Labs:  Rhogam not indicated   Lab Results   Component Value Date    ABO A 02/01/2021    RH Pos 02/01/2021    AS Neg 02/01/2021    RUQIGG 25 02/01/2021    HEPBANG Nonreactive 02/01/2021    HGB 11.6 (L) 08/23/2021    HIAGAB Nonreactive 02/01/2021    GLU1 119 06/11/2021     Rubella immune  No results found for: GBS  Other labs:  COVID-19 PCR Results    COVID-19 PCR Results 9/3/21   SARS CoV2 PCR Negative      Comments are available for some flowsheets but are not being displayed.         COVID-19 Antibody Results, Testing for Immunity    COVID-19 Antibody Results, Testing for Immunity   No data to display.            No results found for this or any previous visit (from the past 24 hour(s)).    ROS  =========  Pt denies significant respiratory, cardiovacular, GI, or muscular/skeletalcomplaints.    See RN data base ROS.       PHYSICAL EXAM:  ===============  LMP 11/23/2020   General appearance: uncomfortable with contractions  GENERAL APPEARANCE: healthy, alert and no distress  RESP: lungs clear to auscultation - no rales, rhonchi or wheezes  CV: regular rates and rhythm, normal S1 S2, no S3 or S4 and no murmur,and no varicosities  ABDOMEN:  soft, nontender, no epigastric pain  SKIN: no suspicious lesions or rashes  NEURO: Denies headache, blurred vision, other vision changes  PSYCH: mentation appears normal. and affect normal/bright  Legs: Reflexes normal bilaterally     Abdomen: gravid, vertex fetus per Leopold's, non-tender between contractions.   Cephalic presentation confirmed by BSUS  EFW-  6 1/2 lbs.   CONTRACTIONS: every 4-5 minutes  FETAL HEART TONES: continuous EFM- baseline 120 with moderate variability and positive accelerations. No decelerations.  PELVIC EXAM: 2/ 80%/ Posterior/ soft/ -1   EL SCORE: 8  BLOODY SHOW: yes     ROM:pending RomPlus  NEG   FLUID: clear and pink tinged  ROMPLUS: pending    # Pain Assessment:   - Arlet is experiencing pain due to labor . Pain management was discussed and the plan was created in a collaborative fashion.  Arlet's response to the current recommendations: engaged  Planning epidural           ASSESSMENT:  ==============  G1 37 yo IUP @ 40w6d admitted in early labor   NST REACTIVE  Fetal Heart Tones - category one  GBS- negative  Covid- negative     Patient Active Problem List   Diagnosis     ADHD (attention deficit hyperactivity disorder), inattentive type     Hx of bulimia nervosa     Raynaud's disease without gangrene     H/O LEEP     Normal first pregnancy confirmed, antepartum     Change in mole     Left ovarian cyst     Uterine size date discrepancy pregnancy, unspecified trimester     Encounter for triage in pregnant patient       PLAN:  ===========  Admit - see IP orders  pain medication options of nitrous oxide, fentanyl IV and epidural anesthesia reviewed with pt. Pt is interested in epidural   MD consultant on call / available prn  Ambulation, hydration, position changes, birthing ball and tub options to facilitate labor reviewed with pt .  Anticipate   DRISS Aranda CNM

## 2021-09-06 VITALS
RESPIRATION RATE: 16 BRPM | HEART RATE: 61 BPM | SYSTOLIC BLOOD PRESSURE: 108 MMHG | DIASTOLIC BLOOD PRESSURE: 81 MMHG | OXYGEN SATURATION: 99 % | TEMPERATURE: 98.2 F

## 2021-09-06 LAB — HGB BLD-MCNC: 11.1 G/DL (ref 11.7–15.7)

## 2021-09-06 PROCEDURE — 250N000013 HC RX MED GY IP 250 OP 250 PS 637: Performed by: ADVANCED PRACTICE MIDWIFE

## 2021-09-06 PROCEDURE — 85018 HEMOGLOBIN: CPT | Performed by: ADVANCED PRACTICE MIDWIFE

## 2021-09-06 PROCEDURE — 36415 COLL VENOUS BLD VENIPUNCTURE: CPT | Performed by: ADVANCED PRACTICE MIDWIFE

## 2021-09-06 RX ORDER — IBUPROFEN 800 MG/1
800 TABLET, FILM COATED ORAL EVERY 6 HOURS PRN
Start: 2021-09-06 | End: 2022-02-04

## 2021-09-06 RX ORDER — ACETAMINOPHEN 325 MG/1
650 TABLET ORAL EVERY 4 HOURS PRN
Start: 2021-09-06 | End: 2022-02-04

## 2021-09-06 RX ADMIN — ACETAMINOPHEN 650 MG: 325 TABLET, FILM COATED ORAL at 09:22

## 2021-09-06 RX ADMIN — ACETAMINOPHEN 650 MG: 325 TABLET, FILM COATED ORAL at 00:44

## 2021-09-06 RX ADMIN — IBUPROFEN 800 MG: 800 TABLET, FILM COATED ORAL at 11:24

## 2021-09-06 RX ADMIN — ACETAMINOPHEN 650 MG: 325 TABLET, FILM COATED ORAL at 14:31

## 2021-09-06 RX ADMIN — IBUPROFEN 800 MG: 800 TABLET, FILM COATED ORAL at 17:59

## 2021-09-06 RX ADMIN — DOCUSATE SODIUM 100 MG: 100 CAPSULE, LIQUID FILLED ORAL at 09:23

## 2021-09-06 RX ADMIN — IBUPROFEN 800 MG: 800 TABLET, FILM COATED ORAL at 03:38

## 2021-09-06 NOTE — PLAN OF CARE
Pt's vital signs and postpartum assessments within normal limits, pt is up ad maddy, she is voiding spontaneously without difficulty and passing gas. Breastfeeding with minimal assistance in positioning and to achieve a deeper latch. Taking Tylenol and Ibuprofen for cramping and using heat pack. Education done on signs of a good latch, safe infant sleep and encouraged to burp infant after feeding. Positive bonding behavior observed with . Support person Alex present at bedside participating in cares. Continue with plan of care and assist with breastfeeding as needed.

## 2021-09-06 NOTE — PLAN OF CARE
VSS and postpartum assessments WDL. Ambulating with steady gait. Voiding without difficulty and tolerating regular diet. Bonding well with infant. Breastfeeding improving. Pain managed with tylenol and Ibuprofen. Discharge instruction reviewed with patient and she verbalized understanding. Education and care plan completed and Pt discharged to home.

## 2021-09-06 NOTE — PROVIDER NOTIFICATION
09/06/21 1748   Provider Notification   Provider Name/Title Reinisch    Method of Notification Electronic Page   Request Evaluate-Remote   Notification Reason Other     Would you please place discharge order  for patient? Her baby is discharging today.   Thank you

## 2021-09-06 NOTE — PLAN OF CARE
Patient arrived to [facility] unit via wheelchair at 1620,with belongings, accompanied by spouse/ significant other, with infant in arms. Received report from RACHEL Cao and checked bands. Unit and room orientation completd. Call light given; no concerns present at this time. Continue with plan of care.

## 2021-09-06 NOTE — DISCHARGE SUMMARY
[facility] Discharge Summary    Arlet Segura MRN# [id number]   Age: 36 year old YOB: 1985     Date of Admission:  2021  Date of Discharge::  2021  Admitting Physician:  Danni Degroot, DRISS IBARRA  Discharge Physician:  DRISS Aranda CNM, CNM, MS      Home clinic: [facility]          Admission Diagnoses:   Encounter for triage in pregnant patient [Z36.89]  Labor and delivery, indication for care [O75.9]   (normal spontaneous vaginal delivery) [O80]          Discharge Diagnosis:   Normal spontaneous vaginal delivery  Intrauterine pregnancy at 40w6 weeks gestation          Procedures:   Procedure(s): No additional procedures performed                  Medications Prior to Admission:     Medications Prior to Admission   Medication Sig Dispense Refill Last Dose     [START ON 10/18/2021] amphetamine-dextroamphetamine (ADDERALL) 5 MG tablet Take one tab (5mg) by mouth BID 60 tablet 0 2021 at Unknown time     Prenatal Vit-Fe Fumarate-FA (PRENATAL MULTIVITAMIN PLUS IRON) 27-0.8 MG TABS per tablet Take 1 tablet by mouth daily   2021 at Unknown time     Misc. Devices (BREAST PUMP) MISC 1 each daily as needed (Breastfeeding) (Patient not taking: Reported on 8/10/2021) 1 each 0      [DISCONTINUED] amphetamine-dextroamphetamine (ADDERALL) 5 MG tablet Take 1 tablet (5 mg) by mouth 2 times daily 60 tablet 0      [DISCONTINUED] amphetamine-dextroamphetamine (ADDERALL) 5 MG tablet Take one tab (5mg) by mouth BID 60 tablet 0      [DISCONTINUED] cyanocobalamin (VITAMIN B-12) 1000 MCG tablet Take 1 tablet (1,000 mcg) by mouth daily (Patient not taking: Reported on 2021) 60 tablet 1      [DISCONTINUED] Ferrous Bisglycinate Chelate 15 MG TABS Taking 27 mg twice daily (Patient not taking: Reported on 7/15/2021)        [DISCONTINUED] ferrous sulfate (FE TABS) 325 (65 Fe) MG EC tablet Take 1 tablet (325 mg) by mouth daily (Patient not taking:  Reported on 8/10/2021) 60 tablet 1      [DISCONTINUED] vitamin C (ASCORBIC ACID) 250 MG tablet Take 1 tablet (250 mg) by mouth daily (Patient not taking: Reported on 2021) 60 tablet 1              Discharge Medications:     Current Discharge Medication List      START taking these medications    Details   acetaminophen (TYLENOL) 325 MG tablet Take 2 tablets (650 mg) by mouth every 4 hours as needed for mild pain or fever (greater than or equal to 38  C /100.4  F (oral) or 38.5  C/ 101.4  F (core).)    Associated Diagnoses:  (normal spontaneous vaginal delivery)      ibuprofen (ADVIL/MOTRIN) 800 MG tablet Take 1 tablet (800 mg) by mouth every 6 hours as needed for other (cramping)    Associated Diagnoses:  (normal spontaneous vaginal delivery)         CONTINUE these medications which have NOT CHANGED    Details   amphetamine-dextroamphetamine (ADDERALL) 5 MG tablet Take one tab (5mg) by mouth BID  Qty: 60 tablet, Refills: 0    Associated Diagnoses: Attention deficit hyperactivity disorder (ADHD), predominantly inattentive type      Prenatal Vit-Fe Fumarate-FA (PRENATAL MULTIVITAMIN PLUS IRON) 27-0.8 MG TABS per tablet Take 1 tablet by mouth daily    Associated Diagnoses: Iron deficiency      Misc. Devices (BREAST PUMP) MISC 1 each daily as needed (Breastfeeding)  Qty: 1 each, Refills: 0    Associated Diagnoses: Lactation disorder         STOP taking these medications       cyanocobalamin (VITAMIN B-12) 1000 MCG tablet Comments:   Reason for Stopping:         Ferrous Bisglycinate Chelate 15 MG TABS Comments:   Reason for Stopping:         ferrous sulfate (FE TABS) 325 (65 Fe) MG EC tablet Comments:   Reason for Stopping:         vitamin C (ASCORBIC ACID) 250 MG tablet Comments:   Reason for Stopping:                     Consultations:   No consultations were requested during this admission          Brief History of Labor:     DELIVERY NOTE:  Brief Labor Course: pt arrived in spontaneous early labor,   "Augmented with pitocin after epidural and progressed steadily to complete. Pushed well with coaching.  Delivery Note:    viable male with double nuchal hands, spont cry and placed on moms abd. IV with  Pitocin opened after delivery of placenta. spont mojica placenta delivered intact. Bilateral labial tears repaired with 4-0 vicryl and first degree perineal laceration repaired with 3-0 vicryl. Right labia swollen but soft to palpation. QBL 194cc. Mom and baby stable.   Pt taking po Adderal 5mg BID for ADHD. Not recommended with breastfeeding, pt has had multiple conversations with her psychiatrist and they have decided together to continue on her dose. Does not have questions about risk/ benefits. Feels all questions answered by her psychiatrist.     IUP at 40 weeks gestation delivered on 2021.     delivery of a viable Male infant.  Weight : pending  Apgars of 9 at 1 minute and 9 at 5 minutes.  Labor was augmented.  Medications administered  in labor:  Pain Rx Epidural; Antibiotics No; Other   Perineum: 1st degree, Labial laceration bilateral, swollen right labia  Placenta-mechanism: spontaneous, intact,  IV oxytocin was given After delivery of baby  Quantitative Blood Loss was 192cc.  Complications of labor and delivery: Nuchal hands  Anticipated Discharge Date: 21  Birth attendants: DRISS Morgan CNM, CNM      Assessment Day of Discharge    Vital signs:  Temp: 98.2  F (36.8  C) Temp src: Oral BP: 108/81 Pulse: 61   Resp: 16   O2 Device: None (Room air)        Estimated body mass index is 22.65 kg/m  as calculated from the following:    Height as of 21: 1.6 m (5' 2.99\").    Weight as of 21: 58 kg (127 lb 12.8 oz).      Breasts: Soft, filling  Nipples: Intact, Non-tender  Abdomen: Soft, Non-tender    Diastatis Recti:  2 FB  Uterus: Fundus Firm, Non-tender, located  below the umbilicus   Lochia: Rubra, appropriate amount    Perineum:  " Well-approximated, healing well  Lower Extremities:  Edema Bilateral, Negative Nataly's Sign           Hospital Course:   The patient's hospital course was unremarkable.  On discharge, her pain was well controlled. Vaginal bleeding is similar to peak menstrual flow.  Voiding without difficulty.  Ambulating well and tolerating a normal diet.  No fever.  Breastfeeding well.  Infant is stable.  No bowel movement yet.*  She was discharged on post-partum day #1 couple is requesting discharge this evening .    Post-partum hemoglobin:   Hemoglobin   Date Value Ref Range Status   2021 11.1 (L) 11.7 - 15.7 g/dL Final   2021 10.6 (L) 11.7 - 15.7 g/dL Final        Rh:pos  Rubella status:  Plan for contraception:  Reviewed Chapter One of  [facility]  Family Book including warning signs of postpartum, activity level, avoiding IC for 6 weeks, Tub soaks BID, Kegels, abdominal exercises, breast care,  postpartum depression/anxiety. Pt verbalized understanding with teach back.          Discharge Instructions and Follow-Up:   Discharge diet: Regular   Discharge activity: Activity as tolerated   Discharge follow-up: Follow up with midwife in 6 weeks   Wound care: Drink plenty of fluids  Ice to area for comfort  Keep wound clean and dry           Discharge Disposition:   Discharged to home        DRISS Aranda CNM

## 2021-09-06 NOTE — LACTATION NOTE
This note was copied from a baby's chart.  Consult for: First time breastfeeding.    History:  Vaginal delivery @ 40w6d, AGA infant @ 6# 14.1 oz. birthweight, just under 24 hours at time of visit.  Maternal history of Buliemia in 2008, ADHD managed with Adderall 5 mg BID (Hale L3, limited data probably compatible for 5 to 60 mg daily, monitor for hyperactivity, agitation, insomnia, decreased appetite, adequate weight gain and tremor); Raynaud's disease (has only experienced on fingers rarely toes, never on nipples), fibroadenoma and ovarian cyst.     Breast exam of mom: Soft, symmetric with everted nipples bilaterally, abrasion at tip of left bleeding after he came off. Flora noted early tenderness & bilateral breast growth during pregnancy.      Oral exam of baby: WNL    Feeding assessment: Infant sleeping on arrival but woke and cueing when put skin to skin. Hands on assist demo techniques for deeper latch, mom return demo getting him on deep with sustained feeding, nutritive sucking after first couple minutes and denies pain, nipple fully rounded when he came off, although tiny blood clot or scab removed from abrasion that mom wiped away after feeding.    Education provided: Discussed positioning with good support, anatomy of breast and infant mouth, tips to get and maintain deeper latch, breast compressions prn to enhance milk transfer, nutritive vs. non-nutritive suck and how to hear swallows, benefits of skin to skin and feeding on cue @ least 8 times daily, supply and demand, benefits of and how to do breast massage & hand expression. Reviewed baby's second night, how to tell when satiated and if getting enough, what to expect in the coming days and preventing engorgement, breastfeeding log with when and who to call if concerns, [facility] pump cleaning handout and lactation resources for after discharge. Briefly reviewed how to manage/prevent vasospasm of nipple if it occurs due to history of Raynaud's    Feeding  Plan: Encourage frequent skin to skin, breastfeed on cue 8 to 12 times daily, hand express after until milk is in & feed back results. Follow up with outpatient lactation consultant within a week of discharge for support with first time breastfeeding.

## 2021-09-06 NOTE — PLAN OF CARE
Afebrile. VSS, except 1-4/10. LS clear on RA. Good PO intake, good appetite. Voiding independently, passing gas. Taking Taking tylenol and IBU for pain control. Bonding well with infant in room. Patient is breast feeding followed by hand expressing every 2-3 hours. Plan to continue to monitor. Hourly monitoring completed, will continue to monitor.

## 2021-09-06 NOTE — PLAN OF CARE
VSS and postpartum assessments WDL. Fundus firm, midline and at U/1. Scant lochia. Ambulating in room with steady gait. Voiding without difficulty and tolerating regular diet. Bonding well with infant. Breastfeeding on cue with minimal help. Pain managed with Tylenol and Ibuprofen.  Alex present and supportive.  Will continue with postpartum cares and education per plan of care.

## 2021-09-08 ENCOUNTER — TELEPHONE (OUTPATIENT)
Dept: OBGYN | Facility: CLINIC | Age: 36
End: 2021-09-08

## 2021-09-08 NOTE — TELEPHONE ENCOUNTER
----- Message from Chin Mckeon CNM sent at 9/7/2021  5:18 PM CDT -----  Regarding: Needs PP appt  Hi!    Can you call this patient and help her schedule a 2 week telephone postpartum check and a 6 week in person postpartum visit?    Thank you!  DRISS Caldwell CNM

## 2021-09-10 NOTE — TELEPHONE ENCOUNTER
DIAGNOSIS: (R) Lanny / Dr. Cristian Terrell @ [facility]   APPOINTMENT DATE: 9.13.21   NOTES STATUS DETAILS   OFFICE NOTE from referring provider Internal 9.8.21 [place]   OFFICE NOTE from other specialist N/A    DISCHARGE SUMMARY from hospital N/A    DISCHARGE REPORT from the ER N/A    OPERATIVE REPORT N/A    EMG report N/A    MEDICATION LIST Internal    MRI N/A    DEXA (osteoporosis/bone health) N/A    CT SCAN N/A    XRAYS (IMAGES & REPORTS) N/A

## 2021-09-13 ENCOUNTER — PRE VISIT (OUTPATIENT)
Dept: ORTHOPEDICS | Facility: CLINIC | Age: 36
End: 2021-09-13

## 2021-09-14 ENCOUNTER — ANCILLARY PROCEDURE (OUTPATIENT)
Dept: GENERAL RADIOLOGY | Facility: CLINIC | Age: 36
End: 2021-09-14
Attending: STUDENT IN AN ORGANIZED HEALTH CARE EDUCATION/TRAINING PROGRAM
Payer: COMMERCIAL

## 2021-09-14 ENCOUNTER — OFFICE VISIT (OUTPATIENT)
Dept: ORTHOPEDICS | Facility: CLINIC | Age: 36
End: 2021-09-14
Payer: COMMERCIAL

## 2021-09-14 DIAGNOSIS — L98.0 PYOGENIC GRANULOMA: Primary | ICD-10-CM

## 2021-09-14 DIAGNOSIS — L98.0 PYOGENIC GRANULOMA: ICD-10-CM

## 2021-09-14 PROCEDURE — 73140 X-RAY EXAM OF FINGER(S): CPT | Mod: RT | Performed by: RADIOLOGY

## 2021-09-14 PROCEDURE — 99204 OFFICE O/P NEW MOD 45 MIN: CPT | Performed by: STUDENT IN AN ORGANIZED HEALTH CARE EDUCATION/TRAINING PROGRAM

## 2021-09-14 NOTE — PROGRESS NOTES
Ortho Hand    HPI: 36-year-old female presenting 2 months after sustaining a cut to the dorsum of her right small finger.  She presents with a growth over her right dorsal PIP joint.  Noticed the growth getting larger over the last month.  She has had no drainage.  There are no signs of infection.  There is no pain.  She just completed a pregnancy and has time off.    ROS: Active, see HPI  Past medical history: No diabetes or high blood pressure  Past surgical history: No prior surgeries to the hands or wrists  Medications: Prenatal vitamins, ibuprofen  Allergies: None  Social history: Never smoker and no use of tobacco products  Family history: No bleeding or clotting problems, or issues with anesthesia    Examination:  Nonlabored breathing  Not distressed  Right small finger with mass over the dorsal aspect of her proximal interphalangeal joint consistent with pyogenic granuloma with no surrounding infection and no drainage  Right small finger PIP flexion limited to 80 degrees  Right small finger with full extension and even retropulsion    No XR    A/P: 36-year-old female right-hand-dominant presenting with a right small finger pyogenic granuloma affecting her small finger PIP motion    -Discussed risks and benefits of intervention which includes (not limited to): Infection, bleeding, scarring, pain, injury to tendon or nerve, numbness, neuroma formation, wound healing issues including skin graft loss, complex regional pain syndrome, and need for revision.  She understands all the risks and elects to proceed with surgery.  -We will plan for right small finger pyogenic granuloma excision with likely full-thickness skin graft from the ipsilateral forearm and splinting under MAC with local  -A total of 30 minutes was devoted to review of chart, direct face-to-face patient counseling and documentation during this encounter    Benjamin Harris MD, PhD    Answers for HPI/ROS submitted by the patient on  9/14/2021  General Symptoms: No  Skin Symptoms: No  HENT Symptoms: No  EYE SYMPTOMS: No  HEART SYMPTOMS: No  LUNG SYMPTOMS: No  INTESTINAL SYMPTOMS: No  URINARY SYMPTOMS: No  GYNECOLOGIC SYMPTOMS: No  BREAST SYMPTOMS: No  SKELETAL SYMPTOMS: No  BLOOD SYMPTOMS: No  NERVOUS SYSTEM SYMPTOMS: No  MENTAL HEALTH SYMPTOMS: No

## 2021-09-14 NOTE — LETTER
9/14/2021         RE: Arlet Segura  [address]  [facility] [address]        Dear Colleague,    Thank you for referring your patient, Arlet Segura, to the [facility] [place]. Please see a copy of my visit note below.    Ortho Hand    HPI: 36-year-old female presenting 2 months after sustaining a cut to the dorsum of her right small finger.  She presents with a growth over her right dorsal PIP joint.  Noticed the growth getting larger over the last month.  She has had no drainage.  There are no signs of infection.  There is no pain.  She just completed a pregnancy and has time off.    ROS: Active, see HPI  Past medical history: No diabetes or high blood pressure  Past surgical history: No prior surgeries to the hands or wrists  Medications: Prenatal vitamins, ibuprofen  Allergies: None  Social history: Never smoker and no use of tobacco products  Family history: No bleeding or clotting problems, or issues with anesthesia    Examination:  Nonlabored breathing  Not distressed  Right small finger with mass over the dorsal aspect of her proximal interphalangeal joint consistent with pyogenic granuloma with no surrounding infection and no drainage  Right small finger PIP flexion limited to 80 degrees  Right small finger with full extension and even retropulsion    No XR    A/P: 36-year-old female right-hand-dominant presenting with a right small finger pyogenic granuloma affecting her small finger PIP motion    -Discussed risks and benefits of intervention which includes (not limited to): Infection, bleeding, scarring, pain, injury to tendon or nerve, numbness, neuroma formation, wound healing issues including skin graft loss, complex regional pain syndrome, and need for revision.  She understands all the risks and elects to proceed with surgery.  -We will plan for right small finger pyogenic granuloma excision with likely full-thickness skin graft from the ipsilateral  forearm and splinting under MAC with local  -A total of 30 minutes was devoted to review of chart, direct face-to-face patient counseling and documentation during this encounter    Benjamin Harris MD, PhD    Answers for HPI/ROS submitted by the patient on 9/14/2021  General Symptoms: No  Skin Symptoms: No  HENT Symptoms: No  EYE SYMPTOMS: No  HEART SYMPTOMS: No  LUNG SYMPTOMS: No  INTESTINAL SYMPTOMS: No  URINARY SYMPTOMS: No  GYNECOLOGIC SYMPTOMS: No  BREAST SYMPTOMS: No  SKELETAL SYMPTOMS: No  BLOOD SYMPTOMS: No  NERVOUS SYSTEM SYMPTOMS: No  MENTAL HEALTH SYMPTOMS: No

## 2021-09-14 NOTE — NURSING NOTE
Pt is 37 yo female postpartum.  Lives close by.    Teaching Flowsheet   Relevant Diagnosis: Right small finger pyogenic granuloma  Teaching Topic: Right small finger pyogenic granuloma excision     Person(s) involved in teaching:   Patient     Motivation Level:  Asks Questions: Yes  Eager to Learn: Yes  Cooperative: Yes  Receptive (willing/able to accept information): Yes  Any cultural factors/Worship beliefs that may influence understanding or compliance? No  Comments: n/a     Patient demonstrates understanding of the following:  Reason for the appointment, diagnosis and treatment plan: Yes  Knowledge of proper use of medications and conditions for which they are ordered (with special attention to potential side effects or drug interactions): Yes  Which situations necessitate calling provider and whom to contact: Yes       Teaching Concerns Addressed:   Comments: n/a     Proper use and care of n/a (medical equip, care aids, etc.): Yes  Nutritional needs and diet plan: Yes  Pain management techniques: Yes  Wound Care: Yes  How and/when to access community resources: Yes     Instructional Materials Used/Given: pre-op packet and      Time spent with patient: 15 minutes.

## 2021-09-14 NOTE — NURSING NOTE
Reason For Visit:   Chief Complaint   Patient presents with     Consult For     Right small finger granuloma       Primary MD: Dorothy Holder    ?  No    Age: 36 year old    Occupation: Neurology research.  Currently working? No.  Work status?  Maternity leave.  Date of injury: 1.5 months ago  Type of injury: Small cut on her knuckle.  Date of surgery: NA  Type of surgery: NA.  Smoker: No  Request smoking cessation information: No      LMP 11/23/2020       Pain Assessment  Patient Currently in Pain: Yes  0-10 Pain Scale: 1  Primary Pain Location: Finger (Comment which one) (Right small finger)               QuickDASH Assessment  No flowsheet data found.       No Known Allergies    Morelia Bah, ATC

## 2021-09-16 ENCOUNTER — HOSPITAL ENCOUNTER (OUTPATIENT)
Facility: AMBULATORY SURGERY CENTER | Age: 36
End: 2021-09-16
Attending: STUDENT IN AN ORGANIZED HEALTH CARE EDUCATION/TRAINING PROGRAM
Payer: COMMERCIAL

## 2021-09-16 ENCOUNTER — TRANSCRIBE ORDERS (OUTPATIENT)
Dept: OTHER | Age: 36
End: 2021-09-16

## 2021-09-16 DIAGNOSIS — L98.0 PYOGENIC GRANULOMA: ICD-10-CM

## 2021-09-16 DIAGNOSIS — Z11.59 ENCOUNTER FOR SCREENING FOR OTHER VIRAL DISEASES: ICD-10-CM

## 2021-09-16 DIAGNOSIS — L98.0 PYOGENIC GRANULOMA: Primary | ICD-10-CM

## 2021-09-17 ENCOUNTER — TELEPHONE (OUTPATIENT)
Dept: ORTHOPEDICS | Facility: CLINIC | Age: 36
End: 2021-09-17

## 2021-09-17 RX ORDER — CEFAZOLIN SODIUM 2 G/50ML
2 SOLUTION INTRAVENOUS
Status: CANCELLED | OUTPATIENT
Start: 2021-09-23

## 2021-09-17 RX ORDER — CEFAZOLIN SODIUM 2 G/50ML
2 SOLUTION INTRAVENOUS SEE ADMIN INSTRUCTIONS
Status: CANCELLED | OUTPATIENT
Start: 2021-09-23

## 2021-09-17 NOTE — TELEPHONE ENCOUNTER
I contacted the patient via phone and spoke with the patient to confirm the scheduled dates and provide the following information:     Surgeon/surgery date/location:  Dr. Harris on 9/23 at [facility].  Arrival:   9:30 AM   Pre-op consult:   Completed  Pre-op physical with:   With surgeon morning of surgery.   COVID-19 test:   9/21.   Post-op:   10/5.    The surgery packet was provided via letter in the mail.

## 2021-09-21 ENCOUNTER — LAB (OUTPATIENT)
Dept: LAB | Facility: CLINIC | Age: 36
End: 2021-09-21
Attending: STUDENT IN AN ORGANIZED HEALTH CARE EDUCATION/TRAINING PROGRAM
Payer: COMMERCIAL

## 2021-09-21 ENCOUNTER — TELEPHONE (OUTPATIENT)
Dept: ORTHOPEDICS | Facility: CLINIC | Age: 36
End: 2021-09-21

## 2021-09-21 DIAGNOSIS — Z11.59 ENCOUNTER FOR SCREENING FOR OTHER VIRAL DISEASES: ICD-10-CM

## 2021-09-21 PROCEDURE — U0005 INFEC AGEN DETEC AMPLI PROBE: HCPCS

## 2021-09-21 PROCEDURE — U0003 INFECTIOUS AGENT DETECTION BY NUCLEIC ACID (DNA OR RNA); SEVERE ACUTE RESPIRATORY SYNDROME CORONAVIRUS 2 (SARS-COV-2) (CORONAVIRUS DISEASE [COVID-19]), AMPLIFIED PROBE TECHNIQUE, MAKING USE OF HIGH THROUGHPUT TECHNOLOGIES AS DESCRIBED BY CMS-2020-01-R: HCPCS

## 2021-09-21 NOTE — TELEPHONE ENCOUNTER
Reached out to pt regarding postpartum breastfeeding status and questions regarding post-op pain management plan.  Writer consulted with Dr. Harris and informed pt of the following plan;    - Pain management with Ibuprofen and Tylenol  - Splint x 1 week    Pt verbalized understanding of teaching.    Rubi Naranjo RN on 9/21/2021 at 8:12 AM

## 2021-09-22 ENCOUNTER — DOCUMENTATION ONLY (OUTPATIENT)
Dept: ORTHOPEDICS | Facility: CLINIC | Age: 36
End: 2021-09-22

## 2021-09-22 LAB — SARS-COV-2 RNA RESP QL NAA+PROBE: NEGATIVE

## 2021-10-03 ENCOUNTER — HEALTH MAINTENANCE LETTER (OUTPATIENT)
Age: 36
End: 2021-10-03

## 2021-10-06 NOTE — PROGRESS NOTES
"  There are no exam notes on file for this visit.     ================================================================     male on 21 (~4 weeks prior) 1st degree lac repaired  Mood \"emotional few weeks, but feel great now.\"   Breastfeeding- at breast 1-2 times daily. Prefers pumping and bottle feeding breast milk. Works well for her and . Doing about 20% formula supplementation. Reviewed to continue frequency of breastfeeding/pumpings to maintain supply  Cramping and perineal pain are resolved, and lochia is very light  Contraception - used condoms in past and plan to start with these. May consider a birth control method in the future, but not now. Reviewed return to fertility.    Wondering when she can start exercising    ROS: 10 point ROS neg other than the symptoms noted above in the HPI.     EXAM:  LMP 2020     General: healthy, alert and no distress  Psych: negative for anxiety and depression  Last PHQ-9 score on record= 8       ASSESSMENT:   Encounter Diagnosis   Name Primary?     Routine postpartum follow-up Yes      Normal postpartum exam after   Pregnancy was complicated by:  none.      PLAN:  No orders of the defined types were placed in this encounter.    Continue abstinence, condoms when restarting SIC.   Exercise discussed. Reviewed to start slow and gentle.   Plans 6 week postpartum visit in 2 weeks.    DRISS Rutledge CNM    "

## 2021-10-07 ENCOUNTER — VIRTUAL VISIT (OUTPATIENT)
Dept: OBGYN | Facility: CLINIC | Age: 36
End: 2021-10-07
Attending: MIDWIFE
Payer: COMMERCIAL

## 2021-10-07 PROCEDURE — 99024 POSTOP FOLLOW-UP VISIT: CPT | Performed by: MIDWIFE

## 2021-10-07 NOTE — LETTER
"10/7/2021       RE: Arlet Segura  [address]  [facility] [address]     Dear Colleague,    Thank you for referring your patient, Arlet Segura, to the [facility] [place] [facility]. Please see a copy of my visit note below.      There are no exam notes on file for this visit.     ================================================================     male on 21 (~4 weeks prior) 1st degree lac repaired  Mood \"emotional few weeks, but feel great now.\"   Breastfeeding- at breast 1-2 times daily. Prefers pumping and bottle feeding breast milk. Works well for her and . Doing about 20% formula supplementation. Reviewed to continue frequency of breastfeeding/pumpings to maintain supply  Cramping and perineal pain are resolved, and lochia is very light  Contraception - used condoms in past and plan to start with these. May consider a birth control method in the future, but not now. Reviewed return to fertility.    Wondering when she can start exercising    ROS: 10 point ROS neg other than the symptoms noted above in the HPI.     EXAM:  LMP 2020     General: healthy, alert and no distress  Psych: negative for anxiety and depression  Last PHQ-9 score on record= 8       ASSESSMENT:   Encounter Diagnosis   Name Primary?     Routine postpartum follow-up Yes      Normal postpartum exam after   Pregnancy was complicated by:  none.      PLAN:  No orders of the defined types were placed in this encounter.    Continue abstinence, condoms when restarting SIC.   Exercise discussed. Reviewed to start slow and gentle.   Plans 6 week postpartum visit in 2 weeks.    DRISS Rutledge CNM    "

## 2021-10-12 ENCOUNTER — VIRTUAL VISIT (OUTPATIENT)
Dept: PSYCHIATRY | Facility: CLINIC | Age: 36
End: 2021-10-12
Attending: PSYCHIATRY & NEUROLOGY
Payer: COMMERCIAL

## 2021-10-12 DIAGNOSIS — F90.0 ADHD (ATTENTION DEFICIT HYPERACTIVITY DISORDER), INATTENTIVE TYPE: Primary | ICD-10-CM

## 2021-10-12 PROCEDURE — 99214 OFFICE O/P EST MOD 30 MIN: CPT | Mod: GT | Performed by: PSYCHIATRY & NEUROLOGY

## 2021-10-12 ASSESSMENT — PAIN SCALES - GENERAL: PAINLEVEL: NO PAIN (0)

## 2021-10-12 NOTE — PROGRESS NOTES
Video- Visit Details  Type of service:  video visit for medication management  Time of service:    Date:  10/12/2021    Video Start Time:  3:00 PM        Video End Time: 3:30 PM     Reason for video visit:  Services only offered telehealth  Originating Site (patient location):  [facility]   Location- Patient's home   Distant Site (provider location):  Remote location  Mode of Communication:  Video Conference via AmWell  Consent:  Patient has given verbal consent for video visit?: Yes        [facility]  Psychiatry Clinic  PSYCHIATRY PROGRESS NOTE     CARE TEAM:  PCP- Dorothy Holder.  Arlet Segura is a 36 year old   patient who uses the name Flora and pronouns she, her.    DIAGNOSES                                                                                 ADHD, inattentive type  Bulimia Nervousa, purging type  Fe deficiency    ASSESSMENT                                                                                Doing great. No changes today. Longer assessment per last note (August)   discusses use of stimulant at this time.    MNPMP: checked today, no indication of misuse     PLAN                                                                                           1) Meds  - continue Adderall 5mg tabs- 1 tab BID     2) Other: None today  3) RTC:   Dec 14    2:00   video  4) CRISIS Numbers:   Provided routinely in AVS          PERTINENT BACKGROUND                                            [evals 2014, 2017]   This pt first entered care in this clinic in 2014 with Dr Epps, diagnosed with bulimia   nervosa (BN), ADHD and was taking bupropion SR 100mg every day. Up to 300mg did not   further improve things so Adderall XR was used up to 30mg. There was improvement in   attentional problems as well as purging episodes. Flora dates BN back to 2008. After a   period of steady treatment there was a gap MH for 2 years from 0652-0403. Upon returning   to this clinic in  early 2017, the pt entered care with DRISS Tong and Dr Grey   for eating disorder treatment. Other med hx: by early 2018 Prozac was added, dosed up to   60mg with no improvement, mood improved once completed graduate school. Switched to   IR d/t sleep problems on XR.      INTERIM HISTORY                                                                         Since the last visit:   -baby is doing great and so is Flora  -very happy at home for this period  -will go back to work after Thanksgiving  -remains comfortable with Adderall 5mg BID (down from 15mg BID)/ prefers higher dose  -stimulant helps attn/focus, task completion and tracking environment when driving  -no problems with BF and use of stimulant    Recent Symptoms:  No depressed mood, anhedonia, insomnia, racing thoughts,   cognitive and eating disorder sxs as described above.  Adverse Effects:  none  Pertinent Negatives:  No suicidal or violent ideation, psychosis and adrian  Recent Substance Use:  none reported    SOCIAL and FAMILY HISTORY                          [per pt report]            Family Hx- maternal aunt w/bipolar disorder, sister w/depression, HTN in father and   brother, DM in sister and thyroid dz in mother  Social Hx- Originally from [place], [place] and moved to [place] in 2014 for graduate school.   School performance has been without difficulty or delay. Good social support with friends   and family.  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 Lives in house with  Alex   and her dog 'Hutch'. Likes to play soccer, garden, run and ski.    MEDICAL HISTORY  and ALLERGY     ALLERGIES: Patient has no known allergies.  Avoid use of Wellbutrin (d/t BN)     Patient Active Problem List   Diagnosis     ADHD (attention deficit hyperactivity disorder), inattentive type     Hx of bulimia nervosa     Raynaud's disease without gangrene     H/O LEEP     Normal first pregnancy confirmed, antepartum     Change in mole     Left ovarian cyst     Uterine  size date discrepancy pregnancy, unspecified trimester     Encounter for triage in pregnant patient     Labor and delivery, indication for care      (normal spontaneous vaginal delivery)     Pyogenic granuloma     MEDICAL REVIEW OF SYSTEMS                                                               A comprehensive review of systems was performed and is negative other than noted in the HPI.  CURRENT MEDS       Current Outpatient Medications   Medication Sig Dispense Refill     acetaminophen (TYLENOL) 325 MG tablet Take 2 tablets (650 mg) by mouth every 4 hours as needed for mild pain or fever (greater than or equal to 38  C /100.4  F (oral) or 38.5  C/ 101.4  F (core).)       [START ON 10/18/2021] amphetamine-dextroamphetamine (ADDERALL) 5 MG tablet Take one tab (5mg) by mouth BID 60 tablet 0     ibuprofen (ADVIL/MOTRIN) 800 MG tablet Take 1 tablet (800 mg) by mouth every 6 hours as needed for other (cramping)       Prenatal Vit-Fe Fumarate-FA (PRENATAL MULTIVITAMIN PLUS IRON) 27-0.8 MG TABS per tablet Take 1 tablet by mouth daily       Misc. Devices (BREAST PUMP) MISC 1 each daily as needed (Breastfeeding) (Patient not taking: Reported on 8/10/2021) 1 each 0     VITALS                                                                                                  Pulse Readings from Last 3 Encounters:   21 61   21 65   21 67     BP Readings from Last 3 Encounters:   21 108/81   21 125/82   21 120/79     MENTAL STATUS EXAM                                                        Alertness: alert  and oriented  Appearance: well groomed  Behavior/Demeanor: cooperative, pleasant and calm, with good  eye contact   Speech: regular rate and rhythm  Language: no obvious problem  Psychomotor: normal or unremarkable  Mood: description consistent with euthymia  Affect: full range; was congruent to mood; was congruent to content  Thought Process/Associations: unremarkable  Thought  Content:  Reports none;  Denies suicidal & violent ideation and delusions   Perception:  Reports none;  Denies hallucinations  Insight: good  Judgment: good  Cognition: (6) oriented: time, person, and place  attention span: intact  concentration: intact  recent memory: intact  remote memory: intact  fund of knowledge: appropriate  Gait and Station: N/A (telehealth)    LABS and DATA     PHQ9 Today:  None today    PSYCHOTROPIC DRUG INTERACTIONS   none  MANAGEMENT:  N/A    RISK STATEMENT for SAFETY   Arlet Segura did not appear to be an imminent safety risk to self or others.    TREATMENT RISK STATEMENT:  The risks, benefits, alternatives and potential adverse   effects have been discussed and are understood by the pt. The pt understands the risks of   using street drugs or alcohol. There are no medical contraindications, the pt agrees to   treatment with the ability to do so. The pt knows to call the clinic for any problems or to   access emergency care if needed.  Medical and substance use concerns are documented   above.  Psychotropic drug interaction check was done, including changes made today.    ATTENDING PHYSICIAN:  Candida Redd MD

## 2021-10-12 NOTE — PROGRESS NOTES
"VIDEO VISIT  Arlet Segura is a 36 year old patient that has consented to receive services via billable video visit.      The patient has been notified of following:   \"This video visit will be conducted via a call between you and your physician/provider. We have found that certain health care needs can be provided without the need for an in-person physical exam. This service lets us provide the care you need with a video conversation. If a prescription is necessary we can send it directly to your pharmacy. If lab work is needed we can place an order for that and you can then stop by our lab to have the test done at a later time. Insurers are generally covering virtual visits as they would in-office visits so billing should not be different than normal.  If for some reason you do get billed incorrectly, you should contact the billing office to correct it and that number is in the AVS .    Video Conference to be completed via:  Carine    If patient attempts to join the video via [employer] but is unable to, they would prefer that the provider send them a video invitation via:   Send to e-mail at: [email or web address]      How would patient like to obtain AVS?:  MyChart    "

## 2021-10-12 NOTE — PATIENT INSTRUCTIONS
**For crisis resources, please see the information at the end of this document**     Patient Education      Treatment Plan Today:     1) Medications- no changes    2) Follow-up appt with Dr Redd    Dec 14    2:00    3) Crisis numbers are below     ------------------------------------------------------------------------    Thank you for coming to the [facility]    Lab Testing:  If you had lab testing today and your results are reassuring or normal they will be mailed to you or sent through [employer] within 7 days. If the lab tests need quick action we will call you with the results. The phone number we will call with results is # [phone] (home) . If this is not the best number please call our clinic and change the number.    Medication Refills:  If you need any refills please call your pharmacy and they will contact us. Our fax number for refills is [phone]. Please allow three business for refill processing. If you need to  your refill at a new pharmacy, please contact the new pharmacy directly. The new pharmacy will help you get your medications transferred.     Scheduling:  If you have any concerns about today's visit or wish to schedule another appointment please call our office during normal business hours [phone] (8-5:00 M-F)    Contact Us:  Please call [phone] during business hours (8-5:00 M-F).  If after clinic hours, or on the weekend, please call  [phone].    Financial Assistance [phone]  [facility] Billing [phone]  Central Billing Office, [facility]: [phone]  [facility] Billing [phone]  Medical Records [phone]  [facility] Patient Bill of Rights [email or web address]       MENTAL HEALTH CRISIS NUMBERS:  For a medical emergency please call  911 or go to the nearest ER.     [facility] ([facility]) [facility] ER  [phone]    [facility]:   [facility]  -[phone]   Crisis Residence [facility] -[phone]   Walk-In Counseling Center [facility] -[phone]   COPE 24/7 [facility] Mobile Team -[phone] (adults)/[phone] (child)  CHILD: [facility] needs assessment team - [phone]      [facility]:   [facility] - [phone]   Walk-in counseling [facility] - [phone]   Walk-in counseling [facility] - [phone]   Crisis Residence [facility] - [phone]  Urgent Care Adult Mental [facility]-[phone] mobile unit/ 24/7 crisis line    National Crisis Numbers:   National Suicide Prevention Lifeline: 4-767-392-TALK (664-173-0940)  Poison Control Center - 1-149.448.6877  [employer]/resources for a list of additional resources (SOS)  Trans Lifeline a hotline for transgender people 1-331.278.8280  The [employer] Project a hotline for LGBT youth 1-983.295.7805  Crisis Text Line: For any crisis 24/7   To: 181611  see www.crisistextline.org  - IF MAKING A CALL FEELS TOO HARD, send a text!       Again thank you for choosing [facility] and please let us know how we can best partner with you to improve you and your family's health.    You may be receiving a survey regarding this appointment. We would love to have your feedback, both positive and negative. The survey is done by an external company, so your answers are anonymous.

## 2021-10-20 ENCOUNTER — OFFICE VISIT (OUTPATIENT)
Dept: OBGYN | Facility: CLINIC | Age: 36
End: 2021-10-20
Attending: ADVANCED PRACTICE MIDWIFE
Payer: COMMERCIAL

## 2021-10-20 VITALS
HEART RATE: 70 BPM | WEIGHT: 111.33 LBS | DIASTOLIC BLOOD PRESSURE: 81 MMHG | SYSTOLIC BLOOD PRESSURE: 115 MMHG | BODY MASS INDEX: 19.73 KG/M2

## 2021-10-20 DIAGNOSIS — Z98.890 H/O LEEP: ICD-10-CM

## 2021-10-20 PROBLEM — Z36.89 ENCOUNTER FOR TRIAGE IN PREGNANT PATIENT: Status: RESOLVED | Noted: 2021-09-05 | Resolved: 2021-10-20

## 2021-10-20 PROBLEM — O26.849: Status: RESOLVED | Noted: 2021-07-30 | Resolved: 2021-10-20

## 2021-10-20 PROBLEM — Z34.00 NORMAL FIRST PREGNANCY CONFIRMED, ANTEPARTUM: Status: RESOLVED | Noted: 2021-02-01 | Resolved: 2021-10-20

## 2021-10-20 PROCEDURE — 99024 POSTOP FOLLOW-UP VISIT: CPT | Performed by: ADVANCED PRACTICE MIDWIFE

## 2021-10-20 PROCEDURE — G0463 HOSPITAL OUTPT CLINIC VISIT: HCPCS

## 2021-10-20 ASSESSMENT — ANXIETY QUESTIONNAIRES
7. FEELING AFRAID AS IF SOMETHING AWFUL MIGHT HAPPEN: SEVERAL DAYS
1. FEELING NERVOUS, ANXIOUS, OR ON EDGE: SEVERAL DAYS
6. BECOMING EASILY ANNOYED OR IRRITABLE: SEVERAL DAYS
GAD7 TOTAL SCORE: 7
5. BEING SO RESTLESS THAT IT IS HARD TO SIT STILL: SEVERAL DAYS
3. WORRYING TOO MUCH ABOUT DIFFERENT THINGS: SEVERAL DAYS
2. NOT BEING ABLE TO STOP OR CONTROL WORRYING: SEVERAL DAYS

## 2021-10-20 ASSESSMENT — PATIENT HEALTH QUESTIONNAIRE - PHQ9
5. POOR APPETITE OR OVEREATING: SEVERAL DAYS
SUM OF ALL RESPONSES TO PHQ QUESTIONS 1-9: 7

## 2021-10-20 NOTE — NURSING NOTE
SUBJECTIVE:   Arlet Segura is here for her 6-week postpartum checkup.       Delivery Date: 21.    Delivering provider:  Moose Degroot CNM.    Type of delivery:  .  Perineum:  tear, with repair.     Delivery complications: None  Infant gender:  boy, weight 6 pounds 14 oz.  Feeding Method:   and Bottlefed.  Complications reported with feeding:  none, infant thriving .    Bleeding:  None.  Duration:  4 weeks.  Menses resumed:  No  Bowel/Urinary problems:  Yes     Contraception Planned:  condoms  She  has not had intercourse since delivery..

## 2021-10-20 NOTE — LETTER
10/20/2021       RE: Arlet Segura  [address]  [facility] [address]     Dear Colleague,    Thank you for referring your patient, Arlet Segura, to the [facility] [place] at [facility]. Please see a copy of my visit note below.    Nursing Notes:   Esthela Perry LPN  10/20/2021  8:34 AM  Signed  SUBJECTIVE:   Arlet Segura is here for her 6-week postpartum checkup. She has no medical concerns at the moment. Denies vaginal bleeding, changes in discharge, and changes in urinary frequency/bowel movements.       Delivery Date: 21.    Delivering provider:  Moose Degroot CNM.    Type of delivery:  .  Perineum:  tear, with repair.     Delivery complications: None  Infant gender:  boy, weight 6 pounds 14 oz.  Feeding Method:  and Bottlefed.  Complications reported with feeding:  none, infant thriving .    Bleeding:  None.  Duration:  4 weeks.  Menses resumed:  No  Bowel/Urinary problems:  Yes     Contraception Planned:  condoms  She has not had intercourse since delivery..   ================================================================  ROS: 10 point ROS neg other than the symptoms noted above in the HPI.       EXAM:  /81   Pulse 70   Wt 50.5 kg (111 lb 5.3 oz)   LMP 2020   BMI 19.73 kg/m      General:  Healthy,alert,no distress  Psych: normal mentation, well oriented  Abdomen: benign, soft, flat, non-tender, no masses, organomegaly, diastasis less than 1-2 FB Incision:  Well healed   Vulva:  Normal genitalia, Bartholin's, Urethra, Cazenovia's normal; right labial defect due to unsucessufful approximation of labial laceration  Vagina: moist, pink, rugae with creamy, white and odorless secretions  Recto-vaginal: anus normal.  Atrophic      ASSESSMENT:   Encounter Diagnosis   Name Primary?     Routine postpartum follow-up Yes      Normal postpartum exam after  on 2021 at 40w6 gestation    Pregnancy was complicated by: Bilateral labial tears and first degree perineal laceration     PLAN:  -Reviewed atrophic vaginitis and encouraged lubricated condoms, consider Premarin cream if needed  -Follow up February 2022 for next pap smear  -Consider pelvic floor PT   -Discussed calcium intake, vitamins and supplements including Vitamin D  -Exercise encouraged  -Follow up in 1 year    I, Paras Livingston, completed the PFSH and ROS. I then acted as a scribe for Zulema Sherman CNM, for the remainder of the visit.    ANGEL Lynn  I agree with the PFSH and ROS as completed by ANGEL Lynn except for changes made by me. The remainder of the encounter was performed by me and scribed by ANGEL Lynn. The scribed note accurately reflects my personal services and decisions made by me.   DRISS Hernandez CNM

## 2021-10-20 NOTE — PROGRESS NOTES
Nursing Notes:   Esthela Perry LPN  10/20/2021  8:34 AM  Signed  SUBJECTIVE:   Arlet Segura is here for her 6-week postpartum checkup. She has no medical concerns at the moment. Denies vaginal bleeding, changes in discharge, and changes in urinary frequency/bowel movements.       Delivery Date: 21.    Delivering provider:  Moose Degroot CNM.    Type of delivery:  .  Perineum:  tear, with repair.     Delivery complications: None  Infant gender:  boy, weight 6 pounds 14 oz.  Feeding Method:  and Bottlefed.  Complications reported with feeding:  none, infant thriving .    Bleeding:  None.  Duration:  4 weeks.  Menses resumed:  No  Bowel/Urinary problems:  Yes     Contraception Planned:  condoms  She has not had intercourse since delivery..   ================================================================  ROS: 10 point ROS neg other than the symptoms noted above in the HPI.       EXAM:  /81   Pulse 70   Wt 50.5 kg (111 lb 5.3 oz)   LMP 2020   BMI 19.73 kg/m      General:  Healthy,alert,no distress  Psych: normal mentation, well oriented  Abdomen: benign, soft, flat, non-tender, no masses, organomegaly, diastasis less than 1-2 FB Incision:  Well healed   Vulva:  Normal genitalia, Bartholin's, Urethra, Kersey's normal; right labial defect due to unsucessufful approximation of labial laceration  Vagina: moist, pink, rugae with creamy, white and odorless secretions  Recto-vaginal: anus normal.  Atrophic      ASSESSMENT:   Encounter Diagnosis   Name Primary?     Routine postpartum follow-up Yes      Normal postpartum exam after  on 2021 at 40w6 gestation   Pregnancy was complicated by: Bilateral labial tears and first degree perineal laceration     PLAN:  -Reviewed atrophic vaginitis and encouraged lubricated condoms, consider Premarin cream if needed  -Follow up 2022 for next pap smear  -Consider pelvic floor PT   -Discussed calcium intake, vitamins and  supplements including Vitamin D  -Exercise encouraged  -Follow up in 1 year    I, Paras Livingston, completed the PFSH and ROS. I then acted as a scribe for Zulema Sherman CNM, for the remainder of the visit.    ANGEL Lynn  I agree with the PFSH and ROS as completed by ANGEL Lynn except for changes made by me. The remainder of the encounter was performed by me and scribed by ANGEL Lynn. The scribed note accurately reflects my personal services and decisions made by me.   DRISS Hernandez CNM

## 2021-10-21 ASSESSMENT — ANXIETY QUESTIONNAIRES: GAD7 TOTAL SCORE: 7

## 2021-11-02 RX ORDER — DEXTROAMPHETAMINE SACCHARATE, AMPHETAMINE ASPARTATE, DEXTROAMPHETAMINE SULFATE AND AMPHETAMINE SULFATE 1.25; 1.25; 1.25; 1.25 MG/1; MG/1; MG/1; MG/1
TABLET ORAL
Qty: 60 TABLET | Refills: 0 | Status: SHIPPED | OUTPATIENT
Start: 2021-11-17 | End: 2021-12-14

## 2021-12-14 ENCOUNTER — VIRTUAL VISIT (OUTPATIENT)
Dept: PSYCHIATRY | Facility: CLINIC | Age: 36
End: 2021-12-14
Attending: PSYCHIATRY & NEUROLOGY
Payer: COMMERCIAL

## 2021-12-14 ENCOUNTER — TELEPHONE (OUTPATIENT)
Dept: PSYCHIATRY | Facility: CLINIC | Age: 36
End: 2021-12-14
Payer: COMMERCIAL

## 2021-12-14 DIAGNOSIS — F90.0 ATTENTION DEFICIT HYPERACTIVITY DISORDER (ADHD), PREDOMINANTLY INATTENTIVE TYPE: ICD-10-CM

## 2021-12-14 DIAGNOSIS — F90.0 ADHD (ATTENTION DEFICIT HYPERACTIVITY DISORDER), INATTENTIVE TYPE: Primary | ICD-10-CM

## 2021-12-14 PROCEDURE — 99214 OFFICE O/P EST MOD 30 MIN: CPT | Mod: GT | Performed by: PSYCHIATRY & NEUROLOGY

## 2021-12-14 RX ORDER — DEXTROAMPHETAMINE SACCHARATE, AMPHETAMINE ASPARTATE, DEXTROAMPHETAMINE SULFATE AND AMPHETAMINE SULFATE 1.25; 1.25; 1.25; 1.25 MG/1; MG/1; MG/1; MG/1
TABLET ORAL
Qty: 60 TABLET | Refills: 0 | Status: SHIPPED | OUTPATIENT
Start: 2022-02-12 | End: 2022-01-28

## 2021-12-14 RX ORDER — DEXTROAMPHETAMINE SACCHARATE, AMPHETAMINE ASPARTATE, DEXTROAMPHETAMINE SULFATE AND AMPHETAMINE SULFATE 1.25; 1.25; 1.25; 1.25 MG/1; MG/1; MG/1; MG/1
TABLET ORAL
Qty: 60 TABLET | Refills: 0 | Status: SHIPPED | OUTPATIENT
Start: 2021-12-14 | End: 2022-05-02

## 2021-12-14 RX ORDER — DEXTROAMPHETAMINE SACCHARATE, AMPHETAMINE ASPARTATE, DEXTROAMPHETAMINE SULFATE AND AMPHETAMINE SULFATE 1.25; 1.25; 1.25; 1.25 MG/1; MG/1; MG/1; MG/1
TABLET ORAL
Qty: 60 TABLET | Refills: 0 | Status: SHIPPED | OUTPATIENT
Start: 2022-01-12 | End: 2022-02-04

## 2021-12-14 RX ORDER — DEXTROAMPHETAMINE SACCHARATE, AMPHETAMINE ASPARTATE, DEXTROAMPHETAMINE SULFATE AND AMPHETAMINE SULFATE 1.25; 1.25; 1.25; 1.25 MG/1; MG/1; MG/1; MG/1
TABLET ORAL
Qty: 60 TABLET | Refills: 0 | Status: SHIPPED | OUTPATIENT
Start: 2022-03-12 | End: 2022-02-04

## 2021-12-14 NOTE — PROGRESS NOTES
Video- Visit Details  Type of service:  video visit for medication management  Time of service:    Date:  12/14/2021    Video Start Time:  2:00 PM        Video End Time: 2:30 PM     Reason for video visit:  Services only offered telehealth  Originating Site (patient location):  [facility]   Location- Patient's home   Distant Site (provider location):  Remote location  Mode of Communication:  Video Conference via AmWell  Consent:  Patient has given verbal consent for video visit?: Yes        [facility]  Psychiatry Clinic  PSYCHIATRY PROGRESS NOTE     CARE TEAM:  PCP- Dorothy Holder.  Arlet Segura is a 36 year old   patient who uses the name Flora and pronouns she, her.    DIAGNOSES                                                                                 ADHD, inattentive type  Bulimia Nervousa, purging type  Fe deficiency    ASSESSMENT                                                                                Back at work, doing well and thing are good at home. No changes. August note  contains info re: stimulant use during BF. Will consider increase in stimulant and use  of selective serotonin reuptake inhibitor when BF concludes. Discuss eating DO   programs next time.    MNPMP: checked today, no indication of misuse     PLAN                                                                                           1) Meds  - continue Adderall 5mg tabs- 1 tab BID     2) Other: None today  3) RTC:   March 15    2:00   video  4) CRISIS Numbers:   Provided routinely in AVS          PERTINENT BACKGROUND                                            [evals 2014, 2017]   This pt first entered care in this clinic in 2014 with Dr Epps, diagnosed with bulimia   nervosa (BN), ADHD and was taking bupropion SR 100mg every day. Up to 300mg did not   further improve things so Adderall XR was used up to 30mg. There was improvement in   attentional problems as well as purging  episodes. Flora dates BN back to 2008. After a   period of steady treatment there was a gap MH for 2 years from [id number]. Upon returning   to this clinic in early 2017, the pt entered care with DRISS Tong and Dr Grey   for eating disorder treatment. Other med hx: by early 2018 Prozac was added, dosed up to   60mg with no improvement, mood improved once completed graduate school. Switched to   IR d/t sleep problems on XR.      INTERIM HISTORY                                                                         Since the last visit Oct 12:  -went back to work last week, full time  -concentration is fair but doesn't want to increase dose d/t BF/ maybe next time  -mood and anxiety are not a problem  -B/P 2x a week, same as it has been for some time now  -consider selective serotonin reuptake inhibitor for B/P next time/ Prozac in the past  -consider eating disorder program next time  -remains comfortable with Adderall 5mg BID (down from 15mg BID)/ prefers higher dose  -stimulant helps attn/focus, task completion and tracking environment when driving  -no problems with BF and use of stimulant    Recent Symptoms:  No depressed mood, anhedonia, insomnia, racing thoughts,   cognitive and eating disorder sxs as described above.  Adverse Effects:  none  Pertinent Negatives:  No suicidal or violent ideation, psychosis and adrian  Recent Substance Use:  none reported    SOCIAL and FAMILY HISTORY                          [per pt report]            Family Hx- maternal aunt w/bipolar disorder, sister w/depression, HTN in father and   brother, DM in sister and thyroid dz in mother  Social Hx- Originally from [place], [place] and moved to [place] in 2014 for graduate school.   School performance has been without difficulty or delay. Good social support with friends   and family.  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 Lives in a house with  Alex,  infant son Luis Miguel and her dog 'Hutch'. Likes to play soccer, garden, run  and ski.    MEDICAL HISTORY  and ALLERGY     ALLERGIES: Patient has no known allergies.  Avoid use of Wellbutrin (d/t BN)     Patient Active Problem List   Diagnosis     ADHD (attention deficit hyperactivity disorder), inattentive type     Hx of bulimia nervosa     Raynaud's disease without gangrene     H/O LEEP     Change in mole     Left ovarian cyst     Pyogenic granuloma     MEDICAL REVIEW OF SYSTEMS                                                               A comprehensive review of systems was performed and is negative other than noted in the HPI.  CURRENT MEDS       Current Outpatient Medications   Medication Sig Dispense Refill     acetaminophen (TYLENOL) 325 MG tablet Take 2 tablets (650 mg) by mouth every 4 hours as needed for mild pain or fever (greater than or equal to 38  C /100.4  F (oral) or 38.5  C/ 101.4  F (core).) (Patient not taking: Reported on 10/20/2021)       amphetamine-dextroamphetamine (ADDERALL) 5 MG tablet Take one tab (5mg) by mouth BID 60 tablet 0     amphetamine-dextroamphetamine (ADDERALL) 5 MG tablet Take one tab (5mg) by mouth BID 60 tablet 0     ibuprofen (ADVIL/MOTRIN) 800 MG tablet Take 1 tablet (800 mg) by mouth every 6 hours as needed for other (cramping) (Patient not taking: Reported on 10/20/2021)       Misc. Devices (BREAST PUMP) MISC 1 each daily as needed (Breastfeeding) 1 each 0     Prenatal Vit-Fe Fumarate-FA (PRENATAL MULTIVITAMIN PLUS IRON) 27-0.8 MG TABS per tablet Take 1 tablet by mouth daily       VITALS                                                                                                  Pulse Readings from Last 3 Encounters:   10/20/21 70   09/06/21 61   08/31/21 65     BP Readings from Last 3 Encounters:   10/20/21 115/81   09/06/21 108/81   08/31/21 125/82     MENTAL STATUS EXAM                                                        Alertness: alert  and oriented  Appearance: well groomed  Behavior/Demeanor: cooperative, pleasant and calm, with  good  eye contact   Speech: regular rate and rhythm  Language: no obvious problem  Psychomotor: normal or unremarkable  Mood: description consistent with euthymia  Affect: full range; was congruent to mood; was congruent to content  Thought Process/Associations: unremarkable  Thought Content:  Reports none;  Denies suicidal & violent ideation and delusions   Perception:  Reports none;  Denies hallucinations  Insight: good  Judgment: good  Cognition: (6) oriented: time, person, and place  attention span: intact  concentration: intact  recent memory: intact  remote memory: intact  fund of knowledge: appropriate  Gait and Station: N/A (telehealth)    LABS and DATA     PHQ9 Today:  None today    PSYCHOTROPIC DRUG INTERACTIONS   none  MANAGEMENT:  N/A    RISK STATEMENT for SAFETY   Arlet Segura did not appear to be an imminent safety risk to self or others.    TREATMENT RISK STATEMENT:  The risks, benefits, alternatives and potential adverse   effects have been discussed and are understood by the pt. The pt understands the risks of   using street drugs or alcohol. There are no medical contraindications, the pt agrees to   treatment with the ability to do so. The pt knows to call the clinic for any problems or to   access emergency care if needed.  Medical and substance use concerns are documented   above.  Psychotropic drug interaction check was done, including changes made today.    ATTENDING PHYSICIAN:  Candida Redd MD

## 2021-12-14 NOTE — TELEPHONE ENCOUNTER
On December 14, 2021, at 1:05 PM, writer called patient at mobile to confirm Virtual Visit. Writer unable to make contact with patient. Writer left detailed voice message for call back. [phone] left as call back number. Rusty Glass, EMT

## 2021-12-14 NOTE — PATIENT INSTRUCTIONS
Treatment Plan Today:     1) Medications- no change    2) Follow-up appt with Dr Redd   March 15    2:00   video    3) Crisis numbers are below     ------------------------------------------------------------------------    Thank you for coming to the [facility] Psychiatry Clinic    Lab Testing:  If you had lab testing today and your results are reassuring or normal they will be mailed to you or sent through [employer] within 7 days. If the lab tests need quick action we will call you with the results. The phone number we will call with results is # [phone] (home) . If this is not the best number please call our clinic and change the number.    Medication Refills:  If you need any refills please call your pharmacy and they will contact us. Our fax number for refills is [phone]. Please allow three business for refill processing. If you need to  your refill at a new pharmacy, please contact the new pharmacy directly. The new pharmacy will help you get your medications transferred.     Scheduling:  If you have any concerns about today's visit or wish to schedule another appointment please call our office during normal business hours [phone] (8-5:00 M-F)    Contact Us:  Please call [phone] during business hours (8-5:00 M-F).  If after clinic hours, or on the weekend, please call  [phone].    Financial Assistance [phone]  [facility] Billing [phone]  Central Billing Office, [facility]: [phone]  [facility] Billing [phone]  Medical Records [phone]  [facility] Patient Bill of Rights [email or web address]       MENTAL HEALTH CRISIS NUMBERS:  For a medical emergency please call  911 or go to the nearest ER.     [facility]) [facility]  [phone]    [facility]:   [facility] -[phone]   Crisis Residence [facility] -[phone]   Walk-In Counseling Center [facility]  -[phone]   COPE 24/7 [facility] Mobile Team -[phone] (adults)/[phone] (child)  CHILD: [facility] needs assessment team - [phone]      [facility]:   [facility] - [phone]   Walk-in counseling [facility] - [phone]   Walk-in counseling [facility] - [phone]   Crisis Residence [facility] - [phone]  Urgent Care Adult Mental [facility]-[phone] mobile unit/ 24/7 crisis line    National Crisis Numbers:   National Suicide Prevention Lifeline: 0-247-988-TALK (069-426-6763)  Poison Control Center - 1-709.734.4721  [employer]/resources for a list of additional resources (SOS)  Trans Lifeline a hotline for transgender people 2-659-547-1541  The [employer]a Project a hotline for LGBT youth 1-963.262.8514  Crisis Text Line: For any crisis 24/7   To: 325788  see www.crisistextline.org  - IF MAKING A CALL FEELS TOO HARD, send a text!       Again thank you for choosing [facility] Psychiatry Clinic and please let us know how we can best partner with you to improve you and your family's health.    You may be receiving a survey regarding this appointment. We would love to have your feedback, both positive and negative. The survey is done by an external company, so your answers are anonymous.

## 2022-01-03 ENCOUNTER — LAB (OUTPATIENT)
Dept: LAB | Facility: CLINIC | Age: 37
End: 2022-01-03
Attending: NURSE PRACTITIONER
Payer: COMMERCIAL

## 2022-01-03 ENCOUNTER — E-VISIT (OUTPATIENT)
Dept: URGENT CARE | Facility: CLINIC | Age: 37
End: 2022-01-03
Payer: COMMERCIAL

## 2022-01-03 DIAGNOSIS — Z20.822 CLOSE EXPOSURE TO 2019 NOVEL CORONAVIRUS: ICD-10-CM

## 2022-01-03 DIAGNOSIS — Z20.822 CLOSE EXPOSURE TO 2019 NOVEL CORONAVIRUS: Primary | ICD-10-CM

## 2022-01-03 PROCEDURE — 99421 OL DIG E/M SVC 5-10 MIN: CPT | Performed by: NURSE PRACTITIONER

## 2022-01-03 PROCEDURE — U0003 INFECTIOUS AGENT DETECTION BY NUCLEIC ACID (DNA OR RNA); SEVERE ACUTE RESPIRATORY SYNDROME CORONAVIRUS 2 (SARS-COV-2) (CORONAVIRUS DISEASE [COVID-19]), AMPLIFIED PROBE TECHNIQUE, MAKING USE OF HIGH THROUGHPUT TECHNOLOGIES AS DESCRIBED BY CMS-2020-01-R: HCPCS

## 2022-01-03 PROCEDURE — U0005 INFEC AGEN DETEC AMPLI PROBE: HCPCS

## 2022-01-03 NOTE — PATIENT INSTRUCTIONS
Dear Flora,    Based on your exposure to COVID-19 (coronavirus), we would like to test you for this virus. I have placed an order for this test. The best time for testing is 5-7 days after the exposure.    How to schedule:  Go to your [employer] home page and scroll down to the section that says  You have an appointment that needs to be scheduled  and click the large green button that says  Schedule Now  and follow the steps to find the next available opening.     If you are unable to complete these [employer] scheduling steps, please call [phone] to schedule your testing.     Monoclonal antibody treatment after exposure:  Because you have been exposed to COVID-19, you may be able to receive a treatment with monoclonal antibodies. This treatment can lower your risk of severe illness and going to the hospital. It is given through an IV or under your skin (subcutaneous) and must be given at an infusion center.   To be eligible, you must be 12 years or older, at least 88 pounds and:    Are not fully vaccinated against COVID-19, OR    Are immunocompromised     If you would like to sign up to be considered to receive the monoclonal antibody medicine, please complete a participation form through the [facility] here:  MNRAP ([email or web address]/diseases/[email or web address]/[email or web address]). You may also call the [facility] COVID-19 Public Hotline at 1-857.766.5946 (open Mon-Fri: 9am-7pm and Sat: 10am-6pm).     Not all people who are eligible will receive the medicine since supply is limited. You will be contacted in the next 1 to 2 business days only if you are selected. If you do not receive a call, you have not been selected to receive the medicine. If you have any questions about this medication, please contact your primary care provider. For more information, see [email or web address]/[email or web address]/[email or web address]    Return to work/school/ guidance:   Please let your workplace manager and  staffing office know when your quarantine ends. We cannot give you an exact date as it depends on the information below. You can calculate this on your own or work with your manager/staffing office to calculate this.    Quarantine Guidelines:  You are considered exposed if you have been within 6 feet of an infected person(s) for 15 minutes or more over a 24-hour period. Quarantine will start after the LAST time you had contact with the infected person while they were contagious (for example, if you saw someone on Monday and Wednesday, your quarantine would start after Wednesday).     If you have NO symptoms (asymptomatic):    Stay home for 14 days (quarantine) after your LAST contact with a person who has COVID-19 (this remains the CDC recommendation for greatest protection against spread of COVID-19), OR    10-day quarantine with no test, OR    Minimum 7-day quarantine with negative RT-PCR test collected on day 5 or later    Quarantine Guideline exceptions:    People who have been fully vaccinated do not need to quarantine unless they have symptoms. You are considered fully vaccinated 2 weeks after your 2nd dose in a 2-dose series or 2 weeks after a single-dose series. This includes vaccinated health care workers.  o Fully vaccinated people should still get tested 5-7 days after exposure, even if they don t have symptoms.   Note: If you have ongoing exposure to the COVID-positive person, this quarantine period may be more than 14 days. For example, if you continue to be exposed to your child who tested positive and cannot isolate from them, then the quarantine of 7-14 days can't start until your child is no longer contagious. This is typically 10 days from onset of the child's symptoms. So, the total duration may be 17-24 days in this case.   Please contact your doctor if you have questions or call the [facility] Public Hotline: [phone] (Mon-Fri: 9am-7pm and Sat: 10am-6pm).     How to Quarantine:     Monitor your  symptoms until 14 days after your last exposure. If you develop signs or symptoms, isolate and get tested (even if you have been tested already).    Stay home and away from others. Don't go to school or anywhere else. Generally, quarantine means staying home from work but there are some exceptions to this. Please contact your workplace. Cover your mouth and nose with a face covering if you must be around other people.     Wash your hands and face often. Use soap and water.    What are the symptoms of COVID-19?  The most common symptoms are cough, fever and trouble breathing. Less common symptoms include headache, body aches, fatigue (feeling very tired), chills, sore throat, stuffy or runny nose, diarrhea (loose poop), loss of taste or smell, belly pain, and nausea or vomiting (feeling sick to your stomach or throwing up).  If you develop symptoms, follow these guidelines:    If you're normally healthy: Please start another eVisit.    If you have a serious health problem (like cancer, heart failure, an organ transplant or kidney disease): Call your specialty clinic. Let them know that you might have COVID-19.    Where can I get more information?    [facility] - About COVID-19: [email or web address]     CDC - What to Do If You're Sick:     www.cdc.gov/coronavirus/2019-ncov/about/steps-when-sick.html    CDC - Ending Home Isolation:  https://www.cdc.gov/coronavirus/2019-ncov/your-health/quarantine-isolation.html    CDC - Caring for Someone:  www.cdc.gov/coronavirus/2019-ncov/if-you-are-sick/care-for-someone.html    [facility] clinical trials (COVID-19 research studies): [email or web address]/[email or web address]    Below are the COVID-19 hotlines at the [facility] ([facility]). Interpreters are available.  o For health questions: Call 008-348-3443 or 1-607.455.8620 (7 am to 7 pm)  o For questions about schools and childcare: Call 144-313-8749 or 1-519.449.8719 (7 a.m. to 7  p.m.)

## 2022-01-04 LAB — SARS-COV-2 RNA RESP QL NAA+PROBE: NEGATIVE

## 2022-01-23 ENCOUNTER — HEALTH MAINTENANCE LETTER (OUTPATIENT)
Age: 37
End: 2022-01-23

## 2022-01-28 ENCOUNTER — MYC REFILL (OUTPATIENT)
Dept: PSYCHIATRY | Facility: CLINIC | Age: 37
End: 2022-01-28
Payer: COMMERCIAL

## 2022-01-28 DIAGNOSIS — F90.0 ADHD (ATTENTION DEFICIT HYPERACTIVITY DISORDER), INATTENTIVE TYPE: ICD-10-CM

## 2022-01-28 RX ORDER — DEXTROAMPHETAMINE SACCHARATE, AMPHETAMINE ASPARTATE, DEXTROAMPHETAMINE SULFATE AND AMPHETAMINE SULFATE 1.25; 1.25; 1.25; 1.25 MG/1; MG/1; MG/1; MG/1
TABLET ORAL
Qty: 60 TABLET | Refills: 0 | Status: SHIPPED | OUTPATIENT
Start: 2022-01-28 | End: 2022-02-04

## 2022-01-28 RX ORDER — DEXTROAMPHETAMINE SACCHARATE, AMPHETAMINE ASPARTATE, DEXTROAMPHETAMINE SULFATE AND AMPHETAMINE SULFATE 1.25; 1.25; 1.25; 1.25 MG/1; MG/1; MG/1; MG/1
TABLET ORAL
Qty: 60 TABLET | Refills: 0 | Status: CANCELLED | OUTPATIENT
Start: 2022-01-28

## 2022-02-03 NOTE — PROGRESS NOTES
Progress Note    SUBJECTIVE:  Arlet Segura is an 36 year old, , who requests an Annual Preventive Exam. Patient is 5 months postpartum.    Concerns today include:     1. Area of tissue near labial laceration continues to be irritating: Feels that the suture came out and the area didn't heal properly.  Describes it as itchy at times and irritated. Not having penetrative intercourse because of the discomfort.     2. Hx of abnormal pap smear, due today  17: LSIL Pap, + HR HPV types 16 and other.  17:[place] Bx CHRISTIANE 3  17: LEEP CHRISTIANE I, margins free of Dysplasia.  19: NIL Pap, + HR HPV (not 16 or 18) result. Plan [facility].   19: K[place] Bx CHRISTIANE I, ECC Neg for Dysplasia. Plan cotest in 1 year. (done at the [facility])  2021 NIL HPV positive not 16/18, repeat pap in one year.    Menstruation: last month light spotting, colored discharge, no full periods.     Feeding baby: breastfeeding, pumping now that she's back to work, breast feeding once a day.     Contraception: Using condoms and pleased with this method of contraception    Social Hx: enjoying 5 month old boy, he is very interactive; growing and feeding well.   Works at [employer] on [employer]. Pre clinical, at the [employer] Her son started  last week and is doing well.      Mental health: is feeling good and tired. No mental health concerns today.      Diet/Exercise: Used to be a runner but hasn't felt ready to get into it. On feet a lot for work! Huge appetite. Constant breastfeeding hunger.     Menstrual History:  Menstrual History 2020   LAST MENSTRUAL PERIOD 2020 -   Period Cycle (Days) - - 28   Period Duration (Days) - - 5   Method of Contraception - - None   Period Pattern - - Regular   Menstrual Flow - - Moderate;Heavy   Dysmenorrhea - - Moderate   PMS Symptoms - - Cramping   Reviewed Today - - Yes     Mammogram current: not applicable; pt does have a hx of left breast  "mass that was biopsied and marked; pt denies change in this area.     Last Mammogram:   MA Post Procedure Left  Clinical breast exam done today: Left breast palpable mass that is mobile at 8 o'clock. Patient states when biopsy was done probe was left there and it has been this way since. She states there have been no changes.    Addendum Date: 7/18/2019    Addendum: Left breast, 8:00, 5 cm from the nipple, ultrasound-guided core biopsy: -Fibroadenoma -No atypical or malignant findings Concordant with imaging. Recommendation:  Clinical follow-up. LIAM MONGE MD    Result Date: 7/18/2019  Narrative: Exam: Ultrasound-guided core needle biopsy left breast and left breast postbiopsy mammogram DIAGNOSIS: Suspicious lesion at 7/12/2019 ultrasound PROCEDURE: Procedure, risks, alternatives, explained and discussed with the patient. Signed consent obtained. Using ultrasound guidance, aseptic technique, local anesthesia, 14-gauge automated needle through a 13-gauge cannula, suspicious mass on the left was sampled. 2 cores were obtained. A BiomarC (shaped liked a \"barbell\") clip was placed. Pressure was held the biopsy sites for 10 minutes following biopsy. Postbiopsy mammogram confirms marker deployment. The area was bandaged. An icepack was applied. Discharge instructions were given to the patient. There were no apparent complications. SALVADOR WOLFF MD    US Breast Left Limited 1-3 Quadrants    Result Date: 7/15/2019  Narrative: Examinations: MA DIAGNOSTIC BILATERAL W/ THOMAS, US BREAST LEFT LIMITED 1-3 QUADRANTS 7/12/2019 2:06 PM Comparisons: Breast ultrasound 2/18/2014. History/Family history: Left breast lump at the 8:00 position 1 cm from the nipple. Patient cannot palpate lump.  She reports a different palpable lump. Technique:  Tomosynthesis BREAST DENSITY: Heterogeneously dense. Findings:   Partially visualized oval isodense mass inferior medial left breast, posteriorly near 8:00. There are no suspicious findings within " "the right breast. Ultrasound: Targeted, real-time ultrasound evaluation was performed by the technologist and radiologist. Palpable lump identified by patient corresponds to an oval, microlobulated, hypoechoic, mass 8:00 position,  5 cm from nipple measuring 1.8 x 1.3 x 0.7 cm. This correlates with mammogram.  Normal appearing breast tissue in area palpated by clinician 8:00 position 1 cm from nipple.      US Breast Biopsy Core Needle Left    Addendum Date: 7/18/2019    Addendum: Left breast, 8:00, 5 cm from the nipple, ultrasound-guided core biopsy: -Fibroadenoma -No atypical or malignant findings Concordant with imaging. Recommendation:  Clinical follow-up. LIAM MONGE MD    Result Date: 7/18/2019  Narrative: Exam: Ultrasound-guided core needle biopsy left breast and left breast postbiopsy mammogram DIAGNOSIS: Suspicious lesion at 7/12/2019 ultrasound PROCEDURE: Procedure, risks, alternatives, explained and discussed with the patient. Signed consent obtained. Using ultrasound guidance, aseptic technique, local anesthesia, 14-gauge automated needle through a 13-gauge cannula, suspicious mass on the left was sampled. 2 cores were obtained. A BiomarC (shaped liked a \"barbell\") clip was placed. Pressure was held the biopsy sites for 10 minutes following biopsy. Postbiopsy mammogram confirms marker deployment. The area was bandaged. An icepack was applied. Discharge instructions were given to the patient. There were no apparent complications. SALVADOR WOLFF MD     Last Colonoscopy: not applicable     HISTORY:  Prenatal Vit-Fe Fumarate-FA (PRENATAL MULTIVITAMIN PLUS IRON) 27-0.8 MG TABS per tablet, Take 1 tablet by mouth daily  amphetamine-dextroamphetamine (ADDERALL) 5 MG tablet, Take one tab (5mg) by mouth BID    No current facility-administered medications on file prior to visit.    No Known Allergies  Immunization History   Administered Date(s) Administered     COVID-19,PF,Jairo 04/08/2021     HEPA 12/17/2009     " HepB 1996, 1996, 1997     Hib (PRP-T) 1989     Influenza Vaccine IM > 6 months Valent IIV4 (Alfuria,Fluzone) 10/16/2017, 10/15/2019     MMR 1988, 1992     Meningococcal (Menomune ) 2005     Poliovirus, inactivated (IPV) 1987, 1987, 1987, 1992     TD (ADULT, 7+) 2005     Tdap (Adacel,Boostrix) 2011, 2021     OB History    Para Term  AB Living   1 1 1 0 0 1   SAB IAB Ectopic Multiple Live Births   0 0 0 0 1     Past Medical History:   Diagnosis Date     Abnormal cervical Pap smear with positive HPV DNA test     last pap 2013 nl     ADHD      Breast disorder 2019    fibroadenoma     Bulimia since 2014     Cervical high risk HPV (human papillomavirus) test positive 2019    See problem list.      Normal first pregnancy confirmed, antepartum 2021    [facility] CNM pt Partner's name: Alex [ ] Entered on Epic list [x ] NOB folder [x ] Dating [ ] First tri screen ordered; declined [ ] QS/AFP ordered declined [ ] Fetal anatomy US ordered [x ] Rubella immune [ x] Hep B immune [x] Pap--NILM 2021  [ x] NO plan utox in labor  _____________________________________ [x ] EOB folder [ ] PP Contraception plan: If tubal,consent date: [x ] Labor plans: epidural     Past Surgical History:   Procedure Laterality Date     BIOPSY  2019     GYN SURGERY  2017    leep     Family History   Problem Relation Age of Onset     Hypertension Father      Diabetes Sister         type 1 dx age 3     Thyroid Disease Mother         hypo     Psychotic Disorder Maternal Aunt         bi-polar     Psychotic Disorder Sister         depression     Hypertension Brother      Breast Cancer Maternal Grandmother 70     Social History     Socioeconomic History     Marital status:      Spouse name: Alex     Number of children: 1,  5 month old son.      Years of education: 10     Highest education level: PhD   Occupational History      "[employer]    Tobacco Use     Smoking status: Never Smoker     Smokeless tobacco: Never Used   Substance and Sexual Activity     Alcohol use: Not Currently     Comment: Five drinks a week, average     Drug use: No     Sexual activity: Yes     Partners: Male     Birth control/protection: condoms   Other Topics Concern      Service No     Blood Transfusions No     Caffeine Concern No     Occupational Exposure No     Hobby Hazards No     Sleep Concern No     Stress Concern Yes     Weight Concern Yes     Special Diet No     Back Care No     Exercise Yes     Comment: running and soccer 6-7 x a week      Bike Helmet Yes     Seat Belt Yes     Self-Exams Not Asked   Social History Narrative    Moved here from [facility]. Currently lives with her  and her sister in [place]. Identifies them as good support.     Family History   Problem Relation Age of Onset     Hypertension Father      Diabetes Sister         type 1 dx age 3     Thyroid Disease Mother         hypo     Psychotic Disorder Maternal Aunt         bi-polar     Psychotic Disorder Sister         depression     Hypertension Brother      Breast Cancer Maternal Grandmother 70     ROS  ROS: 10 point ROS neg other than the symptoms noted above in the HPI.    PHQ-9 SCORE 10/20/2021 2/4/2022   PHQ-9 Total Score 7 5   Some encounter information is confidential and restricted. Go to Review Flowsheets activity to see all data.     ETELVINA-7 SCORE 2/16/2021 10/20/2021 2/4/2022   Total Score - - 6 (mild anxiety)   Total Score 3 7 6     EXAM:  Currently breast feeding and pumping. /75   Pulse 78   Ht 1.6 m (5' 3\")   Wt 47.5 kg (104 lb 11.2 oz)   BMI 18.55 kg/m    General - pleasant female in no acute distress.  Skin - no suspicious lesions or rashes  EENT-  euthyroid with out palpable nodules  Neck - supple without lymphadenopathy.  Lungs - clear to auscultation bilaterally.  Heart - regular rate and rhythm without murmur.  Abdomen - soft, " nontender, nondistended, no masses or organomegaly noted.  Musculoskeletal - no gross deformities.  Neurological - normal strength, sensation, and mental status.    Breast Exam:  Breast: Without visible skin changes. No dimpling or lesions seen.   Breasts supple, non-tender with palpation, no nodularity, or nipple discharge noted bilaterally. Axillary nodes negative. 1 x1 cm palpable mass at 8:00 on left breast, consistent with area that was previously evaluated by US and Biopsy.     Pelvic Exam:  EG/BUS: Normal genital architecture without lesions, erythema or abnormal secretions. Bartholin's, Urethra, [place]'s glands are normal.   - Granulomas present: 1) Right side of introitus (10 o'clock position), 1 x2 cm granuloma noted with redness and tenderness to palpation and 2) 5mm by 5mm area of mild granulation tissue at 6 o'clock position on introitus.   Urethral meatus: normal   Urethra: no masses, tenderness, or scarring   Bladder: no masses or tenderness   Vagina: moist, pink, rugae with creamy, white and odorless secretions.   Cervix: no lesions and pink, moist, closed, without lesion  Rectum:anus normal     ASSESSMENT:  Encounter Diagnoses   Name Primary?     H/O LEEP      Vaginal dryness      Granulation tissue      Granulation tissue at obstetrical laceration site      Screening for cholesterol level      Visit for preventive health examination Yes       PLAN:   1. Silver Nitrate applied to granulomas at 6 o'clock and 8 o'clock positions on introitus. Pt tolerated well. Counseled that another application may be needed to get complete resolution. Recommended pt return to clinic if still having discomfort in this area in 1-2 weeks.  2. Pap collected and patient will be sent results via [employer] with follow-up plan.   3. Lipid screening ordered; recommended fasting sample   4. Vaginal dryness: discussed options for management. Pt opts to try vaginal estrogen.  Rx placed. If not covered well by insurance, pt will  try vaginal moisturizer or coconut oil first.    Contraception: pt opts to continue condoms.   No change in breast mass that was previously marked after biopsy. Mammogram to start at age 42.     Additional teaching done at this visit regarding self breast exam, exercise, birth control, mental health and weight/diet.    Return to clinic in one year.  Follow-up as needed.    I, Saritha Liao, completed the PFSH and ROS. I then acted as a scribe for KAEL Deleon, for the remainder of the visit.  BSN, RN, NP DNP Student    I agree with the PFSH and ROS as completed by the KAEL Student, except for changes made by me.  The remainder of the encounter was performed by me and scribed by the KAEL Student.  The scribed note accurately reflects my personal services and decisions made by me.  Rubi Mccain DNP, APRN, KAEL

## 2022-02-04 ENCOUNTER — OFFICE VISIT (OUTPATIENT)
Dept: OBGYN | Facility: CLINIC | Age: 37
End: 2022-02-04
Attending: ADVANCED PRACTICE MIDWIFE
Payer: COMMERCIAL

## 2022-02-04 VITALS
BODY MASS INDEX: 18.55 KG/M2 | HEIGHT: 63 IN | DIASTOLIC BLOOD PRESSURE: 75 MMHG | SYSTOLIC BLOOD PRESSURE: 116 MMHG | HEART RATE: 78 BPM | WEIGHT: 104.7 LBS

## 2022-02-04 DIAGNOSIS — Z13.220 SCREENING FOR CHOLESTEROL LEVEL: ICD-10-CM

## 2022-02-04 DIAGNOSIS — L92.9 GRANULATION TISSUE: ICD-10-CM

## 2022-02-04 DIAGNOSIS — Z98.890 H/O LEEP: ICD-10-CM

## 2022-02-04 DIAGNOSIS — Z00.00 VISIT FOR PREVENTIVE HEALTH EXAMINATION: Primary | ICD-10-CM

## 2022-02-04 DIAGNOSIS — N89.8 VAGINAL DRYNESS: ICD-10-CM

## 2022-02-04 DIAGNOSIS — L92.9 GRANULATION TISSUE AT OBSTETRICAL LACERATION SITE: ICD-10-CM

## 2022-02-04 PROCEDURE — 88175 CYTOPATH C/V AUTO FLUID REDO: CPT | Performed by: NURSE PRACTITIONER

## 2022-02-04 PROCEDURE — G0463 HOSPITAL OUTPT CLINIC VISIT: HCPCS

## 2022-02-04 PROCEDURE — 87624 HPV HI-RISK TYP POOLED RSLT: CPT | Performed by: NURSE PRACTITIONER

## 2022-02-04 PROCEDURE — 99395 PREV VISIT EST AGE 18-39: CPT | Performed by: NURSE PRACTITIONER

## 2022-02-04 RX ORDER — ESTRADIOL 0.1 MG/G
2 CREAM VAGINAL
Qty: 42.5 G | Refills: 1 | Status: SHIPPED | OUTPATIENT
Start: 2022-02-07 | End: 2023-10-11

## 2022-02-04 ASSESSMENT — ENCOUNTER SYMPTOMS
HOT FLASHES: 0
DECREASED LIBIDO: 1

## 2022-02-04 ASSESSMENT — MIFFLIN-ST. JEOR: SCORE: 1144.05

## 2022-02-04 ASSESSMENT — ANXIETY QUESTIONNAIRES
GAD7 TOTAL SCORE: 6
7. FEELING AFRAID AS IF SOMETHING AWFUL MIGHT HAPPEN: SEVERAL DAYS
1. FEELING NERVOUS, ANXIOUS, OR ON EDGE: SEVERAL DAYS
4. TROUBLE RELAXING: SEVERAL DAYS
5. BEING SO RESTLESS THAT IT IS HARD TO SIT STILL: NOT AT ALL
7. FEELING AFRAID AS IF SOMETHING AWFUL MIGHT HAPPEN: SEVERAL DAYS
3. WORRYING TOO MUCH ABOUT DIFFERENT THINGS: SEVERAL DAYS
6. BECOMING EASILY ANNOYED OR IRRITABLE: SEVERAL DAYS
GAD7 TOTAL SCORE: 6
2. NOT BEING ABLE TO STOP OR CONTROL WORRYING: SEVERAL DAYS

## 2022-02-04 ASSESSMENT — PAIN SCALES - GENERAL: PAINLEVEL: NO PAIN (0)

## 2022-02-04 ASSESSMENT — PATIENT HEALTH QUESTIONNAIRE - PHQ9: SUM OF ALL RESPONSES TO PHQ QUESTIONS 1-9: 5

## 2022-02-04 NOTE — LETTER
2022       RE: Arlet Segura  [address]  [facility] [address]     Dear Colleague,    Thank you for referring your patient, Arlet Segura, to the [facility]. Please see a copy of my visit note below.      Progress Note    SUBJECTIVE:  Arlet Segura is an 36 year old, , who requests an Annual Preventive Exam. Patient is 5 months postpartum.    Concerns today include:     1. Area of tissue near labial laceration continues to be irritating: Feels that the suture came out and the area didn't heal properly.  Describes it as itchy at times and irritated. Not having penetrative intercourse because of the discomfort.     2. Hx of abnormal pap smear, due today  17: LSIL Pap, + HR HPV types 16 and other.  17:[place] Bx CHRISTIANE 3  17: LEEP CHRISTIANE I, margins free of Dysplasia.  19: NIL Pap, + HR HPV (not 16 or 18) result. Plan [place].   19: [place] Bx CHRISTIANE I, ECC Neg for Dysplasia. Plan cotest in 1 year. (done at the [facility])  2021 NIL HPV positive not 16/18, repeat pap in one year.    Menstruation: last month light spotting, colored discharge, no full periods.     Feeding baby: breastfeeding, pumping now that she's back to work, breast feeding once a day.     Contraception: Using condoms and pleased with this method of contraception    Social Hx: enjoying 5 month old boy, he is very interactive; growing and feeding well.   Works at [employer] on [employer]. Pre clinical, at the [employer] . Her son started  last week and is doing well.      Mental health: is feeling good and tired. No mental health concerns today.      Diet/Exercise: Used to be a runner but hasn't felt ready to get into it. On feet a lot for work! Huge appetite. Constant breastfeeding hunger.     Menstrual History:  Menstrual History 2020   LAST MENSTRUAL PERIOD 2020  "11/23/2020 -   Period Cycle (Days) - - 28   Period Duration (Days) - - 5   Method of Contraception - - None   Period Pattern - - Regular   Menstrual Flow - - Moderate;Heavy   Dysmenorrhea - - Moderate   PMS Symptoms - - Cramping   Reviewed Today - - Yes     Mammogram current: not applicable; pt does have a hx of left breast mass that was biopsied and marked; pt denies change in this area.     Last Mammogram:   MA Post Procedure Left  Clinical breast exam done today: Left breast palpable mass that is mobile at 8 o'clock. Patient states when biopsy was done probe was left there and it has been this way since. She states there have been no changes.    Addendum Date: 7/18/2019    Addendum: Left breast, 8:00, 5 cm from the nipple, ultrasound-guided core biopsy: -Fibroadenoma -No atypical or malignant findings Concordant with imaging. Recommendation:  Clinical follow-up. LIAM MONGE MD    Result Date: 7/18/2019  Narrative: Exam: Ultrasound-guided core needle biopsy left breast and left breast postbiopsy mammogram DIAGNOSIS: Suspicious lesion at 7/12/2019 ultrasound PROCEDURE: Procedure, risks, alternatives, explained and discussed with the patient. Signed consent obtained. Using ultrasound guidance, aseptic technique, local anesthesia, 14-gauge automated needle through a 13-gauge cannula, suspicious mass on the left was sampled. 2 cores were obtained. A BiomarC (shaped liked a \"barbell\") clip was placed. Pressure was held the biopsy sites for 10 minutes following biopsy. Postbiopsy mammogram confirms marker deployment. The area was bandaged. An icepack was applied. Discharge instructions were given to the patient. There were no apparent complications. SALVADOR WOLFF MD    US Breast Left Limited 1-3 Quadrants    Result Date: 7/15/2019  Narrative: Examinations: MA DIAGNOSTIC BILATERAL W/ THOMAS, US BREAST LEFT LIMITED 1-3 QUADRANTS 7/12/2019 2:06 PM Comparisons: Breast ultrasound 2/18/2014. History/Family history: Left breast " "lump at the 8:00 position 1 cm from the nipple. Patient cannot palpate lump.  She reports a different palpable lump. Technique:  Tomosynthesis BREAST DENSITY: Heterogeneously dense. Findings:   Partially visualized oval isodense mass inferior medial left breast, posteriorly near 8:00. There are no suspicious findings within the right breast. Ultrasound: Targeted, real-time ultrasound evaluation was performed by the technologist and radiologist. Palpable lump identified by patient corresponds to an oval, microlobulated, hypoechoic, mass 8:00 position,  5 cm from nipple measuring 1.8 x 1.3 x 0.7 cm. This correlates with mammogram.  Normal appearing breast tissue in area palpated by clinician 8:00 position 1 cm from nipple.      US Breast Biopsy Core Needle Left    Addendum Date: 7/18/2019    Addendum: Left breast, 8:00, 5 cm from the nipple, ultrasound-guided core biopsy: -Fibroadenoma -No atypical or malignant findings Concordant with imaging. Recommendation:  Clinical follow-up. LIAM MONGE MD    Result Date: 7/18/2019  Narrative: Exam: Ultrasound-guided core needle biopsy left breast and left breast postbiopsy mammogram DIAGNOSIS: Suspicious lesion at 7/12/2019 ultrasound PROCEDURE: Procedure, risks, alternatives, explained and discussed with the patient. Signed consent obtained. Using ultrasound guidance, aseptic technique, local anesthesia, 14-gauge automated needle through a 13-gauge cannula, suspicious mass on the left was sampled. 2 cores were obtained. A BiomarC (shaped liked a \"barbell\") clip was placed. Pressure was held the biopsy sites for 10 minutes following biopsy. Postbiopsy mammogram confirms marker deployment. The area was bandaged. An icepack was applied. Discharge instructions were given to the patient. There were no apparent complications. SALVADOR WOLFF MD     Last Colonoscopy: not applicable     HISTORY:  Prenatal Vit-Fe Fumarate-FA (PRENATAL MULTIVITAMIN PLUS IRON) 27-0.8 MG TABS per tablet, " Take 1 tablet by mouth daily  amphetamine-dextroamphetamine (ADDERALL) 5 MG tablet, Take one tab (5mg) by mouth BID    No current facility-administered medications on file prior to visit.    No Known Allergies  Immunization History   Administered Date(s) Administered     COVID-19,PF,Jairo 2021     HEPA 2009     HepB 1996, 1996, 1997     Hib (PRP-T) 1989     Influenza Vaccine IM > 6 months Valent IIV4 (Alfuria,Fluzone) 10/16/2017, 10/15/2019     MMR 1988, 1992     Meningococcal (Menomune ) 2005     Poliovirus, inactivated (IPV) 1987, 1987, 1987, 1992     TD (ADULT, 7+) 2005     Tdap (Adacel,Boostrix) 2011, 2021     OB History    Para Term  AB Living   1 1 1 0 0 1   SAB IAB Ectopic Multiple Live Births   0 0 0 0 1     Past Medical History:   Diagnosis Date     Abnormal cervical Pap smear with positive HPV DNA test     last pap 2013 nl     ADHD      Breast disorder 2019    fibroadenoma     Bulimia since 2014     Cervical high risk HPV (human papillomavirus) test positive 2019    See problem list.      Normal first pregnancy confirmed, antepartum 2021    [facility] CNM pt Partner's name: Alex [ ] Entered on Epic list [x ] NOB folder [x ] Dating [ ] First tri screen ordered; declined [ ] QS/AFP ordered declined [ ] Fetal anatomy US ordered [x ] Rubella immune [ x] Hep B immune [x] Pap--NILM 2021  [ x] NO plan utox in labor  _____________________________________ [x ] EOB folder [ ] PP Contraception plan: If tubal,consent date: [x ] Labor plans: epidural     Past Surgical History:   Procedure Laterality Date     BIOPSY  2019     GYN SURGERY  2017    leep     Family History   Problem Relation Age of Onset     Hypertension Father      Diabetes Sister         type 1 dx age 3     Thyroid Disease Mother         hypo     Psychotic Disorder Maternal Aunt         bi-polar     Psychotic  "Disorder Sister         depression     Hypertension Brother      Breast Cancer Maternal Grandmother 70     Social History     Socioeconomic History     Marital status:      Spouse name: Alex     Number of children: 1,  5 month old son.      Years of education: 10     Highest education level: PhD   Occupational History     [employer]    Tobacco Use     Smoking status: Never Smoker     Smokeless tobacco: Never Used   Substance and Sexual Activity     Alcohol use: Not Currently     Comment: Five drinks a week, average     Drug use: No     Sexual activity: Yes     Partners: Male     Birth control/protection: condoms   Other Topics Concern      Service No     Blood Transfusions No     Caffeine Concern No     Occupational Exposure No     Hobby Hazards No     Sleep Concern No     Stress Concern Yes     Weight Concern Yes     Special Diet No     Back Care No     Exercise Yes     Comment: running and soccer 6-7 x a week      Bike Helmet Yes     Seat Belt Yes     Self-Exams Not Asked   Social History Narrative    Moved here from [facility]. Currently lives with her  and her sister in [place]. Identifies them as good support.     Family History   Problem Relation Age of Onset     Hypertension Father      Diabetes Sister         type 1 dx age 3     Thyroid Disease Mother         hypo     Psychotic Disorder Maternal Aunt         bi-polar     Psychotic Disorder Sister         depression     Hypertension Brother      Breast Cancer Maternal Grandmother 70     ROS  ROS: 10 point ROS neg other than the symptoms noted above in the HPI.    PHQ-9 SCORE 10/20/2021 2/4/2022   PHQ-9 Total Score 7 5   Some encounter information is confidential and restricted. Go to Review Flowsheets activity to see all data.     ETELVINA-7 SCORE 2/16/2021 10/20/2021 2/4/2022   Total Score - - 6 (mild anxiety)   Total Score 3 7 6     EXAM:  Currently breast feeding and pumping. /75   Pulse 78   Ht 1.6 m (5' 3\")   Wt " 47.5 kg (104 lb 11.2 oz)   BMI 18.55 kg/m    General - pleasant female in no acute distress.  Skin - no suspicious lesions or rashes  EENT-  euthyroid with out palpable nodules  Neck - supple without lymphadenopathy.  Lungs - clear to auscultation bilaterally.  Heart - regular rate and rhythm without murmur.  Abdomen - soft, nontender, nondistended, no masses or organomegaly noted.  Musculoskeletal - no gross deformities.  Neurological - normal strength, sensation, and mental status.    Breast Exam:  Breast: Without visible skin changes. No dimpling or lesions seen.   Breasts supple, non-tender with palpation, no nodularity, or nipple discharge noted bilaterally. Axillary nodes negative. 1 x1 cm palpable mass at 8:00 on left breast, consistent with area that was previously evaluated by US and Biopsy.     Pelvic Exam:  EG/BUS: Normal genital architecture without lesions, erythema or abnormal secretions. Bartholin's, Urethra, [place]'s glands are normal.   - Granulomas present: 1) Right side of introitus (10 o'clock position), 1 x2 cm granuloma noted with redness and tenderness to palpation and 2) 5mm by 5mm area of mild granulation tissue at 6 o'clock position on introitus.   Urethral meatus: normal   Urethra: no masses, tenderness, or scarring   Bladder: no masses or tenderness   Vagina: moist, pink, rugae with creamy, white and odorless secretions.   Cervix: no lesions and pink, moist, closed, without lesion  Rectum:anus normal     ASSESSMENT:  Encounter Diagnoses   Name Primary?     H/O LEEP      Vaginal dryness      Granulation tissue      Granulation tissue at obstetrical laceration site      Screening for cholesterol level      Visit for preventive health examination Yes       PLAN:   1. Silver Nitrate applied to granulomas at 6 o'clock and 8 o'clock positions on introitus. Pt tolerated well. Counseled that another application may be needed to get complete resolution. Recommended pt return to clinic if still  having discomfort in this area in 1-2 weeks.  2. Pap collected and patient will be sent results via [employer] with follow-up plan.   3. Lipid screening ordered; recommended fasting sample   4. Vaginal dryness: discussed options for management. Pt opts to try vaginal estrogen.  Rx placed. If not covered well by insurance, pt will try vaginal moisturizer or coconut oil first.    Contraception: pt opts to continue condoms.   No change in breast mass that was previously marked after biopsy. Mammogram to start at age 42.     Additional teaching done at this visit regarding self breast exam, exercise, birth control, mental health and weight/diet.    Return to clinic in one year.  Follow-up as needed.    I, Saritha Liao, completed the PFSH and ROS. I then acted as a scribe for KAEL Deleon, for the remainder of the visit.  BSN, RN, KAEL DNP Student    I agree with the PFSH and ROS as completed by the KAEL Student, except for changes made by me.  The remainder of the encounter was performed by me and scribed by the KAEL Student.  The scribed note accurately reflects my personal services and decisions made by me.  Rubi Mccain DNP, APRN, KAEL

## 2022-02-05 ASSESSMENT — ANXIETY QUESTIONNAIRES: GAD7 TOTAL SCORE: 6

## 2022-02-09 LAB
BKR LAB AP GYN ADEQUACY: NORMAL
BKR LAB AP GYN INTERPRETATION: NORMAL
BKR LAB AP HPV REFLEX: NORMAL
BKR LAB AP PREVIOUS ABNL DX: NORMAL
BKR LAB AP PREVIOUS ABNORMAL: NORMAL
PATH REPORT.COMMENTS IMP SPEC: NORMAL
PATH REPORT.COMMENTS IMP SPEC: NORMAL
PATH REPORT.RELEVANT HX SPEC: NORMAL

## 2022-02-11 LAB
HUMAN PAPILLOMA VIRUS 16 DNA: NEGATIVE
HUMAN PAPILLOMA VIRUS 18 DNA: NEGATIVE
HUMAN PAPILLOMA VIRUS FINAL DIAGNOSIS: NORMAL
HUMAN PAPILLOMA VIRUS OTHER HR: NEGATIVE

## 2022-02-18 PROBLEM — Z98.890 H/O LEEP: Status: ACTIVE | Noted: 2019-05-28

## 2022-03-15 ENCOUNTER — MEDICAL CORRESPONDENCE (OUTPATIENT)
Dept: HEALTH INFORMATION MANAGEMENT | Facility: CLINIC | Age: 37
End: 2022-03-15
Payer: COMMERCIAL

## 2022-04-08 ENCOUNTER — OFFICE VISIT (OUTPATIENT)
Dept: OBGYN | Facility: CLINIC | Age: 37
End: 2022-04-08
Payer: COMMERCIAL

## 2022-04-08 VITALS
TEMPERATURE: 97.9 F | OXYGEN SATURATION: 95 % | BODY MASS INDEX: 18.64 KG/M2 | HEART RATE: 76 BPM | DIASTOLIC BLOOD PRESSURE: 71 MMHG | SYSTOLIC BLOOD PRESSURE: 102 MMHG | WEIGHT: 105.2 LBS

## 2022-04-08 DIAGNOSIS — S31.41XS: Primary | ICD-10-CM

## 2022-04-08 PROCEDURE — 99213 OFFICE O/P EST LOW 20 MIN: CPT | Performed by: OBSTETRICS & GYNECOLOGY

## 2022-04-08 ASSESSMENT — PATIENT HEALTH QUESTIONNAIRE - PHQ9: SUM OF ALL RESPONSES TO PHQ QUESTIONS 1-9: 6

## 2022-04-12 ENCOUNTER — MYC MEDICAL ADVICE (OUTPATIENT)
Dept: PSYCHIATRY | Facility: CLINIC | Age: 37
End: 2022-04-12
Payer: COMMERCIAL

## 2022-04-12 ENCOUNTER — VIRTUAL VISIT (OUTPATIENT)
Dept: PSYCHIATRY | Facility: CLINIC | Age: 37
End: 2022-04-12
Attending: PSYCHIATRY & NEUROLOGY
Payer: COMMERCIAL

## 2022-04-12 ENCOUNTER — TELEPHONE (OUTPATIENT)
Dept: PSYCHIATRY | Facility: CLINIC | Age: 37
End: 2022-04-12
Payer: COMMERCIAL

## 2022-04-12 DIAGNOSIS — F90.0 ADHD (ATTENTION DEFICIT HYPERACTIVITY DISORDER), INATTENTIVE TYPE: Primary | ICD-10-CM

## 2022-04-12 NOTE — PROGRESS NOTES
Arlet Segura is a 36 year old who has consented to receive services via billable video visit.      Pt will join video visit via: [employer]  If there are problems joining the visit, send backup video invite via: Joanne      Originating Location (patient location): Place of employment  Distant Location (provider location): [facility] [place] [facility]    Will anyone else be joining the video visit? No    How would you prefer to obtain AVS?: Joanne

## 2022-04-12 NOTE — PATIENT INSTRUCTIONS
**For crisis resources, please see the information at the end of this document**   Patient Education    Thank you for coming to the [facility] [place] [facility].    Lab Testing:  If you had lab testing today and your results are reassuring or normal they will be mailed to you or sent through [employer] within 7 days. If the lab tests need quick action we will call you with the results. The phone number we will call with results is # [phone]. If this is not the best number please call our clinic and change the number.     Medication Refills:  If you need any refills please call your pharmacy and they will contact us. Our fax number for refills is [phone]. Please allow three business days for refill processing.   If you need to change to a different pharmacy, please contact the new pharmacy directly. The new pharmacy will help you get your medications transferred.     Contact Us:  Please call [phone] during business hours (8-5:00 M-F).  If you have medication related questions after clinic hours, or on the weekend, please call [phone].    Financial Assistance [phone]  Medical Records [phone]       MENTAL HEALTH CRISIS RESOURCES:  For a emergency help, please call 911 or go to the nearest Emergency Department.     Emergency Walk-In Options:   [facility] Unit @ [facility] ([place]): [phone] - Specialized mental health emergency area designed to be calming  [facility] ([place]): [phone]  [facility] [place] [place] [facility] ([place]): [phone]  [facility]): [phone]    County Crisis Information:   [place]: [phone]  [place]: [phone]  [place] ([place] - Adult: [phone]     Child: [phone]  [place] - Adult: [phone]     Child: [phone]  [place]: [phone]  List of all [facility] resources:    https://[place].gov/dhs/people-we-serve/adults/health-care/mental-health/resources/crisis-contacts.jsp    National Crisis Information:   Crisis Text Line: Text  [place]  to 832937  National Suicide Prevention Lifeline: 8-876-885-TALK (1-933.280.4278)       For online chat options, visit https://suicidepreventionlifeline.org/chat/  Poison Control Center: 6-029-983-6462  Trans Lifeline: 8-484-811-0339 - Hotline for transgender people of all ages  The [employer] Project: 9-096-330-1426 - Hotline for LGBT youth     For Non-Emergency Support:   Fast Tracker: Mental Health & Substance Use Disorder Resources -   https://www.[employer].org/

## 2022-04-12 NOTE — PROGRESS NOTES
[facility]  Psychiatry Clinic  PSYCHIATRY PROGRESS NOTE     Pt not seen  We did not connect via [employer] messages  Will reschedule  Will check on need for refills      ATTENDING PHYSICIAN:  Candida Redd MD

## 2022-04-12 NOTE — TELEPHONE ENCOUNTER
----- Message from Candida Redd MD sent at 4/12/2022  2:59 PM CDT -----  Regarding: missed the 230 pt  Hello All,  I was to see this person at 230  I was 15min late & when I went into [facility] she was not there/ I went out and came back in- nothing  And  I called her and no answer    She typically would wait and attend the appt, very consistent    So, can someone please call her later and make sure she is ok?  I will set up some refills and plan to RS to next available May 10 or 11  I will ask scheduling to call her for that    Thx!    So, just a checkin and let her know that I did try to connect with her albeit 15min late

## 2022-04-12 NOTE — TELEPHONE ENCOUNTER
Writer attempted to contact pt via phone for follow up regarding missed appt with provider today but obtained no answer.   Will continue to follow up with pt.   Nuria Giraldo RN

## 2022-04-12 NOTE — TELEPHONE ENCOUNTER
Writer placed a call to pt and obtained no response. Will continue to follow up with pt.   Nuria Giraldo RN

## 2022-04-13 NOTE — TELEPHONE ENCOUNTER
----- Message from Candida Redd MD sent at 4/13/2022  9:11 AM CDT -----  Regarding: please contact pt  Kaylee All,    This pt was seemingly checked in yesterday for a video appt  I was 15min late (about 15) and [facility]  I left the video room and went back in, still not there.  I called her, no answer.    I went to give her a refill today, reschedule to May sometime and saw she last refilled stimulant in Dec --which is when I saw her last.    So can someone please call her today and:  -tell her I am sorry I missed her yesterday  -see if she is still taking the stimulant and does she need more?  -see if she wants to reschedule and if so, connect her to scheduling    Then LMK what happens  Thanks very much

## 2022-04-13 NOTE — TELEPHONE ENCOUNTER
Writer placed a call to pt to follow up on 4/12/22 and obtained no answer. DVM left. Will continue to try to connect with pt.   Nuria Giraldo RN

## 2022-05-02 ENCOUNTER — MYC MEDICAL ADVICE (OUTPATIENT)
Dept: PSYCHIATRY | Facility: CLINIC | Age: 37
End: 2022-05-02
Payer: COMMERCIAL

## 2022-05-02 DIAGNOSIS — F90.0 ADHD (ATTENTION DEFICIT HYPERACTIVITY DISORDER), INATTENTIVE TYPE: ICD-10-CM

## 2022-05-02 RX ORDER — DEXTROAMPHETAMINE SACCHARATE, AMPHETAMINE ASPARTATE, DEXTROAMPHETAMINE SULFATE AND AMPHETAMINE SULFATE 1.25; 1.25; 1.25; 1.25 MG/1; MG/1; MG/1; MG/1
TABLET ORAL
Qty: 60 TABLET | Refills: 0 | Status: SHIPPED | OUTPATIENT
Start: 2022-05-02 | End: 2022-05-12 | Stop reason: DRUGHIGH

## 2022-05-02 NOTE — TELEPHONE ENCOUNTER
amphetamine-dextroamphetamine (ADDERALL) 5 MG tablet    Last Seen: 12/14/21  RTC: 3 months  Cancel: none  No-Show: none  Next Appt: 5/11/22    Medication Requested: amphetamine-dextroamphetamine (ADDERALL) 5 MG tablet  Directions: Take one tab (5mg) by mouth BID  Qty: 60  [place]  med[place] on: 1/29/22 #60; 3/1/22 #60 and 4/1/22 #60.    Medication Refill pended and routed to provider for review and approval. Per Refill Protocol  Reason: controlled substance.

## 2022-05-04 NOTE — PROGRESS NOTES
Assessment & Plan     Perineal laceration of labia, sequela  Discussed exam findings  Discussed options for management.   Medical - trial of topical estrogen, trial of topical lidocaine or steroid.   Surgical - could attempt repair now. Would need to open/debride epithelialized edges to attempt reapproximation. Could remove projection - but would leave a disruption in the labium   - could attempt medical management until known that childbearing is completed  - consider referral to plastic surgery    Flora would like to think about these options. She is considering stopping breastfeeding soon and will see if that makes a difference.         25 minutes spent on the date of the encounter doing chart review, history and exam, documentation and further activities per the note           No follow-ups on file.    Tamiko Florence MD  [facility]   Flora is a 36 year old who presents for the following health issues     HPI   Presents for evaluation of obstetrical labial laceration.   Per recent primary care visit:  Area of tissue near labial laceration continues to be irritating: Feels that the suture came out and the area didn't heal properly.  Describes it as itchy at times and irritated. Not having penetrative intercourse because of the discomfort.     Per delivery note on 9/5/21:  Bilateral labial tears repaired with 4-0 vicryl and first degree perineal laceration repaired with 3-0 vicryl. Right labia swollen but soft to palpation. QBL 194cc. Mom and baby stable.       Persistently bothersome.   Feels stinging, pins and needles feeling  Has not tried estrogen cream  Unsure currently about future pregnancy plans. Still breastfeeding. Feels like she needs some time to recover. Hoping to wean soon.     Past Medical History:   Diagnosis Date     Abnormal cervical Pap smear with positive HPV DNA test     last pap 2/2013 nl     ADHD      Breast disorder 07/2019    fibroadenoma      Bulimia since 2008 01/19/2014     Cervical high risk HPV (human papillomavirus) test positive 05/28/2019    See problem list.      Normal first pregnancy confirmed, antepartum 2/1/2021    [facility] CN[facility] pt Partner's name: Alex [ ] Entered on Epic list [x ] NOB folder [x ] Dating [ ] First tri screen ordered; declined [ ] QS/AFP ordered declined [ ] Fetal anatomy US ordered [x ] Rubella immune [ x] Hep B immune [x] Pap--NILM 2/2021  [ x] NO plan utox in labor  _____________________________________ [x ] EOB folder [ ] PP Contraception plan: If tubal,consent date: [x ] Labor plans: epidural       Past Surgical History:   Procedure Laterality Date     BIOPSY  07/2019     GYN SURGERY  07/2017    leep       Family History   Problem Relation Age of Onset     Hypertension Father      Diabetes Sister         type 1 dx age 3     Thyroid Disease Mother         hypo     Psychotic Disorder Maternal Aunt         bi-polar     Psychotic Disorder Sister         depression     Hypertension Brother      Breast Cancer Maternal Grandmother 70       Social History     Socioeconomic History     Marital status:      Spouse name: Alex     Number of children: Not on file     Years of education: Not on file     Highest education level: Not on file   Occupational History     Not on file   Tobacco Use     Smoking status: Never Smoker     Smokeless tobacco: Never Used   Vaping Use     Vaping Use: Never used   Substance and Sexual Activity     Alcohol use: Yes     Comment: Five drinks a week, average     Drug use: No     Sexual activity: Not Currently     Partners: Male     Birth control/protection: None   Other Topics Concern      Service No     Blood Transfusions No     Caffeine Concern No     Occupational Exposure No     Hobby Hazards No     Sleep Concern No     Stress Concern Yes     Weight Concern Yes     Special Diet No     Back Care No     Exercise Yes     Comment: running and soccer 6-7 x a week      Bike Helmet Yes     Seat  Belt Yes     Self-Exams Not Asked   Social History Narrative    Moved here from [place]. Currently lives with her  and her sister in [place]. Identifies them as good support.     Social Determinants of Health     Financial Resource Strain: Not on file   Food Insecurity: Not on file   Transportation Needs: Not on file   Physical Activity: Not on file   Stress: Not on file   Social Connections: Not on file   Intimate Partner Violence: Not on file   Housing Stability: Not on file       Current Outpatient Medications   Medication     estradiol (ESTRACE) 0.1 MG/GM vaginal cream     Prenatal Vit-Fe Fumarate-FA (PRENATAL MULTIVITAMIN PLUS IRON) 27-0.8 MG TABS per tablet     amphetamine-dextroamphetamine (ADDERALL) 5 MG tablet     No current facility-administered medications for this visit.        No Known Allergies      Review of Systems   Constitutional, HEENT, cardiovascular, pulmonary, gi and gu systems are negative, except as otherwise noted.      Objective    /71   Pulse 76   Temp 97.9  F (36.6  C)   Wt 47.7 kg (105 lb 3.2 oz)   SpO2 95%   Breastfeeding Yes   BMI 18.64 kg/m    Body mass index is 18.64 kg/m .  Physical Exam   GENERAL: healthy, alert and no distress   (female): External genitalia notable for lactational atrophy - mild erythema of vestibule with pallor at exterior. Right labia minora disrupted about 2 cm from clitoris. Fully epithelialized. The anterior aspect of the disruption ends in a tag about 0.5 cm.   MS: no gross musculoskeletal defects noted, no edema  PSYCH: mentation appears normal, affect normal/bright

## 2022-05-11 ENCOUNTER — VIRTUAL VISIT (OUTPATIENT)
Dept: PSYCHIATRY | Facility: CLINIC | Age: 37
End: 2022-05-11
Attending: PSYCHIATRY & NEUROLOGY
Payer: COMMERCIAL

## 2022-05-11 DIAGNOSIS — F90.0 ADHD (ATTENTION DEFICIT HYPERACTIVITY DISORDER), INATTENTIVE TYPE: Primary | ICD-10-CM

## 2022-05-11 PROCEDURE — 99214 OFFICE O/P EST MOD 30 MIN: CPT | Mod: GT | Performed by: PSYCHIATRY & NEUROLOGY

## 2022-05-11 NOTE — PROGRESS NOTES
Arlet Segura is a 36 year old who has consented to receive services via billable video visit.      Pt will join video visit via: [employer]  If there are problems joining the visit, send backup video invite via: Text to preferred phone: [phone]      Originating Location (patient location): Place of employment  Distant Location (provider location): [facility] [place] [facility]    Will anyone else be joining the video visit? No    How would you prefer to obtain AVS?: Joanne

## 2022-05-11 NOTE — PATIENT INSTRUCTIONS
**For crisis resources, please see the information at the end of this document**   Patient Education      Treatment Plan Today:     1) Medications-  When completely done breast-feeding, can increase Adderall to:  10mg twice a day  -I decided not to increase all the way back to 15mg twice a day due to our plan to add selective serotonin reuptake inhibitor and potential for adverse effects with possible drug interaction  -can increase later, once we make sure you are ok on lower dose    -contact me in two weeks to start selective serotonin reuptake inhibitor, once you make sure the 10mg twice a day is tolerated    2) Follow-up appt with Dr Redd    June 22 1030  (not 1130)    3) Crisis numbers are below     4) Also, I am ordering labs which will contain liver tests--we should get those periodically when taking stimulant, none for quite some time    ------------------------------------------------------------------------    Thank you for coming to the [facility]    Lab Testing:  If you had lab testing today and your results are reassuring or normal they will be mailed to you or sent through St[employer] within 7 days. If the lab tests need quick action we will call you with the results. The phone number we will call with results is # [phone] (home) . If this is not the best number please call our clinic and change the number.    Medication Refills:  If you need any refills please call your pharmacy and they will contact us. Our fax number for refills is [phone]. Please allow three business for refill processing. If you need to  your refill at a new pharmacy, please contact the new pharmacy directly. The new pharmacy will help you get your medications transferred.     Scheduling:  If you have any concerns about today's visit or wish to schedule another appointment please call our office during normal business hours [phone] (8-5:00 M-F)    Contact Us:  Please call [phone] during  business hours (8-5:00 M-F).  If after clinic hours, or on the weekend, please call  [phone].    Patient Bill of Rights:  [email or web address]       MENTAL HEALTH CRISIS RESOURCES:  For a emergency help, please call 911 or go to the nearest Emergency Department.     Emergency Walk-In Options:   EmPATH Unit @ [facility] ([place]): [phone] - Specialized mental health emergency area designed to be calming  [facility] ([place]): [phone]  [facility] ([place]): [phone]  [facility]): [phone]    [facility] Crisis Information:   [place]: [phone]  [place]: [phone]  [place] ([place]) - Adult: [phone]     Child: [phone]  [place] - Adult: [phone]     Child: [phone]  [place]: [phone]  List of all [facility] resources:   https://[place].gov/dhs/people-we-serve/adults/health-care/mental-health/resources/crisis-contacts.jsp    National Crisis Information:   Crisis Text Line: Text  [place]  to 891432  National Suicide Prevention Lifeline: 2-661-087-TALK (1-913.379.7827)       For online chat options, visit https://suicidepreventionlifeline.org/chat/  Poison Control Center: 1-503.175.4303  Trans Lifeline: 1-351.974.3017 - Hotline for transgender people of all ages  The Compa Project: 1-961.269.3185 - Hotline for LGBT youth     For Non-Emergency Support:   Fast Tracker: Mental Health & Substance Use Disorder Resources -   https://www.MyEdutrackermn.org/       Again thank you for choosing [facility] and please let us know how we can best partner with you to improve you and your family's health.    You may be receiving a survey regarding this appointment. We would love to have your feedback, both positive and negative. The survey is done by an external company, so your answers are anonymous.

## 2022-05-12 RX ORDER — DEXTROAMPHETAMINE SACCHARATE, AMPHETAMINE ASPARTATE, DEXTROAMPHETAMINE SULFATE AND AMPHETAMINE SULFATE 2.5; 2.5; 2.5; 2.5 MG/1; MG/1; MG/1; MG/1
TABLET ORAL
Qty: 60 TABLET | Refills: 0 | Status: SHIPPED | OUTPATIENT
Start: 2022-05-12 | End: 2022-06-14

## 2022-06-14 ENCOUNTER — DOCUMENTATION ONLY (OUTPATIENT)
Dept: PSYCHIATRY | Facility: CLINIC | Age: 37
End: 2022-06-14
Payer: COMMERCIAL

## 2022-06-14 DIAGNOSIS — F90.0 ADHD (ATTENTION DEFICIT HYPERACTIVITY DISORDER), INATTENTIVE TYPE: Primary | ICD-10-CM

## 2022-06-14 RX ORDER — DEXTROAMPHETAMINE SACCHARATE, AMPHETAMINE ASPARTATE, DEXTROAMPHETAMINE SULFATE AND AMPHETAMINE SULFATE 2.5; 2.5; 2.5; 2.5 MG/1; MG/1; MG/1; MG/1
TABLET ORAL
Qty: 60 TABLET | Refills: 0 | Status: SHIPPED | OUTPATIENT
Start: 2022-06-14 | End: 2022-06-14

## 2022-06-14 RX ORDER — DEXTROAMPHETAMINE SACCHARATE, AMPHETAMINE ASPARTATE, DEXTROAMPHETAMINE SULFATE AND AMPHETAMINE SULFATE 2.5; 2.5; 2.5; 2.5 MG/1; MG/1; MG/1; MG/1
TABLET ORAL
Qty: 60 TABLET | Refills: 0 | Status: SHIPPED | OUTPATIENT
Start: 2022-06-14 | End: 2022-08-09

## 2022-06-14 RX ORDER — DEXTROAMPHETAMINE SACCHARATE, AMPHETAMINE ASPARTATE, DEXTROAMPHETAMINE SULFATE AND AMPHETAMINE SULFATE 2.5; 2.5; 2.5; 2.5 MG/1; MG/1; MG/1; MG/1
TABLET ORAL
Qty: 60 TABLET | Refills: 0 | Status: SHIPPED | OUTPATIENT
Start: 2022-07-13 | End: 2022-08-09

## 2022-06-14 RX ORDER — DEXTROAMPHETAMINE SACCHARATE, AMPHETAMINE ASPARTATE, DEXTROAMPHETAMINE SULFATE AND AMPHETAMINE SULFATE 2.5; 2.5; 2.5; 2.5 MG/1; MG/1; MG/1; MG/1
TABLET ORAL
Qty: 60 TABLET | Refills: 0 | Status: SHIPPED | OUTPATIENT
Start: 2022-08-12 | End: 2022-08-09

## 2022-06-14 NOTE — PROGRESS NOTES
[facility]    Pt will be seen next week for addition of selective serotonin reuptake inhibitor for BN.  We can go with Zoloft or Lexapro (DDI risk too high with Prozac).  Little bit more DDI risk with Zoloft, so will start with Lexapro.    RECS:  1) add Lexapro 10mg qAM  2) RN partner to call to assess for adverse effects and efficacy at 2 and 4 weeks  3) looking for signs of Serotonin Syndrome  4) should see a covering provider around week 4 or 5 to assess for increase to 20mg IF this is needed, will ask RN partner to set this up with a resident during an urgent care slot;  IF no need to increase dose, no need to see covering provider  6) will next see me 08/31   1100  7) also, when selective serotonin reuptake inhibitor is started, need to strongly encourage pt to seek therapy at [facility]  MD Chao    MNP checked today, no issues

## 2022-06-22 ENCOUNTER — VIRTUAL VISIT (OUTPATIENT)
Dept: PSYCHIATRY | Facility: CLINIC | Age: 37
End: 2022-06-22
Attending: PSYCHIATRY & NEUROLOGY
Payer: COMMERCIAL

## 2022-06-22 DIAGNOSIS — F50.20 BULIMIA NERVOSA: Primary | ICD-10-CM

## 2022-06-22 DIAGNOSIS — F90.0 ADHD (ATTENTION DEFICIT HYPERACTIVITY DISORDER), INATTENTIVE TYPE: ICD-10-CM

## 2022-06-22 PROCEDURE — 99214 OFFICE O/P EST MOD 30 MIN: CPT | Mod: HN | Performed by: STUDENT IN AN ORGANIZED HEALTH CARE EDUCATION/TRAINING PROGRAM

## 2022-06-22 RX ORDER — ESCITALOPRAM OXALATE 10 MG/1
10 TABLET ORAL DAILY
Qty: 30 TABLET | Refills: 0 | Status: SHIPPED | OUTPATIENT
Start: 2022-06-22 | End: 2022-08-09

## 2022-06-22 NOTE — PATIENT INSTRUCTIONS
**For crisis resources, please see the information at the end of this document**   Patient Education    Thank you for coming to the [facility] [place] [facility].     Lab Testing:  If you had lab testing today and your results are reassuring or normal they will be mailed to you or sent through [employer] within 7 days. If the lab tests need quick action we will call you with the results. The phone number we will call with results is # [phone]. If this is not the best number please call our clinic and change the number.     Medication Refills:  If you need any refills please call your pharmacy and they will contact us. Our fax number for refills is [phone].   Three business days of notice are needed for general medication refill requests.   Five business days of notice are needed for controlled substance refill requests.   If you need to change to a different pharmacy, please contact the new pharmacy directly. The new pharmacy will help you get your medications transferred.     Contact Us:  Please call [phone] during business hours (8-5:00 M-F).   If you have medication related questions after clinic hours, or on the weekend, please call [phone].     Financial Assistance [phone]   Medical Records [phone]       MENTAL HEALTH CRISIS RESOURCES:  For a emergency help, please call 911 or go to the nearest Emergency Department.     Emergency Walk-In Options:   EmPATH Unit @ [facility] ([place]): [phone] - Specialized mental health emergency area designed to be calming  [facility] ([place]): [phone]  [facility] ([place]): [phone]  [facility]): [phone]    [facility] Crisis Information:   [place]: [phone]  [place]: [phone]  [place] ([place]) - Adult: [phone]     Child: [phone]  [place] - Adult: [phone]     Child: [phone]  [place]:  [phone]  List of all [facility] resources:   https://[place].gov/dhs/people-we-serve/adults/health-care/mental-health/resources/crisis-contacts.jsp    National Crisis Information:   Crisis Text Line: Text  [place]  to 224551  National Suicide Prevention Lifeline: 9-364-861-TALK (1-361.554.2059)       For online chat options, visit https://suicidepreventionlifeline.org/chat/  Poison Control Center: 8-351-747-5284  Trans Lifeline: 5-519-087-7241 - Hotline for transgender people of all ages  The Compa Project: 0-305-163-0871 - Hotline for LGBT youth     For Non-Emergency Support:   Fast Tracker: Mental Health & Substance Use Disorder Resources -   https://www.[employer].org/

## 2022-06-22 NOTE — PROGRESS NOTES
Video- Visit Details  Type of service:  video visit for medication management  Time of service:    Date:  06/22/2022    Video Start Time:  2:02 PM       Video End Time: 2:20PM  Reason for video visit:  Services only offered telehealth  Originating Site (patient location):  [facility]   Location- Patient's home   Distant Site (provider location):  Remote location  Mode of Communication:  Video Conference via AmWell  Consent:  Patient has given verbal consent for video visit?: Yes        [facility]  Psychiatry Clinic  PSYCHIATRY PROGRESS NOTE     CARE TEAM:  PCP- Dorothy Holder.  Arlet Segura is a 37 year old   patient who uses the name Flora and pronouns she, her.    DIAGNOSES                                                                                   ADHD, inattentive type  Bulimia Nervosa, purging type    ASSESSMENT                                                                                  Flora is doing well overall. Tolerating higher dose of Adderall and she feels it benefits her focus/concentration and also has modestly reduced binging/purging episode frequency. Discussed adding an SSRi today for possible additional benefit on reducing binging/purging episodes. Offered escitalopram vs sertraline. Explained possible DDI risk w/ sertraline. Discussed there are more studies supporting sertraline for bulimia nervosa, but this is likely due to lack of studies w/ escitalopram rather than inefficacy -- other retrospective data indicates that escitalopram is one of the most commonly prescribed medications at institutions that specialize in eating disorders.     Discussed possible symptoms of serotonin syndrome including: high body temperature, agitation, increased reflexes, tremor, sweating, dilated pupils, diarrhea. If experiencing symptoms of serotonin syndrome and fever, confusion or agitation are present, Flora agreed to seek emergency care, but if only mild symptoms  "are present, Flora will call us. Also discussed that Flora should watch out for new or worsening suicidal ideation and to call us right away if this occurs. Discussed if she does have side effects from SSRi initiation she could cut them in half for 5mg/day then increase to 10mg after having adjusted to side effects. Flora indicated understanding and agreement with this plan.     Again discussed recommendation for therapy at [facility], or [facility]. She stated intention to call them to schedule.     Reminded her to come in for LFT's -- she indicated intention to do this at her convenience    MNPMP: checked today, no indication of misuse     PLAN                                                                                           1) Meds:   - continue Adderall 10mg BID   - start escitalopram 10mg/day     2) Other: Labs- no LFTs for some time, will obtain  3) RTC:  Sept 7 with Dr Redd  4) CRISIS Numbers:   Provided routinely in AVS          PERTINENT BACKGROUND                                            [evals 2014, 2017]   Last documented Dec 2021.  Med History:  Adderall XR 30mg helpful for attentional probs   as well as purging episodes, since about 2014, IR d/t sleep probs with XR; 2018 Prozac was added, dosed up to 60mg with no improvement in mood (?B/P)      INTERIM HISTORY                                                                         Since the last visit:    Finished bresatfeeding. Using formula exclusively. incrased adderall a couple weeks ago after having finished breastfeeding. States it is going well. This dose is more helpful. She can focus better at work. States it has also reduced the frequency of binging/purging -- \"a little bit\".     BN:  - binging: estimates about once per week   - purging: estimates about once per week (same day as binge)    Anxiety:   - Denies excessive worrying  - Denies feeling tense/on edge/keyed up  - Denies panic attacks    Mood:  - mood is \"pretty good\"; \"I have " "no depression symptoms from what I can tell\"  - Denies anhedonia  - Sleep is \"decent\" -- variable with  baby -- but now only waking up once per night.   - energy is good  - appetite is \"normal aside from the eating disorder that happens once in a while\"  - Denies hopelessness  - Denies SI      Denies knowledge of side effects from higher dose of Adderall.     Recent Symptoms:    Depression: as noted above  Anxiety: as noted above  Eating disorder: as noted above     Adverse Effects:  none  Pertinent Negatives:  No suicidal or violent ideation, psychosis and adrian  Recent Substance Use:  none reported    SOCIAL and FAMILY HISTORY                          [per pt report]            Family Hx- maternal aunt w/bipolar disorder, sister w/depression, HTN in father and   brother, DM in sister and thyroid dz in mother  Social Hx- Originally from [place], [place] and moved to [place] in  for graduate school.   School performance has been without difficulty or delay. Good social support with friends   and family.  Working in [employer] here at [employer]. Lives in a house with  Alex,  infant son Luis Miguel and her dog 'Hutch'. Likes to play soccer, garden, run and ski.    MEDICAL HISTORY  and ALLERGY     ALLERGIES: Patient has no known allergies.  Avoid use of Wellbutrin (d/t BN)     Patient Active Problem List   Diagnosis     ADHD (attention deficit hyperactivity disorder), inattentive type     Hx of bulimia nervosa     Raynaud's disease without gangrene     H/O LEEP     Change in mole     Left ovarian cyst     Pyogenic granuloma     MEDICAL REVIEW OF SYSTEMS                                                               no additional symptoms to that documented above     Targeted ROS:   Cardiovascular: denies tachycardia, palpitations, chest pain, orthostasis.     CURRENT MEDS       Current Outpatient Medications   Medication Sig Dispense Refill     amphetamine-dextroamphetamine (ADDERALL) 10 MG tablet Take one tab (10mg) by " mouth twice a day 60 tablet 0     [START ON 7/13/2022] amphetamine-dextroamphetamine (ADDERALL) 10 MG tablet Take one tab (10mg) by mouth BID 60 tablet 0     [START ON 8/12/2022] amphetamine-dextroamphetamine (ADDERALL) 10 MG tablet Take one tab (10mg) by mouth BID 60 tablet 0     estradiol (ESTRACE) 0.1 MG/GM vaginal cream Place 2 g vaginally twice a week 42.5 g 1     Prenatal Vit-Fe Fumarate-FA (PRENATAL MULTIVITAMIN PLUS IRON) 27-0.8 MG TABS per tablet Take 1 tablet by mouth daily       VITALS                                                                                                  Pulse Readings from Last 3 Encounters:   04/08/22 76   02/04/22 78   10/20/21 70     BP Readings from Last 3 Encounters:   04/08/22 102/71   02/04/22 116/75   10/20/21 115/81     MENTAL STATUS EXAM                                                        Alertness: alert  and oriented  Appearance: well groomed  Behavior/Demeanor: cooperative, pleasant and calm, with good  eye contact   Speech: regular rate and rhythm  Language: no obvious problem  Psychomotor: normal or unremarkable  Mood: description consistent with euthymia  Affect: full range; was congruent to mood; was congruent to content  Thought Process/Associations: unremarkable  Thought Content:  Reports none;  Denies suicidal & violent ideation and delusions   Perception:  Reports none;  Denies hallucinations  Insight: good  Judgment: good  Cognition: (6) oriented: time, person, and place  attention span: intact  concentration: intact  recent memory: intact  remote memory: intact  fund of knowledge: appropriate  Gait and Station: N/A (telehealth)    LABS and DATA     PHQ9 Today:   PHQ 10/20/2021 2/4/2022 4/8/2022   PHQ-9 Total Score 7 5 6   Q9: Thoughts of better off dead/self-harm past 2 weeks Not at all Not at all Not at all       No lab results found.  (LFTs)    PSYCHOTROPIC DRUG INTERACTIONS     Additive serotonin side effects/serotonin syndrome: Adderall and SSRi      MANAGEMENT:  Monitoring for adverse effects    RISK STATEMENT for SAFETY   Arlet Segura did not appear to be an imminent safety risk to self or others.    TREATMENT RISK STATEMENT:  The risks, benefits, alternatives and potential adverse   effects have been discussed and are understood by the pt. The pt understands the risks of   using street drugs or alcohol. There are no medical contraindications, the pt agrees to   treatment with the ability to do so. The pt knows to call the clinic for any problems or to   access emergency care if needed.  Medical and substance use concerns are documented   above.  Psychotropic drug interaction check was done, including changes made today.    PROVIDER:   Kevin Brantley MD  PGY-4 Psychiatry Resident    Patient was not staffed in clinic. Note will be sent to medication supervisor, Dr. Redd.

## 2022-06-22 NOTE — PROGRESS NOTES
Arlet Segura is a 37 year old who has consented to receive services via billable video visit.      Pt will join video visit via: [employer]  If there are problems joining the visit, send backup video invite via: Send to preferred e-mail: [email or web address]      Originating Location (patient location): Patient's home  Distant Location (provider location): [facility] [place] [facility]    Will anyone else be joining the video visit? No    How would you prefer to obtain AVS?: Joanne

## 2022-07-29 ENCOUNTER — TELEPHONE (OUTPATIENT)
Dept: PSYCHIATRY | Facility: CLINIC | Age: 37
End: 2022-07-29

## 2022-07-29 NOTE — TELEPHONE ENCOUNTER
Pt last seen by Dr. Brantley on 6/22/22 at which time Lexapro 10mg daily was initiated. Called pt today to check-in. No answer and VM inbox full; unable to leave message. Will make second attempt within the next few days.

## 2022-08-09 DIAGNOSIS — F90.0 ADHD (ATTENTION DEFICIT HYPERACTIVITY DISORDER), INATTENTIVE TYPE: ICD-10-CM

## 2022-08-09 DIAGNOSIS — Z86.59 HX OF BULIMIA NERVOSA: Primary | ICD-10-CM

## 2022-08-09 RX ORDER — ESCITALOPRAM OXALATE 10 MG/1
10 TABLET ORAL DAILY
Qty: 30 TABLET | Refills: 0 | Status: SHIPPED | OUTPATIENT
Start: 2022-08-09 | End: 2022-09-09 | Stop reason: SINTOL

## 2022-08-09 RX ORDER — DEXTROAMPHETAMINE SACCHARATE, AMPHETAMINE ASPARTATE, DEXTROAMPHETAMINE SULFATE AND AMPHETAMINE SULFATE 2.5; 2.5; 2.5; 2.5 MG/1; MG/1; MG/1; MG/1
10 TABLET ORAL 2 TIMES DAILY
Qty: 60 TABLET | Refills: 0 | Status: SHIPPED | OUTPATIENT
Start: 2022-08-09 | End: 2022-09-08

## 2022-08-09 NOTE — TELEPHONE ENCOUNTER
Patient is requesting an early refill for Adderall because she will be going out of town. She is requesting to fill today, which is 23 days from last refill. Per [place] , patient last filled on July 14, June 18, and May 17. Per Board of Pharmacy, patient is not eligible for a refill until August 14.    She is also due for a refill on Lexapro.     Last seen: 6/22 (Dr. Brantley)  Follow up appointment: 9/6 (Dr. Redd)    Pended 30 ds of Lexapro and Adderall. Included a note to pharmacy requesting that any other Rx on file for Adderall be deleted. Routed to PODs (covering for Dr. Redd) for review.     (Next Rx for Adderall should be dated 9/13.)

## 2022-08-09 NOTE — TELEPHONE ENCOUNTER
[facility] Call Center    Phone Message    May a detailed message be left on voicemail: yes     Reason for Call: Medication Refill Request    Has the patient contacted the pharmacy for the refill? Yes   Name of medication being requested: amphetamine-dextroamphetamine (ADDERALL) 10 MG tablet    amphetamine-dextroamphetamine (ADDERALL) 10 MG tablet    amphetamine-dextroamphetamine (ADDERALL) 10 MG tablet    escitalopram (LEXAPRO) 10 MG tablet  Provider who prescribed the medication: Bass  Pharmacy: [place], [place] - [address] [place] [address] [phone]  Date medication is needed: asap    Patient stated that her adderall doesn't allow for  until Aug 13th/14th, but she will be leaving out of town and need to  today. She is requesting for a call back from RN about picking up today.      Action Taken: Message routed to:  Other: P PSYCHIATRY NURSE-P    Travel Screening: Not Applicable

## 2022-08-10 NOTE — TELEPHONE ENCOUNTER
Sent patient a Reclamador message letting her know that the Rx for Adderall and Lexapro have been sent to the pharmacy. Called the pharmacy to confirm that they can do an early refill for Adderall, and requested that they delete the previous Rx for August. They will get the medications ready for .    Called patient to let her know. She did not  and unable to leave a message because her mailbox is full.

## 2022-08-18 NOTE — TELEPHONE ENCOUNTER
RN made second attempt to reach pt for check-in. No answer and VM inbox is full. Unable to reach pt. Next appointment with Dr. Redd is on 9/6/22.

## 2022-09-06 ENCOUNTER — VIRTUAL VISIT (OUTPATIENT)
Dept: PSYCHIATRY | Facility: CLINIC | Age: 37
End: 2022-09-06
Attending: PSYCHIATRY & NEUROLOGY
Payer: COMMERCIAL

## 2022-09-06 DIAGNOSIS — F50.20 BULIMIA NERVOSA: ICD-10-CM

## 2022-09-06 DIAGNOSIS — Z51.81 ENCOUNTER FOR THERAPEUTIC DRUG MONITORING: ICD-10-CM

## 2022-09-06 DIAGNOSIS — F90.0 ADHD (ATTENTION DEFICIT HYPERACTIVITY DISORDER), INATTENTIVE TYPE: Primary | ICD-10-CM

## 2022-09-06 PROCEDURE — 99214 OFFICE O/P EST MOD 30 MIN: CPT | Mod: GT | Performed by: PSYCHIATRY & NEUROLOGY

## 2022-09-06 NOTE — PROGRESS NOTES
Arlet Segura is a 37 year old who has consented to receive services via billable video visit.      Pt will join video visit via: [employer]  If there are problems joining the visit, send backup video invite via: Text to preferred phone: [phone]      Originating Location (patient location): Patient's home  Distant Location (provider location): [facility]    Will anyone else be joining the video visit? No

## 2022-09-06 NOTE — PATIENT INSTRUCTIONS
**For crisis resources, please see the information at the end of this document**   Patient Education         Treatment Plan Today:     1) Medications-  -continue Adderall 10mg BID   -discontinue Lexapro  -initiate Zoloft as follows:   * after discontinuing Lexapro, wait until nausea completely resolves  * once nausea is gone wait another 7 days  * after this waiting period is complete can start 25mg for 2 wks  THEN  * increase to 50mg every day if tolerating    2) Follow-up appt with Dr Redd  mid-Oct;  clinic will call to schedule and you can call clinic if desired    3) Crisis numbers are below     4) Ordered liver function tests today, can be done at any [facility] lab ([facility])    ------------------------------------------------------------------------    Thank you for coming to the [facility]    Lab Testing:  If you had lab testing today and your results are reassuring or normal they will be mailed to you or sent through Linkage within 7 days. If the lab tests need quick action we will call you with the results. The phone number we will call with results is # [phone] (home) . If this is not the best number please call our clinic and change the number.    Medication Refills:  If you need any refills please call your pharmacy and they will contact us. Our fax number for refills is [phone].   Three business days of notice are needed for general medication refill requests.   Five business days of notice are needed for controlled substance refill requests.   If you need to change to a different pharmacy, please contact the new pharmacy directly. The new pharmacy will help you get your medications transferred.     Scheduling:  If you have any concerns about today's visit or wish to schedule another appointment please call our office during normal business hours [phone] (8-5:00 M-F)    Contact Us:  Please call [phone] during business hours (8-5:00 M-F).  If after clinic hours, or on  the weekend, please call  [phone].    Patient Bill of Rights:  [email or web address]       MENTAL HEALTH CRISIS RESOURCES:  For a emergency help, please call 911 or go to the nearest Emergency Department.     Emergency Walk-In Options:   EmPATH Unit @ [facility] ([place]): [phone] - Specialized mental health emergency area designed to be calming  formerly [facility] ([place]): [phone]  [facility] ([place]): [phone]  [facility]): [phone]    [facility] Crisis Information:   [place]: [phone]  [place]: [phone]  [place] ([place]) - Adult: [phone]     Child: [phone]  [place] - Adult: [phone]     Child: [phone]  [place]: [phone]  List of all [facility] resources:   [email or web address]-[email or web address]    National Crisis Information:   Crisis Text Line: Text  [place]  to 654936  National Suicide Prevention Lifeline: 1-877-560-TALK (1-107.317.7051)       For online chat options, visit https://suicidepreventionlifeline.org/chat/  Poison Control Center: 1-694.913.9983  Trans Lifeline: 2-123-680-8239 - Hotline for transgender people of all ages  The [employer]: 8-137-964-6757 - Hotline for LGBT youth     For Non-Emergency Support:   Fast Tracker: Mental Health & Substance Use Disorder Resources -   https://www.fasttrackermn.org/       Again thank you for choosing [facility] and please let us know how we can best partner with you to improve you and your family's health.    You may be receiving a survey regarding this appointment. We would love to have your feedback, both positive and negative. The survey is done by an external company, so your answers are anonymous.

## 2022-09-06 NOTE — PROGRESS NOTES
Video- Visit Details  Type of service:  video visit for medication management  Time of service:    Video Start Time:  3:57 PM       Video End Time:  4:25 PM  Reason for video visit:  Services only offered telehealth  Originating Site (patient location):  [facility]   Location- Patient's home   Distant Site (provider location):  Remote location  Mode of Communication:  Video Conference via [employer]  Consent:  Patient has given verbal consent for video visit?: Yes        [facility]  Psychiatry Clinic  PSYCHIATRY PROGRESS NOTE     CARE TEAM:  PCP- Dorothy Holder.  Arlet Segura is a 37 year old   patient who uses the name Flora and pronouns she, her.    DIAGNOSES                                                                                   ADHD, inattentive type  Bulimia Nervosa, purging type    ASSESSMENT                                                                                  Flora has been taking Lexapro with no benefit and ongoing issue with nausea. Plan to  stop Lexapro, d/t adverse effect, and try Zoloft as previously discussed. Adderall is   helpful, tolerated and no issues with use. Specifically, Adderall benefits focus/conc   and has modestly reduced binging/purging episode frequency. If we are not able to find   an effective antidepressant, will re-discuss ED programs more specifically. Flora is   aware of symptoms c/w Serotonin Syndrome (in AVS), understands the risk in   adding Zoloft and will seek help if needed. Encouraged the pt to call this clinic for   any adverse effect.    [place]:  Indicates taking controlled medication as prescribed.    PLAN                                                                                           1) Meds:   -continue Adderall 10mg BID   -discontinue Lexapro  -initiate Zoloft as follows:   * after discontinuing Lexapro, wait until nausea completely resolves  * once nausea is gone wait another 7 days  *  after this waiting period is complete can start 25mg x 2 wks  THEN  * increase to 50mg every day if tolerating    2) Other: Labs- still need LFTs, will reorder today  Encouraged contact with ED programs if needed: [facility], or [facility]  3) RTC:   Mid-October, clinic will call for appt  4) CRISIS Numbers:   Provided routinely in AVS          PERTINENT BACKGROUND                                            [evals 2014, 2017]   Last documented Dec 2021.  Med History:  Adderall XR 30mg helpful for attentional probs   as well as purging episodes; since about 2014, IR d/t sleep probs with XR; 2018 Prozac   was added, dosed up to 60mg with no improvement in mood (?B/P). Lexapro trial mid 2022  was not successful. Individual eating disorder (ED) treatment (in this clinic) was not   helpful. No use of Wellbutrin d/t BN    INTERIM HISTORY                                                                         Since the last visit:  -finished breastfeeding few mos ago  -Adderall dose back to 10mg BID, tolerating, helps more than the lower dose  -Lexapro trial was started in June, not helpful, causing nausea pretty consistently  -interested in a new selective serotonin reuptake inhibitor trial, discussed Zoloft  -aware of ED programs, prob will hold off for now  -things are going well at home/work, her son Luis Miguel is doing well, work-life balance is ok    Current Social Hx: Lives in a house with  Alex, young son Luis Miguel and her  dog 'Hutch'. Likes to play soccer, garden, run and ski. Good social support with friends   and family.  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    Recent Symptoms:  No mood or anxiety symptoms, attention/ concentration are  good, binge-purge about 1x a week  Adverse Effects:  Lexapro causes nausea  Pertinent Negatives:  No suicidal or violent ideation, psychosis and adrian  Recent Substance Use:  none reported    MEDICAL HISTORY  and ALLERGY     ALLERGIES: Patient  has no known allergies.  Avoid use of Wellbutrin (d/t BN)     Patient Active Problem List   Diagnosis     ADHD (attention deficit hyperactivity disorder), inattentive type     Hx of bulimia nervosa     Raynaud's disease without gangrene     H/O LEEP     Change in mole     Left ovarian cyst     Pyogenic granuloma     MEDICAL REVIEW OF SYSTEMS                                                               no additional symptoms to that documented above   CURRENT MEDS      Lexapro stopped this visit, Zoloft initiated  Current Outpatient Medications   Medication Sig Dispense Refill     estradiol (ESTRACE) 0.1 MG/GM vaginal cream Place 2 g vaginally twice a week 42.5 g 1     Prenatal Vit-Fe Fumarate-FA (PRENATAL MULTIVITAMIN PLUS IRON) 27-0.8 MG TABS per tablet Take 1 tablet by mouth daily       sertraline (ZOLOFT) 50 MG tablet Take one half tab (25mg) by mouth every morning for 14 days then increase to one tab (50mg) every morning if tolerating and only start this medication after escitalopram has been discontinued for at least 7 days. 30 tablet 0     amphetamine-dextroamphetamine (ADDERALL) 10 MG tablet Take 1 tablet (10 mg) by mouth 2 times daily 60 tablet 0     VITALS                                                                                                  Pulse Readings from Last 3 Encounters:   04/08/22 76   02/04/22 78   10/20/21 70     BP Readings from Last 3 Encounters:   04/08/22 102/71   02/04/22 116/75   10/20/21 115/81     MENTAL STATUS EXAM                                                        Alertness: alert  and oriented  Appearance: well groomed  Behavior/Demeanor: cooperative, pleasant and calm, with good  eye contact   Speech: regular rate and rhythm  Language: no obvious problem  Psychomotor: normal or unremarkable  Mood: description consistent with euthymia  Affect: full range; was congruent to mood; was congruent to content  Thought Process/Associations: unremarkable  Thought Content:   Reports none;  Denies suicidal & violent ideation and delusions   Perception:  Reports none;  Denies hallucinations  Insight: good  Judgment: good  Cognition: (6) oriented: time, person, and place  attention span: intact  concentration: intact  recent memory: intact  remote memory: intact  fund of knowledge: appropriate  Gait and Station: N/A (telehealth)    LABS and DATA     PHQ9 Today:   PHQ 10/20/2021 2/4/2022 4/8/2022   PHQ-9 Total Score 7 5 6   Q9: Thoughts of better off dead/self-harm past 2 weeks Not at all Not at all Not at all     No lab results found.  (LFTs)    PSYCHOTROPIC DRUG INTERACTIONS   ADDITIVE SEROTONERGIC: Zoloft, Adderall  MANAGEMENT:  Monitoring for adverse effects and patient is aware of risks    RISK STATEMENT for SAFETY   Arlet Segura did not appear to be an imminent safety risk to self or others.    TREATMENT RISK STATEMENT:  The risks, benefits, alternatives and potential adverse   effects have been discussed and are understood by the pt. The pt understands the risks of   using street drugs or alcohol. There are no medical contraindications, the pt agrees to   treatment with the ability to do so. The pt knows to call the clinic for any problems or to   access emergency care if needed.  Medical and substance use concerns are documented   above.  Psychotropic drug interaction check was done, including changes made today.    PROVIDER:   Candida Redd MD

## 2022-09-08 ENCOUNTER — MYC REFILL (OUTPATIENT)
Dept: PSYCHOLOGY | Facility: CLINIC | Age: 37
End: 2022-09-08

## 2022-09-08 DIAGNOSIS — F90.0 ADHD (ATTENTION DEFICIT HYPERACTIVITY DISORDER), INATTENTIVE TYPE: ICD-10-CM

## 2022-09-08 RX ORDER — DEXTROAMPHETAMINE SACCHARATE, AMPHETAMINE ASPARTATE, DEXTROAMPHETAMINE SULFATE AND AMPHETAMINE SULFATE 2.5; 2.5; 2.5; 2.5 MG/1; MG/1; MG/1; MG/1
10 TABLET ORAL 2 TIMES DAILY
Qty: 60 TABLET | Refills: 0 | Status: SHIPPED | OUTPATIENT
Start: 2022-09-08 | End: 2022-11-04

## 2022-09-08 NOTE — CONFIDENTIAL NOTE
Flora is doing well overall. Tolerating higher dose of Adderall and she feels it benefits her focus/concentration and also has modestly reduced binging/purging episode frequency. Discussed adding an SSRi today for possible additional benefit on reducing binging/purging episodes. Offered escitalopram vs sertraline. Explained possible DDI risk w/ sertraline. Discussed there are more studies supporting sertraline for bulimia nervosa, but this is likely due to lack of studies w/ escitalopram rather than inefficacy -- other retrospective data indicates that escitalopram is one of the most commonly prescribed medications at institutions that specialize in eating disorders.     Discussed possible symptoms of serotonin syndrome including: high body temperature, agitation, increased reflexes, tremor, sweating, dilated pupils, diarrhea. If experiencing symptoms of serotonin syndrome and fever, confusion or agitation are present, Flora agreed to seek emergency care, but if only mild symptoms are present, Flora will call us. Also discussed that Flora should watch out for new or worsening suicidal ideation and to call us right away if this occurs. Discussed if she does have side effects from SSRi initiation she could cut them in half for 5mg/day then increase to 10mg after having adjusted to side effects. Flora indicated understanding and agreement with this plan.     Again discussed recommendation for therapy at [facility] or [facility]. She stated intention to call them to schedule.       1) Meds:   - continue Adderall 10mg BID   - start escitalopram 10mg/day discontinue  Start Zoloft after lexapro has cleared (after discontinue lexapro, wait for nausea to clear then wait one more week, then start Zoloft--probably about two weeks  50mg Zoloft x 2 weeks then checkin via mychart    2) Other: Labs- no LFTs for some time, will obtain    PERTINENT BACKGROUND                                            [evals 2014, 2017]   Last  "documented Dec 2021.  Med History:  Adderall XR 30mg helpful for attentional probs   as well as purging episodes, since about 2014, IR d/t sleep probs with XR; 2018 Prozac was added, dosed up to 60mg with no improvement in mood (?B/P)      Finished bresatfeeding. Using formula exclusively. incrased adderall a couple weeks ago after having finished breastfeeding. States it is going well. This dose is more helpful. She can focus better at work. States it has also reduced the frequency of binging/purging -- \"a little bit\".       "

## 2022-09-08 NOTE — TELEPHONE ENCOUNTER
Last seen: 9/6/22 Bass  RTC: Unknown  Cancel: none  No-show: none  Next appt: none scheduled at this time     Medication requested: amphetamine-dextroamphetamine (ADDERALL) 10 MG tablet  Directions: Take 1 tablet (10 mg) by mouth 2 times daily - Oral  Qty: 60  Last refilled: 8/10, 7/14, 6/15 per [place] PDMP     Medication refill pended and routed to Dr. Redd for review/signature.

## 2022-09-10 ENCOUNTER — HEALTH MAINTENANCE LETTER (OUTPATIENT)
Age: 37
End: 2022-09-10

## 2022-09-10 RX ORDER — DEXTROAMPHETAMINE SACCHARATE, AMPHETAMINE ASPARTATE, DEXTROAMPHETAMINE SULFATE AND AMPHETAMINE SULFATE 2.5; 2.5; 2.5; 2.5 MG/1; MG/1; MG/1; MG/1
TABLET ORAL
Qty: 60 TABLET | Refills: 0 | Status: SHIPPED | OUTPATIENT
Start: 2022-10-06 | End: 2022-11-09

## 2022-10-03 ENCOUNTER — TELEPHONE (OUTPATIENT)
Dept: PSYCHIATRY | Facility: CLINIC | Age: 37
End: 2022-10-03

## 2022-10-03 NOTE — TELEPHONE ENCOUNTER
Pt saw Dr. Redd on 9/6/22 at that time Lexapro was discontinued and Zoloft was initiated. RN attempted to reach pt for check-in. No answer and VM inbox full. Will send [employer].

## 2022-11-04 ENCOUNTER — MYC REFILL (OUTPATIENT)
Dept: PSYCHOLOGY | Facility: CLINIC | Age: 37
End: 2022-11-04

## 2022-11-04 DIAGNOSIS — F90.0 ADHD (ATTENTION DEFICIT HYPERACTIVITY DISORDER), INATTENTIVE TYPE: ICD-10-CM

## 2022-11-04 DIAGNOSIS — F50.20 BULIMIA NERVOSA: ICD-10-CM

## 2022-11-04 RX ORDER — DEXTROAMPHETAMINE SACCHARATE, AMPHETAMINE ASPARTATE, DEXTROAMPHETAMINE SULFATE AND AMPHETAMINE SULFATE 2.5; 2.5; 2.5; 2.5 MG/1; MG/1; MG/1; MG/1
TABLET ORAL
Qty: 60 TABLET | Refills: 0 | Status: CANCELLED | OUTPATIENT
Start: 2022-11-04

## 2022-11-04 NOTE — TELEPHONE ENCOUNTER
Last seen: 9/6/22  RTC: mid-October  Cancel: none  No-show: none  Next appt: 11/9/22     Medication requested: amphetamine-dextroamphetamine (ADDERALL) 10 MG tablet  Directions: Take one tab (10mg) by mouth twice a day  Qty: 60  Last refilled: 10/8, 9/9, 8/10 per [place] PDMP    Medication requested: sertraline (ZOLOFT) 50 MG tablet  Directions: Take one half tab (25mg) by mouth every morning for 14 days then increase to one tab (50mg) every morning if tolerating and only start this medication after escitalopram has been discontinued for at least 7 days.  Qty: 30  Last refilled: 9/9/22

## 2022-11-06 RX ORDER — DEXTROAMPHETAMINE SACCHARATE, AMPHETAMINE ASPARTATE, DEXTROAMPHETAMINE SULFATE AND AMPHETAMINE SULFATE 2.5; 2.5; 2.5; 2.5 MG/1; MG/1; MG/1; MG/1
10 TABLET ORAL 2 TIMES DAILY
Qty: 60 TABLET | Refills: 0 | Status: SHIPPED | OUTPATIENT
Start: 2022-11-06 | End: 2022-11-09

## 2022-11-09 ENCOUNTER — VIRTUAL VISIT (OUTPATIENT)
Dept: PSYCHIATRY | Facility: CLINIC | Age: 37
End: 2022-11-09
Attending: PSYCHIATRY & NEUROLOGY
Payer: COMMERCIAL

## 2022-11-09 DIAGNOSIS — F90.0 ADHD (ATTENTION DEFICIT HYPERACTIVITY DISORDER), INATTENTIVE TYPE: Primary | ICD-10-CM

## 2022-11-09 DIAGNOSIS — F50.20 BULIMIA NERVOSA: ICD-10-CM

## 2022-11-09 PROCEDURE — 99215 OFFICE O/P EST HI 40 MIN: CPT | Mod: GT | Performed by: PSYCHIATRY & NEUROLOGY

## 2022-11-09 RX ORDER — DEXTROAMPHETAMINE SACCHARATE, AMPHETAMINE ASPARTATE, DEXTROAMPHETAMINE SULFATE AND AMPHETAMINE SULFATE 2.5; 2.5; 2.5; 2.5 MG/1; MG/1; MG/1; MG/1
TABLET ORAL
Qty: 60 TABLET | Refills: 0 | Status: SHIPPED | OUTPATIENT
Start: 2023-02-05 | End: 2023-03-01

## 2022-11-09 RX ORDER — DEXTROAMPHETAMINE SACCHARATE, AMPHETAMINE ASPARTATE, DEXTROAMPHETAMINE SULFATE AND AMPHETAMINE SULFATE 2.5; 2.5; 2.5; 2.5 MG/1; MG/1; MG/1; MG/1
10 TABLET ORAL 2 TIMES DAILY
Qty: 60 TABLET | Refills: 0 | Status: SHIPPED | OUTPATIENT
Start: 2022-12-05 | End: 2023-03-01

## 2022-11-09 RX ORDER — DEXTROAMPHETAMINE SACCHARATE, AMPHETAMINE ASPARTATE, DEXTROAMPHETAMINE SULFATE AND AMPHETAMINE SULFATE 2.5; 2.5; 2.5; 2.5 MG/1; MG/1; MG/1; MG/1
TABLET ORAL
Qty: 60 TABLET | Refills: 0 | Status: SHIPPED | OUTPATIENT
Start: 2023-01-05 | End: 2023-03-01

## 2022-11-09 NOTE — PROGRESS NOTES
Arlet Segura is a 37 year old who is being evaluated via a billable video visit.      Pt will join video visit via: [employer]  If there are problems joining the visit, send backup video invite via: Text to preferred phone: [phone]    Reason for telehealth visit: Patient has requested telehealth visit    Originating location (patient location): Patient's home    Will anyone else be joining the visit? No

## 2022-11-09 NOTE — PROGRESS NOTES
Video- Visit Details  Type of service:  video visit for medication management  Time of service:    Video Start Time:  12:04 PM       Video End Time:  12:30 PM  Reason for video visit:  Services only offered telehealth  Originating Site (patient location):  [facility]   Location- Patient's home   Distant Site (provider location):  Remote location  Mode of Communication:  Video Conference via AmMobim  Consent:  Patient has given verbal consent for video visit?: Yes        [facility]  Psychiatry Clinic  PSYCHIATRY PROGRESS NOTE     CARE TEAM:  PCP- Dorothy Holder.  Arlet Segura is a 37 year old   who uses the name Flora and pronouns she, her.    DIAGNOSES                                                                                   ADHD, inattentive type  Bulimia Nervosa, purging type    ASSESSMENT                                                                                Tolerating Zoloft 50mg and will continue a cautious upward taper, next to 75mg.  No signs or symptoms of Serotonin Syndrome. No improvement in eating disorder  symptoms but does generally feel better, less stressed. Adderall remains helpful,   tolerated and no issues with use. Specifically, Adderall benefits focus/conc and has   modestly reduced binging/purging episode frequency. If we are not able to find an   effective antidepressant, will re-discuss ED programs more specifically. Flora is   aware of symptoms c/w Serotonin Syndrome (in AVS), understands the risk related   to using Zoloft and will seek help if needed. Encouraged the pt to call this clinic for   any adverse effect. Will ask RNCC to do outreach call in 2 weeks to check on   a) efficacy wrt BN sxs   b) tolerability including sxs of Serotonin Syndrome  c) will  consider increase to 100mg in 2 weeks or continue 75mg for 4 weeks then consider  increase to 100mg. Follow-up with me in Feb and can see another provider in the  interim if needed,  will make that determination over the next few weeks. Will repeat  RNCC outreach if Zoloft dose is increased. If we discuss ED programs those will   include: [facility], or [facility]    [place] was checked today:  Indicates taking controlled medication as prescribed.    PLAN                                                                                           1) Meds:   -continue Adderall 10mg BID  -increase Zoloft to 75mg qAM     2) Other: Labs- still need LFTs, will reorder today; RNCC outreach as described above    3) RTC:   FEB or sooner if needed  4) CRISIS Numbers:   Provided routinely in AVS          PERTINENT BACKGROUND                                            [evals 2014, 2017]   Last documented Dec 2021.  Med History:  Adderall XR 30mg helpful for attentional probs   as well as purging episodes; since about 2014, IR d/t sleep probs with XR; 2018 Prozac   was added, dosed up to 60mg with no improvement in mood (?B/P). Lexapro trial mid 2022  was not successful. Individual eating disorder (ED) treatment (in this clinic) was not   helpful. No use of Wellbutrin d/t BN    INTERIM HISTORY                                                                         Since the last visit:  -GI distress gone with discontinuing Lexapro  -eating disorder symptoms unchanged  -does notice an overall sense of feeling better, less anxious/stressed  -Adderall 10mg BID, tolerating, helps more than the lower dose  -did not revisit eating disorder programs today  -things are going well at home/work, her son Luis Miguel is doing well, work-life balance is ok  -Luis Miguel does have RSV at the moment, but not too sick    Current Social Hx: Lives in a house with  Alex, young son Luis Miguel and her  dog 'Hutch'. Likes to play soccer, garden, run and ski. Good social support with friends   and family.  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    Recent Symptoms:  No mood or anxiety symptoms, attention/  concentration are  good, binge-purge about 1x a week and unchanged  Adverse Effects:  none  Pertinent Negatives:  No suicidal or violent ideation, psychosis and adrian  Recent Substance Use:  none reported    MEDICAL HISTORY  and ALLERGY     ALLERGIES: Patient has no known allergies.  Avoid use of Wellbutrin (d/t BN)     Patient Active Problem List   Diagnosis     ADHD (attention deficit hyperactivity disorder), inattentive type     Hx of bulimia nervosa     Raynaud's disease without gangrene     H/O LEEP     Change in mole     Left ovarian cyst     Pyogenic granuloma     MEDICAL REVIEW OF SYSTEMS                                                               no additional symptoms to that documented above   CURRENT MEDS       Current Outpatient Medications   Medication Sig Dispense Refill     amphetamine-dextroamphetamine (ADDERALL) 10 MG tablet Take 1 tablet (10 mg) by mouth 2 times daily 60 tablet 0     amphetamine-dextroamphetamine (ADDERALL) 10 MG tablet Take one tab (10mg) by mouth twice a day 60 tablet 0     estradiol (ESTRACE) 0.1 MG/GM vaginal cream Place 2 g vaginally twice a week 42.5 g 1     Prenatal Vit-Fe Fumarate-FA (PRENATAL MULTIVITAMIN PLUS IRON) 27-0.8 MG TABS per tablet Take 1 tablet by mouth daily       sertraline (ZOLOFT) 50 MG tablet Take 1 tablet (50 mg) by mouth daily 30 tablet 0     VITALS                                                                                                  Pulse Readings from Last 3 Encounters:   04/08/22 76   02/04/22 78   10/20/21 70     BP Readings from Last 3 Encounters:   04/08/22 102/71   02/04/22 116/75   10/20/21 115/81     MENTAL STATUS EXAM                                                        Alertness: alert  and oriented  Appearance: well groomed  Behavior/Demeanor: cooperative, pleasant and calm, with good  eye contact   Speech: regular rate and rhythm  Language: no obvious problem  Psychomotor: normal or unremarkable  Mood: description consistent  with euthymia  Affect: full range; was congruent to mood; was congruent to content  Thought Process/Associations: unremarkable  Thought Content:  Reports none;  Denies suicidal & violent ideation and delusions   Perception:  Reports none;  Denies hallucinations  Insight: good  Judgment: good  Cognition: (6) oriented: time, person, and place  attention span: intact  concentration: intact  recent memory: intact  remote memory: intact  fund of knowledge: appropriate  Gait and Station: N/A (telehealth)    LABS and DATA     PHQ 10/20/2021 2/4/2022 4/8/2022   PHQ-9 Total Score 7 5 6   Q9: Thoughts of better off dead/self-harm past 2 weeks Not at all Not at all Not at all     No lab results found.  (LFTs)    PSYCHOTROPIC DRUG INTERACTIONS   ADDITIVE SEROTONERGIC: Zoloft, Adderall  MANAGEMENT:  Monitoring for adverse effects and patient is aware of risks    RISK STATEMENT for SAFETY   Arlet Segura did not appear to be an imminent safety risk to self or others.    TREATMENT RISK STATEMENT:  The risks, benefits, alternatives and potential adverse   effects have been discussed and are understood by the pt. The pt understands the risks of   using street drugs or alcohol. There are no medical contraindications, the pt agrees to   treatment with the ability to do so. The pt knows to call the clinic for any problems or to   access emergency care if needed.  Medical and substance use concerns are documented   above.  Psychotropic drug interaction check was done, including changes made today.    Faculty Psychiatrist:      Candida Redd MD        40 min spent on the date of the encounter in chart review, patient visit, review of tests, documentation, care coordination, and/or discussion with other providers about the issues documented above.

## 2022-11-21 ENCOUNTER — TELEPHONE (OUTPATIENT)
Dept: PSYCHIATRY | Facility: CLINIC | Age: 37
End: 2022-11-21

## 2022-11-21 NOTE — TELEPHONE ENCOUNTER
Pt last seen by Dr. Redd on 11/9/22 at which time Zoloft was increase to 75mg qAM. Writer called pt today to address the following:    a) efficacy wrt BN sxs     b) tolerability including sxs of Serotonin Syndrome  c) will consider increase to 100mg in 2 weeks or continue 75mg for 4 weeks then consider  increase to 100mg.    *Pt did not answer phone call and the VM inbox is full; unable to leave . See [employer] message in follow-up.

## 2022-12-19 NOTE — TELEPHONE ENCOUNTER
RN made additional attempt to reach pt via phone. No answer and VM inbox full.     Pt did read 11/21/22 [employer] message on 12/6/22 but has not responded.

## 2023-01-04 ENCOUNTER — MYC REFILL (OUTPATIENT)
Dept: PSYCHIATRY | Facility: CLINIC | Age: 38
End: 2023-01-04

## 2023-01-04 DIAGNOSIS — F90.0 ADHD (ATTENTION DEFICIT HYPERACTIVITY DISORDER), INATTENTIVE TYPE: ICD-10-CM

## 2023-01-04 RX ORDER — DEXTROAMPHETAMINE SACCHARATE, AMPHETAMINE ASPARTATE, DEXTROAMPHETAMINE SULFATE AND AMPHETAMINE SULFATE 2.5; 2.5; 2.5; 2.5 MG/1; MG/1; MG/1; MG/1
10 TABLET ORAL 2 TIMES DAILY
Qty: 60 TABLET | Refills: 0 | Status: CANCELLED | OUTPATIENT
Start: 2023-01-04

## 2023-03-01 ENCOUNTER — VIRTUAL VISIT (OUTPATIENT)
Dept: PSYCHIATRY | Facility: CLINIC | Age: 38
End: 2023-03-01
Attending: PSYCHIATRY & NEUROLOGY
Payer: COMMERCIAL

## 2023-03-01 DIAGNOSIS — F50.20 BULIMIA NERVOSA: ICD-10-CM

## 2023-03-01 DIAGNOSIS — F90.0 ADHD (ATTENTION DEFICIT HYPERACTIVITY DISORDER), INATTENTIVE TYPE: Primary | ICD-10-CM

## 2023-03-01 PROCEDURE — 99214 OFFICE O/P EST MOD 30 MIN: CPT | Mod: VID | Performed by: PSYCHIATRY & NEUROLOGY

## 2023-03-01 RX ORDER — DEXTROAMPHETAMINE SACCHARATE, AMPHETAMINE ASPARTATE, DEXTROAMPHETAMINE SULFATE AND AMPHETAMINE SULFATE 2.5; 2.5; 2.5; 2.5 MG/1; MG/1; MG/1; MG/1
TABLET ORAL
Qty: 60 TABLET | Refills: 0 | Status: SHIPPED | OUTPATIENT
Start: 2023-04-26 | End: 2023-05-31

## 2023-03-01 RX ORDER — DEXTROAMPHETAMINE SACCHARATE, AMPHETAMINE ASPARTATE, DEXTROAMPHETAMINE SULFATE AND AMPHETAMINE SULFATE 2.5; 2.5; 2.5; 2.5 MG/1; MG/1; MG/1; MG/1
TABLET ORAL
Qty: 60 TABLET | Refills: 0 | Status: SHIPPED | OUTPATIENT
Start: 2023-03-01 | End: 2023-05-31

## 2023-03-01 RX ORDER — DEXTROAMPHETAMINE SACCHARATE, AMPHETAMINE ASPARTATE, DEXTROAMPHETAMINE SULFATE AND AMPHETAMINE SULFATE 2.5; 2.5; 2.5; 2.5 MG/1; MG/1; MG/1; MG/1
10 TABLET ORAL 2 TIMES DAILY
Qty: 60 TABLET | Refills: 0 | Status: SHIPPED | OUTPATIENT
Start: 2023-03-29 | End: 2023-05-31

## 2023-03-01 RX ORDER — DEXTROAMPHETAMINE SACCHARATE, AMPHETAMINE ASPARTATE, DEXTROAMPHETAMINE SULFATE AND AMPHETAMINE SULFATE 2.5; 2.5; 2.5; 2.5 MG/1; MG/1; MG/1; MG/1
TABLET ORAL
Qty: 60 TABLET | Refills: 0 | Status: SHIPPED | OUTPATIENT
Start: 2023-05-24 | End: 2023-05-31

## 2023-03-01 NOTE — NURSING NOTE
Is the patient currently in the state of [place]? YES    Visit mode:VIDEO    If the visit is dropped, the patient can be reconnected by: VIDEO VISIT: Send to e-mail at: [email or web address]    Will anyone else be joining the visit? NO      How would you like to obtain your AVS? MyChart    Are changes needed to the allergy or medication list? YES: Please remove duplicates from med list. Adderall 10 mg is listed multiple times.    Reason for visit: Psychiatry Return    PHILLIP Abreu on 3/1/2023 at 11:32 AM

## 2023-03-01 NOTE — PROGRESS NOTES
Arlet Segura is a 37 year old who is being evaluated via a billable video visit.      Pt will join video visit via: iCurrent  If there are problems joining the visit, send backup video invite via: Send to preferred e-mail: [email or web address]    Reason for telehealth visit: Patient has requested telehealth visit    Originating location (patient location): Patient's place of employment    Will anyone else be joining the visit? No    PHILLIP Abreu on 3/1/2023 at 11:30 AM

## 2023-03-01 NOTE — PATIENT INSTRUCTIONS
**For crisis resources, please see the information at the end of this document**   Patient Education          Treatment Plan Today:     1) Medications:  No change today although if you decide to stop the sertraline would recommend  50mg for 7 days then stop      2) Follow-up appt with Dr Redd:     April 15 or May 2  I  know we said 4 months but I think sooner would be better for a variety of reasons-want to make sure you have your questions answered and a good med plan in place    Clinic staff will call you for this appt  OR   you can call clinic to schedule if you desire    3) Crisis numbers are below     4)  Call the clinic to get an appointment with the [facility]-they are part of this clinic [phone]    ------------------------------------------------------------------------    Thank you for coming to the [facility]    Lab Testing:  If you had lab testing today and your results are reassuring or normal they will be mailed to you or sent through [employer] within 7 days. If the lab tests need quick action we will call you with the results. The phone number we will call with results is # [phone] (home) NONE (work). If this is not the best number please call our clinic and change the number.    Medication Refills:  If you need any refills please call your pharmacy and they will contact us. Our fax number for refills is [phone].   Three business days of notice are needed for general medication refill requests.   Five business days of notice are needed for controlled substance refill requests.   If you need to change to a different pharmacy, please contact the new pharmacy directly. The new pharmacy will help you get your medications transferred.     Scheduling:  If you have any concerns about today's visit or wish to schedule another appointment please call our office during normal business hours [phone] (8-5:00 M-F)    Contact Us:  Please call [phone] during  business hours (8-5:00 M-F).  If after clinic hours, or on the weekend, please call  [phone].    Patient Bill of Rights:  [email or web address]       MENTAL HEALTH CRISIS RESOURCES:  For a emergency help, please call 911 or go to the nearest Emergency Department.     Emergency Walk-In Options:   Em[facility] Unit @ [facility] ([place]): [phone] - Specialized mental health emergency area designed to be calming  [facility] ([place]): [phone]  [facility] ([place]): [phone]  [facility]): [phone]    [facility] Crisis Information:   [place]: [phone]  [place]: [phone]  [place] ([place]) - Adult: [phone]     Child: [phone]  [place] - Adult: [phone]     Child: [phone]  [place]: [phone]  List of all [facility] resources:   https://[place].gov/dhs/people-we-serve/adults/health-care/mental-health/resources/crisis-contacts.jsp    National Crisis Information:   Crisis Text Line: Text  [place]  to 062445  National Suicide Prevention Lifeline: 2-639-374-TALK (1-284.700.7353)       For online chat options, visit https://suicidepreventionlifeline.org/chat/  Poison Control Center: 1-398.979.1423  Trans Lifeline: 1-240.358.5078 - Hotline for transgender people of all ages  The [employer] Project: 1-393.214.2601 - Hotline for LGBT youth     For Non-Emergency Support:   Fast Tracker: Mental Health & Substance Use Disorder Resources -   https://www.Medtrics Labtrackermn.org/       Again thank you for choosing [facility] Psychiatry Clinic and please let us know how we can best partner with you to improve you and your family's health.    You may be receiving a survey regarding this appointment. We would love to have your feedback, both positive and negative. The survey is done by an external company, so your answers are anonymous.

## 2023-03-02 NOTE — PROGRESS NOTES
Video- Visit Details  Type of service:  video visit for medication management  Time of service:    Video Start Time:  1130 AM       Video End Time:  12:00 PM  Reason for video visit:  Services only offered telehealth  Originating Site (patient location):  [facility]   Location- Patient's home   Distant Site (provider location):  Remote location  Mode of Communication:  Video Conference via AmWell  Consent:  Patient has given verbal consent for video visit?: Yes        [facility]  Psychiatry Clinic  PSYCHIATRY PROGRESS NOTE     CARE TEAM:  PCP- Dorothy Holder.  Arlet Segura is a 37 year old   who uses the name Flora and pronouns she, her.    DIAGNOSES                                                                                   ADHD, inattentive type  Bulimia Nervosa, purging type    ASSESSMENT                                                                                Tolerating Zoloft 75mg although not helpful for eating disorder, which is understandable  since the dose is so low. I had intended for the dose to go to 100mg between the last   visit in Nov and now, but my team and Flora did not connect regarding that plan. Now  with the news of planning for her next pregnancy, we decided to hold off on an increase.  I will refer to [facility] so the use of two meds during pregnancy   can be discussed in detail. Need to determine pros and cons of using Zoloft during   pregnancy, we know the stimulant would be continued but at a lower dose most likely  (the same as with the first pregnancy 5mg BID, down from 10mg BID).Adderall remains   helpful, tolerated and no issues with use. Specifically, Adderall benefits focus/conc and   has modestly reduced binging/purging episode frequency. Have discussed ED programs   [facility] and [facility]. Flora may decide to just discontinue Zoloft at this time and  if so, will take 50mg x 7 days then discontinue.    GEOVANNY was  checked today:  Indicates taking controlled medication as prescribed.    PLAN                                                                                           1) Meds:   -continue Adderall 10mg BID  -continue Zoloft to 75mg qAM     2) Other: Labs- still need LFTs, will reorder today; referral to [facility]  for a consult    3) RTC:  April 15 or May 2  4) CRISIS Numbers:   Provided routinely in AVS          PERTINENT BACKGROUND                                            [evals 2014, 2017]   Last documented Dec 2021.  Med History:  Adderall XR 30mg helpful for attentional probs   as well as purging episodes; since about 2014, IR d/t sleep probs with XR; 2018 Prozac   was added, dosed up to 60mg with no improvement in mood (?B/P). Lexapro trial mid 2022  was not successful. Individual eating disorder (ED) treatment (in this clinic) was not   helpful. No use of Wellbutrin d/t BN    INTERIM HISTORY                                                                         Since the last visit:  -tolerating sertraline 75mg but not helping with ED symptoms  -previously reported an overall sense of feeling better, less anxious/stressed  -Adderall 10mg BID, tolerating, no issues  -things are going well at home/work, her son Luis Miguel is doing well, work-life balance is ok  -planning another pregnancy soon, discussed whether to continue sertraline at this time,  agreed to make referral to [facility] to discuss pros and cons of  using sertraline during pregnancy    Current Social Hx: Lives in a house with  Alex, young son Luis Miguel and her  dog 'Hutch'. Likes to play soccer, garden, run and ski. Good social support with friends   and family.  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    Recent Symptoms:  No mood or anxiety symptoms, attention/ concentration are  good, binge-purge about 1x a week and unchanged  Adverse Effects:  none  Pertinent Negatives:  No  suicidal or violent ideation, psychosis and adrian  Recent Substance Use:  none reported    MEDICAL HISTORY  and ALLERGY     ALLERGIES: Patient has no known allergies.  Avoid use of Wellbutrin (d/t BN)     Patient Active Problem List   Diagnosis     ADHD (attention deficit hyperactivity disorder), inattentive type     Hx of bulimia nervosa     Raynaud's disease without gangrene     H/O LEEP     Change in mole     Left ovarian cyst     Pyogenic granuloma     MEDICAL REVIEW OF SYSTEMS                                                               no additional symptoms to that documented above   CURRENT MEDS       Current Outpatient Medications   Medication Sig Dispense Refill     amphetamine-dextroamphetamine (ADDERALL) 10 MG tablet Take one tab (10mg) by mouth twice a day 60 tablet 0     [START ON 3/29/2023] amphetamine-dextroamphetamine (ADDERALL) 10 MG tablet Take 1 tablet (10 mg) by mouth 2 times daily 60 tablet 0     [START ON 4/26/2023] amphetamine-dextroamphetamine (ADDERALL) 10 MG tablet Take one tab (10mg) by mouth twice a day 60 tablet 0     [START ON 5/24/2023] amphetamine-dextroamphetamine (ADDERALL) 10 MG tablet Take one tab (10mg) by mouth twice a day 60 tablet 0     estradiol (ESTRACE) 0.1 MG/GM vaginal cream Place 2 g vaginally twice a week 42.5 g 1     Prenatal Vit-Fe Fumarate-FA (PRENATAL MULTIVITAMIN PLUS IRON) 27-0.8 MG TABS per tablet Take 1 tablet by mouth daily       sertraline (ZOLOFT) 50 MG tablet Take one and a half tabs (75mg) by mouth daily 45 tablet 1     VITALS                                                                                               None recently  MENTAL STATUS EXAM                                                        Alertness: alert  and oriented  Appearance: well groomed  Behavior/Demeanor: cooperative, pleasant and calm, with good  eye contact   Speech: regular rate and rhythm  Language: no obvious problem  Psychomotor: normal or unremarkable  Mood: description  consistent with euthymia  Affect: full range; was congruent to mood; was congruent to content  Thought Process/Associations: unremarkable  Thought Content:  Reports none;  Denies suicidal & violent ideation and delusions   Perception:  Reports none;  Denies hallucinations  Insight: good  Judgment: good  Cognition: (6) oriented: time, person, and place  attention span: intact  concentration: intact  recent memory: intact  remote memory: intact  fund of knowledge: appropriate  Gait and Station: N/A (telehealth)    LABS and DATA     PHQ 10/20/2021 2/4/2022 4/8/2022   PHQ-9 Total Score 7 5 6   Q9: Thoughts of better off dead/self-harm past 2 weeks Not at all Not at all Not at all     No lab results found.  (needs LFTs)    PSYCHOTROPIC DRUG INTERACTIONS   ADDITIVE SEROTONERGIC: Zoloft, Adderall  MANAGEMENT:  Monitoring for adverse effects and patient is aware of risks    RISK STATEMENT for SAFETY   Arlet Segura did not appear to be an imminent safety risk to self or others.    TREATMENT RISK STATEMENT:  The risks, benefits, alternatives and potential adverse   effects have been discussed and are understood by the pt. The pt understands the risks of   using street drugs or alcohol. There are no medical contraindications, the pt agrees to   treatment with the ability to do so. The pt knows to call the clinic for any problems or to   access emergency care if needed.  Medical and substance use concerns are documented   above.  Psychotropic drug interaction check was done, including changes made today.    Faculty Psychiatrist:      Candida Redd MD

## 2023-03-31 ENCOUNTER — MYC REFILL (OUTPATIENT)
Dept: PSYCHIATRY | Facility: CLINIC | Age: 38
End: 2023-03-31
Payer: COMMERCIAL

## 2023-03-31 DIAGNOSIS — F90.0 ADHD (ATTENTION DEFICIT HYPERACTIVITY DISORDER), INATTENTIVE TYPE: ICD-10-CM

## 2023-03-31 RX ORDER — DEXTROAMPHETAMINE SACCHARATE, AMPHETAMINE ASPARTATE, DEXTROAMPHETAMINE SULFATE AND AMPHETAMINE SULFATE 2.5; 2.5; 2.5; 2.5 MG/1; MG/1; MG/1; MG/1
TABLET ORAL
Qty: 60 TABLET | Refills: 0 | Status: CANCELLED | OUTPATIENT
Start: 2023-03-31

## 2023-04-30 ENCOUNTER — HEALTH MAINTENANCE LETTER (OUTPATIENT)
Age: 38
End: 2023-04-30

## 2023-05-10 ENCOUNTER — TELEPHONE (OUTPATIENT)
Dept: PSYCHIATRY | Facility: CLINIC | Age: 38
End: 2023-05-10
Payer: COMMERCIAL

## 2023-05-10 NOTE — TELEPHONE ENCOUNTER
Candida Redd MD Labossiere, Laura, RN  Caller: Unspecified (Today, 11:03 AM)  Ok thanks very much     Yeah, maybe she did taper off it   And that is perfectly fine   If you don't hear back, no need to pursue as she will ask for what she needs   If she has been taking it somehow can give a month supply only   If she has been off and wants to restart can either wait until I see her or go for MTM given the pregnancy   THX!

## 2023-05-10 NOTE — TELEPHONE ENCOUNTER
Per epic, Dr. Redd ordered sertraline 50 mg; taking 1.5 tabs (75 mg) by mouth daily. Per the outside med rec, 50 mg tabs were last filled on 3/2 for #30.    Called the  pharmacy and was informed the pharmacy adjusted the prescriptions to 25 mg tablet + 50 mg tab for TDD of 75 mg to avoid insurance issues. Both prescriptions were last filled on 3/2/23 for a 30 d/s.    Reviewed provider's last office visit note further, which states client may be planning for her next pregnancy. Also mention that she can discontinue the Zoloft by going down to 50 mg x7 days and then discontinue. It's possible she followed through with this.    Placed a phone call to the client. No answer. LVM requesting a c/b or [employer] message to confirm if she discontinued the sertraline (Zoloft).    Melody Jacinto RN on 5/10/2023 at 11:43 AM

## 2023-05-10 NOTE — TELEPHONE ENCOUNTER
----- Message -----   From: Candida Redd MD   Sent: 5/9/2023   8:04 PM CDT   To: Psychiatry Nurse-[facility]   Subject: may need refill                                   It looks like she would have been out of Zoloft as of May1   Please call and see if she is still taking and if she needs more   Thx very much

## 2023-05-31 ENCOUNTER — VIRTUAL VISIT (OUTPATIENT)
Dept: PSYCHIATRY | Facility: CLINIC | Age: 38
End: 2023-05-31
Attending: PSYCHIATRY & NEUROLOGY
Payer: COMMERCIAL

## 2023-05-31 DIAGNOSIS — F90.0 ADHD (ATTENTION DEFICIT HYPERACTIVITY DISORDER), INATTENTIVE TYPE: Primary | ICD-10-CM

## 2023-05-31 DIAGNOSIS — Z51.81 ENCOUNTER FOR THERAPEUTIC DRUG MONITORING: ICD-10-CM

## 2023-05-31 PROCEDURE — 99214 OFFICE O/P EST MOD 30 MIN: CPT | Mod: VID | Performed by: PSYCHIATRY & NEUROLOGY

## 2023-05-31 RX ORDER — DEXTROAMPHETAMINE SACCHARATE, AMPHETAMINE ASPARTATE, DEXTROAMPHETAMINE SULFATE AND AMPHETAMINE SULFATE 2.5; 2.5; 2.5; 2.5 MG/1; MG/1; MG/1; MG/1
TABLET ORAL
Qty: 60 TABLET | Refills: 0 | Status: SHIPPED | OUTPATIENT
Start: 2023-05-31 | End: 2023-06-23

## 2023-05-31 RX ORDER — DEXTROAMPHETAMINE SACCHARATE, AMPHETAMINE ASPARTATE, DEXTROAMPHETAMINE SULFATE AND AMPHETAMINE SULFATE 2.5; 2.5; 2.5; 2.5 MG/1; MG/1; MG/1; MG/1
10 TABLET ORAL 2 TIMES DAILY
Qty: 60 TABLET | Refills: 0 | Status: SHIPPED | OUTPATIENT
Start: 2023-07-29 | End: 2023-06-29

## 2023-05-31 RX ORDER — DEXTROAMPHETAMINE SACCHARATE, AMPHETAMINE ASPARTATE, DEXTROAMPHETAMINE SULFATE AND AMPHETAMINE SULFATE 2.5; 2.5; 2.5; 2.5 MG/1; MG/1; MG/1; MG/1
TABLET ORAL
Qty: 60 TABLET | Refills: 0 | Status: SHIPPED | OUTPATIENT
Start: 2023-06-29 | End: 2023-08-23

## 2023-05-31 NOTE — PATIENT INSTRUCTIONS
Treatment Plan Today:     1) Medications:  No change     2) Follow-up appt with Dr Redd:      July 26 or Aug 2  Clinic staff will call you for this appt  OR   you can call clinic to schedule if you desire    3) Crisis numbers are below      4) see note for talking regarding use of stimulants and breast-feeding    ------------------------------------------------------------------------    Thank you for coming to the [facility]    Lab Testing:  If you had lab testing today and your results are reassuring or normal they will be mailed to you or sent through [employer] within 7 days. If the lab tests need quick action we will call you with the results. The phone number we will call with results is # [phone] (home) NONE (work). If this is not the best number please call our clinic and change the number.    Medication Refills:  If you need any refills please call your pharmacy and they will contact us. Our fax number for refills is [phone].   Three business days of notice are needed for general medication refill requests.   Five business days of notice are needed for controlled substance refill requests.   If you need to change to a different pharmacy, please contact the new pharmacy directly. The new pharmacy will help you get your medications transferred.     Scheduling:  If you have any concerns about today's visit or wish to schedule another appointment please call our office during normal business hours [phone] (8-5:00 M-F)    Contact Us:  Please call [phone] during business hours (8-5:00 M-F).  If after clinic hours, or on the weekend, please call  [phone].    Patient Bill of Rights:  [email or web address]       MENTAL HEALTH CRISIS RESOURCES:  For a emergency help, please call 911 or go to the nearest Emergency Department.     Emergency Walk-In Options:   [facility] @ [facility]): [phone] -  Specialized mental health emergency area designed to be calming  [facility] ([place]): [phone]  [facility] ([place]): [phone]  [facility] ([place]): [phone]    [facility] Crisis Information:   Susana: [phone]  Humberto: [phone]  Tiffanie (LUIS) - Adult: [phone]     Child: [phone]  [place] - Adult: [phone]     Child: [phone]  [place]: [phone]  List of all [facility] resources:   https://[place].gov/dhs/people-we-serve/adults/health-care/mental-health/resources/crisis-contacts.jsp    National Crisis Information:   Crisis Text Line: Text  [place]  to 944781  National Suicide Prevention Lifeline: 1-963-741-TALK (1-506.305.1404)       For online chat options, visit https://suicidepreventionlifeline.org/chat/  Poison Control Center: 1-425.386.1573  Trans Lifeline: 1-310.921.9035 - Hotline for transgender people of all ages  The Compa Project: 8-059-226-6372 - Hotline for LGBT youth     For Non-Emergency Support:   Fast Tracker: Mental Health & Substance Use Disorder Resources -   https://www.fasttrackermn.org/       Again thank you for choosing [facility] and please let us know how we can best partner with you to improve you and your family's health.    You may be receiving a survey regarding this appointment. We would love to have your feedback, both positive and negative. The survey is done by an external company, so your answers are anonymous.

## 2023-05-31 NOTE — NURSING NOTE
Is the patient currently in the state of [place]? YES    Visit mode:VIDEO    If the visit is dropped, the patient can be reconnected by: VIDEO VISIT: Text to cell phone: [phone]    Will anyone else be joining the visit? NO      How would you like to obtain your AVS? MyChart    Are changes needed to the allergy or medication list? NO    Reason for visit: RECHECK

## 2023-05-31 NOTE — PROGRESS NOTES
Video- Visit Details  Type of service:  video visit for medication management  Time of service:    Video Start Time:  1200 PM       Video End Time:  12:30 PM  Originating Site (patient location):  [facility] [place]   Location- Patient's home / work  Distant Site (provider location):  Remote location  Mode of Communication:  Video Conference via AmWell  Consent:  Patient has given verbal consent for video visit?: Yes        [facility]  Psychiatry Clinic  PSYCHIATRY PROGRESS NOTE     CARE TEAM:  PCP- Dorothy Holder.  Arlet Segura is a 37 year old   who uses the name Flora and pronouns she, her.    DIAGNOSES                                                                                   ADHD, inattentive type  Bulimia Nervosa, purging type    ASSESSMENT                                                                                Now off Zoloft, trying to get pregnant and very well might be currently-will know in a   few days. No change to stimulant at this time and she will call if she wants to decrease  dose. Last pregnancy she took half of her current dose, 5mg BID, and this was discussed   again today. I would support reducing the dose if pregnant. Adderall use--no adverse   effects, no HTN, no evidence of misuse & has been taking a stimulant since ~2018.  Adderall benefits focus/conc and has modestly reduced binging/purging episode frequency.     I have retrieved the talking points, re: stimulant use with breast-feeding, that we went   through with the last pregnancy (08/2021):  1) L3 safety risk [moderatel/likey safe:studies in breastfeeding have shown evidence for mild   non-threatening adverse effects OR there are no studies in breastfeeding (limited data)]  2) Relative Infant Dose (2.46-7.25%), range less than the cut off of 10% which we deem compatible   with breastfeeding. At normal therapeutic doses, amount in breastmilk is likely subclinical   3) Can try to feed in the  AM prior to taking dose. However, if this is too stressful it is ok to not pursue  4) Monitor in infant (same as in adults): insomnia, irritability, dec appetite, reduced weight gain (all are  rare secondary to stimulant use) contact psych or medical provider if sxs occur  5) Although rare, potential for stimulants to decrease prolactin and decreasemilk supply (rare)  6) What is best for mom is best for baby (usually)  The lower dose of 5mg BID was continued through breast-feeding.    B/P episodes unchanged, did not impact the last pregnancy and OB provider team  is aware. Flora did not tolerate or benefit from Prozac or Lexapro in the past. Zoloft trial was  incomplete and only reached a dose of 75mg. If necessary, the use of Zoloft could be considered  during pregnancy. I did refer to our Women's Well Being team for a more detailed discussion  of this but Flora does not want to pursue at this time. Have discussed ED programs [facility]   and [facility] in the past but not today. Finally, I did discuss that I will be decreasing my clinical hours   and changing my practice from continuity care to consults and brief care. I recommended we meet   again in July or August and discuss disposition options then. Flora agreed to this plan.   .   [place] was checked today:  Indicates taking controlled medication as prescribed.    PLAN                                                                                           1) Meds:   -continue Adderall 10mg BID    2) Other: Labs- still need LFTs, will reorder today;    3) RTC:  July or August  4) CRISIS Numbers:   Provided routinely in AVS          PERTINENT BACKGROUND                                            [evals 2014, 2017]   Last documented Dec 2021.  Med History:  Adderall XR 30mg helpful for attentional probs   as well as purging episodes; since about 2014, IR d/t sleep probs with XR; 2018 Prozac   was added, dosed up to 60mg with no improvement in mood (?B/P). Lexapro  trial mid 2022  was not successful. Individual eating disorder (ED) treatment (in this clinic) was not   helpful. No use of Wellbutrin d/t BN and not helpful in the past.    INTERIM HISTORY                                                                         Since the last visit:  -discontinued Zoloft without difficulty, off for a couple of months  -waiting to determine if pregnant, any day now  -Luis Miguel is talking now  -things are going well at home/work  -no symptoms of depression, anxiety  -no change to B/P pattern  -good response to Adderall at current dose    Current Social Hx: Lives in a house with  Alex, young son Luis Miguel and her  dog 'Johanny'. Likes to play soccer, garden, run and ski. Good social support with friends   and family.  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    Recent Symptoms:  No mood or anxiety symptoms, attention/ concentration are  good, binge-purge about 1x a week and unchanged  Adverse Effects:  none  Pertinent Negatives:  No suicidal or violent ideation, psychosis and adrian  Recent Substance Use:  none reported    MEDICAL HISTORY  and ALLERGY     ALLERGIES: Patient has no known allergies.  Avoid use of Wellbutrin (d/t BN)     Patient Active Problem List   Diagnosis     ADHD (attention deficit hyperactivity disorder), inattentive type     Hx of bulimia nervosa     Raynaud's disease without gangrene     H/O LEEP     Change in mole     Left ovarian cyst     Pyogenic granuloma     MEDICAL REVIEW OF SYSTEMS                                                               no additional symptoms to that documented above   CURRENT MEDS       Current Outpatient Medications   Medication Sig Dispense Refill     amphetamine-dextroamphetamine (ADDERALL) 10 MG tablet Take one tab (10mg) by mouth twice a day 60 tablet 0     amphetamine-dextroamphetamine (ADDERALL) 10 MG tablet Take 1 tablet (10 mg) by mouth 2 times daily 60 tablet 0     amphetamine-dextroamphetamine  (ADDERALL) 10 MG tablet Take one tab (10mg) by mouth twice a day 60 tablet 0     amphetamine-dextroamphetamine (ADDERALL) 10 MG tablet Take one tab (10mg) by mouth twice a day 60 tablet 0     estradiol (ESTRACE) 0.1 MG/GM vaginal cream Place 2 g vaginally twice a week 42.5 g 1     Prenatal Vit-Fe Fumarate-FA (PRENATAL MULTIVITAMIN PLUS IRON) 27-0.8 MG TABS per tablet Take 1 tablet by mouth daily       sertraline (ZOLOFT) 50 MG tablet Take one and a half tabs (75mg) by mouth daily 45 tablet 1     VITALS                                                                                               None recently  MENTAL STATUS EXAM                                                        Alertness: alert  and oriented  Appearance: well groomed  Behavior/Demeanor: cooperative, pleasant and calm, with good  eye contact   Speech: regular rate and rhythm  Language: no obvious problem  Psychomotor: normal or unremarkable  Mood: description consistent with euthymia  Affect: full range; was congruent to mood; was congruent to content  Thought Process/Associations: unremarkable  Thought Content:  Reports none;  Denies suicidal & violent ideation and delusions   Perception:  Reports none;  Denies hallucinations  Insight: good  Judgment: good  Cognition: (6) oriented: time, person, and place  attention span: intact  concentration: intact  recent memory: intact  remote memory: intact  fund of knowledge: appropriate  Gait and Station: N/A (telehealth)    LABS and DATA         10/20/2021     8:34 AM 2/4/2022    10:22 AM 4/8/2022     2:09 PM   PHQ   PHQ-9 Total Score 7 5 6   Q9: Thoughts of better off dead/self-harm past 2 weeks Not at all Not at all Not at all     No lab results found.  (needs LFTs)    PSYCHOTROPIC DRUG INTERACTIONS   none  MANAGEMENT:  N/A    RISK STATEMENT for SAFETY   Arlet Segura did not appear to be an imminent safety risk to self or others.    TREATMENT RISK STATEMENT:  The risks, benefits, alternatives and  potential adverse   effects have been discussed and are understood by the pt. The pt understands the risks of   using street drugs or alcohol. There are no medical contraindications, the pt agrees to   treatment with the ability to do so. The pt knows to call the clinic for any problems or to   access emergency care if needed.  Medical and substance use concerns are documented   above.  Psychotropic drug interaction check was done, including changes made today.    Faculty Psychiatrist:      Candida Redd MD

## 2023-06-23 ENCOUNTER — MYC REFILL (OUTPATIENT)
Dept: PSYCHIATRY | Facility: CLINIC | Age: 38
End: 2023-06-23
Payer: COMMERCIAL

## 2023-06-23 DIAGNOSIS — F90.0 ADHD (ATTENTION DEFICIT HYPERACTIVITY DISORDER), INATTENTIVE TYPE: ICD-10-CM

## 2023-06-26 RX ORDER — DEXTROAMPHETAMINE SACCHARATE, AMPHETAMINE ASPARTATE, DEXTROAMPHETAMINE SULFATE AND AMPHETAMINE SULFATE 2.5; 2.5; 2.5; 2.5 MG/1; MG/1; MG/1; MG/1
TABLET ORAL
Qty: 60 TABLET | Refills: 0 | Status: SHIPPED | OUTPATIENT
Start: 2023-06-26 | End: 2023-06-29

## 2023-06-26 NOTE — TELEPHONE ENCOUNTER
[facility] Call Center    Phone Message    May a detailed message be left on voicemail: yes     Reason for Call: Medication Question or concern regarding medication   Prescription Clarification  Name of Medication: dextroamphetamine 10 mg  Prescribing Provider: bass   Pharmacy: [facility], [place] - [address] [place] [address] [phone]     What on the order needs clarification?   Fill date is 6/29, but patient is going out of town tomorrow, requests authorization to refill today          Action Taken: Message routed to:  Other: nursing pool    Travel Screening: Not Applicable

## 2023-06-29 ENCOUNTER — VIRTUAL VISIT (OUTPATIENT)
Dept: OBGYN | Facility: CLINIC | Age: 38
End: 2023-06-29
Attending: REGISTERED NURSE
Payer: COMMERCIAL

## 2023-06-29 DIAGNOSIS — O09.529 SUPERVISION OF HIGH-RISK PREGNANCY OF ELDERLY MULTIGRAVIDA: Primary | ICD-10-CM

## 2023-06-29 NOTE — PROGRESS NOTES
[facility] RN Prenatal Intake note  Subjective     38 year old female newly pregnant.  LMP: 5/7/23.    exact and regular cycles  Pregnancy is planned.    Partner/support person name and relationship - Alex/.        Symptoms since LMP include nausea, breast tenderness, fatigue and increased saliva. Patient has tried these relief   measures: diet modification.    OB HISTORY  # 1 - Date: 09/05/21, Sex: Male, Weight: 3.12 kg (6 lb 14.1 oz), GA: 40w6d, Delivery: Vaginal, Spontaneous, Apgar1: 9, Apgar5: 9, Living: Living, Birth Comments: None    # 2 - Date: None, Sex: None, Weight: None, GA: None, Delivery: None, Apgar1: None, Apgar5: None, Living: None, Birth Comments: None      OB COMPLICATIONS  NONE       PERSONAL/SOCIAL HISTORY    lives with their family.  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  Job involves moderate activity .  Her partner works as a consultant for [employer].   MENTAL HEALTH HISTORY:  current medication and past therapist.  Bulemia hx and ADHD      - Current Medications adderall-discussed with psychiatrist   Current Outpatient Medications   Medication Sig Dispense Refill     amphetamine-dextroamphetamine (ADDERALL) 10 MG tablet Take one tab (10mg) by mouth twice a day 60 tablet 0     Prenatal Vit-Fe Fumarate-FA (PRENATAL MULTIVITAMIN PLUS IRON) 27-0.8 MG TABS per tablet Take 1 tablet by mouth daily       estradiol (ESTRACE) 0.1 MG/GM vaginal cream Place 2 g vaginally twice a week (Patient not taking: Reported on 6/29/2023) 42.5 g 1           - Co-morbids    Past Medical History:   Diagnosis Date     Abnormal cervical Pap smear with positive HPV DNA test     last pap 2/2013 nl     ADHD      Breast disorder 07/2019    fibroadenoma     Bulimia since 2008 01/19/2014     Cervical high risk HPV (human papillomavirus) test positive 05/28/2019    See problem list.      Normal first pregnancy confirmed, antepartum 2/1/2021    [facility] CNM pt Partner's name: Alex [ ] Entered on Epic list [x ]  NOB folder [x ] Dating [ ] First tri screen ordered; declined [ ] QS/AFP ordered declined [ ] Fetal anatomy US ordered [x ] Rubella immune [ x] Hep B immune [x] Pap--NILM 2/2021  [ x] NO plan utox in labor  _____________________________________ [x ] EOB folder [ ] PP Contraception plan: If tubal,consent date: [x ] Labor plans: epidural       - Genetic/Infection questionnaire completed, risks include family hx of hemochromatosis and sister with DM1. Discussed genetic screening options, patient does desire first trimester genetic screening  Pt  does not have a recent known exposure to Parvo or CMV so IgG/IgM testing WILL NOT be ordered.   Flu Vaccine.  Patient already received Flu Vaccine  COVID Vaccine: Hx of COVID illness  (details below) and Has received most recent COVID booster   covid in December 2022. Mild, no treatement    - Discussed expectations for routine prenatal care and scheduling.  -Discussed highlights from The Expectant Family booklet on warning signs, safe pregnancy and prevention of infections diseases, nutrition and exercise.  - Patient was encouraged to start prenatal vitamins as tolerated, if experiencing nausea and vomiting then OK to switch to folic acid only at this time.    -Additional items: None  -Reconciled and reviewed problem list  -Pt scheduled for dating US and NOB on 7/7/23    Carrie Salazar RN

## 2023-06-29 NOTE — LETTER
6/29/2023       RE: Arlet Segura  [address]  [place] [address]     Dear Colleague,    Thank you for referring your patient, Arlet Segura, to the [facility] [place] [facility]. Please see a copy of my visit note below.    REBECCA RN Prenatal Intake note  Subjective     38 year old female newly pregnant.  LMP: 5/7/23.    exact and regular cycles  Pregnancy is planned.    Partner/support person name and relationship - Alex/.        Symptoms since LMP include nausea, breast tenderness, fatigue and increased saliva. Patient has tried these relief   measures: diet modification.    OB HISTORY  # 1 - Date: 09/05/21, Sex: Male, Weight: 3.12 kg (6 lb 14.1 oz), GA: 40w6d, Delivery: Vaginal, Spontaneous, Apgar1: 9, Apgar5: 9, Living: Living, Birth Comments: None    # 2 - Date: None, Sex: None, Weight: None, GA: None, Delivery: None, Apgar1: None, Apgar5: None, Living: None, Birth Comments: None      OB COMPLICATIONS  NONE       PERSONAL/SOCIAL HISTORY    lives with their family.  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  Her partner works as a consultant for [employer].   MENTAL HEALTH HISTORY:  current medication and past therapist.  Bulemia hx and ADHD      - Current Medications adderall-discussed with psychiatrist   Current Outpatient Medications   Medication Sig Dispense Refill    amphetamine-dextroamphetamine (ADDERALL) 10 MG tablet Take one tab (10mg) by mouth twice a day 60 tablet 0    Prenatal Vit-Fe Fumarate-FA (PRENATAL MULTIVITAMIN PLUS IRON) 27-0.8 MG TABS per tablet Take 1 tablet by mouth daily      estradiol (ESTRACE) 0.1 MG/GM vaginal cream Place 2 g vaginally twice a week (Patient not taking: Reported on 6/29/2023) 42.5 g 1           - Co-morbids    Past Medical History:   Diagnosis Date    Abnormal cervical Pap smear with positive HPV DNA test     last pap  2/2013 nl    ADHD     Breast disorder 07/2019    fibroadenoma    Bulimia since 2008 01/19/2014    Cervical high risk HPV (human papillomavirus) test positive 05/28/2019    See problem list.     Normal first pregnancy confirmed, antepartum 2/1/2021    [facility] CNM pt Partner's name: Alex [ ] Entered on Epic list [x ] NOB folder [x ] Dating [ ] First tri screen ordered; declined [ ] QS/AFP ordered declined [ ] Fetal anatomy US ordered [x ] Rubella immune [ x] Hep B immune [x] Pap--NILM 2/2021  [ x] NO plan utox in labor  _____________________________________ [x ] EOB folder [ ] PP Contraception plan: If tubal,consent date: [x ] Labor plans: epidural       - Genetic/Infection questionnaire completed, risks include family hx of hemochromatosis and sister with DM1. Discussed genetic screening options, patient does desire first trimester genetic screening  Pt  does not have a recent known exposure to Parvo or CMV so IgG/IgM testing WILL NOT be ordered.   Flu Vaccine.  Patient already received Flu Vaccine  COVID Vaccine: Hx of COVID illness  (details below) and Has received most recent COVID booster   covid in December 2022. Mild, no treatement    - Discussed expectations for routine prenatal care and scheduling.  -Discussed highlights from The Expectant Family booklet on warning signs, safe pregnancy and prevention of infections diseases, nutrition and exercise.  - Patient was encouraged to start prenatal vitamins as tolerated, if experiencing nausea and vomiting then OK to switch to folic acid only at this time.    -Additional items: None  -Reconciled and reviewed problem list  -Pt scheduled for dating US and NOB on 7/7/23    Carrie Salazar RN

## 2023-06-29 NOTE — PATIENT INSTRUCTIONS
"    Pregnancy: Your First Trimester Changes  The first trimester is a time of rapid development for your baby. Because your baby is growing so quickly, it is important that you start a healthy lifestyle right away. By the end of the first trimester, your baby has formed all of its major body organs and weighs just over an ounce.  Month 1 (weeks 1 to 4)  The placenta (the organ that nourishes your baby) begins to form. The brain, spinal cord, heart, gastrointestinal tract, and lungs begin to develop. Your baby is about   inch long by the end of the first month.     Actual size of baby is 1/4\".     Month 2 (weeks 5 to 8)  All of your baby s major body organs form. The face, fingers, toes, ears, and eyes appear. By the end of the month, your baby is about 1 inch long.     Actual size of baby is 1\".     Month 3 (weeks 9 to 12)  Your baby can open and close its fists and mouth. The sexual organs begin to form. As the first trimester ends, your baby is about 3 inches long.     Actual size of baby is 3\".     [employer] last reviewed this educational content on 8/1/2020 2000-2022 [employer]. All rights reserved. This information is not intended as a substitute for professional medical care. Always follow your healthcare professional's instructions.          Vitamin B6 (pyridoxine) Oral Tablet  Uses  This medicine is used for the following purposes:    metabolism disorder    nausea and vomiting    vitamin deficiency  Instructions  This medicine may be taken with or without food.  Store at room temperature away from heat, light, and moisture. Do not keep in the bathroom.  If you forget to take a dose on time, take it as soon as you remember. If it is almost time for the next dose, do not take the missed dose. Return to your normal dosing schedule. Do not take 2 doses of this medicine at one time.  Drug interactions can change how medicines work or increase risk for side effects. Tell your health care providers " about all medicines taken. Include prescription and over-the-counter medicines, vitamins, and herbal medicines. Speak with your doctor or pharmacist before starting or stopping any medicine.  Speak with your doctor or pharmacist before starting any other vitamins.  It is very important that you follow your doctor's instructions for all blood tests.  Cautions  Tell your doctor and pharmacist if you ever had an allergic reaction to a medicine.  Do not use the medication any more than instructed.  Tell the doctor or pharmacist if you are pregnant, planning to be pregnant, or breastfeeding.  Side Effects  If you have any of the following side effects, you may be getting too much medicine. Please contact your doctor to let them know about these side effects.    drowsiness or sedation    numbness or tingling in hands and feet    headaches    nausea    stomach upset or abdominal pain  This medicine usually has no side effects.  A few people may have an allergic reaction to this medicine. Symptoms can include difficulty breathing, skin rash, itching, swelling, or severe dizziness. If you notice any of these symptoms, seek medical help quickly.  Extra  Please speak with your doctor, nurse, or pharmacist if you have any questions about this medicine.  [email or web address]  IMPORTANT NOTE: This document tells you briefly how to take your medicine, but it does not tell you all there is to know about it. Your doctor or pharmacist may give you other documents about your medicine. Please talk to them if you have any questions. Always follow their advice. There is a more complete description of this medicine available in English. Scan this code on your smartphone or tablet or use the web address below. You can also ask your pharmacist for a printout. If you have any questions, please ask your pharmacist. The display and use of this drug information is subject to Terms of Use. Copyright(c) 2022 [employer]     1765-3044 [employer]. All rights reserved. This information is not intended as a substitute for professional medical care. Always follow your healthcare professional's instructions.          Adapting to Pregnancy: First Trimester  As your body adjusts during your first trimester of pregnancy, you may have to change or limit your daily activities. You ll need more rest. You may also need to use the energy you have more wisely.   Your changing body  Almost every part of your body is affected as you adapt to pregnancy. The uterus and cervix will start to soften right away. You may not look very pregnant during the first 3 months. But you are likely to have some common signs of early pregnancy:     Nausea    Fatigue    Frequent urination    Mood swings    Bloating of the belly    Constipation    Heartburn    Missed or light periods (first trimester bleeding)    Nipple or breast tenderness and breast swelling  It s not too late to start good habits   What matters most is protecting your baby from this moment on. If you smoke, drink alcohol, or use drugs, now is the time to stop. If you need help, talk with your healthcare provider:     Smoking increases the risk of stillbirth or having a low-birth-weight baby. If you smoke, quit now.    Alcohol and drugs have been linked with miscarriage, birth defects, intellectual disability, and low birth weight. Don't drink alcohol or take drugs.  Tips to relieve nausea  During pregnancy, nausea can happen at any time of the day, but it may be worse in the morning. To help prevent nausea:     Eat small, light meals at frequent intervals.    Drink fluids often.    Get up slowly. Eat a few unsalted crackers before you get out of bed.    Avoid smells that bother you.    Avoid spicy and fatty foods.    Eat an ice pop in your favorite flavor.    Get plenty of rest.    Ask your healthcare provider about taking elmer or vitamin B6 for nausea and vomiting.    Talk  with your healthcare provider if you take vitamins that upset your stomach.   Work concerns  The end of the first trimester is a good time to discuss working during pregnancy with your employer. Follow your healthcare provider s advice if your job needs you to stand for a long time, work with hazardous tools, or even sit at a desk all day. Your workspace, workload, or scheduled hours may need to be adjusted. Perhaps you can change body postures more often or take an extra break.   Advice for travel  Talk to your healthcare provider first, but the second trimester may be the best time for any travel. You may be advised to avoid certain trips while you re pregnant. Food and water can be concerns in developing countries. Travel by car is a good choice, as you can stop, get out, and stretch. Bring snacks and water along. Fasten the lap belt below your belly, low over your hips. Also be sure to wear the shoulder harness.   Intimacy  Unless your healthcare provider tells you to, there's no reason to stop having sex while you re pregnant. You or your partner may notice changes in desire. Desire may be less in the first trimester, due to nausea and fatigue. In the second trimester, sex may be very enjoyable. The third trimester can be a challenge comfort-wise. Try different positions and see what s best for you both.   [employer] last reviewed this educational content on 4/1/2020 2000-2022 [employer]. All rights reserved. This information is not intended as a substitute for professional medical care. Always follow your healthcare professional's instructions.          Pregnancy: Common Questions  There are plenty of myths and  old wives  tales  about pregnancy. You may need help  fact from fiction. On this sheet, you ll find answers to a few common questions. If you have other questions, talk with your healthcare provider.    Will working harm my baby?  In most cases, working throughout your  pregnancy is not harmful at all. There may be concerns if the job involves dangerous machinery or chemicals, lifting, or standing for very long periods of time. Talk with your healthcare provider and employer about your particular job and pregnancy.  Is it safe to have sex during pregnancy?  Yes, unless you are specifically advised not to by your healthcare provider.  Why can t I change the cat litter box?  Cats carry a disease called toxoplasmosis. In adult humans, it shows up as a mild infection of the blood and organs. If you are infected during pregnancy, the baby s brain and eyes could be damaged. To be safe, have someone else change the litter. If you must handle it, wear a paper mask over your nose and mouth. Also, wear gloves and wash your hands afterward.  Which medicines are safe?  No prescription or over-the-counter medicine is safe for everyone all of the time. But sometimes medicines are needed. Be sure your healthcare provider knows you are pregnant. Then use only the medicines he or she advises you to take.  Is it true that I can overheat my baby?  Yes. To prevent making your baby too warm:    Don t sit in a Jacuzzi. A long, warm bath is fine, but not in water over 100 F (37.7 C).    Exercise less intensely if you feel tired. Base your workout on how you feel, not your heart rate. Heart rates aren t a good way to measure effort during pregnancy.  Can I lift and carry safely?  Yes, if your healthcare provider doesn t tell you otherwise. Learn to lift and carry safely to prevent injury and reduce back pain during pregnancy. To protect your back:    Bend at the knees to bring the load nearer.    Get a good . Test the weight of the load.    Tighten your belly. Exhale as you lift.    Lift with your legs, not with your back.    Carry the load close to your body.    Hold the load so you can see where you are going.  What if I get sick?  Most women get sick at least once during pregnancy. Talk with your  healthcare provider if you do. Most likely it will not affect your pregnancy. Get plenty of rest and fluids, and eat what you can. Talk with your provider before taking any medicines.  [employer] last reviewed this educational content on 8/1/2020 2000-2022 [employer]. All rights reserved. This information is not intended as a substitute for professional medical care. Always follow your healthcare professional's instructions.          Comfort Tips During Pregnancy  Pregnancy can bring discomfort of different kinds. Below are tips for ways to feel better. Talk with your healthcare provider before using pain-relieving medicine at any time during your pregnancy.    First trimester tips  Easing nausea    Get up slowly. Eat a few unsalted crackers before you get out of bed.    Avoid smells that bother you.    Eat small, bland, low-fat, high-protein meals at frequent intervals.    Sip on water, weak tea, or clear soft drinks, like ginger ale. Eat ice chips.    Try taking vitamin B6.  Coping with fatigue    Take catnaps when you can.    Get regular exercise.    Accept help from others.    Practice good sleep habits, like going to bed and getting up at the same time each day. Use your bed only for sleep and sex.  Calming mood swings    Talk about your feelings with others, including other mothers.    Limit sugar, chocolate, and caffeine.    Eat a healthy diet. Don t skip meals.    Get regular exercise.  Soothing headaches    Get fresh air and exercise.    Relax and get enough rest.    Check with your healthcare provider before taking any pain medicines.    Second trimester tips    To limit ankle swelling, sit with your feet raised or wear support hose.    If you have pain in your groin and stomach (round ligament pain), don't make sudden twisting movements with your body.    For leg cramps, flexing your foot often brings immediate relief. Also try massaging your calf in long, downward strokes, or stretching  your legs before going to bed. Get enough exercise and wear shoes with flexible soles. Eat foods rich in calcium.    Third trimester tips  Reducing heartburn    Eat small, light meals throughout the day rather than 3 large ones.    Sleep with your upper body raised 6 inches. Don t lie down until 2 hours after you eat.    Don't eat greasy, fried, or spicy foods.    Don't have citrus fruits or juices.  Treating constipation    Eat foods high in fiber, such as whole-grain foods, and fresh fruit and vegetables).    Drink plenty of water.    Get regular exercise.    Ask about your healthcare provider about medicines that have docusate or psyllium.  Taking care of your breasts    Don't use harsh soaps or alcohol, which can make your skin too dry.    Wear nursing bras. They provide more support than regular bras and can be used after pregnancy if you breastfeed.  Getting a good night s sleep    Take a warm shower before bed.    Sleep on a firm mattress.    Lie on your side with 1 leg crossed over the other.    Use pillows to support your arms, legs, and belly.    [email or web address] last reviewed this educational content on 8/1/2020 2000-2022 [employer]. All rights reserved. This information is not intended as a substitute for professional medical care. Always follow your healthcare professional's instructions.          Folic Acid Supplements  Folic acid is also called folate. It is one of the B vitamins. Nutrition experts are just starting to learn more about how folic acid helps the body. It is needed to prevent a shortage of red blood cells (a type of anemia). Experts have also found that folic acid can prevent some birth defects.     How much folic acid do you need?  Adults should have 400 mcg (micrograms) of folic acid each day. Adults may need more if they are planning to get pregnant, are pregnant, or are breastfeeding. Talk with your healthcare provider to find the right amount of folic acid for you.   Why  use a supplement?  Taking folic acid both before and during pregnancy is important. This can prevent birth defects of the spine and brain (neural tube defects). A supplement may also be helpful if you drink alcohol often. You may want to use a folic acid supplement if any of the following is true for you:   ? I am a person of childbearing age.   ? I am planning to get pregnant.  ? I rarely eat leafy green vegetables, dried beans, or lentils.   ? I rarely eat cereal and whole grains (wheat germ, brown rice, whole-wheat bread).  ? I often have more than 1 drink of alcohol a day.   If you take folic acid  Here are some tips to help you get the most from a folic acid supplement:    Read the label to be sure the product won't  soon.    Choose a supplement that provides 400 to 800 mcg of folic acid.    Store the supplement in a cool, dry place, away from sun and heat.    Eat a healthy diet to get all the nutrients your body needs.  Foods that have folic acid  Folic acid is found mainly in plants. Some good sources include:    Dark green leafy vegetables such as spinach, kale, collards, and mik lettuce    Lentils, chickpeas, and dried beans such as sinha, kidney, and black beans    Asparagus, bok chante, broad-beans, broccoli, and [place] sprouts    Avocados, oranges, and orange juice    Wheat germ and whole-wheat products    Products fortified with folic acid, such as cereal, pasta, bread, and rice  [employer] last reviewed this educational content on 2022-2022 [employer]. All rights reserved. This information is not intended as a substitute for professional medical care. Always follow your healthcare professional's instructions.          Anemia During Pregnancy  Anemia is a condition in which the red blood cell count is too low. In pregnant women, this is often caused by not having enough iron in the blood. Anemia is common in pregnancy and very easy to treat.   Why you need iron   While  pregnant, your body uses iron to make red blood cells for you and your baby. These cells bring oxygen to your baby and to the rest of your body. Not having enough red blood cells can cause your baby to be born too small. But this is rare, as it s easy for you to get enough iron.   Testing for anemia   The only way to know if you have anemia is to have a simple test called a CBC (complete blood count). This is a routine test that will be done at one of your first prenatal visits. This test may be done again, at about week 26 to week 28.   Treating anemia   If you have anemia due to low iron content, follow the advice of your healthcare provider. Eating foods high in iron and taking supplements can help you get the iron you need.   Eating foods high in iron      Green leafy vegetables and nuts are a good source of iron.     Eat foods that are high in iron such as:     Red meat (limit organ meats such as liver)    Seafood (be sure it s fully cooked), and don't eat fish that are high in mercury, such as swordfish, tilefish, fartun mackerel, and shark    Tofu    Eggs    Green, leafy vegetables    Whole grains and iron-fortified cereals    Dried fruits and nuts  Taking iron supplements   In most cases, a prenatal vitamin can provide enough iron. But if you need more, your healthcare provider may prescribe an iron supplement. Swallow iron pills with a glass of orange or cranberry juice. The vitamin C in these fruit juices can help your body absorb iron. But don t take your iron pills with juices that have calcium added to them. They can keep your body from absorbing the iron.   Iron supplements   Iron supplements may have certain side effects. They may cause your stools to turn black, and make you feel sick to your stomach or constipated. Here are some tips that may help you limit side effects:     Start slowly. Take 1 pill a day for a few days. Then work up to your prescribed dose over time.    Take your pills with meals,  and not at bedtime.    Increase the fiber in your diet. Eat more whole grains, fruits, and vegetables.    Do mild exercise each day.    If advised by your healthcare provider, take a stool softener.  [employer] last reviewed this educational content on 2/1/2020 2000-2022 [employer]. All rights reserved. This information is not intended as a substitute for professional medical care. Always follow your healthcare professional's instructions.          Rh-Negative Screening  If you have Rh-negative blood, your baby may be at risk for health problems. This is true only if your baby has Rh-positive blood. A simple test followed by treatment can help prevent problems.   What are the risks?  If the blood of your baby is Rh positive, your Rh-negative blood may form antibodies. These antibodies will attack the Rh-positive blood. This is called Rh disease. Rh disease can cause your baby to lose blood cells or have other health problems. Medical treatment can prevent Rh disease by keeping antibodies from forming.   How are you tested?  A simple blood test shows if you re Rh negative. This test is done very early in your pregnancy. If you re Rh negative, you ll have a second blood test near week 28 of pregnancy. This test will check whether or not your blood contains Rh antibodies.     [employer] last reviewed this educational content on 4/1/2020 2000-2022 [employer]. All rights reserved. This information is not intended as a substitute for professional medical care. Always follow your healthcare professional's instructions.          What Is Prenatal Care?  Before getting pregnant, you may have added some good health habits to get ready for your baby. But if you didn t, start today. One of the first steps is learning how to take care of yourself. See your healthcare provider as soon as you think you may be pregnant. Then continue prenatal care during your pregnancy.     Prenatal care helps you  have a healthy baby   During prenatal care:    Your healthcare provider checks the health of your pregnancy. They'll calculate a due date. This gives an estimate of your baby's delivery. Many people give birth between 38 and 41 weeks of pregnancy. Your due date is found by counting 40 weeks from the first day of your last menstrual period.    Your pregnancy's progress is checked. This includes your baby s growth, fetal heart rate, changes in your weight and blood pressure, and your overall health and comfort.    Your provider may find new concerns and manage current ones before problems happen.    Your provider will check lab work through blood and urine.    Your provider will talk about normal changes that happen during pregnancy. They'll also talk about changes that may not be normal. And they'll advise you about lifestyle changes.    Your provider will answer your questions. They'll also help you get ready for labor and delivery.  You're part of a team  When you re pregnant, you re part of a team. This team includes you, your baby, and your provider. It also may include a partner or a main support person. That could be a loved one, such as a spouse, a family member, or a friend. As you work to give your baby a healthy start, rely on your team members for support.   It s not too late to start good habits   What matters most is protecting your baby from this moment on. If you smoke, drink alcohol, or use illegal drugs, now's the time to stop. If you need help, talk with your healthcare provider.     Smoking increases the risk of losing your baby. Or of having a low-birth-weight baby. If you smoke, quit now.    Alcohol and drugs have been linked with many problems. These include miscarriage, birth defects, intellectual disability, and low birth weight. Stay away from alcohol and drugs.    Eat a healthy diet. This helps keep you and your baby strong and healthy. Follow your provider's instructions for nutrition. Also  stay within the guidelines you're given for healthy weight gain.    Take 400 micrograms to 800 micrograms (400 mcg to 800 mcg or 0.4 mg to 0.8 mg) of folic acid every day. Take it for at least 1 month before getting pregnant. And keep taking it for the first trimester of your pregnancy. This is to lower your risk of some brain and spinal birth defects. You can get folic acid from some foods. But it's hard to get all the folic acid you'll need from foods alone. Talk with your provider about taking a folic acid supplement.    Regular exercise will help you stay fit and feel good during pregnancy. It can also help prevent or reduce back pain. Talk with your provider about how to exercise safely during pregnancy.    If you have a health condition, make sure it's under control. Some conditions include asthma, diabetes, depression, high blood pressure, obesity, thyroid disease, or epilepsy. Be sure your vaccines are up to date.  How daily issues affect your health  Many things in your daily life impact your health. This can include transportation, money problems, housing, access to food, and . If you can t get to medical appointments, you may not receive the care you need. When money is tight, it may be difficult to pay for medicines. And living far from a grocery store can make it hard to buy healthy food.   If you have concerns in any of these or other areas, talk with your healthcare team. They may know of local resources to assist you. Or they may have a staff person who can help.   [employer] last reviewed this educational content on 8/1/2020 2000-2022 [employer]. All rights reserved. This information is not intended as a substitute for professional medical care. Always follow your healthcare professional's instructions.          Understanding Intimate Partner Violence  Intimate partner violence (IPV) affects hundreds of thousands of women. But the true number may be much higher because many  women may not report it. Partner violence often starts or gets worse during and after pregnancy. This may be from the stress of pregnancy and a new baby. IPV can result in pregnancy complications, poor postpartum care, and poor health of the baby.  You may feel alone if you are experiencing intimate partner violence during or after your pregnancy, but know that you re not. It s far more common than you think. And there s help and hope. Talk with your healthcare provider if you are not safe.  Types of intimate partner violence  Most people think of IPV as just physical abuse. While it does often include physical abuse, IPV can be emotional and sexual abuse. These other forms of abuse are sometimes harder to see. This is especially true for emotional abuse, because it can distort your own viewpoint.    Physical abuse includes using physical force to hurt or disable a partner. It can include throwing objects, pushing, kicking, biting, slapping, strangling, hitting, or beating.    Emotional abuse includes making threats, and controlling money or other resources. It s anything that erodes a person s sense of self-worth. It may look like name-calling, blaming, and stalking. It can also include isolation from friends, family, and even access to healthcare.    Sexual violence includes rape, unwanted kissing, and forced touching. It also includes reproductive coercion. This is holding power and control over the woman s reproductive health. It may look like efforts to sabotage contraception, having unsafe sex to purposefully expose the woman to sexually transmitted infections (STIs). It may also include forced  or injuries to cause a miscarriage.  Are you at risk for IPV?  IPV affects all genders, races, ethnicities, and social and economic statuses. But some people are at greater risk for being a victim or an abuser. Certain factors can increase the risk for IPV.  Individual factors    Having low self-esteem, being  "emotional dependent, insecure, and jealous    Having low income or being unemployed, having recent job loss or job instability    Being younger    Using alcohol or drugs    Being depressed, angry, having PTSD, or being hostile towards women    Having a history of abusing others, being abused, or witnessing abuse    Having poor problem solving skills    Having poor impulse control    Being a Native , , or  woman  Relationship factors    Marital or relationship conflict, frequent fighting    Having a jealous or possessive partner    Having control issues    Being under economic stress    Having poor social support    Having income inequality    It often helps to ask yourself these questions from the [employer] to know if you are in an abusive relationship:    Does my partner always put me down and make me feel bad about myself?    Has my partner caused harm or pain to my body?    Does my partner threaten me, the baby, my other children or himself?    Does my partner blame me for his actions? Does he tell me it's my own fault he hit me?    Is my partner becoming more violent as time goes on?    Has my partner promised never to hurt me again, but still does?  If you answered \"yes\" to any of these questions, you may be in an unhealthy relationship.  If you recognize yourself or your partner in any of these descriptors, talk with your healthcare provider to get help. If you are in danger, he or she can help you develop a safety plan.  Health effects  IPV during or after pregnancy can cause physical and emotional health effects, pregnancy complications, poor outcomes, and injury and harm to the baby after birth.  During pregnancy    Physical injuries such as bruises, cuts, or broken bones    Vaginal bleeding or pelvic pain    Early labor and delivery    Tearing of the placenta (placental abruption)    Maternal death  Infant health    Low infant birth weight    Infant who needs NICU " care    Broken bones    Death of   After birth  After birth, the mother may:    Have pain and other long-term health conditions    Develop postpartum depression (2 to 3 times greater risk)    Have high-risk sexual behaviors    Use harmful substances    Have unhealthy diet and lifestyle such as eating disorders  Abuse is never OK. One in 6 abused women are first abused during pregnancy, when it s dangerous to both you and your developing baby. Recognizing that you are in an abusive relationship is the first step to help. See your healthcare provider or someone else you trust who can help you develop a safety plan.  What to expect from your healthcare provider  It can be a hard to bring up partner violence with your healthcare provider. But it s an important first step in getting help. Rest assured, your conversation is confidential. He or she is a non-judgmental health professional. He or she likely has other patients with similar problems and recognizes the difficulty of the situation. Your provider may ask you questions such as:    Do you feel safe in your relationship?    Do you have a safe place to go in an emergency?    Do conflicts ever turn into physical fights?  Answer honestly and completely. There will be no pressure to disclose the abuse or to press charges. He or she won t ask about the abuse in front of a partner, friends, or family. The goal is to help you access help when you are ready.  Call   If you feel your safety is in jeopardy now, call or the police.  For more help  If the person who hurt you is your partner or spouse and your situation can become dangerous again, it's vital to make a safety plan. The National Domestic Violence Hotline can help you develop a plan that meets your personal situation.    National Domestic Violence Hotline , www.[employer].org, 804.455.4696  [employer] last reviewed this educational content on 2020-2022 [employer]. All rights  reserved. This information is not intended as a substitute for professional medical care. Always follow your healthcare professional's instructions.          Bleeding During Early Pregnancy   If you ve had bleeding early in your pregnancy, you re not alone. Many other pregnant women have early bleeding, too. And in most cases, nothing is wrong. But your healthcare provider still needs to know about it. They may want to do tests to find out why you re bleeding. Call your provider if you see bleeding during pregnancy. Tell your provider if your blood is Rh negative. Then they can figure out if you need anti-D immune globulin treatment.   What causes early bleeding?   The cause of bleeding early in pregnancy is often unknown. But many factors early on in pregnancy may lead to light bleeding (called spotting) or heavier bleeding. These include:     Having sex    When the embryo implants on the uterine wall    Bleeding between the sac membrane and the uterus (subchorionic bleeding)    Pregnancy loss (miscarriage)    The embryo implants outside of the uterus (ectopic pregnancy)  If you see spotting   Light bleeding is the most common type of bleeding in early pregnancy. If you see it, call your healthcare provider. Chances are, they will tell you that you can care for yourself at home.   If tests are needed   Depending on how much you bleed, your healthcare provider may ask you to come in for some tests. A pelvic exam, for instance, can help see how far along your pregnancy is. You also may have an ultrasound or a Doppler test. These imaging tests use sound waves to check the health of your baby. The ultrasound may be done on your belly or inside your vagina. You may also need a special blood test. This test compares your hormone levels in blood samples taken 2 days apart. The results can help your provider learn more about the implantation of the embryo. Your blood type will also need to be checked to assess if you will  need to be treated for Rh sensitization.       Ultrasound can help check the health of your fetus.     Warning signs   If your bleeding doesn t stop or if you have any of the following, get medical care right away:     Soaking a sanitary pad each hour    Bleeding like you re having a period    Cramping or severe belly pain    Feeling dizzy or faint    Tissue passing through your vagina    Bleeding at any time after the first trimester  Questions you may be asked   Bleeding early in pregnancy isn't normal. But it is common. If you ve seen any bleeding, you may be concerned. But keep in mind that bleeding alone doesn t mean something is wrong. Just be sure to call your healthcare provider right away. They may ask you questions like these to help find the cause of your bleeding:     When did your bleeding start?    Is your bleeding very light or is it like a period?    Is the blood bright red or brownish?    Have you had sex recently?    Have you had pain or cramping?    Have you felt dizzy or faint?  Monitoring your pregnancy   Bleeding will often stop as quickly as it began. Your pregnancy may go on a normal path again. You may need to make a few extra prenatal visits. But you and your baby will most likely be fine.   [employer] last reviewed this educational content on 1/1/2022 2000-2022 [employer]. All rights reserved. This information is not intended as a substitute for professional medical care. Always follow your healthcare professional's instructions.          Healthy Eating Habits During Pregnancy  It s important to develop healthy eating habits while you are pregnant, for you as well as for your baby. Here are some ways to stay healthy.   Aim for a healthy weight  A slow, steady rate of weight gain is often best. After the first trimester, you may gain about a pound a week. If you were overweight before pregnancy, you need to gain fewer pounds. Your healthcare provider can give you a healthy  weight goal for your pregnancy.   Don t diet  Now is not the time to diet. You may not get enough of the nutrients you and your baby need. Instead, learn how to be a healthy eater. Start by doing it for your baby. Soon, you may do it for yourself.   Vitamins and supplements  Talk with your healthcare provider about taking these and other prenatal vitamins and supplements.     Iron makes the extra blood you need now.    Calcium and vitamin D help build and keep strong bones.    Folic acid helps prevent certain birth defects.    Iodine helps the thyroid work right.    Some vitamins may not be safe to take. Your healthcare provider will tell you which ones to avoid.  Fluids    Drink at least 8 to 10 cups of fluid daily. Your baby needs fluids. Fluids also decrease constipation, flush out toxins and waste, limit swelling, and help prevent bladder infections. Water is best. Other good choices are:     Water or seltzer water with a slice of lemon or lime. (These can also help ease an upset stomach.)    Clear soups that are low in salt    Low-fat or fat-free milk, soy or rice milk with calcium added    Popsicles or gelatin  Things to avoid  Some things might harm your growing baby. Don t eat or drink:     Alcohol    Unpasteurized dairy foods and juices    Raw or undercooked meat, poultry, fish, or eggs    Unwashed fruits and vegetables    Prepared meats, like deli meats or hot dogs, unless heated until steaming hot    Fish that are high in mercury, like shark, swordfish, fartun mackerel, tilefish, and albacore tuna  Things to limit  Ask your healthcare provider whether it s safe to eat or drink:     Caffeine    Artificial sweeteners    Organ meats    Certain types of fish    Fish and shellfish that contain mercury in lower amounts, like shrimp, canned light tuna, salmon, pollock, and catfish  [employer] last reviewed this educational content on 7/1/2021 2000-2022 [employer]. All rights reserved. This  information is not intended as a substitute for professional medical care. Always follow your healthcare professional's instructions.          Pregnancy: Planning Your Exercise Routine  While you re pregnant, an exercise routine helps both your mind and your body feel good. It tones your muscles and makes them stronger. It also gives you and your baby more oxygen.   The right exercise for you    Overall conditioning is best for you and your baby. Try walking, swimming, or riding a stationary bike. Always warm up, cool down, and drink enough fluids. Keep a snack close by in case your blood sugar gets low. Discuss exercise choices with your healthcare provider. Talk about the following:     If you already exercise, find out how to adapt your routine while you re pregnant. Keep the intensity of the exercise moderate. As your pregnancy progresses, your center of gravity will change. Be careful to keep your balance.    Ask if there are any local prenatal exercise classes, such as yoga or water aerobics. Find out which prenatal exercise videos are good choices.    If you were not exercising before your pregnancy, find out the best way to start. Now is not the time to begin a new workout on your own. Start slowly. Listen to your body.    Ask which forms of exercise you should avoid. These may include risky activities like hot yoga, horseback riding, scuba diving, skiing, skating, and contact sports.  Pelvic tilts  These help strengthen your stomach muscles and low back. You can do pelvic tilts instead of sit-ups.     Do this exercise on your hands and knees.    Relax the back of your neck. Pull your stomach in until your low back flattens.    Hold for 30 seconds. Release. Repeat 10 times. Do this twice a day.  Kegel exercises  Kegel exercises strengthen the pelvic muscles. Doing Kegels daily helps prepare these muscles for delivery. Kegels also help ease your recovery. You exercise these muscles by tightening, holding,  then relaxing them. To do 1 type of Kegel exercise, contract as if you were stopping your urine stream (but do it when you re not urinating). Hold for 10 seconds, then repeat 10 times, a few times a day.   Tips to add activity  Here are some tips to follow:    Park the car farther from a store and walk.    If you can, do errands on foot instead of driving.    Walk across the office to talk to someone in person instead of calling.    While waiting for appointments, go up and down stairs or around the block.  Tips to stay active  Here are some tips to follow:    Maintain your routine. But exercise less intensely if you feel tired.    Base your workout on how you feel, not your heart rate. Heart rates aren t a good way to measure effort during pregnancy.    Don't exercise on your back after week 16.  What are the warning signs that I should stop exercising?  Stop exercising and call your healthcare provider if you have any of these symptoms:    Vaginal bleeding     Dizziness or feeling faint     Increased shortness of breath     Chest pain     Headache     Muscle weakness     Calf (back of the leg) pain or swelling      Uterine contractions or  labor     Decreased fetal movement     Fluid leaking (or gushing) from your vagina  [employer] last reviewed this educational content on 2021-2022 [employer]. All rights reserved. This information is not intended as a substitute for professional medical care. Always follow your healthcare professional's instructions.          Nutrition During Pregnancy   Having a healthy baby depends mostly on you. What you eat matters to your baby and your health. During pregnancy, you will likely need about 300 more calories per day than before you became pregnant. Each day, try to eat the number of servings listed here for each food group. In addition, cut down on salt and caffeine. Limit the amount of sweets and high-fat foods you eat. Don t smoke or drink  alcohol.     Important: See your healthcare provider as often as requested. If you have any questions, be sure to ask them.   Fruits Vegetables Grains & Cereals* Fats & Oils   2 cups  Examples of 1-cup servings:   1 medium apple  1 medium orange  1 medium banana  1 cup chopped fruit  1 cup 100% fruit juice (pasteurized)   1/2 cup dried fruit 2-1/2 to 3 cups   Examples of 1 servin cups raw, leafy greens   1 cup raw or cooked cut-up vegetables   1 cup 100% vegetable juice (pasteurized)  6 to 8 ounces  Examples of 1-ounce servings:   1 slice bread  1/2 cup cooked rice  1/2 cup cooked cereal   1/2 cup pasta  1 ounce cold cereal 6 to 8 teaspoons   Dairy** Protein--- Fluids      3 cups  Examples of 1-cup servings:   1 cup milk  1 cup yogurt  1-1/2 ounces natural cheese   2 ounces processed cheese  5 to 6-1/2 ounces  Examples of 1-ounce servings:   1 egg  1 ounce of lean meat, poultry, or fish   1/4 cup cooked beans   1 tablespoon peanut butter   1/2 ounce nuts 8 or more 8-ounce glasses   Examples:  Water  Mineral water  Clear soups, broth      *Note: Choose whole grains whenever possible.   ** Note: Try to choose low-fat options; stay away from soft cheeses and unpasteurized milk.   --- Notes: Don't eat raw or undercooked meats, eggs, seafood, fish, and shellfish. Also, some types of fish, such as shark, swordfish, and fartun mackerel, should not be eaten during pregnancy. Don't eat hot dogs, lunch meats, or cold cuts unless heated to steaming just before being served. Ask your healthcare provider about safe choices.   Prenatal supplements  A prenatal supplement is a pill that you take daily during pregnancy. It helps make sure you re getting the right amount of certain nutrients that are important to your baby. Ask your healthcare provider to help you choose the best one for you. Important nutrients during pregnancy include:     Folic acid. It's best to start taking this supplement 1 month before you start trying to  get pregnant. Folic acid helps prevent certain problems in your baby. During pregnancy, you need to take 400 micrograms (mcg) of folic acid every day for the first 2 to 3 months after conception. After that, 600 mcg is needed for a growing baby and placenta.    Iron, calcium, and vitamin D. You may also be advised to take these supplements during pregnancy. They help keep you and your baby healthy. Take them at different times because calcium makes it hard for the body to absorb iron. Taking iron with orange juice helps to increase its absorption.  [employer] last reviewed this educational content on 8/1/2020 2000-2022 [employer]. All rights reserved. This information is not intended as a substitute for professional medical care. Always follow your healthcare professional's instructions.          Pregnancy: Your Weight  Being a healthy weight is important for both you and your baby. The weight you gain now is not just extra fat. It is also the weight of your baby. And it is the increased blood and fluids to support the baby. A slow, steady rate of gain is best. How much you should gain depends on your weight before getting pregnant. Check with your healthcare provider to find out what is right for you.      Your weight will be checked regularly by your healthcare provider.     Talk to your healthcare provider if you have any questions.   If you gain too much  Gaining too much weight might cause you to feel tired, or you could have a harder pregnancy or birth. If you and your healthcare provider decide you re gaining too much:     Eat fewer fats and sugars. Instead, eat fruit, vegetables, and whole-grain foods.    Drink plenty of water between meals.    Get at least 20 minutes of light exercise, like walking, each day.    Don t diet. You might not get enough of the nutrients you and your baby need.    Keep a food diary to help you gauge what and how much you are eating.  If you're not gaining  enough  If you don t gain enough, your baby could be too small or have health problems. Women tend to gain most of their weight in the second and third trimesters. For now:     Eat many types of foods. Make sure you get enough calcium, protein, and carbohydrates.    Don t skip meals.    Eat healthy snacks.    Pick nutrient-dense, high-calorie healthy food like trail mix or protein shakes.    See a dietitian for help.    Talk to your healthcare provider if you have had an eating disorder or problems with certain foods.  The following are ways to get more calories:    Eat breakfast every day. Peanut butter or a slice of cheese on toast can give you an extra protein boost.    Snack between meals. Yogurt and dried fruits can provide protein, calcium, and minerals.    Try to eat more foods that are high in good fats, like nuts, fatty fish, avocados, and olive oil.    Drink juices made from real fruit that are high in vitamin C or beta-carotene, like grapefruit juice, orange juice, papaya nectar, apricot nectar, and carrot juice.    Don't eat junk food, like foods high in sugar.  [employer] last reviewed this educational content on 7/1/2021 2000-2022 [employer]. All rights reserved. This information is not intended as a substitute for professional medical care. Always follow your healthcare professional's instructions.        You have been provided the CDC Warning Signs in Pregnancy document.    Additional copies can be found here: [email or web address]

## 2023-07-05 DIAGNOSIS — O09.529 SUPERVISION OF HIGH-RISK PREGNANCY OF ELDERLY MULTIGRAVIDA: Primary | ICD-10-CM

## 2023-07-06 LAB
ABO/RH(D): NORMAL
ANTIBODY SCREEN: NEGATIVE
SPECIMEN EXPIRATION DATE: NORMAL

## 2023-07-07 ENCOUNTER — TRANSCRIBE ORDERS (OUTPATIENT)
Dept: MATERNAL FETAL MEDICINE | Facility: CLINIC | Age: 38
End: 2023-07-07

## 2023-07-07 ENCOUNTER — OFFICE VISIT (OUTPATIENT)
Dept: OBGYN | Facility: CLINIC | Age: 38
End: 2023-07-07
Attending: REGISTERED NURSE
Payer: COMMERCIAL

## 2023-07-07 ENCOUNTER — ANCILLARY PROCEDURE (OUTPATIENT)
Dept: ULTRASOUND IMAGING | Facility: CLINIC | Age: 38
End: 2023-07-07
Attending: REGISTERED NURSE
Payer: COMMERCIAL

## 2023-07-07 ENCOUNTER — LAB (OUTPATIENT)
Dept: LAB | Facility: CLINIC | Age: 38
End: 2023-07-07
Attending: REGISTERED NURSE
Payer: COMMERCIAL

## 2023-07-07 VITALS
BODY MASS INDEX: 18.89 KG/M2 | HEIGHT: 63 IN | DIASTOLIC BLOOD PRESSURE: 86 MMHG | HEART RATE: 59 BPM | WEIGHT: 106.6 LBS | SYSTOLIC BLOOD PRESSURE: 127 MMHG

## 2023-07-07 DIAGNOSIS — Z13.88 SCREENING EXAMINATION FOR LEAD POISONING: ICD-10-CM

## 2023-07-07 DIAGNOSIS — O09.521 MULTIGRAVIDA OF ADVANCED MATERNAL AGE IN FIRST TRIMESTER: ICD-10-CM

## 2023-07-07 DIAGNOSIS — Z77.011 EXPOSURE TO LEAD: ICD-10-CM

## 2023-07-07 DIAGNOSIS — Z34.91 NORMAL PREGNANCY IN FIRST TRIMESTER: Primary | ICD-10-CM

## 2023-07-07 DIAGNOSIS — Z34.91 NORMAL PREGNANCY IN FIRST TRIMESTER: ICD-10-CM

## 2023-07-07 DIAGNOSIS — Z83.3 FAMILY HISTORY OF DIABETES MELLITUS: ICD-10-CM

## 2023-07-07 DIAGNOSIS — O09.529 SUPERVISION OF HIGH-RISK PREGNANCY OF ELDERLY MULTIGRAVIDA: ICD-10-CM

## 2023-07-07 DIAGNOSIS — O26.90 PREGNANCY RELATED CONDITION, ANTEPARTUM: Primary | ICD-10-CM

## 2023-07-07 DIAGNOSIS — S31.41XD: ICD-10-CM

## 2023-07-07 LAB
DEPRECATED CALCIDIOL+CALCIFEROL SERPL-MC: 33 UG/L (ref 20–75)
ERYTHROCYTE [DISTWIDTH] IN BLOOD BY AUTOMATED COUNT: 11.7 % (ref 10–15)
HBA1C MFR BLD: 4.8 %
HBV SURFACE AB SERPL IA-ACNC: 59.7 M[IU]/ML
HBV SURFACE AB SERPL IA-ACNC: REACTIVE M[IU]/ML
HBV SURFACE AG SERPL QL IA: NONREACTIVE
HCT VFR BLD AUTO: 42.5 % (ref 35–47)
HCV AB SERPL QL IA: NONREACTIVE
HGB BLD-MCNC: 14.3 G/DL (ref 11.7–15.7)
HIV 1+2 AB+HIV1 P24 AG SERPL QL IA: NONREACTIVE
MCH RBC QN AUTO: 30.6 PG (ref 26.5–33)
MCHC RBC AUTO-ENTMCNC: 33.6 G/DL (ref 31.5–36.5)
MCV RBC AUTO: 91 FL (ref 78–100)
PLATELET # BLD AUTO: 406 10E3/UL (ref 150–450)
RBC # BLD AUTO: 4.67 10E6/UL (ref 3.8–5.2)
T PALLIDUM AB SER QL: NONREACTIVE
WBC # BLD AUTO: 8.4 10E3/UL (ref 4–11)

## 2023-07-07 PROCEDURE — 85027 COMPLETE CBC AUTOMATED: CPT

## 2023-07-07 PROCEDURE — 76817 TRANSVAGINAL US OBSTETRIC: CPT

## 2023-07-07 PROCEDURE — 87389 HIV-1 AG W/HIV-1&-2 AB AG IA: CPT

## 2023-07-07 PROCEDURE — 36415 COLL VENOUS BLD VENIPUNCTURE: CPT

## 2023-07-07 PROCEDURE — 86762 RUBELLA ANTIBODY: CPT

## 2023-07-07 PROCEDURE — 76817 TRANSVAGINAL US OBSTETRIC: CPT | Mod: 26 | Performed by: OBSTETRICS & GYNECOLOGY

## 2023-07-07 PROCEDURE — 86780 TREPONEMA PALLIDUM: CPT

## 2023-07-07 PROCEDURE — 86901 BLOOD TYPING SEROLOGIC RH(D): CPT

## 2023-07-07 PROCEDURE — 86850 RBC ANTIBODY SCREEN: CPT

## 2023-07-07 PROCEDURE — 36416 COLLJ CAPILLARY BLOOD SPEC: CPT

## 2023-07-07 PROCEDURE — G0463 HOSPITAL OUTPT CLINIC VISIT: HCPCS | Performed by: REGISTERED NURSE

## 2023-07-07 PROCEDURE — 76801 OB US < 14 WKS SINGLE FETUS: CPT | Mod: 26 | Performed by: OBSTETRICS & GYNECOLOGY

## 2023-07-07 PROCEDURE — 86803 HEPATITIS C AB TEST: CPT

## 2023-07-07 PROCEDURE — 86706 HEP B SURFACE ANTIBODY: CPT

## 2023-07-07 PROCEDURE — 99207 PR PRENATAL VISIT: CPT | Performed by: REGISTERED NURSE

## 2023-07-07 PROCEDURE — 86787 VARICELLA-ZOSTER ANTIBODY: CPT

## 2023-07-07 PROCEDURE — 87086 URINE CULTURE/COLONY COUNT: CPT

## 2023-07-07 PROCEDURE — 82306 VITAMIN D 25 HYDROXY: CPT

## 2023-07-07 PROCEDURE — 83036 HEMOGLOBIN GLYCOSYLATED A1C: CPT

## 2023-07-07 PROCEDURE — 87340 HEPATITIS B SURFACE AG IA: CPT

## 2023-07-07 ASSESSMENT — ANXIETY QUESTIONNAIRES
GAD7 TOTAL SCORE: 5
5. BEING SO RESTLESS THAT IT IS HARD TO SIT STILL: NOT AT ALL
1. FEELING NERVOUS, ANXIOUS, OR ON EDGE: SEVERAL DAYS
6. BECOMING EASILY ANNOYED OR IRRITABLE: SEVERAL DAYS
3. WORRYING TOO MUCH ABOUT DIFFERENT THINGS: SEVERAL DAYS
2. NOT BEING ABLE TO STOP OR CONTROL WORRYING: SEVERAL DAYS
7. FEELING AFRAID AS IF SOMETHING AWFUL MIGHT HAPPEN: SEVERAL DAYS
GAD7 TOTAL SCORE: 5

## 2023-07-07 ASSESSMENT — PATIENT HEALTH QUESTIONNAIRE - PHQ9
SUM OF ALL RESPONSES TO PHQ QUESTIONS 1-9: 4
5. POOR APPETITE OR OVEREATING: NOT AT ALL

## 2023-07-07 NOTE — PATIENT INSTRUCTIONS
Thank you for trusting us with your care!     If you need to contact us for questions about:  Symptoms, Scheduling & Medical Questions; Non-urgent (2-3 day response) Radha message, Urgent (needing response today) [phone] (if after 3:30pm next day response)   Prescriptions: Please call your Pharmacy   Billing: Shel[facility] [phone] or [facility]:[phone]

## 2023-07-07 NOTE — LETTER
2023       RE: Arlet Segura  [address]  [facility] [id number]     Dear Colleague,    Thank you for referring your patient, Arlet Segura, to the [facility] [place] [facility]. Please see a copy of my visit note below.    [facility] Provider New OB Note    Reviewed RN intake note.    Arlet is a 38 year old female who presents to clinic for a new OB visit.   at 8w5d with Estimated Date of Delivery: 2024 based on LMP, c/w dating US. Feels well. Has started PNV.     She has not had bleeding since her LMP.   She has had mild nausea. Weight loss has not occurred.   This was a planned pregnancy.   Partner is involved,  Alex.      OTHER CONCERNS:   - Last baby born with our CNM group 2021. Uncomplicated delivery with 1st degree and bilateral labial lacerations, swollen right labia. Nuchal hands at birth. Has had issues with recovery from her labial lacerations has been seen for this but declined surgical repair. Struggles with irritation on labia from time to time.   - Pap due 2025, see problem list for pap hx.   - Had possible exposure to lead and would like added to intake labs today.     - Risk for GDM: First degree relative with GDM or DM  so WILL have an early GCT and Hgb A1C. Accepts hgba1c but may decline 1hr at next visit.     - High risk factors for Pre E-  No known risk factors of High risk for Pre E     Pregnant individuals at high risk of preeclampsia with one or more of the following risk factors:  History of preeclampsia, especially when accompanied by an adverse outcome  Hx of Gestational Hypertension (new 2022 per Natalee)  Multifetal gestation  Chronic hypertension  Pregestational type 1 or 2 diabetes  Kidney disease  Autoimmune disease (ie, systemic lupus erythematous, antiphospholipid syndrome)  Combinations of multiple moderate-risk factors    - Moderate risk factor for Pre E Age =35  "years or older  and No moderate risk factors  Meets one high risk factors or one of the moderate risk facrtors  Nulliparity  Obesity (ie, body mass index > 30)  Family history of preeclampsia (ie, mother or sister)  Black race (as a proxy for underlying racism)  Lower income  Age 35 years or older  Personal history factors (eg, low birth weight or small for gestational age, previous adverse pregnancy outcome, >10-year pregnancy interval)  In vitro fertilization  so WILL NOT consider starting low dose aspirin (81mg) starting between 12 and 28 weeks to prevent early onset preeclampsia    - The patient  does not have a history of spontaneous  birth so  WILL NOT consider progesterone starting at 16-20 weeks and/or serial transvaginal cervical length ultrasounds from 16-24 weeks.     -The patient does not have a history of immunosuppresion or HIV so Toxoplasma IgG/IgM WILL NOT be ordered.    -Assess risk for asymptomatic latent TB (prior infection, recent immigrant from epidemic areas, immunosuppression, living in overcrowded environment):   WILL NOT have PPD skin test or Quantiferon-TB Gold Plus blood draw. *both options valid*    OBJECTIVE:  /86   Pulse 59   Ht 1.6 m (5' 3\")   Wt 48.4 kg (106 lb 9.6 oz)   LMP 2023 (Exact Date)   Breastfeeding No   BMI 18.88 kg/m    ROS: 10 point ROS neg other than the symptoms noted above in the HPI.    PSYCHIATRIC:  Denies mood changes.     PHQ-9 score:        2023    10:38 AM   PHQ-9 SCORE   PHQ-9 Total Score 4         10/20/2021     8:34 AM 2022     6:43 AM 2023    10:38 AM   ETELVINA-7 SCORE   Total Score  6 (mild anxiety)    Total Score 7 6 5     Past History:  Her past medical history   Past Medical History:   Diagnosis Date    Abnormal cervical Pap smear with positive HPV DNA test     last pap 2013 nl    ADHD     Breast disorder 2019    fibroadenoma    Bulimia since 2014    Cervical high risk HPV (human papillomavirus) test " positive 05/28/2019    See problem list.     Normal first pregnancy confirmed, antepartum 2/1/2021    [facility] CNM pt Partner's name: Alex [ ] Entered on Epic list [x ] NOB folder [x ] Dating [ ] First tri screen ordered; declined [ ] QS/AFP ordered declined [ ] Fetal anatomy US ordered [x ] Rubella immune [ x] Hep B immune [x] Pap--NILM 2/2021  [ x] NO plan utox in labor  _____________________________________ [x ] EOB folder [ ] PP Contraception plan: If tubal,consent date: [x ] Labor plans: epidural     Her past pregnancies have been uncomplicated  Since her last LMP she denies use of alcohol, tobacco and street drugs.    HISTORY:  Family History   Problem Relation Age of Onset    Thyroid Disease Mother         hypo    Hypertension Father     Breast Cancer Maternal Grandmother 70    Hemochromatosis Maternal Grandfather     Pacemaker Paternal Grandmother     No Known Problems Paternal Grandfather     Hypertension Brother     Cancer Brother         oral-tongue (HPV related)    Diabetes Sister         type 1 dx age 3    Depression Sister     Psychotic Disorder Maternal Aunt         bi-polar     Social History     Socioeconomic History    Marital status:      Spouse name: Alex    Number of children: None    Years of education: None    Highest education level: None   Tobacco Use    Smoking status: Never    Smokeless tobacco: Never   Vaping Use    Vaping Use: Never used   Substance and Sexual Activity    Alcohol use: Not Currently     Comment: Five drinks a week, average    Drug use: No    Sexual activity: Not Currently     Partners: Male     Birth control/protection: None   Other Topics Concern     Service No    Blood Transfusions No    Caffeine Concern No    Occupational Exposure No    Hobby Hazards No    Sleep Concern No    Stress Concern Yes    Weight Concern Yes    Special Diet No    Back Care No    Exercise Yes     Comment: running and soccer 6-7 x a week     Bike Helmet Yes    Seat Belt Yes   Social  History Narrative    Moved here from [facility]. Currently lives with her  and her sister in [place]. Identifies them as good support.     Current Outpatient Medications   Medication Sig    amphetamine-dextroamphetamine (ADDERALL) 10 MG tablet Take one tab (10mg) by mouth twice a day    Prenatal Vit-Fe Fumarate-FA (PRENATAL MULTIVITAMIN PLUS IRON) 27-0.8 MG TABS per tablet Take 1 tablet by mouth daily    estradiol (ESTRACE) 0.1 MG/GM vaginal cream Place 2 g vaginally twice a week (Patient not taking: Reported on 2023)     No current facility-administered medications for this visit.     No Known Allergies    ============================================  MEDICAL HISTORY  Past Medical History:   Diagnosis Date    Abnormal cervical Pap smear with positive HPV DNA test     last pap 2013 nl    ADHD     Breast disorder 2019    fibroadenoma    Bulimia since 2014    Cervical high risk HPV (human papillomavirus) test positive 2019    See problem list.     Normal first pregnancy confirmed, antepartum 2021    [facility] CNM pt Partner's name: Alex [ ] Entered on Epic list [x ] NOB folder [x ] Dating [ ] First tri screen ordered; declined [ ] QS/AFP ordered declined [ ] Fetal anatomy US ordered [x ] Rubella immune [ x] Hep B immune [x] Pap--NILM 2021  [ x] NO plan utox in labor  _____________________________________ [x ] EOB folder [ ] PP Contraception plan: If tubal,consent date: [x ] Labor plans: epidural     Past Surgical History:   Procedure Laterality Date    BIOPSY  2019    breast biopsy    GYN SURGERY  2017    leep    TOOTH EXTRACTION         OB History    Para Term  AB Living   2 1 1 0 0 1   SAB IAB Ectopic Multiple Live Births   0 0 0 0 1      # Outcome Date GA Lbr Darren/2nd Weight Sex Delivery Anes PTL Lv   2 Current            1 Term 21 40w6d 04:06 / 00:58 3.12 kg (6 lb 14.1 oz) M Vag-Spont EPI N FRANCIS      Name: ALYSSA,MALE-ELIZABETH Apgar1: 9   "Apgar5: 9      Obstetric Comments   NONE          GYN History- Yes Abnormal Pap Smears - see problem list                        Cervical procedures: colposcopy                        History of STI: none    I personally reviewed the past social/family/medical and surgical history on the date of service.     EXAM:  /86   Pulse 59   Ht 1.6 m (5' 3\")   Wt 48.4 kg (106 lb 9.6 oz)   LMP 2023 (Exact Date)   Breastfeeding No   BMI 18.88 kg/m     EXAM:  GENERAL:  Pleasant pregnant female, alert, cooperative and well groomed.  SKIN:  Warm and dry, without lesions or rashes  HEAD: Symmetrical features.  MOUTH:  Buccal mucosa pink, moist without lesions.  Teeth in good repair.    NECK:  Thyroid without enlargement and nodules.  Lymph nodes not palpable.   LUNGS:  Clear to auscultation.  BREAST:   declined  HEART:  RRR without murmur.  ABDOMEN: Soft without masses , tenderness or organomegaly.  No CVA tenderness.  Uterus palpable at size equal to dates.  No scars noted..   MUSCULOSKELETAL:  Full range of motion  EXTREMITIES:  No edema. No significant varicosities.   PELVIC EXAM: declined     Lab Results   Component Value Date    PAP NIL 2021    PAP NIL 2019    PAP LSIL 2017      ASSESSMENT:  38 year old , 8w5d weeks of pregnancy with DEREK of 2024 by LMP  Intrauterine pregnancy 8w5d size is consistent with dates  Genetic Screening: First Trimester Screen    ICD-10-CM    1. Normal pregnancy in first trimester  Z34.91 ABO/Rh type and screen     Rubella Antibody IgG     Hepatitis B Surface Antibody     Hepatitis B surface antigen     Hepatitis C antibody     Vitamin D Deficiency     HIV Antigen Antibody Combo Cascade     CBC with platelets     Urine Culture     Treponema Abs w Reflex to RPR and Titer     Varicella Zoster Virus Antibody IgG     [facility] Fetal [facility]tr Referral - Pregnancy     Lead Capillary     Hemoglobin A1c      2. Exposure to lead  Z77.011 Lead Capillary      3. " Screening examination for lead poisoning  Z13.88 Lead Capillary      4. Family history of diabetes mellitus  Z83.3 Hemoglobin A1c          Arlet was seen today for prenatal care.    Diagnoses and all orders for this visit:    Normal pregnancy in first trimester  -     ABO/Rh type and screen; Future  -     Rubella Antibody IgG; Future  -     Hepatitis B Surface Antibody; Future  -     Hepatitis B surface antigen; Future  -     Hepatitis C antibody; Future  -     Vitamin D Deficiency; Future  -     HIV Antigen Antibody Combo Cascade; Future  -     CBC with platelets; Future  -     Urine Culture; Future  -     Treponema Abs w Reflex to RPR and Titer; Future  -     Varicella Zoster Virus Antibody IgG; Future  -     Mat Fetal Med [facility]tr Referral - Pregnancy; Future  -     Lead Capillary; Future  -     Hemoglobin A1c; Future    Exposure to lead  -     Lead Capillary; Future    Screening examination for lead poisoning  -     Lead Capillary; Future    Family history of diabetes mellitus  -     Hemoglobin A1c; Future          Orders Placed This Encounter   Procedures    Rubella Antibody IgG    Hepatitis B Surface Antibody    Hepatitis B surface antigen    Hepatitis C antibody    Vitamin D Deficiency    HIV Antigen Antibody Combo Cascade    CBC with platelets    Treponema Abs w Reflex to RPR and Titer    Varicella Zoster Virus Antibody IgG    Lead Capillary    Hemoglobin A1c    Mat Fetal [facility]tr Referral - Pregnancy    ABO/Rh type and screen        PLAN:  - Reviewed use of triage nurse line and contacting the on-call provider after hours for an urgent need such as fever, vagina bleeding, bladder or vaginal infection, rupture of membranes,  or term labor.    - Reviewed best evidence for: weight gain for her weight and height for pregnancy:  Based on pre-pregnancy Body mass index is 18.88 kg/m . RECOMMENDED WEIGHT GAIN: 25-35 lbs.  - Reviewed healthy diet and foods to avoid, exercise and activity during pregnancy;  avoiding exposure to toxoplasmosis; and maintenance of a generally healthy lifestyle.   - Discussed the harms, benefits, side effects and alternative therapies for current prescribed and OTC medications. Patient was encouraged to start/continue prenatal vitamins as tolerated.    - Oriented to Practice, types of care, and how to reach a provider.  Pt prefers CNM team  - Patient received 1st trimester new OB education packet complete with aide of [facility] Expectant Family booklet including information on genetic screening test options.  - Patient desires 1st trimester screening which was ordered.    - Reviewed high risk for gestational diabetes d/t First degree relative with GDM or DM , IS NOT agreeable to early 1 hour today. May decline at next visit if HgbA1c WNL.   - Reviewed No identifiable increased risk risk for Pre Eclampsia d/t No known risk factors of High risk for Pre E (including a hx GHTN, new 8/2022) or meets two or more of the moderate risk factors including Age =35 years or older  and IS NOTagreeable to starting 81mg aspirin daily after 12 weeks .    - Sent to lab for routine OB blood work + HgbA1c, Lead level.   - MFM referral for 1st tri screening + Level II US  - Pap up to date due 2/2025  - Continue scheduled prenatal care and prn if questions or concerns    DRISS Love CNAZUL      Answers for HPI/ROS submitted by the patient on 7/6/2023  General Symptoms: No  Skin Symptoms: No  HENT Symptoms: No  EYE SYMPTOMS: No  HEART SYMPTOMS: No  LUNG SYMPTOMS: No  INTESTINAL SYMPTOMS: No  URINARY SYMPTOMS: No  GYNECOLOGIC SYMPTOMS: No  BREAST SYMPTOMS: No  SKELETAL SYMPTOMS: No  BLOOD SYMPTOMS: No  NERVOUS SYSTEM SYMPTOMS: No  MENTAL HEALTH SYMPTOMS: No

## 2023-07-07 NOTE — PROGRESS NOTES
S Provider New OB Note    Reviewed RN intake note.    Arlet is a 38 year old female who presents to clinic for a new OB visit.   at 8w5d with Estimated Date of Delivery: 2024 based on LMP, c/w dating US. Feels well. Has started PNV.     She has not had bleeding since her LMP.   She has had mild nausea. Weight loss has not occurred.   This was a planned pregnancy.   Partner is involved,  Alex.      OTHER CONCERNS:   - Last baby born with our CNM group 2021. Uncomplicated delivery with 1st degree and bilateral labial lacerations, swollen right labia. Nuchal hands at birth. Has had issues with recovery from her labial lacerations has been seen for this but declined surgical repair. Struggles with irritation on labia from time to time.   - Pap due 2025, see problem list for pap hx.   - Had possible exposure to lead and would like added to intake labs today.     - Risk for GDM: First degree relative with GDM or DM  so WILL have an early GCT and Hgb A1C. Accepts hgba1c but may decline 1hr at next visit.     - High risk factors for Pre E-  No known risk factors of High risk for Pre E     Pregnant individuals at high risk of preeclampsia with one or more of the following risk factors:  History of preeclampsia, especially when accompanied by an adverse outcome  Hx of Gestational Hypertension (new 2022 per Natalee)  Multifetal gestation  Chronic hypertension  Pregestational type 1 or 2 diabetes  Kidney disease  Autoimmune disease (ie, systemic lupus erythematous, antiphospholipid syndrome)  Combinations of multiple moderate-risk factors    - Moderate risk factor for Pre E Age =35 years or older  and No moderate risk factors  Meets one high risk factors or one of the moderate risk facrtors  Nulliparity  Obesity (ie, body mass index > 30)  Family history of preeclampsia (ie, mother or sister)  Black race (as a proxy for underlying racism)  Lower income  Age 35 years or older  Personal history  "factors (eg, low birth weight or small for gestational age, previous adverse pregnancy outcome, >10-year pregnancy interval)  In vitro fertilization  so WILL NOT consider starting low dose aspirin (81mg) starting between 12 and 28 weeks to prevent early onset preeclampsia    - The patient  does not have a history of spontaneous  birth so  WILL NOT consider progesterone starting at 16-20 weeks and/or serial transvaginal cervical length ultrasounds from 16-24 weeks.     -The patient does not have a history of immunosuppresion or HIV so Toxoplasma IgG/IgM WILL NOT be ordered.    -Assess risk for asymptomatic latent TB (prior infection, recent immigrant from epidemic areas, immunosuppression, living in overcrowded environment):   WILL NOT have PPD skin test or Quantiferon-TB Gold Plus blood draw. *both options valid*    OBJECTIVE:  /86   Pulse 59   Ht 1.6 m (5' 3\")   Wt 48.4 kg (106 lb 9.6 oz)   LMP 2023 (Exact Date)   Breastfeeding No   BMI 18.88 kg/m    ROS: 10 point ROS neg other than the symptoms noted above in the HPI.    PSYCHIATRIC:  Denies mood changes.     PHQ-9 score:        2023    10:38 AM   PHQ-9 SCORE   PHQ-9 Total Score 4         10/20/2021     8:34 AM 2022     6:43 AM 2023    10:38 AM   ETELVINA-7 SCORE   Total Score  6 (mild anxiety)    Total Score 7 6 5     Past History:  Her past medical history   Past Medical History:   Diagnosis Date     Abnormal cervical Pap smear with positive HPV DNA test     last pap 2013 nl     ADHD      Breast disorder 2019    fibroadenoma     Bulimia since 2014     Cervical high risk HPV (human papillomavirus) test positive 2019    See problem list.      Normal first pregnancy confirmed, antepartum 2021    [facility] CN[facility] pt Partner's name: Alex [ ] Entered on Epic list [x ] NOB folder [x ] Dating [ ] First tri screen ordered; declined [ ] QS/AFP ordered declined [ ] Fetal anatomy US ordered [x ] Rubella immune [ x] Hep B " immune [x] Pap--NILM 2/2021  [ x] NO plan utox in labor  _____________________________________ [x ] EOB folder [ ] PP Contraception plan: If tubal,consent date: [x ] Labor plans: epidural     Her past pregnancies have been uncomplicated  Since her last LMP she denies use of alcohol, tobacco and street drugs.    HISTORY:  Family History   Problem Relation Age of Onset     Thyroid Disease Mother         hypo     Hypertension Father      Breast Cancer Maternal Grandmother 70     Hemochromatosis Maternal Grandfather      Pacemaker Paternal Grandmother      No Known Problems Paternal Grandfather      Hypertension Brother      Cancer Brother         oral-tongue (HPV related)     Diabetes Sister         type 1 dx age 3     Depression Sister      Psychotic Disorder Maternal Aunt         bi-polar     Social History     Socioeconomic History     Marital status:      Spouse name: Alex     Number of children: None     Years of education: None     Highest education level: None   Tobacco Use     Smoking status: Never     Smokeless tobacco: Never   Vaping Use     Vaping Use: Never used   Substance and Sexual Activity     Alcohol use: Not Currently     Comment: Five drinks a week, average     Drug use: No     Sexual activity: Not Currently     Partners: Male     Birth control/protection: None   Other Topics Concern      Service No     Blood Transfusions No     Caffeine Concern No     Occupational Exposure No     Hobby Hazards No     Sleep Concern No     Stress Concern Yes     Weight Concern Yes     Special Diet No     Back Care No     Exercise Yes     Comment: running and soccer 6-7 x a week      Bike Helmet Yes     Seat Belt Yes   Social History Narrative    Moved here from [place] Currently lives with her  and her sister in [place]. Identifies them as good support.     Current Outpatient Medications   Medication Sig     amphetamine-dextroamphetamine (ADDERALL) 10 MG tablet Take one tab (10mg) by  mouth twice a day     Prenatal Vit-Fe Fumarate-FA (PRENATAL MULTIVITAMIN PLUS IRON) 27-0.8 MG TABS per tablet Take 1 tablet by mouth daily     estradiol (ESTRACE) 0.1 MG/GM vaginal cream Place 2 g vaginally twice a week (Patient not taking: Reported on 2023)     No current facility-administered medications for this visit.     No Known Allergies    ============================================  MEDICAL HISTORY  Past Medical History:   Diagnosis Date     Abnormal cervical Pap smear with positive HPV DNA test     last pap 2013 nl     ADHD      Breast disorder 2019    fibroadenoma     Bulimia since 2014     Cervical high risk HPV (human papillomavirus) test positive 2019    See problem list.      Normal first pregnancy confirmed, antepartum 2021    [facility] CN[facility] pt Partner's name: Alex [ ] Entered on Epic list [x ] NOB folder [x ] Dating [ ] First tri screen ordered; declined [ ] QS/AFP ordered declined [ ] Fetal anatomy US ordered [x ] Rubella immune [ x] Hep B immune [x] Pap--NILM 2021  [ x] NO plan utox in labor  _____________________________________ [x ] EOB folder [ ] PP Contraception plan: If tubal,consent date: [x ] Labor plans: epidural     Past Surgical History:   Procedure Laterality Date     BIOPSY  2019    breast biopsy     GYN SURGERY  2017    leep     TOOTH EXTRACTION         OB History    Para Term  AB Living   2 1 1 0 0 1   SAB IAB Ectopic Multiple Live Births   0 0 0 0 1      # Outcome Date GA Lbr Darren/2nd Weight Sex Delivery Anes PTL Lv   2 Current            1 Term 21 40w6d 04:06 / 00:58 3.12 kg (6 lb 14.1 oz) M Vag-Spont EPI N FRANCIS      Name: CINTHYA SHAH-JESSICA      Apgar1: 9  Apgar5: 9      Obstetric Comments   NONE          GYN History- Yes Abnormal Pap Smears - see problem list                        Cervical procedures: colposcopy                        History of STI: none    I personally reviewed the past social/family/medical and  "surgical history on the date of service.     EXAM:  /86   Pulse 59   Ht 1.6 m (5' 3\")   Wt 48.4 kg (106 lb 9.6 oz)   LMP 2023 (Exact Date)   Breastfeeding No   BMI 18.88 kg/m     EXAM:  GENERAL:  Pleasant pregnant female, alert, cooperative and well groomed.  SKIN:  Warm and dry, without lesions or rashes  HEAD: Symmetrical features.  MOUTH:  Buccal mucosa pink, moist without lesions.  Teeth in good repair.    NECK:  Thyroid without enlargement and nodules.  Lymph nodes not palpable.   LUNGS:  Clear to auscultation.  BREAST:   declined  HEART:  RRR without murmur.  ABDOMEN: Soft without masses , tenderness or organomegaly.  No CVA tenderness.  Uterus palpable at size equal to dates.  No scars noted..   MUSCULOSKELETAL:  Full range of motion  EXTREMITIES:  No edema. No significant varicosities.   PELVIC EXAM: declined     Lab Results   Component Value Date    PAP NIL 2021    PAP NIL 2019    PAP LSIL 2017      ASSESSMENT:  38 year old , 8w5d weeks of pregnancy with DEREK of 2024 by LMP  Intrauterine pregnancy 8w5d size is consistent with dates  Genetic Screening: First Trimester Screen    ICD-10-CM    1. Normal pregnancy in first trimester  Z34.91 ABO/Rh type and screen     Rubella Antibody IgG     Hepatitis B Surface Antibody     Hepatitis B surface antigen     Hepatitis C antibody     Vitamin D Deficiency     HIV Antigen Antibody Combo Cascade     CBC with platelets     Urine Culture     Treponema Abs w Reflex to RPR and Titer     Varicella Zoster Virus Antibody IgG     Mat Fetal Med Ctr Referral - Pregnancy     Lead Capillary     Hemoglobin A1c      2. Exposure to lead  Z77.011 Lead Capillary      3. Screening examination for lead poisoning  Z13.88 Lead Capillary      4. Family history of diabetes mellitus  Z83.3 Hemoglobin A1c          Arlet was seen today for prenatal care.    Diagnoses and all orders for this visit:    Normal pregnancy in first trimester  -    "  ABO/Rh type and screen; Future  -     Rubella Antibody IgG; Future  -     Hepatitis B Surface Antibody; Future  -     Hepatitis B surface antigen; Future  -     Hepatitis C antibody; Future  -     Vitamin D Deficiency; Future  -     HIV Antigen Antibody Combo Cascade; Future  -     CBC with platelets; Future  -     Urine Culture; Future  -     Treponema Abs w Reflex to RPR and Titer; Future  -     Varicella Zoster Virus Antibody IgG; Future  -     Mat Fetal Med Ctr Referral - Pregnancy; Future  -     Lead Capillary; Future  -     Hemoglobin A1c; Future    Exposure to lead  -     Lead Capillary; Future    Screening examination for lead poisoning  -     Lead Capillary; Future    Family history of diabetes mellitus  -     Hemoglobin A1c; Future          Orders Placed This Encounter   Procedures     Rubella Antibody IgG     Hepatitis B Surface Antibody     Hepatitis B surface antigen     Hepatitis C antibody     Vitamin D Deficiency     HIV Antigen Antibody Combo Cascade     CBC with platelets     Treponema Abs w Reflex to RPR and Titer     Varicella Zoster Virus Antibody IgG     Lead Capillary     Hemoglobin A1c     Mat Fetal Med Ctr Referral - Pregnancy     ABO/Rh type and screen        PLAN:  - Reviewed use of triage nurse line and contacting the on-call provider after hours for an urgent need such as fever, vagina bleeding, bladder or vaginal infection, rupture of membranes,  or term labor.    - Reviewed best evidence for: weight gain for her weight and height for pregnancy:  Based on pre-pregnancy Body mass index is 18.88 kg/m . RECOMMENDED WEIGHT GAIN: 25-35 lbs.  - Reviewed healthy diet and foods to avoid, exercise and activity during pregnancy; avoiding exposure to toxoplasmosis; and maintenance of a generally healthy lifestyle.   - Discussed the harms, benefits, side effects and alternative therapies for current prescribed and OTC medications. Patient was encouraged to start/continue prenatal vitamins  as tolerated.    - Oriented to Practice, types of care, and how to reach a provider.  Pt prefers CNM team  - Patient received 1st trimester new OB education packet complete with aide of [facility] booklet including information on genetic screening test options.  - Patient desires 1st trimester screening which was ordered.    - Reviewed high risk for gestational diabetes d/t First degree relative with GDM or DM , IS NOT agreeable to early 1 hour today. May decline at next visit if HgbA1c WNL.   - Reviewed No identifiable increased risk risk for Pre Eclampsia d/t No known risk factors of High risk for Pre E (including a hx GHTN, new 8/2022) or meets two or more of the moderate risk factors including Age =35 years or older  and IS NOTagreeable to starting 81mg aspirin daily after 12 weeks .    - Sent to lab for routine OB blood work + HgbA1c, Lead level.   - MFM referral for 1st tri screening + Level II US  - Pap up to date due 2/2025  - Continue scheduled prenatal care and prn if questions or concerns    DRISS Love CNM      Answers for HPI/ROS submitted by the patient on 7/6/2023  General Symptoms: No  Skin Symptoms: No  HENT Symptoms: No  EYE SYMPTOMS: No  HEART SYMPTOMS: No  LUNG SYMPTOMS: No  INTESTINAL SYMPTOMS: No  URINARY SYMPTOMS: No  GYNECOLOGIC SYMPTOMS: No  BREAST SYMPTOMS: No  SKELETAL SYMPTOMS: No  BLOOD SYMPTOMS: No  NERVOUS SYSTEM SYMPTOMS: No  MENTAL HEALTH SYMPTOMS: No

## 2023-07-08 LAB — BACTERIA UR CULT: NO GROWTH

## 2023-07-10 LAB
RUBV IGG SERPL QL IA: 4.13 INDEX
RUBV IGG SERPL QL IA: POSITIVE
VZV IGG SER QL IA: 898.1 INDEX
VZV IGG SER QL IA: POSITIVE

## 2023-08-04 ENCOUNTER — PRE VISIT (OUTPATIENT)
Dept: MATERNAL FETAL MEDICINE | Facility: CLINIC | Age: 38
End: 2023-08-04
Payer: COMMERCIAL

## 2023-08-04 ENCOUNTER — MYC MEDICAL ADVICE (OUTPATIENT)
Dept: OBGYN | Facility: CLINIC | Age: 38
End: 2023-08-04
Payer: COMMERCIAL

## 2023-08-07 ENCOUNTER — OFFICE VISIT (OUTPATIENT)
Dept: MATERNAL FETAL MEDICINE | Facility: CLINIC | Age: 38
End: 2023-08-07
Attending: OBSTETRICS & GYNECOLOGY
Payer: COMMERCIAL

## 2023-08-07 ENCOUNTER — LAB (OUTPATIENT)
Dept: LAB | Facility: CLINIC | Age: 38
End: 2023-08-07
Attending: OBSTETRICS & GYNECOLOGY
Payer: COMMERCIAL

## 2023-08-07 ENCOUNTER — MEDICAL CORRESPONDENCE (OUTPATIENT)
Dept: HEALTH INFORMATION MANAGEMENT | Facility: CLINIC | Age: 38
End: 2023-08-07

## 2023-08-07 ENCOUNTER — HOSPITAL ENCOUNTER (OUTPATIENT)
Dept: ULTRASOUND IMAGING | Facility: CLINIC | Age: 38
Discharge: HOME | End: 2023-08-07
Attending: OBSTETRICS & GYNECOLOGY
Payer: COMMERCIAL

## 2023-08-07 DIAGNOSIS — O09.521 MULTIGRAVIDA OF ADVANCED MATERNAL AGE IN FIRST TRIMESTER: ICD-10-CM

## 2023-08-07 DIAGNOSIS — O26.90 PREGNANCY RELATED CONDITION, ANTEPARTUM: ICD-10-CM

## 2023-08-07 DIAGNOSIS — Z36.9 FIRST TRIMESTER SCREENING: ICD-10-CM

## 2023-08-07 DIAGNOSIS — Z77.011 EXPOSURE TO LEAD: ICD-10-CM

## 2023-08-07 DIAGNOSIS — Z51.81 ENCOUNTER FOR THERAPEUTIC DRUG MONITORING: ICD-10-CM

## 2023-08-07 DIAGNOSIS — O09.521 MULTIGRAVIDA OF ADVANCED MATERNAL AGE IN FIRST TRIMESTER: Primary | ICD-10-CM

## 2023-08-07 DIAGNOSIS — Z36.9 ENCOUNTER FOR ANTENATAL SCREENING OF MOTHER: ICD-10-CM

## 2023-08-07 DIAGNOSIS — Z34.91 NORMAL PREGNANCY IN FIRST TRIMESTER: ICD-10-CM

## 2023-08-07 DIAGNOSIS — F90.0 ADHD (ATTENTION DEFICIT HYPERACTIVITY DISORDER), INATTENTIVE TYPE: ICD-10-CM

## 2023-08-07 DIAGNOSIS — Z13.88 SCREENING EXAMINATION FOR LEAD POISONING: ICD-10-CM

## 2023-08-07 DIAGNOSIS — Z31.5 ENCOUNTER FOR PROCREATIVE GENETIC COUNSELING AND TESTING: ICD-10-CM

## 2023-08-07 LAB
ALBUMIN SERPL BCG-MCNC: 4.1 G/DL (ref 3.5–5.2)
ALP SERPL-CCNC: 31 U/L (ref 35–104)
ALT SERPL W P-5'-P-CCNC: 14 U/L (ref 0–50)
AST SERPL W P-5'-P-CCNC: 17 U/L (ref 0–45)
BILIRUB DIRECT SERPL-MCNC: <0.2 MG/DL (ref 0–0.3)
BILIRUB SERPL-MCNC: 0.4 MG/DL
PROT SERPL-MCNC: 7 G/DL (ref 6.4–8.3)

## 2023-08-07 PROCEDURE — 83655 ASSAY OF LEAD: CPT

## 2023-08-07 PROCEDURE — 36416 COLLJ CAPILLARY BLOOD SPEC: CPT

## 2023-08-07 PROCEDURE — 36415 COLL VENOUS BLD VENIPUNCTURE: CPT

## 2023-08-07 PROCEDURE — 96040 HC GENETIC COUNSELING, EACH 30 MINUTES: CPT

## 2023-08-07 PROCEDURE — 76813 OB US NUCHAL MEAS 1 GEST: CPT

## 2023-08-07 PROCEDURE — 82040 ASSAY OF SERUM ALBUMIN: CPT

## 2023-08-07 PROCEDURE — 76813 OB US NUCHAL MEAS 1 GEST: CPT | Mod: 26 | Performed by: OBSTETRICS & GYNECOLOGY

## 2023-08-07 NOTE — PROGRESS NOTES
Please refer to ultrasound report under 'Imaging' Studies of 'Chart Review' tabs.    Francisco hSarp M.D.

## 2023-08-07 NOTE — PROGRESS NOTES
[facility]  Genetic Counseling Consult    Patient:  Arlet Segura YOB: 1985   Date of Service:  23   MRN: [id number]    Flora was seen at the [facility] for genetic consultation. The indication for genetic counseling is advanced maternal age. The patient was accompanied to this visit by their partner, Alex.    The session was conducted in English.      IMPRESSION/ PLAN   1. Arlet has not had genetic screening in this pregnancy but elected to have screening today.     2. During today's MFM visit, Arlet had a blood draw for Expanded NIPS through [employer]. The core NIPS screens for trisomy 21, 18, and 13 and the patient opted to screen for sex chromosome aneuploidies, including reported fetal sex. In addition, expanded NIPS also includes microdeletion syndromes and rare autosomal trisomies. Results are expected in 5-10 days. The patient will be called with results and if they do not answer they requested a detailed message with results on their voicemail, including the predicted fetal sex information.  Patient was informed that results, including fetal sex, will be available in [employer].    3. Arlet had a nuchal translucency ultrasound today. Please see the ultrasound report for further details.    4. Further recommendations include a level II ultrasound with MFM and maternal serum AFP only screening for neural tube defects, if desired (quad screen should NOT be performed). The level II ultrasound has been scheduled for 2023.     PREGNANCY HISTORY   /Parity:       Arlet's pregnancy history is significant for:   A son born vaginally with her current partner in     CURRENT PREGNANCY   Current Age: 38 year old   Age at Delivery: 38 year old  DEREK: 2024, by Last Menstrual Period                                   Gestational Age: 13w1d  This pregnancy is a  "single gestation.   Flora denies bleeding, complications, illnesses, fevers, and exposure concerns. She reports that she is taking prenatal vitamins and Amphetamine.    MEDICAL HISTORY   Arlet s reported medical history is not expected to impact pregnancy management or risks to fetal development.       FAMILY HISTORY   A three-generation pedigree was obtained previously by a genetic counselor on 2-23-21 and updated today. The original and updated version is scanned under the \"Media\" tab in Epic.  The family history was reported by Arlet and their partner.    The following significant updates were reported today:   Flora's son (~2y) is healthy.  Alex reports that he is healthy. He reports a history of bipolar disorder in his mother. We reviewed Flora's family history of bipolar disorder in her maternal aunt and grandmother. We discussed how mental illnesses are thought to be inherited in a multifactorial fashion, meaning many factors are involved in the development of this condition. These factors usually include both genetic and environmental aspects and a combination of these aspects lead to the condition. Given that there is a genetic component, the couple's children may be at increased risk to develop a mental illness and were encouraged to share this information with their pediatrician and have awareness for early signs and symptoms.      Otherwise, the reported family history is unremarkable for multiple miscarriages, stillbirths, birth defects, intellectual disabilities, known genetic conditions, and consanguinity.       CARRIER SCREENING   Expanded carrier screening is available to screen for autosomal recessive conditions and X-linked conditions in a large list of genes. Autosomal recessive conditions happen when a mutation has been inherited from the egg and sperm and include conditions like cystic fibrosis, thalassemia, hearing loss, spinal muscular atrophy, and more. X-linked conditions happen when " a mutation has been inherited from the egg and include conditions like fragile X syndrome. [place] screening was also reviewed. About [place]  Screening    As part of our conversation on carrier screening and how common or rare these condition are, we discussed the patient is of [place] ancestry and the partner is of  ancestry.    The patient has declined the carrier screening options today. They are aware the option will remain, and they can contact us if they would like to pursue screening.       RISK ASSESSMENT FOR CHROMOSOME CONDITIONS   We explained that the risk for fetal chromosome abnormalities increases with maternal age. We discussed specific features of common chromosome abnormalities, including Down syndrome, trisomy 13, trisomy 18, and sex chromosome trisomies.    At age 38 at midtrimester, the risk to have a baby with Down syndrome is 1 in 216.   At age 38 at midtrimester, the risk to have a baby with any chromosome abnormality is 1 in 105.     Arlet has not had genetic screening in this pregnancy but elected to have screening today.      GENETIC TESTING OPTIONS   Genetic testing during a pregnancy includes screening and diagnostic procedures.      Screening tests are non-invasive which means no risk to the pregnancy and includes ultrasounds and blood work. The benefits and limitations of screening were reviewed. Screening tests provide a risk assessment (chance) specific to the pregnancy for certain fetal chromosome abnormalities but cannot definitively diagnose or exclude a fetal chromosome abnormality. Follow-up genetic counseling and consideration of diagnostic testing is recommended with any abnormal screening result. Diagnostic testing during a pregnancy is more certain and can test for more conditions. However, the tests do have a risk of miscarriage that requires careful consideration. These tests can detect fetal chromosome abnormalities with greater than 99% certainty. Results  can be compromised by maternal cell contamination or mosaicism and are limited by the resolution of current genetic testing technology.     There is no screening or diagnostic test that detects all forms of birth defects or intellectual disability.     We discussed the following screening options:   Non-invasive prenatal testing (NIPT)  Also called cell-free DNA screening because it detects chromosomes from the placenta in the pregnant person's blood  Can be done any time after 10 weeks gestation  Screens for trisomy 21, trisomy 18, trisomy 13, and sex chromosome aneuploidies  Cannot screen for open neural tube defects, maternal serum AFP after 15 weeks is recommended  We discussed that NIPT can be used to screen for 22q11.2 deletion syndrome. The data on this condition is more limited, so there is not a positive or negative predictive value available for results interpretation. The patient elected screening for 22q11.2 deletion syndrome. Testing was drawn through [employer] due to no [employer] kits in stock. Flora consented to screening for other select microdeletion conditions, trisomy 16, and trisomy 22 as well.    We discussed the following ultrasound options:  Nuchal translucency (NT) ultrasound  Ultrasound between [id number] that includes nuchal translucency measurement and nasal bone assessments  Nuchal translucency refers to the space at the back of the neck where fluid builds up. All babies at this stage have fluid and there is only concern if there is too much fluid  Nasal bone refers to the small bone in the nose. There is concern for conditions like Down syndrome if the bone cannot be seen at all  This ultrasound can be done as part of first trimester screening, at the same time as another screen (NIPT), at the same time as a CVS, or if the patients does not want genetic screening.  Markers on ultrasound detects about 70% of pregnancies with aneuploidy  Abnormalities on NT ultrasound can also increase the  risk for a birth defect, like a heart defect  Comprehensive level II ultrasound (Fetal Anatomy Ultrasound)  Ultrasound done between 18-20 weeks gestation  Screens for major birth defects and markers for aneuploidy (like trisomy 21 and trisomy 18)  Includes looking at the fetus/baby's growth, heart, organs (stomach, kidneys), placenta, and amniotic fluid    We discussed the following diagnostic options:   Chorionic villus sampling (CVS)  Invasive diagnostic procedure done between 10w0d and 13w6d  The procedure collects a small sample from the placenta for the purpose of chromosomal testing and/or other genetic testing  Diagnostic result; more than 99% sensitivity for fetal chromosome abnormalities  Cannot screen for open neural tube defects, maternal serum AFP after 15 weeks is recommended  Amniocentesis  Invasive diagnostic procedure done after 15 weeks gestation  The procedure collects a small sample of amniotic fluid for the purpose of chromosomal testing and/or other genetic testing  Diagnostic result; more than 99% sensitivity for fetal chromosome abnormalities  Testing for AFP in the amniotic fluid can test for open neural tube defects    We discussed that an amniocentesis allows for the option to run a microarray. Microarray testing involves looking for small extra (duplications) or missing (deletions) pieces of DNA within the chromosomes.  Chromosomal deletions and duplications may cause problems with an individual's health and development including learning disabilities, developmental delays, growth issues, physical differences, and psychiatric challenges. After a normal karyotype, yield for microarray is typically ~2% when the indication is advanced maternal age or positive screen, or 6% if there is a structural anomaly (Prudence, 2012).     It was a pleasure to be involved with Arlet [facility]. Face-to-face time of the meeting was 30 minutes.    Yvonne Bello, GC, MS, [facility]  Certified and [place]  Licensed Genetic Counselor  [facility]  Maternal Fetal Medicine  Office: [phone]  [facility]: [phone]   Fax: [phone]  [facility]

## 2023-08-08 LAB — LEAD BLDC-MCNC: <2 UG/DL

## 2023-08-14 ENCOUNTER — TELEPHONE (OUTPATIENT)
Dept: MATERNAL FETAL MEDICINE | Facility: CLINIC | Age: 38
End: 2023-08-14
Payer: COMMERCIAL

## 2023-08-14 ENCOUNTER — MEDICAL CORRESPONDENCE (OUTPATIENT)
Dept: HEALTH INFORMATION MANAGEMENT | Facility: CLINIC | Age: 38
End: 2023-08-14
Payer: COMMERCIAL

## 2023-08-14 DIAGNOSIS — O09.521 MULTIGRAVIDA OF ADVANCED MATERNAL AGE IN FIRST TRIMESTER: Primary | ICD-10-CM

## 2023-08-14 DIAGNOSIS — Z36.9 ENCOUNTER FOR ANTENATAL SCREENING OF MOTHER: ICD-10-CM

## 2023-08-14 LAB
Lab: NORMAL
PERFORMING LABORATORY: NORMAL
SCANNED LAB RESULT: NORMAL
SPECIMEN STATUS: NORMAL
TEST NAME: NORMAL

## 2023-08-14 NOTE — TELEPHONE ENCOUNTER
August 14, 2023     I called Flora and spoke to her to disclose that her non-invasive prenatal screening (NIPS) through [employer] failed. The failure was due to technical or sample-related issues. Data failed to meet quality standards for interpretation. We discussed the option for a redraw through [employer]. Originally, Flora had consented to NIPS through [employer], including sex chromosome aneuploidies and 22q11.2 deletion syndrome. She then consented to [employer] when there were no [employer] kits in clinic. Flora consented to using [employer] for the redraw due to the failure at [employer]. She stated that she will come to lab tomorrow, 8/15/23.    Yvonne Bello MS, [facility]  Licensed Genetic Counselor  [facility]  Pager: [phone]  Office: [phone]

## 2023-08-15 ENCOUNTER — LAB (OUTPATIENT)
Dept: LAB | Facility: CLINIC | Age: 38
End: 2023-08-15
Payer: COMMERCIAL

## 2023-08-15 DIAGNOSIS — Z36.9 ENCOUNTER FOR ANTENATAL SCREENING OF MOTHER: ICD-10-CM

## 2023-08-15 DIAGNOSIS — O09.521 MULTIGRAVIDA OF ADVANCED MATERNAL AGE IN FIRST TRIMESTER: ICD-10-CM

## 2023-08-15 PROCEDURE — 36415 COLL VENOUS BLD VENIPUNCTURE: CPT

## 2023-08-21 ENCOUNTER — TELEPHONE (OUTPATIENT)
Dept: MATERNAL FETAL MEDICINE | Facility: HOSPITAL | Age: 38
End: 2023-08-21
Payer: COMMERCIAL

## 2023-08-21 DIAGNOSIS — O28.5 ABNORMAL CHROMOSOMAL AND GENETIC FINDING ON ANTENATAL SCREENING OF MOTHER: Primary | ICD-10-CM

## 2023-08-21 LAB — SCANNED LAB RESULT: ABNORMAL

## 2023-08-21 NOTE — TELEPHONE ENCOUNTER
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    We discussed chromosomal inheritance. I explained that we typically have 46 chromosomes total and that chromosomes come in pairs. We inherit one copy of each chromosome from our mother and one copy of each chromosome from our father. Chromosomes are numbered 1-22 and these are the same for males and females. The 23rd pair of chromosomes is the sex chromosomes and these determine our sex. Most females have two X chromosomes and most males have one X and one Y chromosome. The presence of a Y chromosome early in development results in development as a male. If there is no Y chromosome present, an embryo develops as a female.    We discussed monosomy X, also called Webb syndrome. This is a chromosome difference that occurs in about 1 in 2000 females. The presence of 45, X causes the symptoms of Webb syndrome. The features of Webb syndrome can include: shorter stature, absence of the ovaries and infertility, learning trouble, webbing at the neck, increased risk for thyroid trouble, increased risk for type ll diabetes, and congenital heart defects. The features or symptoms are variable; it is unlikely that a female with monosomy X would have each and every symptom. However, short stature and infertility or lack of menstrual cycles are consistent findings in females with Webb syndrome.     We discussed that monosomy X is a common chromosome abnormality in pregnancy losses. In fact, approximately 10% of spontaneous abortions that have testing have a finding of monosomy X. In addition, approximately 99% of babies with monosomy X will not survive to birth. The majority of these pregnancy losses occur in the first trimester. Those affected babies that do survive to birth likely have less severe features or may be mosaic which means some of their cells have two copies of X  (46,XX) and others only have one copy of X (45,X)    We discussed the finding of Flora's abnormal cell-free result for monosomy X (Webb syndrome). We discussed the possible explanations for this result including:  The fetus could have Webb syndrome  The abnormal result could be confined to the placenta only. The placenta could also be mosaic and have two cells lines- a normal 46, XX and an abnormal 45,X  The fetus could be mosaic for 45, X and 46, XX  Results may represent low level mosaicism in a phenotypically normal mother (the patient). The loss of one X to give an occasional 45,X cell is a normal characteristic of aging for a normal 46, XX female     We discussed the positive predictive value, which is the probability that that a pregnancy with a positive result truly has the diease. For Flora's result, the positive predictive value is 47.6-62%. The 47.6% was provided by [facility], the performing laboratory, and the 62% is calculated from the NIPT/ Cell Free DNA Screening Predictive Value Calculator from the National Society of Genetic Counselors and  Quality.org which is based on the prevalence of the condition, and the specificity and sensitivity of the screen. This means the probability the pregnancy is affected with Webb syndrome, based on this result, is 47.6-62%. Therefore, the false positive rate (the chance the fetus is not affected is 38-52.4%.      Flora is having a level II ultrasound on 23. There is limited research regarding the diagnostic value of sonographic findings in fetuses with Webb syndrome. One study of 69 reported cases of Webb syndrome suggests that 29.8% of affected fetuses have a sign detected in the first trimester including an increased NT (>3mm) and a nuchal cystic hygroma. However, most cases of cystic hygroma were diagnosed in the second trimester. Other sonographic findings in the second trimester inlcude hydrops, ventriculomegaly, renal abnormalities, short  femur, choroid plexus cyst, echogenic bowel, and echogenic intracardial focus.Congenital heart defects were diagnosed in 13% of cases, most commonly aortic coarctation. Overall 68.1% of affected fetuses had findings on sonography while 31.9% did not. While this study is limited by small sample size it does support the value of sonographic findings to increase the detection rate of Webb syndrome but not be diagnostic. [Hilary et al., 2006]     We discussed the option of diagnostic testing to determine if the monosomy X result is a true positive and the pregnancy is affected. We discussed the procedure and associated-risks of amniocentesis. We also discussed the tests that could be ordered on amniocentesis such as FISH (24-48 hours return) for common aneuploidies (13,18,21,X,Y) and a chromosome analysis (7-10 days return) for a karyotype. We also discussed a chromosome analysis of Flora to determine whether she could have low-level mosaicism for Webb Syndrome.    In the absence of diagnostic testing chromosome analysis for the child is recommended after birth.     Flora elected to pursue an amniocentesis. She said that her priority was scheduling as soon as possible rather than site-specific. She was scheduled for an amniocentesis at [facility] on Monday, 8/28/23.     Amniocentesis  - This is an invasive procedure typically performed at 15 weeks or later, through which amniotic fluid is obtained for the purpose of chromosome analysis and/or other prenatal genetic analysis.  - Amniocentesis is considered a diagnostic test for chromosome problems during pregnancy.  - The risk of pregnancy loss associated with amniocentesis is generally estimated to be 1/500 or less.  - Amniotic fluid AFP measurement is also done to screen for the possibility of open neural tube or ventral defects.    Flora had follow-up questions including whether this condition was hereditary. We discussed that Webb Syndrome is generally spontaneous,  and is not thought to be associated with advanced maternal age. Flora said that her mother had an abnormal amniocentesis with her sister. I offered to review this report if Flora is able to get additional information about it.    We briefly discussed the  timeline in the [facility] as being up until 23w6d, if this is something Flora would consider. We discussed that others choose to continue. We will support Flora in her decisions and can further discuss this at her genetic counseling appointment.    Flora was encouraged to contact me with any additional questions. I will see her for a genetic counseling appointment prior to the amniocentesis next Monday.    Yvonne Bello MS, [facility]  Licensed Genetic Counselor  [facility]  Pager: [phone]  Office: [phone]

## 2023-08-23 ENCOUNTER — VIRTUAL VISIT (OUTPATIENT)
Dept: PSYCHIATRY | Facility: CLINIC | Age: 38
End: 2023-08-23
Attending: PSYCHIATRY & NEUROLOGY
Payer: COMMERCIAL

## 2023-08-23 DIAGNOSIS — F90.0 ADHD (ATTENTION DEFICIT HYPERACTIVITY DISORDER), INATTENTIVE TYPE: Primary | ICD-10-CM

## 2023-08-23 PROCEDURE — 99215 OFFICE O/P EST HI 40 MIN: CPT | Mod: VID | Performed by: PSYCHIATRY & NEUROLOGY

## 2023-08-23 RX ORDER — DEXTROAMPHETAMINE SACCHARATE, AMPHETAMINE ASPARTATE, DEXTROAMPHETAMINE SULFATE AND AMPHETAMINE SULFATE 2.5; 2.5; 2.5; 2.5 MG/1; MG/1; MG/1; MG/1
TABLET ORAL
Qty: 60 TABLET | Refills: 0 | Status: SHIPPED | OUTPATIENT
Start: 2023-09-21 | End: 2023-10-10

## 2023-08-23 RX ORDER — DEXTROAMPHETAMINE SACCHARATE, AMPHETAMINE ASPARTATE, DEXTROAMPHETAMINE SULFATE AND AMPHETAMINE SULFATE 2.5; 2.5; 2.5; 2.5 MG/1; MG/1; MG/1; MG/1
TABLET ORAL
Qty: 60 TABLET | Refills: 0 | Status: SHIPPED | OUTPATIENT
Start: 2023-10-21 | End: 2023-10-10

## 2023-08-23 RX ORDER — DEXTROAMPHETAMINE SACCHARATE, AMPHETAMINE ASPARTATE, DEXTROAMPHETAMINE SULFATE AND AMPHETAMINE SULFATE 2.5; 2.5; 2.5; 2.5 MG/1; MG/1; MG/1; MG/1
TABLET ORAL
Qty: 60 TABLET | Refills: 0 | Status: SHIPPED | OUTPATIENT
Start: 2023-08-23 | End: 2023-10-10

## 2023-08-23 NOTE — PROGRESS NOTES
Virtual Visit Details    Type of service:  Video Visit     Originating Location (pt. Location): Home  Distant Location (provider location):  Off-site  Platform used for Video Visit: Junito

## 2023-08-23 NOTE — PROGRESS NOTES
Video Visit        [facility]  Psychiatry Clinic  PSYCHIATRY PROGRESS NOTE     CARE TEAM:  PCP- Dorothy Holder.  Arlet Segura is a 38 year old   who uses the name Flora and pronouns she, her.    DIAGNOSES                                                                                   ADHD, inattentive type  Bulimia Nervosa, purging type     ASSESSMENT                                                                                Flora is now almost 16 wks into her 2nd pregnancy. Everything is going well. Some  concern re: recent genetic test results, will have amnio next week, feels hopeful. Current   stimulant dose is working well. She has discussed the dose with OB who reportedly rec   no changes. Her usual dose of 10 mg BID was reduced with the last pregnancy to   5 mg BID, but that was with a reduced workload during the pandemic and it was tolerable.   Flora prefers to maintain the current dose as she is not sure how she would do with a lower   dose given her workload. As long as OB has no issue, I am fine w/ this dose. Flora will   periodically check with OB on the dosing and we will review with routine check-ins as well   (taking into consideration fetal growth, adverse effects). No other intervention needed today.   Binging/ purging down to less than 1x/ wk, will not use selective serotonin reuptake inhibitor   at this time. This issue did not impact the last pregnancy and OB provider team is aware.   Today is our last visit and will plan to transfer care to one my nurse practitioner colleagues   in approx Oct. Flora is aware and agrees.    Adderall use--no adverse effects, no HTN, no evidence of misuse & has been taking a   stimulant since ~2018. Adderall benefits focus/conc and has modestly reduced   binging/purging episode frequency.    At the last visit l documented these talking points re: stimulant use with breast-feeding.   We went through with these points w/the  last pregnancy (08/2021):  1) L3 safety risk [moderatel/likey safe:studies in breastfeeding have shown evidence for   mild non-threatening adverse effects OR there are no studies in breastfeeding (limited data)]  2) Relative Infant Dose (2.46-7.25%), range less than the cut off of 10% which is deemed   compatible w/breastfeeding. At normal therapeutic doses, amt in breastmilk is likely subclinical   3) Can try to feed in the AM prior to taking dose. If this is too stressful it is ok to not pursue  4) Monitor in infant (same as in adults): insomnia, irritability, dec appetite, reduced wt gain   (all are rare secondary to stimulant use) contact psych or medical provider if sxs occur  5) Although rare, there is potential for stimulants to decrease prolactin & decrease milk supply   6) What is best for mom is best for baby (usually)  Flora was taking the lower dose of 5 mg BID during her first experience with breast-feeding.  Could discuss again when the time comes, reduction in dose would not be required if all  else is WNL.    Other things to be aware of as care is being transferred:  Flora did not tolerate, or benefit from, Prozac or Lexapro in the past. Zoloft trial was incomplete   and only reached a dose of 75mg (2022-23). If necessary, the use of Zoloft could be considered  during pregnancy. I did refer to our [facility] program for a more detailed discussion  of this, but Flora does not want to pursue at this time. Have discussed ED programs [facility], [facility]   and [facility] in the past but not today. She knows she can connect with those programs if needed.  .   [place] was checked today:  Indicates taking controlled medication as prescribed.    PLAN                                                                                           1) Meds:   -continue Adderall 10mg BID        gave 3 refills    2) Other: Labs- LFTs obtained, no longer due    3) RTC:  early Oct - end of Nov with new provider in this  clinic  4) CRISIS Numbers:   Provided routinely in AVS          PERTINENT BACKGROUND                                            [evals 2014, 2017]   Last documented Dec 2021.    Med History:  Adderall XR 30mg helpful for attentional probs as well as purging episodes; since   about 2014 using IR d/t sleep probs with XR; 2018 Prozac was added, dosed up to 60mg with no   improvement in mood (?B/P). Lexapro trial mid 2022 was not successful. Individual eating disorder   (ED) treatment (in this clinic) was not helpful. No use of Wellbutrin d/t BN and not helpful in the past.    INTERIM HISTORY     Since the last visit:  -now 16 weeks into her second pregnancy, going well, feels good  -recent genetic testing produced a result indicating possible risk for Webb's Syndrome   -amnio next week will provide more info  -Luis Miguel will be 2 on Sept 5, he is doing great  -overall things are going well at home/ work  -no symptoms of depression, anxiety  -reduced B/P pattern,  less than once a week  -good response to Adderall at current dose    Current Social Hx: Lives in a house with  Alex, young son Luis Miguel and her  dog 'Hutch'. Likes to play soccer, garden, run and ski. Good social support with friends   and family.  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    Recent Symptoms:  No mood or anxiety symptoms, attention/ concentration are  good, binge-purge less than 1x a week and less frequent that usual  Adverse Effects:  none  Pertinent Negatives:  No suicidal or violent ideation, psychosis and adrian  Recent Substance Use:  none reported    MEDICAL HISTORY  and ALLERGY     ALLERGIES: Patient has no known allergies.  Avoid use of Wellbutrin (d/t BN)     Patient Active Problem List   Diagnosis    ADHD (attention deficit hyperactivity disorder), inattentive type    Hx of bulimia nervosa    Raynaud's disease without gangrene    H/O LEEP    Change in mole    Left ovarian cyst    Pyogenic granuloma     Normal pregnancy in first trimester    Laceration of labia majora, subsequent encounter    Screening examination for lead poisoning    Family history of diabetes mellitus    Multigravida of advanced maternal age in first trimester     MEDICAL REVIEW OF SYSTEMS                                                               no additional symptoms to that documented above    CURRENT MEDS       Current Outpatient Medications   Medication Sig Dispense Refill    amphetamine-dextroamphetamine (ADDERALL) 10 MG tablet Take one tab (10mg) by mouth twice a day 60 tablet 0    Prenatal Vit-Fe Fumarate-FA (PRENATAL MULTIVITAMIN PLUS IRON) 27-0.8 MG TABS per tablet Take 1 tablet by mouth daily      estradiol (ESTRACE) 0.1 MG/GM vaginal cream Place 2 g vaginally twice a week (Patient not taking: Reported on 6/29/2023) 42.5 g 1     VITALS                                                                                                 Pulse Readings from Last 3 Encounters:   07/07/23 59   04/08/22 76   02/04/22 78     Wt Readings from Last 3 Encounters:   07/07/23 48.4 kg (106 lb 9.6 oz)   04/08/22 47.7 kg (105 lb 3.2 oz)   02/04/22 47.5 kg (104 lb 11.2 oz)     BP Readings from Last 3 Encounters:   07/07/23 127/86   04/08/22 102/71   02/04/22 116/75      MENTAL STATUS EXAM                                                        Alertness: alert  and oriented  Appearance: well groomed  Behavior/Demeanor: cooperative, pleasant and calm, with good  eye contact   Speech: regular rate and rhythm  Language: no obvious problem  Psychomotor: normal or unremarkable  Mood: description consistent with euthymia  Affect: full range; was congruent to mood; was congruent to content  Thought Process/Associations: unremarkable  Thought Content:  Reports none;  Denies suicidal & violent ideation and delusions   Perception:  Reports none;  Denies hallucinations  Insight: good  Judgment: good  Cognition: (6) oriented: time, person, and place  attention  span: intact  concentration: intact  recent memory: intact  remote memory: intact  fund of knowledge: appropriate  Gait and Station: N/A (telehealth)    LABS and DATA         2/4/2022    10:22 AM 4/8/2022     2:09 PM 7/7/2023    10:38 AM   PHQ   PHQ-9 Total Score 5 6 4   Q9: Thoughts of better off dead/self-harm past 2 weeks Not at all Not at all Not at all     Recent Labs   Lab Test 08/07/23  1024   AST 17   ALT 14   ALKPHOS 31*      PSYCHOTROPIC DRUG INTERACTIONS   none  MANAGEMENT:  N/A    RISK STATEMENT for SAFETY   Arlet Segura did not appear to be an imminent safety risk to self or others.    TREATMENT RISK STATEMENT:  The risks, benefits, alternatives and potential adverse   effects have been discussed and are understood by the pt. The pt understands the risks of   using street drugs or alcohol. There are no medical contraindications, the pt agrees to   treatment with the ability to do so. The pt knows to call the clinic for any problems or to   access emergency care if needed.      Faculty Psychiatrist:      Candida Redd MD    50 min spent on the date of the encounter in chart review, patient visit, review of tests, documentation, care coordination, and/or discussion with other providers about the issues documented above. Any psychotherapy time is excluded from this total.

## 2023-08-23 NOTE — PATIENT INSTRUCTIONS
No med change  Refills given for 3 months  Clinic will call you with an appt for your new provider, Bianca NAQVI    Take care Flora  I have enjoyed working with you!          **For crisis resources, please see the information at the end of this document**   Patient Education    Thank you for coming to the [facility].     Lab Testing:  If you had lab testing today and your results are reassuring or normal they will be mailed to you or sent through [employer] within 7 days. If the lab tests need quick action we will call you with the results. The phone number we will call with results is # [phone]. If this is not the best number please call our clinic and change the number.     Medication Refills:  If you need any refills please call your pharmacy and they will contact us. Our fax number for refills is [phone].   Three business days of notice are needed for general medication refill requests.   Five business days of notice are needed for controlled substance refill requests.   If you need to change to a different pharmacy, please contact the new pharmacy directly. The new pharmacy will help you get your medications transferred.     Contact Us:  Please call [phone] during business hours (8-5:00 M-F).   If you have medication related questions after clinic hours, or on the weekend, please call [phone].     Financial Assistance [phone]   Medical Records [phone]       MENTAL HEALTH CRISIS RESOURCES:  For a emergency help, please call 911 or go to the nearest Emergency Department.     Emergency Walk-In Options:   EmPATH Unit @ [facility] ([place]): [phone] - Specialized mental health emergency area designed to be calming  [facility] ([place]): [phone]  [facility] ([place]): [phone]  [facility] ([place]): [phone]    [facility] Crisis Information:   [place]:  [phone]  [place]: [phone]  [place] (COPE) - Adult: [phone]     Child: [phone]  [place] - Adult: [phone]     Child: [phone]  [place]: [phone]  List of all [facility] resources:   https://[place].gov/dhs/people-we-serve/adults/health-care/mental-health/resources/crisis-contacts.jsp    National Crisis Information:   Crisis Text Line: Text  [place]  to 473271  Suicide & Crisis Lifeline: 988  National Suicide Prevention Lifeline: 6-330-135-TALK (1-304.241.3035)       For online chat options, visit https://suicidepreventionlifeline.org/chat/  Poison Control Center: 7-405-810-6870  Trans Lifeline: 1-492.150.4723 - Hotline for transgender people of all ages  The [employer]: 5-091-370-7477 - Hotline for LGBT youth     For Non-Emergency Support:   Fast Tracker: Mental Health & Substance Use Disorder Resources -   https://www.[employer]n.org/

## 2023-08-23 NOTE — NURSING NOTE
Is the patient currently in the state of [place]? YES    Visit mode:VIDEO    If the visit is dropped, the patient can be reconnected by: VIDEO VISIT: Send to e-mail at: [email or web address]    Will anyone else be joining the visit? NO  (If patient encounters technical issues they should call [phone])    How would you like to obtain your AVS? MyChart    Are changes needed to the allergy or medication list? No    Reason for visit: RECHECK    Patient will complete questionnaire in mychart.    Zahira MAST

## 2023-08-28 ENCOUNTER — OFFICE VISIT (OUTPATIENT)
Dept: MATERNAL FETAL MEDICINE | Facility: HOSPITAL | Age: 38
End: 2023-08-28
Attending: STUDENT IN AN ORGANIZED HEALTH CARE EDUCATION/TRAINING PROGRAM
Payer: COMMERCIAL

## 2023-08-28 ENCOUNTER — ANCILLARY PROCEDURE (OUTPATIENT)
Dept: ULTRASOUND IMAGING | Facility: HOSPITAL | Age: 38
End: 2023-08-28
Attending: STUDENT IN AN ORGANIZED HEALTH CARE EDUCATION/TRAINING PROGRAM
Payer: COMMERCIAL

## 2023-08-28 DIAGNOSIS — O09.522 MULTIGRAVIDA OF ADVANCED MATERNAL AGE IN SECOND TRIMESTER: ICD-10-CM

## 2023-08-28 DIAGNOSIS — Z31.5 ENCOUNTER FOR PROCREATIVE GENETIC COUNSELING AND TESTING: ICD-10-CM

## 2023-08-28 DIAGNOSIS — Z36.0 ANTENATAL SCREENING FOR CHROMOSOMAL ANOMALIES BY AMNIOCENTESIS: Primary | ICD-10-CM

## 2023-08-28 DIAGNOSIS — O28.5 ABNORMAL CHROMOSOMAL AND GENETIC FINDING ON ANTENATAL SCREENING OF MOTHER: Primary | ICD-10-CM

## 2023-08-28 DIAGNOSIS — O28.5 ABNORMAL CHROMOSOMAL AND GENETIC FINDING ON ANTENATAL SCREENING OF MOTHER: ICD-10-CM

## 2023-08-28 PROCEDURE — 76805 OB US >/= 14 WKS SNGL FETUS: CPT

## 2023-08-28 PROCEDURE — 99207 PR NO CHARGE LOS: CPT | Performed by: STUDENT IN AN ORGANIZED HEALTH CARE EDUCATION/TRAINING PROGRAM

## 2023-08-28 PROCEDURE — 96040 HC GENETIC COUNSELING, EACH 30 MINUTES: CPT

## 2023-08-28 PROCEDURE — 76805 OB US >/= 14 WKS SNGL FETUS: CPT | Mod: 26 | Performed by: STUDENT IN AN ORGANIZED HEALTH CARE EDUCATION/TRAINING PROGRAM

## 2023-08-28 NOTE — PROCEDURES
As part of your visit in Maternal Fetal Medicine, you elected to have a genetic amniocentesis, an invasive diagnostic procedure. The following information was discussed.:  Amniocentesis is an invasive test that can diagnose these types of structural chromosome abnormalities with greater than 99% accuracy.  In addition, amniotic fluid alpha fetoprotein levels would be measured to screen for neural tube and abdominal wall defects.    The risk of pregnancy loss association with amniocentesis is generally estimated to be 1/500  or less.  Mild cramping is very common. It usually goes away within a few hours. You may take Acetaminophen (Tylenol ) for this if needed  Avoid strenuous activities for the rest of the day after the Amniocentesis is done. This includes heavy lifting, jogging or other exercise.  You should call your regular obstetric (OB) care provider if you have:  Heavy bleeding  Clear fluid (like water) leaking from your vagina  Severe abdominal (belly) pain  Flu-like symptoms within two weeks of the test. These include chills, muscle aches or a fever over 100.4 F (38 C) (under the tongue).  The following testing was ordered on the amniotic fluid sample:  FISH preliminary analysis - results available in 2-4 business days.  Reflex to Chromosome analysis or microarray  Chromosome analysis - results available 7-10 days after FISH  Microarray analysis - results typically available in 7-10 days after FISH. Testing may take up to 14 days.  Maternal Cell Contamination studies are performed on amnio specimens with microarray  Results are not guaranteed  Results will be communicated to you, forwarded to your primary OB provider, and available in [facility].

## 2023-08-28 NOTE — NURSING NOTE
Arlet ISAC Segura is a  at 16w1d who presents to [facility] for GC and 2/3 ultrasound. Pt denies bldg/lof/change in discharge, contractions, headache, vision changes, chest pain/SOB or edema. SBAR given to Dr. Simental, see note in Epic.

## 2023-08-28 NOTE — PROGRESS NOTES
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 Flora was accompanied by her spouse, Alex. The session was conducted in English.       Impression/Plan:   1.  Arlet had a genetic consultation today. Today she has elected to pursue genetic amniocentesis and consent was obtained. Following the 2/3 ultrasound, Flora's membranes were not fused and the amniocentesis was not performed. Dr. Sugar Simental recommended that Flora return in one week, so she scheduled for Tuesday morning, 23. The following was consented for on the prenatal sample: FISH preliminary analysis with reflex to chromosome analysis (karyotype), or chromosomal microarray. Maternal cell contamination studies were also consented for.  When the amniocentesis is performed, results are expected 2-3 business days after the procedure for the FISH analysis and 7-10 days after the FISH result for the karyotype or the microarray. All results will be available in [facility]. We will contact Flora to discuss the results, and a copy will be forwarded to the referring OB provider. Arlet provided verbal permission for detailed results to be left on her voicemail.    2.  The patient declines parental chromosome analysis today.    3.  Arlet also underwent a 2/3 ultrasound today. 2/3 ultrasound was normal today, per Dr. Sugar Simental. See [facility] note for details. Please see the ultrasound report for additional details.     Pregnancy History:   /Parity:    Age at Delivery: 38 year old  DEREK: 2024, by Last Menstrual Period  Gestational Age: 16w1d  No significant complications or exposures were reported in the current pregnancy.  Arlet sesay  pregnancy history is significant for a son born vaginally in September, 2021 with her current partner.    Medical History:   Arlet sesay reported medical history is not expected to impact pregnancy management or risks to fetal development.       Family History:   A three-generation pedigree was obtained on 2-23-21, and was updated on 8-7-23, and is scanned under the  Media  tab.     The reported family history is negative for multiple miscarriages, stillbirths, birth defects, intellectual disability, known genetic conditions, and consanguinity.       Carrier Screening:   Carrier screening was discussed on 8-7-23, and declined by Flora.       Risk Assessment:   We discussed specific features of common chromosome abnormalities, including Down syndrome, trisomy 13, trisomy 18, and sex chromosome conditions.    - At age 38 at midtrimester, the risk to have a baby with Down syndrome is 1 in 216.   - At age 38 at midtrimester, the risk to have a baby with any chromosome abnormality is 1 in 105.     The patient had maternal serum screening in the current pregnancy: Non-invasive Prenatal Testing (NIPT) that screened high-risk for Webb Syndrome, or Monosomy X. The results are normal for chromosome 13, chromosome 18, and chromosome 21. [employer] calculates a 47.6% PPV using a 99.99 sensitivity and a 99.89 specificity. Using the PPV calculator through perinatalquality.org and the incidence of this condition at the current gestational age and the patient's age at delivery (38y), the PPV is 62.     We discussed that NIPT relies on pieces of chromosomes (or cell-free DNA) found in maternal serum. These pieces are a mix of maternal and placental fragments. Since the placenta and the fetus develop from the same fertilized embryo, measuring cell-free placental DNA can give a risk assessment for the fetus. However, NIPT will never diagnose a condition in the fetus.     We reviewed the possible explanations for a positive NIPT result  include:  The fetus may have Monosomy X   This is a chromosome difference that occurs in about 1 in 2000 females. The presence of 45, X causes the symptoms of Webb syndrome. The features of Webb syndrome can include: shorter stature, absence of the ovaries and infertility, learning trouble, webbing at the neck, increased risk for thyroid trouble, increased risk for type ll diabetes, and congenital heart defects. The features or symptoms are variable; it is unlikely that a female with monosomy X would have each and every symptom. However, short stature and infertility or lack of menstrual cycles are consistent findings in females with Webb syndrome.   We discussed that monosomy X is a common chromosome abnormality in pregnancy losses. In fact, approximately 10% of spontaneous abortions that have testing have a finding of monosomy X. In addition, approximately 99% of babies with monosomy X will not survive to birth. The majority of these pregnancy losses occur in the first trimester. Those affected babies that do survive to birth likely have less severe features or may be mosaic which means some of their cells have two copies of X (46,XX) and others only have one copy of X (45,X)  [facility] is having a level II ultrasound on 9/11/23. There is limited research regarding the diagnostic value of sonographic findings in fetuses with Webb syndrome. One study of 69 reported cases of Webb syndrome suggests that 29.8% of affected fetuses have a sign detected in the first trimester including an increased NT (>3mm) and a nuchal cystic hygroma. However, most cases of cystic hygroma were diagnosed in the second trimester. Other sonographic findings in the second trimester inlcude hydrops, ventriculomegaly, renal abnormalities, short femur, choroid plexus cyst, echogenic bowel, and echogenic intracardial focus.Congenital heart defects were diagnosed in 13% of cases, most commonly aortic coarctation. Overall 68.1% of affected  fetuses had findings on sonography while 31.9% did not. While this study is limited by small sample size it does support the value of sonographic findings to increase the detection rate of Webb syndrome but not be diagnostic. [Hilary et al., 2006]   The abnormal result could be confined to the placenta only. The placenta could also be mosaic and have two cells lines- a normal 46, XX and an abnormal 45,X  The fetus could be mosaic for 45, X and 46, XX  Results may represent low level mosaicism in a phenotypically normal mother (the patient). The loss of one X to give an occasional 45,X cell is a normal characteristic of aging for a normal 46, XX female. Literature suggests that diagnostic testing for Webb Syndrome be performed in a mother if fetal Webb Syndrome is not identified and maternal Webb Syndrome is suspected (Jovanni, 2022). Flora wished to wait for fetal testing to further consider parental chromosome analysis.       Testing Options:   We discussed the following options:   Non-invasive Prenatal Testing (NIPT)  Maternal plasma cell-free DNA testing; first trimester ultrasound with nuchal translucency and nasal bone assessment is recommended, when appropriate  Screens for fetal trisomy 21, trisomy 13, trisomy 18, and sex chromosome aneuploidy  Cannot screen for open neural tube defects; maternal serum AFP after 15 weeks is recommended     Genetic Amniocentesis  Invasive procedure typically performed in the second trimester by which amniotic fluid is obtained for the purpose of chromosome analysis and/or other prenatal genetic analysis  Diagnostic results; >99% sensitivity for fetal chromosome abnormalities  AFAFP measurement tests for open neural tube defects    Comprehensive (Level II) ultrasound:   Detailed ultrasound performed between 18-22 weeks gestation to screen for major birth defects and markers for aneuploidy.    2/3 complete ultrasound:   Early anatomy scan performed around 15 weeks of  "gestation. This can help identify findings such as soft markers and may provide additional risk assessment.  2/3 ultrasound was performed today and was normal, per Dr. Sugar Simental. See imaging note for details.      Arlet has elected to pursue genetic amniocentesis.    Genetic Amniocentesis  - This is an invasive procedure typically performed at 15 weeks or later, through which amniotic fluid is obtained for the purpose of chromosome analysis and/or other prenatal genetic analysis.  - The timing and option of amniocentesis is dependent on the fusion of the chorionic and amniotic membranes. This fusion typically occurs around 15-16 weeks gestation. In the case of aneuploidy, this fusion can be delayed.  - Amniocentesis is considered a diagnostic test for chromosome problems during pregnancy.  - The risk of pregnancy loss associated with amniocentesis is generally estimated to be 1/500 or less.  - Amniotic fluid AFP measurement can be done to screen for the possibility of open neural tube or ventral defects.     We reviewed the amniocentesis procedure consent form as well as the genetic testing consent form. Both can be found scanned in the patient's chart under the \"Media\" tab. The following was ordered on the prenatal sample:   FISH preliminary analysis with results available in 2-3 business days.   If FISH results are abnormal: reflex to chromasome analysis with results likely taking 7-10 days after the FISH results.  If FISH results are normal: reflex to microarray analysis with results likely taking 7-10 days after the FISH results.     We discussed that FISH results are considered preliminary with chromosome analysis or a microarray result determined final. There is a less than 1% chance for FISH results to be discordant from the final results. We often encourage patients to make pregnancy decisions based on those final results but understand that some patients may feel comfortable making those after FISH " given the context.     Fluorescence In Situ Hybridization (FISH) analysis is a preliminary targeted chromosome analysis that determines how many copies of chromosome 13, 18, 21, X, and Y are present in the prenatal sample. FISH cannot detect all chromosomal differences and cannot exclude mosaicism.     Chromosome analysis (karyotype/g-bands) assess each chromosome to provide information regarding number, structure, and location of all chromosomes. This can rule in or rule out full chromosome conditions, such as Down syndrome.     Microarray testing involves looking for small extra (duplications) or missing (deletions) pieces of DNA within the chromosomes.  Chromosomal deletions and duplications may cause problems with an individual's health and development including learning disabilities, developmental delays, growth issues, physical differences, and psychiatric challenges. The specific symptoms would depend on the size and gene content of the specific chromosomal region involved.  Microarray testing can also identify whether there are areas within a pair of chromosomes that are too similar to each other, which could indicate that the individual's parents may be related to each other such as cousins, or could represent a phenomenon known as uniparental disomy (UPD) which is where an individual inherits more of a specific chromosome region from one parent than the other parent.  Depending on the chromosome(s) involved, this  genetic similarity  can sometimes lead to growth, developmental and/or physical problems for the individual.     We reviewed the benefits, limitations, and possible results from a microarray analysis which can include:    Negative: No clinically significant deletions or duplications were identified. This may not rule out a genetic cause for the fetal features.    Positive: A deletion, duplication, or genetic similarity was identified that may be associated with a particular set of symptoms or  known syndrome.     Variant of uncertain significance (VUS): A deletion or duplication was identified, but it is not known if it explains the clinical features or it is is normal variation.    If the microarray returns a VUS, parental testing may be recommended to help clarify pathogenicity.       These results will be available in [facility].  We will contact her to discuss the results, and a copy will be forwarded to the office of the referring OB provider.    We reviewed the benefits and limitations of this testing.  Screening tests provide a risk assessment specific to the pregnancy for certain fetal chromosome abnormalities, but cannot definitively diagnose or exclude a fetal chromosome abnormality.  Follow-up genetic counseling and consideration of diagnostic testing is recommended with any abnormal screening result.     Diagnostic tests carry inherent risks- including risk of miscarriage- that require careful consideration.  These tests can detect fetal chromosome abnormalities with greater than 99% certainty.  Results can be compromised by maternal cell contamination or mosaicism, and are limited by the resolution of cytogenetic G-banding technology.  There is no screening nor diagnostic test that can detect all forms of birth defects or mental disability.     It was a pleasure to be involved with Arlet s care. Face-to-face time of the meeting was 30 minutes.    A genetic counseling appointment was scheduled for Flora prior to her amniocentesis on Tuesday, 9/5/23. However, Flora will not need the full appointment. The appointment will just be to sign a new consent form for the procedure.    Of note, previous phone disclosure documentation states that Flora's mother had an amniocentesis with her pregnancy with Flora's sister. Flora clarified that the amniocentesis was during her mother's pregnancy with herself. Flora did not have the results. If she gets the results, she is welcome to share them with me.    Yvonne  JAQUI Bello MS, [facility]  Licensed Genetic Counselor  [facility]  Pager: [phone]  Office: [phone]

## 2023-08-28 NOTE — PROGRESS NOTES
Please see the full imaging report from the View[employer] program under the imaging tab.    Sugar Simental MD  Maternal Fetal Medicine

## 2023-08-29 PROBLEM — Q96.9 MONOSOMY X: Status: ACTIVE | Noted: 2023-08-29

## 2023-09-01 ENCOUNTER — TELEPHONE (OUTPATIENT)
Dept: MATERNAL FETAL MEDICINE | Facility: CLINIC | Age: 38
End: 2023-09-01
Payer: COMMERCIAL

## 2023-09-01 NOTE — TELEPHONE ENCOUNTER
September 1, 2023    I called Flora to discuss her plan for amniocentesis on 9/5/23. Flora said that she is doing well. She said she is looking forward to moving forward with the amniocentesis on Tuesday. I told her that she is scheduled for genetic counseling before the amniocentesis. However, she does not need a full genetic counseling appointment. This spot was held so she can sign a new procedure consent form with the date and correct [facility] physician for that clinic day. Since she had a full genetic counseling session on 8/28/23, she will only need 5-10 minutes to sign the consents. For this reason, I told her she could come to the appointment at 8:15 or 8:30. I explained that the differences between a limited ultrasound and a 2/3 complete ultrasound. Flora verbalized understanding of the plan. I explained that one of my colleagues Luis Miguel Jarquin MS, , or Kellie Young MS, [facility], would be available to sign consents with her. I discussed that I would still call her results once they are back.    Flora verbalized understanding and had no further questions.    I asked the [facility] scheduling team to place a note on her team sheet that patient would arrive at 8:15 or 8:30.    Yvonne Bello MS, [facility]  Licensed Genetic Counselor  [facility]  Pager: [phone]  Office: [phone]

## 2023-09-05 ENCOUNTER — OFFICE VISIT (OUTPATIENT)
Dept: MATERNAL FETAL MEDICINE | Facility: HOSPITAL | Age: 38
End: 2023-09-05
Attending: OBSTETRICS & GYNECOLOGY
Payer: COMMERCIAL

## 2023-09-05 ENCOUNTER — TRANSFERRED RECORDS (OUTPATIENT)
Dept: HEALTH INFORMATION MANAGEMENT | Facility: CLINIC | Age: 38
End: 2023-09-05

## 2023-09-05 ENCOUNTER — ANCILLARY PROCEDURE (OUTPATIENT)
Dept: ULTRASOUND IMAGING | Facility: HOSPITAL | Age: 38
End: 2023-09-05
Attending: OBSTETRICS & GYNECOLOGY
Payer: COMMERCIAL

## 2023-09-05 DIAGNOSIS — O09.522 MULTIGRAVIDA OF ADVANCED MATERNAL AGE IN SECOND TRIMESTER: ICD-10-CM

## 2023-09-05 DIAGNOSIS — O28.5 ABNORMAL CHROMOSOMAL AND GENETIC FINDING ON ANTENATAL SCREENING OF MOTHER: Primary | ICD-10-CM

## 2023-09-05 DIAGNOSIS — O28.5 ABNORMAL CHROMOSOMAL AND GENETIC FINDING ON ANTENATAL SCREENING OF MOTHER: ICD-10-CM

## 2023-09-05 DIAGNOSIS — O28.9 ABNORMAL FINDINGS ON PRENATAL SCREENING: Primary | ICD-10-CM

## 2023-09-05 PROCEDURE — 88275 CYTOGENETICS 100-300: CPT | Performed by: OBSTETRICS & GYNECOLOGY

## 2023-09-05 PROCEDURE — 59000 AMNIOCENTESIS DIAGNOSTIC: CPT | Performed by: OBSTETRICS & GYNECOLOGY

## 2023-09-05 PROCEDURE — 999N000069 HC STATISTIC GENETIC COUNSELING, < 16 MIN

## 2023-09-05 PROCEDURE — 88271 CYTOGENETICS DNA PROBE: CPT | Performed by: OBSTETRICS & GYNECOLOGY

## 2023-09-05 PROCEDURE — 84999 UNLISTED CHEMISTRY PROCEDURE: CPT | Performed by: OBSTETRICS & GYNECOLOGY

## 2023-09-05 PROCEDURE — 76946 ECHO GUIDE FOR AMNIOCENTESIS: CPT | Mod: 26 | Performed by: OBSTETRICS & GYNECOLOGY

## 2023-09-05 PROCEDURE — 76946 ECHO GUIDE FOR AMNIOCENTESIS: CPT

## 2023-09-05 PROCEDURE — 76815 OB US LIMITED FETUS(S): CPT | Mod: 26 | Performed by: OBSTETRICS & GYNECOLOGY

## 2023-09-05 PROCEDURE — 36415 COLL VENOUS BLD VENIPUNCTURE: CPT | Performed by: OBSTETRICS & GYNECOLOGY

## 2023-09-05 PROCEDURE — 99207 PR NO CHARGE LOS: CPT | Performed by: OBSTETRICS & GYNECOLOGY

## 2023-09-05 PROCEDURE — 76815 OB US LIMITED FETUS(S): CPT

## 2023-09-05 NOTE — PROGRESS NOTES
Please see the imaging tab for details of the ultrasound performed today.    Estefani Avitia MD  Specialist in Maternal-Fetal Medicine

## 2023-09-05 NOTE — NURSING NOTE
Pt here for amniocentesis d/t NIPT +Monosomy X. Saw [facility], see their dictation.  After consent signed and TimeOut completed, Dr. Avitia withdrew adequate fluid x1 transabdominal pass.    Patient reports minimal pain.  Pt is RH positive.  MD reviewed blood type and screen and Rhogam not indicated. Discharge teaching completed and questions answered.  Pt discharged ambulatory and stable.   Lab alerted by calling phone number on amnio work-aid for [facility].  [employer] notified of specimen.  Specimen transported to main lab, warm hand-off completed.

## 2023-09-05 NOTE — PROGRESS NOTES
[facility]  Genetic Counseling Consult    Patient:  Arlet Segura YOB: 1985   Date of Service:  23   MRN: [id number]    Flora was seen at the [facility] for genetic consultation. The indication for genetic counseling is consent and coordination for amniocentesis. The patient was unaccompanied to this visit.     The session was conducted in English.        Flora has had genetic counseling previously in this pregnancy; please see documentation from Yvonne Bello [facility] on 2023 and 2023 for details. I met with Flora very briefly today to resign consent paperwork for amniocentesis.     IMPRESSION/ PLAN   1. Arlet had genetic screening earlier in this pregnancy. Their non-invasive prenatal test was screen positive or high risk for Webb syndrome and low risk for the other screened conditions. Today she has elected to pursue genetic amniocentesis and consent was obtained. The following was ordered on the prenatal sample: FISH preliminary analysis with reflex to chromosome analysis (karyotype), or chromosomal microarray. Maternal cell contamination studies were also consented for. Results are expected 2-3 business days after the procedure for the FISH analysis and 7-10 days after the FISH result for the karyotype or the microarray. All results will be available in [facility]. We will contact Flora to discuss the results, and a copy will be forwarded to the referring OB provider. Flora previously provided verbal permission for detailed results to be left on her voicemail.     2.  The patient declines parental chromosome analysis today.     PREGNANCY HISTORY   /Parity:       Tanmays pregnancy history is significant for:   Arlet sesay pregnancy history is significant for a son born vaginally in  with her current partner.    CURRENT PREGNANCY   Current Age: 38 year old   Age at  Delivery: 38 year old  DEREK: 2/11/2024, by Last Menstrual Period                                   Gestational Age: 17w2d    MEDICAL HISTORY   Arlet sesay reported medical history is not expected to impact pregnancy management or risks to fetal development.       FAMILY HISTORY   A three-generation pedigree was obtained on 2-23-21, and was updated on 8-7-23, and is scanned under the  Media  tab.      The reported family history is negative for multiple miscarriages, stillbirths, birth defects, intellectual disability, known genetic conditions, and consanguinity.       RISK ASSESSMENT   The patient had maternal serum screening in the current pregnancy: Non-invasive Prenatal Testing (NIPT) that screened high-risk for Webb Syndrome, or Monosomy X. The results are normal for chromosome 13, chromosome 18, and chromosome 21. This result was discussed in detail with Flora by Yvonne Bello MS, [facility] on 8/28/2023; please see her documentation for details.     GENETIC TESTING OPTIONS   Genetic testing during a pregnancy includes screening and diagnostic procedures.      Screening tests are non-invasive which means no risk to the pregnancy and includes ultrasounds and blood work. The benefits and limitations of screening were reviewed. Screening tests provide a risk assessment (chance) specific to the pregnancy for certain fetal chromosome abnormalities but cannot definitively diagnose or exclude a fetal chromosome abnormality. Follow-up genetic counseling and consideration of diagnostic testing is recommended with any abnormal screening result. Diagnostic testing during a pregnancy is more certain and can test for more conditions. However, the tests do have a risk of miscarriage that requires careful consideration. These tests can detect fetal chromosome abnormalities with greater than 99% certainty. Results can be compromised by maternal cell contamination or mosaicism and are limited by the resolution of current genetic  "testing technology.     There is no screening or diagnostic test that detects all forms of birth defects or intellectual disability.     The benefits, risks, utility of and limitations of non-invasive prenatal screening (NIPT) and amniocentesis were reviewed in detail with the patient today. Flora elected to proceed with amniocentesis today. The following testing was reviewed with the patient today:      Genetic Amniocentesis  - This is an invasive prenatal procedure, typically performed at 15 weeks or later, through which amniotic fluid is obtained for the purpose of chromosome analysis and/or other prenatal genetic analysis or infection studies.  - Amniocentesis is considered a diagnostic test for chromosome problems during pregnancy.  - The risk of pregnancy loss associated with amniocentesis is generally estimated to be 1/500 (0.2%) or less.  - Amniotic fluid AFP (AF-AFP) measurement can also done to screen for the possibility of open neural tube or ventral defects.    We reviewed the amniocentesis procedure consent form. Flora also previously signed the genetic testing consent form, which is still valid. Both can be found scanned in the patient's chart under the \"Media\" tab. Benefits and limitations of genetic testing options were reviewed with the patient in detail by Yvonne Bello MS, [facility]; please see her documentation for details. Today we briefly reviewed the testing plan again. The following testing was ordered on the prenatal sample:   FISH preliminary analysis with results available in 2-3 business days.   If FISH results are abnormal: reflex to chromosome analysis with results likely taking 7-10 days after the FISH results.  If FISH results are normal: reflex to microarray analysis with results likely taking 7-10 days after the FISH results.     Fluorescence In Situ Hybridization (FISH) analysis is a preliminary targeted chromosome analysis that determines how many copies of chromosome 13, 18, 21, X, and " Y are present in the prenatal sample. FISH cannot detect all chromosomal differences and cannot exclude mosaicism.     Chromosome analysis (karyotype/g-bands) assess each chromosome to provide information regarding number, structure, and location of all chromosomes. This can rule in or rule out full chromosome conditions, such as Down syndrome.     Microarray testing involves looking for small extra (duplications) or missing (deletions) pieces of DNA within the chromosomes.  Chromosomal deletions and duplications may cause problems with an individual's health and development including learning disabilities, developmental delays, growth issues, physical differences, and psychiatric challenges. The specific symptoms would depend on the size and gene content of the specific chromosomal region involved.  Microarray testing can also identify whether there are areas within a pair of chromosomes that are too similar to each other, which could indicate that the individual's parents may be related to each other such as cousins, or could represent a phenomenon known as uniparental disomy (UPD) which is where an individual inherits more of a specific chromosome region from one parent than the other parent.  Depending on the chromosome(s) involved, this  genetic similarity  can sometimes lead to growth, developmental and/or physical problems for the individual.     We reviewed the benefits, limitations, and possible results from a microarray analysis which can include:    Negative: No clinically significant deletions or duplications were identified. This may not rule out a genetic cause for the fetal features.    Positive: A deletion, duplication, or genetic similarity was identified that may be associated with a particular set of symptoms or known syndrome.     Variant of uncertain significance (VUS): A deletion or duplication was identified, but it is not known if it explains the clinical features or it is is normal  variation.     If the microarray returns a VUS, parental testing may be recommended to help clarify pathogenicity.    FISH results are considered preliminary with chromosome analysis or a microarray result (with resulted group home studies) determined final. There is a less than 1% chance for FISH results to be discordant from the final results. We encourage patients to make pregnancy decisions based on those final results but understand that some patients may feel comfortable making those after FISH given the context.       It was a pleasure to be involved with Arlet [facility]. Face-to-face time of the meeting was  10  minutes.    Kellie oYung, GC, MS, C  Certified and [place] Licensed Genetic Counselor  [facility]  Maternal Fetal Medicine  Office: [phone]  [facility]: [phone]   Fax: [phone]  [facility]

## 2023-09-06 LAB — MATERNAL CELL CONTAM, MATERNAL SPECIMEN: NORMAL

## 2023-09-07 ENCOUNTER — TELEPHONE (OUTPATIENT)
Dept: MATERNAL FETAL MEDICINE | Facility: CLINIC | Age: 38
End: 2023-09-07
Payer: COMMERCIAL

## 2023-09-07 NOTE — TELEPHONE ENCOUNTER
September 7, 2023     I called Flora to discuss the fluorescence in situ hybridization (FISH) results for chromosomes 13, 18, 21, X and Y from her amniocentesis performed on 9-5-23.     Results are consistent with a normal female. There were two signals for chromosomes 21, 18, 13, and the sex chromosomes.  These results, although reassuring, are not the final results. The final chromosome result should be completed in approximately 7-10 days.    We discussed that FISH results are considered preliminary with chromosome analysis or a microarray result determined final. There is a less than 1% chance for FISH results to be discordant from the final results.     We discussed that FISH does not rule out maternal cell contamination, mosaicism with <20% of cells showing 45, X, or X-chromosome deletions.    Additionally, we discussed that if the microarray is normal, we will revisit the conversation of maternal chromosome analysis.    [facility] will reflex to a microarray.    Yvonne Bello MS, [facility]  Licensed Genetic Counselor  [facility]  Pager: [phone]  Office: [phone]

## 2023-09-15 ENCOUNTER — TELEPHONE (OUTPATIENT)
Dept: MATERNAL FETAL MEDICINE | Facility: CLINIC | Age: 38
End: 2023-09-15
Payer: COMMERCIAL

## 2023-09-15 ENCOUNTER — OFFICE VISIT (OUTPATIENT)
Dept: MATERNAL FETAL MEDICINE | Facility: CLINIC | Age: 38
End: 2023-09-15
Attending: REGISTERED NURSE
Payer: COMMERCIAL

## 2023-09-15 ENCOUNTER — HOSPITAL ENCOUNTER (OUTPATIENT)
Dept: ULTRASOUND IMAGING | Facility: CLINIC | Age: 38
Discharge: HOME | End: 2023-09-15
Attending: REGISTERED NURSE
Payer: COMMERCIAL

## 2023-09-15 DIAGNOSIS — O09.522 ADVANCED MATERNAL AGE IN MULTIGRAVIDA, SECOND TRIMESTER: Primary | ICD-10-CM

## 2023-09-15 DIAGNOSIS — O26.90 PREGNANCY RELATED CONDITION, ANTEPARTUM: ICD-10-CM

## 2023-09-15 LAB
Lab: NORMAL
MISCELLANEOUS TEST 1 (ARUP): NORMAL
PERFORMING LABORATORY: NORMAL
SPECIMEN STATUS: NORMAL
TEST NAME: NORMAL

## 2023-09-15 PROCEDURE — 99213 OFFICE O/P EST LOW 20 MIN: CPT | Mod: 25 | Performed by: OBSTETRICS & GYNECOLOGY

## 2023-09-15 PROCEDURE — 76811 OB US DETAILED SNGL FETUS: CPT | Mod: 26 | Performed by: OBSTETRICS & GYNECOLOGY

## 2023-09-15 PROCEDURE — 76811 OB US DETAILED SNGL FETUS: CPT

## 2023-09-15 NOTE — Clinical Note
Flora Segura microarray results disclosure note from September 15, 2023.  Kellie Young [facility], [facility] Licensed Genetic Counselor [facility] Maternal Fetal Medicine Phone: [phone] [email or web address]

## 2023-09-15 NOTE — TELEPHONE ENCOUNTER
September 15, 2023     I called Flora to review microarray result from recent amniocentesis procedure. She was previously informed of the normal female FISH result on 9/7/2023 by Yvonne Bello MS, [facility]. 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 No clinically significant copy number changes or regions of homozygosity were identified. Maternal cell contamination studies were performed and a single fetal genotype was present with no evidence of maternal contamination.     We reviewed the option of a maternal chromosome analysis to rule out mosaic Webb syndrome in Flora as an explanation for the positive NIPT result. Reviewed that a diagnosis of mosaic Webb syndrome would likely have management implications for Flora including recommendation for an echocardiogram. Flora declined maternal chromosome analysis at this time. She shared that she may wish to pursue this in the future but is relieved about the normal fetal results and would like to hold off on further testing right now. Of note, Flora did have a normal NIPT result in her first pregnancy, which makes maternal mosaic Webb syndrome less likely.     I encouraged Flora to contact me or Yvonne with any questions or concerns or if she would like to coordinate maternal chromosome analysis at any point.     Kellie Young, [facility], [facility]  Licensed Genetic Counselor  [facility]  Maternal Fetal Medicine  Phone: [phone]  [email or web address]

## 2023-09-19 ENCOUNTER — MYC REFILL (OUTPATIENT)
Dept: PSYCHIATRY | Facility: CLINIC | Age: 38
End: 2023-09-19
Payer: COMMERCIAL

## 2023-09-19 DIAGNOSIS — F90.0 ADHD (ATTENTION DEFICIT HYPERACTIVITY DISORDER), INATTENTIVE TYPE: ICD-10-CM

## 2023-09-19 RX ORDER — DEXTROAMPHETAMINE SACCHARATE, AMPHETAMINE ASPARTATE, DEXTROAMPHETAMINE SULFATE AND AMPHETAMINE SULFATE 2.5; 2.5; 2.5; 2.5 MG/1; MG/1; MG/1; MG/1
TABLET ORAL
Qty: 60 TABLET | Refills: 0 | Status: CANCELLED | OUTPATIENT
Start: 2023-09-19

## 2023-09-27 ENCOUNTER — MYC MEDICAL ADVICE (OUTPATIENT)
Dept: OBGYN | Facility: CLINIC | Age: 38
End: 2023-09-27
Payer: COMMERCIAL

## 2023-10-10 ENCOUNTER — VIRTUAL VISIT (OUTPATIENT)
Dept: PSYCHIATRY | Facility: CLINIC | Age: 38
End: 2023-10-10
Attending: PSYCHIATRY & NEUROLOGY
Payer: COMMERCIAL

## 2023-10-10 DIAGNOSIS — F90.0 ADHD (ATTENTION DEFICIT HYPERACTIVITY DISORDER), INATTENTIVE TYPE: ICD-10-CM

## 2023-10-10 PROCEDURE — 99214 OFFICE O/P EST MOD 30 MIN: CPT | Mod: VID | Performed by: PSYCHIATRY & NEUROLOGY

## 2023-10-10 RX ORDER — DEXTROAMPHETAMINE SACCHARATE, AMPHETAMINE ASPARTATE, DEXTROAMPHETAMINE SULFATE AND AMPHETAMINE SULFATE 2.5; 2.5; 2.5; 2.5 MG/1; MG/1; MG/1; MG/1
TABLET ORAL
Qty: 60 TABLET | Refills: 0 | Status: SHIPPED | OUTPATIENT
Start: 2024-01-21 | End: 2023-10-11

## 2023-10-10 RX ORDER — DEXTROAMPHETAMINE SACCHARATE, AMPHETAMINE ASPARTATE, DEXTROAMPHETAMINE SULFATE AND AMPHETAMINE SULFATE 2.5; 2.5; 2.5; 2.5 MG/1; MG/1; MG/1; MG/1
10 TABLET ORAL 2 TIMES DAILY
Qty: 60 TABLET | Refills: 0 | Status: SHIPPED | OUTPATIENT
Start: 2023-10-21 | End: 2023-11-14

## 2023-10-10 RX ORDER — DEXTROAMPHETAMINE SACCHARATE, AMPHETAMINE ASPARTATE, DEXTROAMPHETAMINE SULFATE AND AMPHETAMINE SULFATE 2.5; 2.5; 2.5; 2.5 MG/1; MG/1; MG/1; MG/1
TABLET ORAL
Qty: 60 TABLET | Refills: 0 | Status: SHIPPED | OUTPATIENT
Start: 2023-12-21 | End: 2023-10-11

## 2023-10-10 RX ORDER — DEXTROAMPHETAMINE SACCHARATE, AMPHETAMINE ASPARTATE, DEXTROAMPHETAMINE SULFATE AND AMPHETAMINE SULFATE 2.5; 2.5; 2.5; 2.5 MG/1; MG/1; MG/1; MG/1
TABLET ORAL
Qty: 60 TABLET | Refills: 0 | Status: SHIPPED | OUTPATIENT
Start: 2023-11-21 | End: 2023-10-11

## 2023-10-10 NOTE — NURSING NOTE
Is the patient currently in the state of [place]? YES    Visit mode:VIDEO    If the visit is dropped, the patient can be reconnected by: VIDEO VISIT: Send to e-mail at: [email or web address]    Will anyone else be joining the visit? NO  (If patient encounters technical issues they should call [phone]    How would you like to obtain your AVS? MyChart    Are changes needed to the allergy or medication list? No    Reason for visit: No chief complaint on file.    Krystina GROVEF

## 2023-10-10 NOTE — PATIENT INSTRUCTIONS
"Weeks 14 to 18 of Your Pregnancy: Care Instructions  Around this time, you may start to look pregnant. Your baby is now able to pass urine. And the first stool (meconium) is starting to collect in your baby's intestines. Hair is starting to grow on your baby's head.    You may notice some skin changes, such as itchy spots on your palms or acne on your face.   At your next doctor visit, you may have an ultrasound. So you might think about whether you want to know the sex of your baby. Also ask your doctor about flu and COVID-19 shots.      How to reduce stress   Ask for help when you need it.  Try to avoid things that cause you stress.  Seek out things that relieve stress, such as breathing exercises or yoga.     How to get exercise   If you don't usually exercise, start slowly. Short walks may be a good choice.  Try to be active 30 minutes a day, at least 5 days a week.  Avoid activities where you're more likely to fall.  Use light weights to reduce stress on your joints.     How to stay at a healthy weight for you   Talk to your doctor or midwife about how much weight you should gain.  It's generally best to gain:  About 28 to 40 pounds if you're underweight.  About 25 to 35 pounds if you're at a healthy weight.  About 15 to 25 pounds if you're overweight.  About 11 to 20 pounds if you're very overweight (obese).  Follow-up care is a key part of your treatment and safety. Be sure to make and go to all appointments, and call your doctor if you are having problems. It's also a good idea to know your test results and keep a list of the medicines you take.  Where can you learn more?  Go to [email or web address]/[email or web address]  Enter I453 in the search box to learn more about \"Weeks 14 to 18 of Your Pregnancy: Care Instructions.\"  Current as of: November 9, 2022               Content Version: 13.7    8037-1620 No.[employer].   Care instructions adapted under license by your healthcare professional. If you " have questions about a medical condition or this instruction, always ask your healthcare professional. Healthwise, [facility] disclaims any warranty or liability for your use of this information.      Second-Trimester Fetal Ultrasound: About This Test  What is it?     Fetal ultrasound is a test that uses sound waves to make pictures of your baby (fetus) and placenta inside the uterus. The test is the safest way to find out the age, size, and position of your baby. You also may be able to find out the sex of your baby. (But the test isn't done just to find out a baby's sex.)  No known risks to the mother or the baby are linked to fetal ultrasound. But you may feel anxious if the test reveals a problem with your pregnancy or baby.  Why is this test done?  In the second trimester, a fetal ultrasound is done to:  Estimate the number of weeks and days a fetus has developed since the beginning of the pregnancy. This is called the gestational age.  Look at the size and position of the fetus, the placenta, and the fluid that surrounds the fetus.  Find major birth defects, such as heart problems or problems with the brain and spinal cord (neural tube defects). But the test may not be able to find many minor defects and some major birth defects.  How do you prepare for the test?  In general, there's nothing you have to do before this test, unless your doctor tells you to.  How is the test done?  You may be able to leave your clothes on, or you will be given a gown to wear.  You will lie on your back on a padded examination table.  A gel will be spread on your belly. It will be removed after the test.  A small, handheld device called a transducer will be pressed against the gel on your skin and moved across your belly several times.  You may watch the monitor to see the picture of your baby during the test.  What happens after the test?  You will probably be able to go home right away.  You most likely will be able to go  "back to your usual activities right away.  Follow-up care is a key part of your treatment and safety. Be sure to make and go to all appointments, and call your doctor if you are having problems. It's also a good idea to keep a list of the medicines you take. Ask your doctor when you can expect to have your test results.  Where can you learn more?  Go to https://www.[employer]/patiented  Enter Y671 in the search box to learn more about \"Second-Trimester Fetal Ultrasound: About This Test.\"  Current as of: November 9, 2022               Content Version: 13.7    4900-1154 [employer].   Care instructions adapted under license by your healthcare professional. If you have questions about a medical condition or this instruction, always ask your healthcare professional. [employer] disclaims any warranty or liability for your use of this information.      During Pregnancy: Exercises  Introduction  Here are some examples of exercises to do during your pregnancy. Start each exercise slowly. Ease off the exercise if you start to have pain.  Your doctor or physical therapist will tell you when you can start these exercises and which ones will work best for you.  How to do the exercises  Neck rotation    Sit in a firm chair, or stand tall. If you're standing, keep your feet about hip-width apart.  Keeping your chin level, turn your head to the right and hold for 15 to 30 seconds.  Turn your head to the left and hold for 15 to 30 seconds.  Repeat 2 to 4 times.  Next stretch to the front    Sit in a firm chair, or stand tall. Look straight ahead. If you're standing, keep your feet about hip-width apart.  Slowly bend your head forward without moving your shoulders.  Hold for 15 to 30 seconds, then return to your starting position.  Repeat 2 to 4 times.  Back press    Place your feet 10 to 12 inches from the wall.  Rest your back flat against the wall and slide down the wall until your knees are " slightly bent.  Press your lower back against the wall by pulling in your stomach muscles.  Hold for 6 seconds, and then relax your stomach muscles and slide back up the wall.  Repeat 8 to 12 times.  Trunk twist (seated)    Sit on the floor with your legs crossed. If that's not comfortable, you can sit on a folded blanket so your bottom is a few inches off the floor. Or you can sit on a chair with your knees comfortably spread apart and your feet flat on the floor.  Reach your left hand toward your right knee. You can place your right hand at your side for support.  Slowly twist your trunk (upper body) to your right.  Relax and return to your starting position.  Repeat 2 to 4 times.  Switch your hands and twist to your left.  Repeat 2 to 4 times.  Pelvic rocking    Kneeling on hands and knees, place your hands directly under your shoulders and your knees under your hips.  Breathe in deeply. Tuck your head downward and round your back up, making a curve with your back in the shape of the letter C. Hold this position for a count of 6.  Breathe out slowly and bring your head back up. Relax, keeping your back straight (don't allow it to curve toward the floor). Hold this for a count of 6.  Do this exercise 8 times or to your comfort level.  Pelvic tilt    This exercise strengthens your lower back and pelvis. It is for use during the first 4 months of pregnancy. After this point, lying on your back is not recommended, because it can cause blood flow problems for you and your baby.  Lie on your back.  Keep your knees relaxed.  Tighten your belly and buttocks muscles.  At the same time, gently shift your pelvis upward. This should flatten the curve in your back.  Hold for 6 seconds and then relax.  Gradually increase the number of tilts you do each day, to your comfort level.  Backward stretch    Kneel on hands and knees with your knees 8 to 10 inches apart, hands directly under your shoulders, and arms and back  straight.  Keeping your arms straight, slowly lower your buttocks toward your heels and tuck your head toward your knees. Hold for 15 to 30 seconds.  Slowly return to the kneeling position.  Repeat 2 to 4 times.  Forward bend    Sit comfortably in a chair, with your arms relaxed.  Slowly bend forward, allowing your arms to hang down in front of you. Lean only as far as you can without feeling discomfort or pressure on your belly.  Hold for 15 to 30 seconds and then slowly sit up straight.  Repeat 2 to 4 times or to your comfort level.  Leg lift crawl    Kneeling on hands and knees, place your hands directly under your shoulders and straighten your arms.  Tighten your belly muscles by pulling in your belly button toward your spine. Be sure you continue to breathe normally and do not hold your breath.  Lift your left knee and bring it toward your elbow.  Slowly extend your leg behind you without completely straightening it. Be careful not to let your hip drop down. Avoid arching your back.  Hold your leg behind you for about 6 seconds.  Return to your starting position.  Do the same exercise with your other leg.  Repeat 8 to 12 times for each leg.  Tailor sitting    Sit on the floor.  Bring your feet close to your body while crossing your ankles.  Hold this position for as long as you are comfortable.  Tailor stretching    Sit on the floor with your back straight, legs about 12 inches apart, and feet relaxed outward.  Stretch your hands forward toward your left foot, then sit up.  Stretch your hands straight forward, then sit up.  Stretch your hands forward toward your right foot, then sit up.  Hold each stretch for 15 to 30 seconds.  Repeat 2 to 4 times.  Follow-up care is a key part of your treatment and safety. Be sure to make and go to all appointments, and call your doctor if you are having problems. It's also a good idea to know your test results and keep a list of the medicines you take.  Current as of:  "November 9, 2022               Content Version: 13.7    3213-3668 [employer].   Care instructions adapted under license by your healthcare professional. If you have questions about a medical condition or this instruction, always ask your healthcare professional. [employer] disclaims any warranty or liability for your use of this information.      Weeks 18 to 22 of Your Pregnancy: Care Instructions  At this stage you may find that your nausea and fatigue are gone. You may feel better overall and have more energy. But you might now also have some new discomforts, like sleep problems or leg cramps.    You may start to feel your baby move. These movements can feel like butterflies or bubbles.   Babies at this stage can now suck their thumbs.     Get some exercise every day.  And avoid caffeine late in the day.     Take a warm shower or bath before bed.  Try relaxation exercises to calm your mind and body.     Use extra pillows.  They can help you get comfortable.     Don't use sleeping pills or alcohol.  They could harm your baby.     For leg cramps, stretch and apply heat.  A warm bath, leg warmers, a heating pad, or a hot water bottle can help with muscle aches.   Stretches for leg cramps    Straighten your leg and bend your foot (flex your ankle) slowly upward, toward your knee. Bend your toes up and down.   Stand on a flat surface. Stretch your toes upward. For balance, hold on to the wall or something stable. If it feels okay, take small steps walking on your heels.   Follow-up care is a key part of your treatment and safety. Be sure to make and go to all appointments, and call your doctor if you are having problems. It's also a good idea to know your test results and keep a list of the medicines you take.  Where can you learn more?  Go to https://www.[employer].net/patiented  Enter W603 in the search box to learn more about \"Weeks 18 to 22 of Your Pregnancy: Care Instructions.\"  Current " as of: November 9, 2022               Content Version: 13.7    7892-4722 [employer].   Care instructions adapted under license by your healthcare professional. If you have questions about a medical condition or this instruction, always ask your healthcare professional. [employer] disclaims any warranty or liability for your use of this information.      Round Ligament Pain: Care Instructions  Your Care Instructions     Round ligament pain is a common pain during pregnancy. You may feel a sharp brief pain on one or both sides of your belly. It may go down into your groin. It's usually felt for the first time during the second trimester.  This pain is a normal part of pregnancy. It will go away as your pregnancy continues or after your baby is born.  Your uterus is supported by two ligaments that go from the top and sides of the uterus to the bones of the pelvis. These are the round ligaments. As your uterus grows, these ligaments stretch and tighten with your movements. This may be the cause of the pain. You may find that certain activities seem to cause pain. If you can, avoid those activities.  Your doctor can usually diagnose round ligament pain from your symptoms and an exam. If you have bleeding or other symptoms, your doctor may also do an imaging test, such as an ultrasound. Your doctor may suggest that you take an over-the-counter pain medicine, such as acetaminophen.  Follow-up care is a key part of your treatment and safety. Be sure to make and go to all appointments, and call your doctor if you are having problems. It's also a good idea to know your test results and keep a list of the medicines you take.  How can you care for yourself at home?  If certain movements seem to trigger belly pain, see if you can avoid them while you are pregnant.  Stay active. If your doctor says it's okay, try moderate exercise. Water exercise may be a good choice if you have belly pain. Examples  "are swimming and water aerobics.  Ask your doctor about taking acetaminophen for pain. Be safe with medicines. Read and follow all instructions on the label.  When should you call for help?   Call your doctor now or seek immediate medical care if:    You think you might be in labor.     You have new or worse pain.   Watch closely for changes in your health, and be sure to contact your doctor if you have any problems.  Where can you learn more?  Go to https://www.[email or web address].net/patiented  Enter R110 in the search box to learn more about \"Round Ligament Pain: Care Instructions.\"  Current as of: November 9, 2022               Content Version: 13.7    1760-2278 [employer].   Care instructions adapted under license by your healthcare professional. If you have questions about a medical condition or this instruction, always ask your healthcare professional. [employer] disclaims any warranty or liability for your use of this information.      Leg and Ankle Edema: Care Instructions  Your Care Instructions  Swelling in the legs, ankles, and feet is called edema. It is common after you sit or stand for a while. Long plane flights or car rides often cause swelling in the legs and feet. You may also have swelling if you have to stand for long periods of time at your job. Problems with the veins in the legs (varicose veins) and changes in hormones can also cause swelling. Sometimes the swelling in the ankles and feet is caused by a more serious problem, such as heart failure, infection, blood clots, or liver or kidney disease.  Follow-up care is a key part of your treatment and safety. Be sure to make and go to all appointments, and call your doctor if you are having problems. It's also a good idea to know your test results and keep a list of the medicines you take.  How can you care for yourself at home?  If your doctor gave you medicine, take it as prescribed. Call your doctor if you think you are " "having a problem with your medicine.  Whenever you are resting, raise your legs up. Try to keep the swollen area higher than the level of your heart.  Take breaks from standing or sitting in one position.  Walk around to increase the blood flow in your lower legs.  Move your feet and ankles often while you stand, or tighten and relax your leg muscles.  Wear support stockings. Put them on in the morning, before swelling gets worse.  Eat a balanced diet. Lose weight if you need to.  Limit the amount of salt (sodium) in your diet. Salt holds fluid in the body and may increase swelling.  When should you call for help?   Call 911 anytime you think you may need emergency care. For example, call if:    You have symptoms of a blood clot in your lung (called a pulmonary embolism). These may include:  Sudden chest pain.  Trouble breathing.  Coughing up blood.   Call your doctor now or seek immediate medical care if:    You have signs of a blood clot, such as:  Pain in your calf, back of the knee, thigh, or groin.  Redness and swelling in your leg or groin.     You have symptoms of infection, such as:  Increased pain, swelling, warmth, or redness.  Red streaks or pus.  A fever.   Watch closely for changes in your health, and be sure to contact your doctor if:    Your swelling is getting worse.     You have new or worsening pain in your legs.     You do not get better as expected.   Where can you learn more?  Go to https://www.[employer].net/patiented  Enter N696 in the search box to learn more about \"Leg and Ankle Edema: Care Instructions.\"  Current as of: November 14, 2022               Content Version: 13.7    5591-6722 [employer].   Care instructions adapted under license by your healthcare professional. If you have questions about a medical condition or this instruction, always ask your healthcare professional. [employer] disclaims any warranty or liability for your use of this " information.      Back Pain During Pregnancy: Care Instructions  Overview     Back pain has many possible causes. It is often caused by problems with muscles and ligaments in your back. The extra weight during pregnancy can put stress on your back. Moving, lifting, standing, sitting, or sleeping in an awkward way also can strain your back. Back pain can also be a sign of labor. Although it may hurt a lot, back pain often improves on its own. Use good home treatment, and take care not to stress your back.  Follow-up care is a key part of your treatment and safety. Be sure to make and go to all appointments, and call your doctor if you are having problems. It's also a good idea to know your test results and keep a list of the medicines you take.  How can you care for yourself at home?  Ask your doctor about taking acetaminophen (Tylenol) for pain. Do not take aspirin, ibuprofen (Advil, Motrin), or naproxen (Aleve).  Do not take two or more pain medicines at the same time unless the doctor told you to. Many pain medicines have acetaminophen, which is Tylenol. Too much acetaminophen (Tylenol) can be harmful.  Lie on your side with your knees and hips bent and a pillow between your legs. This reduces stress on your back.  Put ice or cold packs on your back for 10 to 20 minutes at a time, several times a day. Put a thin cloth between the ice and your skin.  Warm baths may also help reduce pain.  Change positions every 30 minutes. Take breaks if you must sit for a long time. Get up and walk around.  Ask your doctor about how much exercise you can do. You may feel better taking short walks or doing gentle movements and stretching in a swimming pool.  Ask your doctor about exercises to stretch and strengthen your back.  When should you call for help?   Call your doctor now or seek immediate medical care if:    You think you are in labor.     You have new numbness in your buttocks, genital or rectal areas, or legs.     You  "have a new loss of bowel or bladder control.   Watch closely for changes in your health, and be sure to contact your doctor if:    You do not get better as expected.   Where can you learn more?  Go to https://www.[employer].net/patiented  Enter C696 in the search box to learn more about \"Back Pain During Pregnancy: Care Instructions.\"  Current as of: 2022               Content Version: 13.7    9107-4077 [employer].   Care instructions adapted under license by your healthcare professional. If you have questions about a medical condition or this instruction, always ask your healthcare professional. [employer] disclaims any warranty or liability for your use of this information.       Labor: Care Instructions  Overview      labor is the start of labor between 20 and 36 weeks of pregnancy. Most babies are born at 37 to 42 weeks of pregnancy. In labor, the uterus contracts to open the cervix. This is the first stage of childbirth.  labor can be caused by a problem with the baby, the mother, or both. Often the cause is not known.  In some cases, doctors use medicines to try to delay labor until 34 or more weeks of pregnancy. By this time, a baby has grown enough so that problems are not likely. In some cases--such as with a serious infection--it is healthier for the baby to be born early. Your treatment will depend on how far along you are in your pregnancy and on your health and your baby's health.  Follow-up care is a key part of your treatment and safety. Be sure to make and go to all appointments, and call your doctor if you are having problems. It's also a good idea to know your test results and keep a list of the medicines you take.  How can you care for yourself at home?  If your doctor prescribed medicines, take them exactly as directed. Call your doctor if you think you are having a problem with your medicine.  Rest until your doctor advises you about " "activity.  Do not have sexual intercourse unless your doctor says it is safe.  Use sanitary pads if you have vaginal bleeding. Using pads makes it easier to monitor your bleeding.  Do not smoke or allow others to smoke around you. If you need help quitting, talk to your doctor about stop-smoking programs and medicines. These can increase your chances of quitting for good.  When should you call for help?   Call 911  anytime you think you may need emergency care. For example, call if:    You passed out (lost consciousness).     You have a seizure.     You have severe vaginal bleeding.     You have severe pain in your belly or pelvis that doesn't get better between contractions.     You have had fluid gushing or leaking from your vagina and you know or think the umbilical cord is bulging into your vagina. If this happens, immediately get down on your knees so your rear end (buttocks) is higher than your head. This will decrease the pressure on the cord until help arrives.   Call your doctor now or seek immediate medical care if:    You have signs of preeclampsia, such as:  Sudden swelling of your face, hands, or feet.  New vision problems (such as dimness, blurring, or seeing spots).  A severe headache.     You have any vaginal bleeding.     You have belly pain or cramping.     You have a fever.     You have had regular contractions (with or without pain) for an hour. This means that you have 6 or more within 1 hour after you change your position and drink fluids.     You have a sudden release of fluid from the vagina.     You have low back pain or pelvic pressure that does not go away.     You notice that your baby has stopped moving or is moving much less than normal.   Watch closely for changes in your health, and be sure to contact your doctor if you have any problems.  Where can you learn more?  Go to https://www.healthwise.net/patiented  Enter Q400 in the search box to learn more about \" Labor: Care " "Instructions.\"  Current as of: November 9, 2022               Content Version: 13.7    7996-3871 [employer].   Care instructions adapted under license by your healthcare professional. If you have questions about a medical condition or this instruction, always ask your healthcare professional. [employer] disclaims any warranty or liability for your use of this information.      "

## 2023-10-10 NOTE — PROGRESS NOTES
Virtual Visit Details    Type of service:  Video Visit     Originating Location (pt. Location): Home    Distant Location (provider location):  On-site  Platform used for Video Visit: Junito
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
## 2023-10-10 NOTE — PATIENT INSTRUCTIONS
PLAN                                                                                                       1) Meds:   CONTINUE Adderall 10mg twice daily. Sent three additional refills.      2) Other: Begin working on therapy referral for postpartum. Here is a guide to postpartum resources and the link for a website that will allow you to filter for the type of therapy you are looking for and your insurance. I was unable to locate the exact resource I mentioned this morning.    https://Unbound/pregnancy-postpartum-guide/#mom    https://[employer].ActiveReplay/search/     3) RTC:  In three months.     4) CRISIS Numbers:  For crisis resources, please see the information at the end of this document.    Patient Education      Thank you for coming to the [facility] [facility].     Lab Testing:  If you had lab testing today and your results are reassuring or normal they will be mailed to you or sent through Cynvec within 7 days. If the lab tests need quick action we will call you with the results. The phone number we will call with results is # [phone]. If this is not the best number please call our clinic and change the number.     Medication Refills:  If you need any refills please call your pharmacy and they will contact us. Our fax number for refills is [phone].   Three business days of notice are needed for general medication refill requests.   Five business days of notice are needed for controlled substance refill requests.   If you need to change to a different pharmacy, please contact the new pharmacy directly. The new pharmacy will help you get your medications transferred.     Contact Us:  Please call [phone] during business hours (8-5:00 M-F).   If you have medication related questions after clinic hours, or on the weekend, please call [phone].     Financial Assistance [phone]   Medical Records [phone]       MENTAL HEALTH CRISIS  RESOURCES:  For a emergency help, please call 911 or go to the nearest Emergency Department.     Emergency Walk-In Options:   EmPATH Unit @ [facility] ([place]): [phone] - Specialized mental health emergency area designed to be calming  [facility] ([place]): [phone]  [facility] ([place]): [phone]  [facility]): [phone]    [facility] Crisis Information:   [place]: [phone]  [place]: [phone]  [place] ([place]) - Adult: [phone]     Child: [phone]  [place] - Adult: [phone]     Child: [phone]  [place]: [phone]  List of all [facility] resources:   https://[place].[facility]/dhs/people-we-serve/adults/health-care/mental-health/resources/crisis-contacts.jsp    National Crisis Information:   Crisis Text Line: Text  [place]  to 418563  Suicide & Crisis Lifeline: 988  National Suicide Prevention Lifeline: 4-423-755-TALK (1-336.627.5113)       For online chat options, visit https://suicidepreventionlifeline.org/chat/  Poison Control Center: 0-879-157-7237  Trans Lifeline: 1-420.646.1963 - Hotline for transgender people of all ages  The Compa Project: 3-907-864-8778 - Hotline for LGBT youth     For Non-Emergency Support:   Fast Tracker: Mental Health & Substance Use Disorder Resources -   https://www.[employer]oroecon.org/

## 2023-10-11 ENCOUNTER — PRENATAL OFFICE VISIT (OUTPATIENT)
Dept: OBGYN | Facility: CLINIC | Age: 38
End: 2023-10-11
Attending: ADVANCED PRACTICE MIDWIFE
Payer: COMMERCIAL

## 2023-10-11 VITALS
WEIGHT: 113.8 LBS | BODY MASS INDEX: 20.16 KG/M2 | DIASTOLIC BLOOD PRESSURE: 73 MMHG | HEART RATE: 67 BPM | SYSTOLIC BLOOD PRESSURE: 110 MMHG

## 2023-10-11 DIAGNOSIS — Z98.890 H/O LEEP: ICD-10-CM

## 2023-10-11 DIAGNOSIS — F90.0 ADHD (ATTENTION DEFICIT HYPERACTIVITY DISORDER), INATTENTIVE TYPE: ICD-10-CM

## 2023-10-11 DIAGNOSIS — I73.00 RAYNAUD'S DISEASE WITHOUT GANGRENE: ICD-10-CM

## 2023-10-11 DIAGNOSIS — Z34.91 NORMAL PREGNANCY IN FIRST TRIMESTER: Primary | ICD-10-CM

## 2023-10-11 DIAGNOSIS — O28.5 ABNORMAL GENETIC TEST DURING PREGNANCY: ICD-10-CM

## 2023-10-11 DIAGNOSIS — O09.521 MULTIGRAVIDA OF ADVANCED MATERNAL AGE IN FIRST TRIMESTER: ICD-10-CM

## 2023-10-11 DIAGNOSIS — Z83.3 FAMILY HISTORY OF DIABETES MELLITUS: ICD-10-CM

## 2023-10-11 LAB
BACTERIAL VAGINOSIS VAG-IMP: NEGATIVE
CANDIDA DNA VAG QL NAA+PROBE: DETECTED
CANDIDA GLABRATA / CANDIDA KRUSEI DNA: NOT DETECTED
T VAGINALIS DNA VAG QL NAA+PROBE: NOT DETECTED

## 2023-10-11 PROCEDURE — 90686 IIV4 VACC NO PRSV 0.5 ML IM: CPT

## 2023-10-11 PROCEDURE — 0352U MULTIPLEX VAGINAL PANEL BY PCR: CPT | Performed by: ADVANCED PRACTICE MIDWIFE

## 2023-10-11 PROCEDURE — G0008 ADMIN INFLUENZA VIRUS VAC: HCPCS

## 2023-10-11 PROCEDURE — 250N000011 HC RX IP 250 OP 636

## 2023-10-11 PROCEDURE — 99213 OFFICE O/P EST LOW 20 MIN: CPT | Mod: 25 | Performed by: ADVANCED PRACTICE MIDWIFE

## 2023-10-11 PROCEDURE — 99207 PR PRENATAL VISIT: CPT | Performed by: ADVANCED PRACTICE MIDWIFE

## 2023-10-11 ASSESSMENT — PAIN SCALES - GENERAL: PAINLEVEL: NO PAIN (0)

## 2023-10-11 NOTE — PROGRESS NOTES
Subjective:      38 year old  at 21w5d presents for a routine prenatal appointment.       no vaginal bleeding or leakage of fluid.  no contractions or cramping.    pos fetal movement.       No HA, visual changes, RUQ or epigastric pain.   The patient presents with the following concerns: possible yeast infection   Level II US  Results reviewed normal scan.        Objective:  Vitals:    10/11/23 0833   BP: 110/73   Pulse: 67   Weight: 51.6 kg (113 lb 12.8 oz)     See OB flowsheet    Assessment/Plan     Encounter Diagnoses   Name Primary?    Normal pregnancy in first trimester Yes    ADHD (attention deficit hyperactivity disorder), inattentive type     Raynaud's disease without gangrene     H/O LEEP     Family history of diabetes mellitus     Abnormal genetic test during pregnancy     Multigravida of advanced maternal age in first trimester      Orders Placed This Encounter   Procedures    INFLUENZA VACCINE >6 MONTHS (AFLURIA/FLUZONE)     No orders of the defined types were placed in this encounter.      - Reviewed total weight gain, encouraged continued healthy diet and exercise.      - Reviewed why/how to contact provider.    Patient education/orders or handouts today:  Fetal movement and Plan for EOB visit w labs   Return to clinic in 5 weeks and prn if questions or concerns.   DRISS Webb CNM

## 2023-10-12 DIAGNOSIS — B37.31 YEAST INFECTION OF THE VAGINA: Primary | ICD-10-CM

## 2023-11-12 ENCOUNTER — MYC MEDICAL ADVICE (OUTPATIENT)
Dept: PSYCHIATRY | Facility: CLINIC | Age: 38
End: 2023-11-12
Payer: COMMERCIAL

## 2023-11-12 DIAGNOSIS — F90.0 ADHD (ATTENTION DEFICIT HYPERACTIVITY DISORDER), INATTENTIVE TYPE: ICD-10-CM

## 2023-11-13 ENCOUNTER — PRENATAL OFFICE VISIT (OUTPATIENT)
Dept: OBGYN | Facility: CLINIC | Age: 38
End: 2023-11-13
Attending: OBSTETRICS & GYNECOLOGY
Payer: COMMERCIAL

## 2023-11-13 VITALS
WEIGHT: 117.8 LBS | SYSTOLIC BLOOD PRESSURE: 114 MMHG | BODY MASS INDEX: 20.87 KG/M2 | DIASTOLIC BLOOD PRESSURE: 79 MMHG | HEIGHT: 63 IN | HEART RATE: 68 BPM

## 2023-11-13 DIAGNOSIS — O09.892 SUPERVISION OF OTHER HIGH RISK PREGNANCIES, SECOND TRIMESTER: Primary | ICD-10-CM

## 2023-11-13 DIAGNOSIS — B37.31 YEAST INFECTION OF THE VAGINA: ICD-10-CM

## 2023-11-13 DIAGNOSIS — O28.5 ABNORMAL GENETIC TEST DURING PREGNANCY: ICD-10-CM

## 2023-11-13 DIAGNOSIS — O09.522 MULTIGRAVIDA OF ADVANCED MATERNAL AGE IN SECOND TRIMESTER: ICD-10-CM

## 2023-11-13 PROCEDURE — 99213 OFFICE O/P EST LOW 20 MIN: CPT | Performed by: OBSTETRICS & GYNECOLOGY

## 2023-11-13 PROCEDURE — 99207 PR POST PARTUM EXAM: CPT | Performed by: OBSTETRICS & GYNECOLOGY

## 2023-11-13 RX ORDER — FLUCONAZOLE 150 MG/1
150 TABLET ORAL ONCE
Qty: 1 TABLET | Refills: 0 | Status: SHIPPED | OUTPATIENT
Start: 2023-11-13 | End: 2023-11-13

## 2023-11-13 NOTE — PROGRESS NOTES
[facility]  Return OB Visit    S: Denies vaginal bleeding, vaginal discharge, LOF, contractions.  Reports good fetal movement. Denies headache, vision changes, RUQ pain, chest pain, SOB. Still having symptoms of yeast vaginitis despite treatment with 7day OTC suppository.      Pregnancy notable for  - high risk NIPT  - amniocentesis@ 17w2d- nl karyotype and microarray     O: LMP 2023 (Exact Date)   Weight gain: 3.266 kg (7 lb 3.2 oz)    Gen: Well-appearing, NAD    Fundal Height:  26cm  FHR: 135    A/P:  Arlet Segura is a 38 year old  at 26w3d by LMP c/w 8w0d US, here for return OB visit. Continues to have vaginal itchiness/irritation with previously positive Candida refractory to 7-day miconazole suppository.     Prenatal care  - Rh positive, jeannine neg, rubella imm, infectious labs within normal limits  - GBS to be performed at 36 weeks  - Appropriate weight gain (pre-pregnancy BMI 18.88)  - S/p flu vaccine  - will have GCT,  CBC, vit D, and Tdap vaccine next visit    Genetic screening &  diagnosis  - Normal NT, NIPT high risk , AFP screen negative  - Normal amniocentesis  - Level 2 US normal    Yeast vaginitis  Continues to have vaginal itchiness/irritation with previously positive Candida refractory to 7-day miconazole suppository.   - 1 dose Diflucan 150mg ordered    Return to clinic in 2 weeks. Discussed return precautions.    Staffed with Dr. Gabriel Farley, MS3    Patient was seen with student who acted as my scribe and was present for learning. I personally assessed, examined and made medical decisions reflected in the documentation. Any necessary edits to the note have been made by myself. Dorothy Rios MD

## 2023-11-14 RX ORDER — DEXTROAMPHETAMINE SACCHARATE, AMPHETAMINE ASPARTATE, DEXTROAMPHETAMINE SULFATE AND AMPHETAMINE SULFATE 2.5; 2.5; 2.5; 2.5 MG/1; MG/1; MG/1; MG/1
10 TABLET ORAL 2 TIMES DAILY
Qty: 60 TABLET | Refills: 0 | Status: SHIPPED | OUTPATIENT
Start: 2023-11-16 | End: 2024-01-12

## 2023-11-14 NOTE — CONFIDENTIAL NOTE
"Patient requests early refill on Adderall due to travel.  Leaving on 11/17.    Pended refill dated 11/16 sent to Bianca Bonds for review.    Last appt:   10/10/23  CONTINUE Adderall 10mg twice daily. Sent three additional refills.    RTC:  3 months  Next appt:  1/18/24     Disp Refills Start End BEE   amphetamine-dextroamphetamine (ADDERALL) 10 MG tablet 60 tablet 0 10/21/2023  No   Sig - Route: Take 1 tablet (10 mg) by mouth 2 times daily - Oral     \"Patient is going out of town - provider approves early refill\" added as note to pharmacy        "

## 2023-11-27 NOTE — PATIENT INSTRUCTIONS
"Weeks 22 to 26 of Your Pregnancy: Care Instructions  Your baby's lungs are getting ready for breathing. Your baby may respond to your voice. Your baby likely turns less, and kicks or jerks more. Jerking may mean that your baby has hiccups.    Think about taking childbirth classes. And start to think about whether you want to have pain medicine during labor.   At your next doctor visit, you may be tested for anemia and for high blood sugar that first occurs during pregnancy (gestational diabetes). These conditions can cause problems for you and your baby.     To ease discomfort, such as back pain    Change your position often. Try not to sit or stand for too long.  Get some exercise. Things like walking or stretching may help.  Try using a heating pad or cold pack.    To ease or reduce swelling in your feet, ankles, hands, and fingers    Take off your rings.  Avoid high-sodium foods, such as potato chips.  Prop up your feet, and sleep with pillows under your feet.  Try to avoid standing for long periods of time.  Do not wear tight shoes.  Wear support stockings.  Kegel exercises to prevent urine from leaking    Squeeze your muscles as if you were trying not to pass gas. Your belly, legs, and buttocks shouldn't move. Hold the squeeze for 3 seconds, then relax for 5 to 10 seconds.    Add 1 second each week until you can squeeze for 10 seconds. Repeat the exercise 10 times a session. Do 3 to 8 sessions a day. If these exercises cause you pain, stop doing them and talk with your doctor.  Follow-up care is a key part of your treatment and safety. Be sure to make and go to all appointments, and call your doctor if you are having problems. It's also a good idea to know your test results and keep a list of the medicines you take.  Where can you learn more?  Go to https://www.healthwise.net/patiented  Enter G264 in the search box to learn more about \"Weeks 22 to 26 of Your Pregnancy: Care Instructions.\"  Current as of: July " 11, 2023               Content Version: 13.8    4211-2955 [employer].   Care instructions adapted under license by your healthcare professional. If you have questions about a medical condition or this instruction, always ask your healthcare professional. [employer] disclaims any warranty or liability for your use of this information.      Learning About Screening for Gestational Diabetes  What is gestational diabetes screening?     Screening for gestational diabetes is a way to look for high blood sugar during pregnancy. You drink some very sweet liquid. Then you have a blood test to see how your body uses sugar (glucose).  How is gestational diabetes screening done?  Screening for gestational diabetes may be done in a couple of ways.  Two-part screening.  Part one (glucose challenge test): A blood sample is taken after you drink a liquid that contains sugar (glucose). You don't need to stop eating or drinking before this test. If the test shows that you don't have a lot of sugar in your blood, you don't have gestational diabetes.  Part two (oral glucose tolerance test, or OGTT): If the first test shows a lot of sugar in your blood, then you may have an OGTT. You can't eat or drink for at least 8 hours before this test. A blood sample is taken, then you drink a sweet liquid. You have more blood tests after 1 to 3 hours. If the OGTT shows that you have a lot of sugar in your blood, you may have gestational diabetes.  One-part screening.  Sometimes doctors use the OGTT on its own. If the test shows that you don't have a lot of sugar in your blood, you don't have gestational diabetes. If you do have a lot of sugar in your blood, you may have the condition.  What are the risks of screening?  Your blood glucose level may drop very low toward the end of the test. If this happens, you may feel weak, hungry, and restless. Tell your doctor if you have these symptoms. The test usually will be  "stopped.  You may vomit after drinking the sweet liquid. If this happens, you may need to take the test at a later time.  Your doctor may do more glucose tests at other times during your pregnancy.  Follow-up care is a key part of your treatment and safety. Be sure to make and go to all appointments, and call your doctor if you are having problems. It's also a good idea to know your test results and keep a list of the medicines you take.  Where can you learn more?  Go to https://www.OnetoOnetext.net/patiented  Enter A472 in the search box to learn more about \"Learning About Screening for Gestational Diabetes.\"  Current as of: February 28, 2023               Content Version: 13.8    5610-7555 [employer].   Care instructions adapted under license by your healthcare professional. If you have questions about a medical condition or this instruction, always ask your healthcare professional. [employer] disclaims any warranty or liability for your use of this information.      You have been provided the My Labor and Birth Wishes document.  Please review at home and bring to your next prenatal visit. Bring this sheet to the hospital for your birth. Give copies to your care team members and support person.   Additional copies can be found here:  [email or web address]  "

## 2023-11-28 ENCOUNTER — LAB (OUTPATIENT)
Dept: LAB | Facility: CLINIC | Age: 38
End: 2023-11-28
Payer: COMMERCIAL

## 2023-11-28 ENCOUNTER — PRENATAL OFFICE VISIT (OUTPATIENT)
Dept: OBGYN | Facility: CLINIC | Age: 38
End: 2023-11-28
Payer: COMMERCIAL

## 2023-11-28 VITALS
WEIGHT: 119 LBS | SYSTOLIC BLOOD PRESSURE: 109 MMHG | DIASTOLIC BLOOD PRESSURE: 74 MMHG | HEART RATE: 72 BPM | BODY MASS INDEX: 21.08 KG/M2

## 2023-11-28 DIAGNOSIS — O28.5 ABNORMAL GENETIC TEST DURING PREGNANCY: ICD-10-CM

## 2023-11-28 DIAGNOSIS — O09.892 SUPERVISION OF OTHER HIGH RISK PREGNANCIES, SECOND TRIMESTER: Primary | ICD-10-CM

## 2023-11-28 DIAGNOSIS — R73.09 ELEVATED GLUCOSE: Primary | ICD-10-CM

## 2023-11-28 DIAGNOSIS — O09.892 SUPERVISION OF OTHER HIGH RISK PREGNANCIES, SECOND TRIMESTER: ICD-10-CM

## 2023-11-28 DIAGNOSIS — F90.0 ADHD (ATTENTION DEFICIT HYPERACTIVITY DISORDER), INATTENTIVE TYPE: ICD-10-CM

## 2023-11-28 LAB
ALBUMIN SERPL BCG-MCNC: 3.5 G/DL (ref 3.5–5.2)
ALP SERPL-CCNC: 72 U/L (ref 40–150)
ALT SERPL W P-5'-P-CCNC: 11 U/L (ref 0–50)
AST SERPL W P-5'-P-CCNC: 19 U/L (ref 0–45)
BILIRUB DIRECT SERPL-MCNC: <0.2 MG/DL (ref 0–0.3)
BILIRUB SERPL-MCNC: <0.2 MG/DL
ERYTHROCYTE [DISTWIDTH] IN BLOOD BY AUTOMATED COUNT: 12.4 % (ref 10–15)
GLUCOSE 1H P 50 G GLC PO SERPL-MCNC: 133 MG/DL (ref 70–129)
HCT VFR BLD AUTO: 33.3 % (ref 35–47)
HGB BLD-MCNC: 11.5 G/DL (ref 11.7–15.7)
MCH RBC QN AUTO: 31.9 PG (ref 26.5–33)
MCHC RBC AUTO-ENTMCNC: 34.5 G/DL (ref 31.5–36.5)
MCV RBC AUTO: 93 FL (ref 78–100)
PLATELET # BLD AUTO: 286 10E3/UL (ref 150–450)
PROT SERPL-MCNC: 6.2 G/DL (ref 6.4–8.3)
RBC # BLD AUTO: 3.6 10E6/UL (ref 3.8–5.2)
VIT D+METAB SERPL-MCNC: 31 NG/ML (ref 20–50)
WBC # BLD AUTO: 7.7 10E3/UL (ref 4–11)

## 2023-11-28 PROCEDURE — 99213 OFFICE O/P EST LOW 20 MIN: CPT

## 2023-11-28 PROCEDURE — 250N000011 HC RX IP 250 OP 636

## 2023-11-28 PROCEDURE — 36415 COLL VENOUS BLD VENIPUNCTURE: CPT

## 2023-11-28 PROCEDURE — 85027 COMPLETE CBC AUTOMATED: CPT

## 2023-11-28 PROCEDURE — 80076 HEPATIC FUNCTION PANEL: CPT

## 2023-11-28 PROCEDURE — 82306 VITAMIN D 25 HYDROXY: CPT

## 2023-11-28 PROCEDURE — 90471 IMMUNIZATION ADMIN: CPT

## 2023-11-28 PROCEDURE — 99207 PR PRENATAL VISIT: CPT | Mod: GE | Performed by: OBSTETRICS & GYNECOLOGY

## 2023-11-28 PROCEDURE — 90715 TDAP VACCINE 7 YRS/> IM: CPT

## 2023-11-28 PROCEDURE — 82950 GLUCOSE TEST: CPT

## 2023-11-28 ASSESSMENT — PAIN SCALES - GENERAL: PAINLEVEL: NO PAIN (0)

## 2023-11-28 NOTE — PROGRESS NOTES
[facility]  Return OB Visit    S: Denies vaginal bleeding, vaginal discharge, LOF, contractions.  Reports good fetal movement. Denies headache, vision changes, RUQ pain, chest pain, SOB. Still having symptoms of yeast vaginitis despite treatment with 7day OTC suppository and 1 dose diflucan. Would like to monitor symptoms for the time being    Pregnancy notable for  - high risk NIPT  - amniocentesis@ 17w2d- nl karyotype and microarray     O: /74   Pulse 72   Wt 54 kg (119 lb)   LMP 2023 (Exact Date)   BMI 21.08 kg/m    Weight gain: 5.625 kg (12 lb 6.4 oz)    Gen: Well-appearing, NAD    Fundal Height:  27 cm  FHR: 158    A/P:  Arlet Segura is a 38 year old  at 26w3d by LMP c/w 8w0d US, here for return OB visit. Continues to have vaginal itchiness/irritation with previously positive Candida refractory to 7-day miconazole suppository and 1 dose diflucan; would like to monitor symptoms for now.    Prenatal care  - Rh positive, jeannine neg, rubella imm, infectious labs within normal limits  - GBS to be performed at 36 weeks  - Appropriate weight gain (pre-pregnancy BMI 18.88)  - S/p flu vaccine  - GCT,  CBC, vit D, and Tdap vaccine today    Genetic screening &  diagnosis  - Normal NT, NIPT high risk , AFP screen negative  - Normal amniocentesis  - Level 2 US normal    Yeast vaginitis  Continues to have vaginal itchiness/irritation with previously positive Candida refractory to 7-day miconazole suppository. Did not improve completely with diflucan. Plan to monitor symptoms for now and will readdress at the next visit if persistent.    Return to clinic in 2 weeks. Discussed return precautions.    Staffed with Dr. Latonya Patrick MD  Ob/Gyn Resident, PGY-3  2023 4:24 PM      The Patient was seen in Resident Continuity Clinic by SRINI PATRICK.  I reviewed the history & exam. Assessment and plan were jointly made.    Gabby Hanks MD

## 2023-12-12 NOTE — PATIENT INSTRUCTIONS
Weeks 26 to 30 of Your Pregnancy: Care Instructions  You're starting your last trimester. You'll probably feel your baby moving around more. Your back may ache as your body gets used to your baby's size and length. Take care of yourself, and pay attention to what your body needs.    Talk to your doctor about getting the Tdap shot. It will help protect your  against whooping cough (pertussis). Also ask your doctor about flu and COVID-19 shots if you haven't had them yet. If your blood type is Rh negative, you may be given a shot of Rh immune globulin (such as RhoGAM). It can help prevent problems for your baby.   You may have [place] contractions. They are single or several strong contractions without a pattern. These are practice contractions but not the start of labor.   Be kind to yourself.     Take breaks when you're tired.  Change positions often. Don't sit for too long or stand for too long.  At work, rest during breaks if you can. If you don't get breaks, talk to your doctor about writing a letter to your employer to request them.  Avoid fumes, chemicals, and tobacco smoke.  Be sexual if you want to.     You may be interested in sex, or you may not. Everyone is different.  Sex is okay unless your doctor tells you not to.  Your belly can make it hard to find good positions for sex. Schwana and explore.  Watch for signs of  labor.    These signs include:   Menstrual-like cramps. Or you may have pain or pressure in your pelvis that happens in a pattern.  About 6 or more contractions in an hour (even after rest and a glass of water).  A low, dull backache that doesn't go away when you change positions.  An increase or change in vaginal discharge.  Light vaginal bleeding or spotting.  Your water breaking.  Know what to do if you think you are having contractions.     Drink 1 or 2 glasses of water.  Lie down on your left side for at least an hour.  While on your side, feel the top of your  "belly to see if it's tight.  Write down your contractions for an hour. Time how long it is from the start of one contraction to the start of the next.  Call your doctor if you have regular contractions.  Follow-up care is a key part of your treatment and safety. Be sure to make and go to all appointments, and call your doctor if you are having problems. It's also a good idea to know your test results and keep a list of the medicines you take.  Where can you learn more?  Go to https://www.[employer].net/patiented  Enter S999 in the search box to learn more about \"Weeks 26 to 30 of Your Pregnancy: Care Instructions.\"  Current as of: July 11, 2023               Content Version: 13.8    8927-1630 [employer].   Care instructions adapted under license by your healthcare professional. If you have questions about a medical condition or this instruction, always ask your healthcare professional. [employer] disclaims any warranty or liability for your use of this information.      Weeks 30 to 32 of Your Pregnancy: Care Instructions  Your baby is growing more every day. Its eyes can open and close, and it may have hair on its head. Your baby may sleep 20 to 45 minutes at a time and is more active at certain times.    You should feel your baby move several times every day. Your baby now turns less and kicks more.   This is a good time to tour your hospital or birthing center. You may also want to find childcare if needed.     To ease heartburn    Avoid foods that make your symptoms worse, such as chocolate, spicy foods, and caffeine.  Avoid bending over or lying down after meals.  Do not eat for 2 hours before bedtime.  Take antacids like Tums, but don't take ones that have sodium bicarbonate, magnesium trisilicate, or aspirin.    To care for large, swollen veins (varicose veins)    Try to avoid standing for long periods of time.  Sit with your feet propped up.  Wear support hose.  Get some exercise " "every day, like walking or swimming.  Counting your baby's kicks  Your doctor may ask you to count your baby's movements, such as kicks, flutters, or rolls.    Find a quiet place, and get comfortable. Write down your start time. Count your baby's movements (except hiccups). When your baby has moved 10 times, you can stop counting. Write down how many minutes it took.   If an hour goes by and you don't feel 10 movements, have something to eat or drink. Count for another hour. If you don't feel at least 10 movements in the 2-hour period, call your doctor.   Follow-up care is a key part of your treatment and safety. Be sure to make and go to all appointments, and call your doctor if you are having problems. It's also a good idea to know your test results and keep a list of the medicines you take.  Where can you learn more?  Go to https://www.[employer].XbyMe/patiented  Enter X471 in the search box to learn more about \"Weeks 30 to 32 of Your Pregnancy: Care Instructions.\"  Current as of: July 11, 2023               Content Version: 13.8    9128-3371 [employer].   Care instructions adapted under license by your healthcare professional. If you have questions about a medical condition or this instruction, always ask your healthcare professional. [employer] disclaims any warranty or liability for your use of this information.      Learning About Birth Control After Childbirth  What is birth control?  Birth control is any method used to prevent pregnancy. If you have vaginal sex without birth control, you could get pregnant--even if you haven't started having periods again. You're less likely to get pregnant while breastfeeding, but it's still possible. Finding birth control that works for you can help avoid an unplanned pregnancy.  There are many kinds of birth control. Each has pros and cons. Find what works for you. Talk to your doctor if you've just given birth or are " "breastfeeding.    Long-acting reversible contraception (LARC). These are placed inside your body by a doctor. They can prevent pregnancy for years.  Examples include:  An implant (hormonal).  Copper intrauterine device (IUD).  Hormonal IUDs.   Short-acting hormonal methods. These release hormones. Examples include:  Combination birth control pills (\"the pill\").  Skin patches.  A vaginal ring.  A shot.  Mini-pills. Choose progestin-only options soon after giving birth.     Barrier methods. Use these every time you have vaginal sex.  Examples include:  External (male) condoms.  Internal (female) condoms.  Diaphragms.  Cervical caps.  Sponges.   Spermicides. These kill sperm or stop sperm from moving. They can be gels, creams, foams, films, or tablets. Use them before vaginal sex.  Examples include:  Nonoxynol-9.  pH regulator gel.     Permanent birth control (sterilization). This can be an option if you're sure that you don't want to get pregnant later.  Examples include:  Vasectomy.  Having tubes tied (tubal ligation).   Emergency contraception. This is a backup method. Use it if you didn't use birth control or your birth control method failed.  Examples include:  Copper and hormonal IUDs.  Emergency contraceptive pills.     Fertility awareness. You'll learn when you are most likely to become pregnant (are fertile). You can avoid vaginal sex at that time.  It's also called:  Natural family planning.  The rhythm method.   Breastfeeding. This is most effective when all of these are true:  Your baby is younger than 6 months old.  You're breastfeeding and not bottle-feeding at all.  You aren't having periods.   Follow-up care is a key part of your treatment and safety. Be sure to make and go to all appointments, and call your doctor if you are having problems. It's also a good idea to know your test results and keep a list of the medicines you take.  Where can you learn more?  Go to " "https://www.[employer].net/patiented  Enter X408 in the search box to learn more about \"Learning About Birth Control After Childbirth.\"  Current as of: July 11, 2023               Content Version: 13.8    8062-1708 [employer].   Care instructions adapted under license by your healthcare professional. If you have questions about a medical condition or this instruction, always ask your healthcare professional. [employer], [employer] disclaims any warranty or liability for your use of this information.      "

## 2023-12-13 ENCOUNTER — PRENATAL OFFICE VISIT (OUTPATIENT)
Dept: OBGYN | Facility: CLINIC | Age: 38
End: 2023-12-13
Attending: ADVANCED PRACTICE MIDWIFE
Payer: COMMERCIAL

## 2023-12-13 ENCOUNTER — LAB (OUTPATIENT)
Dept: LAB | Facility: CLINIC | Age: 38
End: 2023-12-13
Attending: ADVANCED PRACTICE MIDWIFE
Payer: COMMERCIAL

## 2023-12-13 VITALS
HEIGHT: 63 IN | DIASTOLIC BLOOD PRESSURE: 75 MMHG | SYSTOLIC BLOOD PRESSURE: 113 MMHG | WEIGHT: 118.5 LBS | BODY MASS INDEX: 21 KG/M2 | HEART RATE: 66 BPM

## 2023-12-13 DIAGNOSIS — O09.521 MULTIGRAVIDA OF ADVANCED MATERNAL AGE IN FIRST TRIMESTER: ICD-10-CM

## 2023-12-13 DIAGNOSIS — Z34.91 NORMAL PREGNANCY IN FIRST TRIMESTER: ICD-10-CM

## 2023-12-13 DIAGNOSIS — Z83.3 FAMILY HISTORY OF DIABETES MELLITUS: ICD-10-CM

## 2023-12-13 DIAGNOSIS — O28.5 ABNORMAL GENETIC TEST DURING PREGNANCY: ICD-10-CM

## 2023-12-13 DIAGNOSIS — Z34.91 NORMAL PREGNANCY IN FIRST TRIMESTER: Primary | ICD-10-CM

## 2023-12-13 LAB — GLUCOSE 1H P 50 G GLC PO SERPL-MCNC: 111 MG/DL (ref 70–129)

## 2023-12-13 PROCEDURE — 99213 OFFICE O/P EST LOW 20 MIN: CPT | Performed by: ADVANCED PRACTICE MIDWIFE

## 2023-12-13 PROCEDURE — 82950 GLUCOSE TEST: CPT

## 2023-12-13 PROCEDURE — 99207 PR PRENATAL VISIT: CPT | Performed by: ADVANCED PRACTICE MIDWIFE

## 2023-12-13 PROCEDURE — 36415 COLL VENOUS BLD VENIPUNCTURE: CPT

## 2023-12-13 ASSESSMENT — PAIN SCALES - GENERAL: PAINLEVEL: NO PAIN (0)

## 2023-12-13 NOTE — PROGRESS NOTES
"Subjective:      38 year old  at 30w5d presentst for a routine prenatal appointment.    no vaginal bleeding or leakage of fluid.  no contractions. pos fetal movement.       No HA, visual changes, RUQ or epigastric pain.   Patient concerns:    Feeling well overall.  Reviewed EOB labs with patient.  Reviewed TDAP Previously given  Objective:  Vitals:    23 0849   BP: 113/75   Pulse: 66   Weight: 53.8 kg (118 lb 8 oz)   Height: 1.6 m (5' 2.99\")   , see ob flowsheet  Assessment/Plan     Encounter Diagnoses   Name Primary?    Normal pregnancy in first trimester Yes    Multigravida of advanced maternal age in first trimester     Abnormal genetic test during pregnancy     Family history of diabetes mellitus      Orders Placed This Encounter   Procedures    Glucose 1 Hour     No orders of the defined types were placed in this encounter.    ABO   Date Value Ref Range Status   2021 A  Final     RH(D)   Date Value Ref Range Status   2021 Pos  Final     Antibody Screen   Date Value Ref Range Status   2023 Negative Negative Final   2021 Neg  Final   , Rhogam  was notgiven.    - Reviewed total weight gain, encouraged continued healthy diet and exercise.  .  Reviewed importance of daily fetal kick count and why/how to contact provider.    - Reviewed why/how to contact provider if headache/visual changes/RUQ or epigastric pain, decreased fetal movement, vaginal bleeding, leakage of fluid or more than 4 contractions in an hour.     Patient education/orders or handouts today:  PTL signs/symptoms and RSV next  Reviewed GBS screening at 35-36 wks.    Return to clinic in 2 weeks and prn if questions or concerns.     Dana Howard, APRN CNM        "

## 2023-12-27 ENCOUNTER — PRENATAL OFFICE VISIT (OUTPATIENT)
Dept: OBGYN | Facility: CLINIC | Age: 38
End: 2023-12-27
Attending: ADVANCED PRACTICE MIDWIFE
Payer: COMMERCIAL

## 2023-12-27 VITALS
BODY MASS INDEX: 21.44 KG/M2 | DIASTOLIC BLOOD PRESSURE: 73 MMHG | SYSTOLIC BLOOD PRESSURE: 109 MMHG | HEART RATE: 72 BPM | WEIGHT: 121 LBS

## 2023-12-27 DIAGNOSIS — O26.843 UTERINE SIZE-DATE DISCREPANCY IN THIRD TRIMESTER: ICD-10-CM

## 2023-12-27 DIAGNOSIS — Z34.93 NORMAL PREGNANCY IN THIRD TRIMESTER: Primary | ICD-10-CM

## 2023-12-27 DIAGNOSIS — O28.5 ABNORMAL GENETIC TEST DURING PREGNANCY: ICD-10-CM

## 2023-12-27 DIAGNOSIS — O09.523 MULTIGRAVIDA OF ADVANCED MATERNAL AGE IN THIRD TRIMESTER: ICD-10-CM

## 2023-12-27 PROCEDURE — 99213 OFFICE O/P EST LOW 20 MIN: CPT | Performed by: ADVANCED PRACTICE MIDWIFE

## 2023-12-27 PROCEDURE — 99207 PR PRENATAL VISIT: CPT | Performed by: ADVANCED PRACTICE MIDWIFE

## 2023-12-27 NOTE — PATIENT INSTRUCTIONS
"Weeks 32 to 34 of Your Pregnancy: Care Instructions    Decide whether you want to bank or donate your baby's umbilical cord blood. If you want to save this blood, you have to arrange for it ahead of time.   Decide about circumcision. Personal, Sikh, or cultural beliefs may play a role in your decision. You get to decide what you want for your baby.     Learn how to ease hemorrhoids.    Get more liquids, fruits, vegetables, and fiber in your diet.  Avoid sitting for too long.  Clean yourself with moist toilet paper. Or try witch hazel pads.  Try ice packs or warm sitz baths for discomfort.  Use hydrocortisone cream for pain or itching.  Ask your doctor about stool softeners.    Consider the benefits of breastfeeding.    It reduces your baby's risk of sudden infant death syndrome (SIDS).   babies are less likely to get certain infections. And they're less likely to be obese or get diabetes later in life.  It can lower your risk of breast and ovarian cancers and osteoporosis.  It saves you money.  Follow-up care is a key part of your treatment and safety. Be sure to make and go to all appointments, and call your doctor if you are having problems. It's also a good idea to know your test results and keep a list of the medicines you take.  Where can you learn more?  Go to https://www.[employer].net/patiented  Enter X711 in the search box to learn more about \"Weeks 32 to 34 of Your Pregnancy: Care Instructions.\"  Current as of: July 11, 2023               Content Version: 13.8    9731-9695 [employer].   Care instructions adapted under license by your healthcare professional. If you have questions about a medical condition or this instruction, always ask your healthcare professional. [employer] disclaims any warranty or liability for your use of this information.      You have been provided the Any Day Now: Early Labor at Home document.    Additional copies can be found here:  " [email or web address]  You have been provided the What I'd Wish I'd Known About Giving Birth document.    Additional copies can be found here:  [email or web address]

## 2023-12-27 NOTE — PROGRESS NOTES
Subjective:      38 year old  at 32w5d presentst for a routine prenatal appointment.     Denies cramping/contractions, vaginal bleeding, discharge or leakage of fluid. Reports +fetal movement.  No HA, vision changes, ruq/epigastric pain.      Patient concerns: Feeling well overall. Repeated and passed 1 hour glucose test vs completing 3 hour. Notes that she had candy right before completing the 1 hour glucose test that was abnormal.     Objective:  Vitals:    23 1442   BP: 109/73   Pulse: 72   Weight: 54.9 kg (121 lb)     See ob flowsheet    Assessment/Plan     Encounter Diagnoses   Name Primary?    Multigravida of advanced maternal age in third trimester     Normal pregnancy in third trimester Yes    Abnormal genetic test during pregnancy     Uterine size-date discrepancy in third trimester      Orders Placed This Encounter   Procedures    US OB >14 Weeks Follow Up       - Reviewed total weight gain, encouraged continued healthy diet and exercise.  .  Reviewed importance of daily fetal kick count and why/how to contact provider.    - Reviewed why/how to contact provider if headache/visual changes/RUQ or epigastric pain, decreased fetal movement, vaginal bleeding, leakage of fluid or more than 4 contractions in an hour.     Patient education/orders or handouts today:  PTL signs/symptoms    - Reviewed EOB labs. Recommended maintenance dose of vitamin D 3972-8746  international unit(s)/day.   Hbg 11.5- continue iron rich foods.   - Desires RSV vaccine next visit.   - Reviewed fundal height less than dates. Recommended growth ultrasound.   Will schedule in the next 1 week.   - GBS and CBC with plts 36-37 weeks.    Continue scheduled prenatal care, RTC within next week for growth ultrasound, 2 weeks for DRISS Heard, TYLERM

## 2024-01-03 ENCOUNTER — ANCILLARY PROCEDURE (OUTPATIENT)
Dept: ULTRASOUND IMAGING | Facility: CLINIC | Age: 39
End: 2024-01-03
Attending: ADVANCED PRACTICE MIDWIFE
Payer: COMMERCIAL

## 2024-01-03 DIAGNOSIS — O26.843 UTERINE SIZE-DATE DISCREPANCY IN THIRD TRIMESTER: ICD-10-CM

## 2024-01-03 DIAGNOSIS — Z34.93 NORMAL PREGNANCY IN THIRD TRIMESTER: ICD-10-CM

## 2024-01-03 PROCEDURE — 76816 OB US FOLLOW-UP PER FETUS: CPT | Mod: 26 | Performed by: OBSTETRICS & GYNECOLOGY

## 2024-01-03 PROCEDURE — 76816 OB US FOLLOW-UP PER FETUS: CPT

## 2024-01-08 ENCOUNTER — HOSPITAL ENCOUNTER (OUTPATIENT)
Facility: CLINIC | Age: 39
End: 2024-01-08
Payer: COMMERCIAL

## 2024-01-08 ENCOUNTER — MYC MEDICAL ADVICE (OUTPATIENT)
Dept: OBGYN | Facility: CLINIC | Age: 39
End: 2024-01-08
Payer: COMMERCIAL

## 2024-01-11 ENCOUNTER — TELEPHONE (OUTPATIENT)
Dept: OBGYN | Facility: CLINIC | Age: 39
End: 2024-01-11
Payer: COMMERCIAL

## 2024-01-11 NOTE — TELEPHONE ENCOUNTER
Dirk paperwork completed signed copied and secured emailed back to patient as per phone request at [email or web address]

## 2024-01-12 ENCOUNTER — PRENATAL OFFICE VISIT (OUTPATIENT)
Dept: OBGYN | Facility: CLINIC | Age: 39
End: 2024-01-12
Attending: MIDWIFE
Payer: COMMERCIAL

## 2024-01-12 ENCOUNTER — MYC REFILL (OUTPATIENT)
Dept: PSYCHIATRY | Facility: CLINIC | Age: 39
End: 2024-01-12
Payer: COMMERCIAL

## 2024-01-12 VITALS
HEIGHT: 63 IN | WEIGHT: 125 LBS | DIASTOLIC BLOOD PRESSURE: 75 MMHG | HEART RATE: 67 BPM | SYSTOLIC BLOOD PRESSURE: 112 MMHG | BODY MASS INDEX: 22.15 KG/M2

## 2024-01-12 DIAGNOSIS — O26.843 UTERINE SIZE-DATE DISCREPANCY IN THIRD TRIMESTER: ICD-10-CM

## 2024-01-12 DIAGNOSIS — F90.0 ADHD (ATTENTION DEFICIT HYPERACTIVITY DISORDER), INATTENTIVE TYPE: ICD-10-CM

## 2024-01-12 DIAGNOSIS — O28.5 ABNORMAL GENETIC TEST DURING PREGNANCY: ICD-10-CM

## 2024-01-12 DIAGNOSIS — Z34.91 NORMAL PREGNANCY IN FIRST TRIMESTER: Primary | ICD-10-CM

## 2024-01-12 PROCEDURE — 90678 RSV VACC PREF BIVALENT IM: CPT

## 2024-01-12 PROCEDURE — 250N000011 HC RX IP 250 OP 636

## 2024-01-12 PROCEDURE — 99207 PR PRENATAL VISIT: CPT | Performed by: MIDWIFE

## 2024-01-12 PROCEDURE — 99213 OFFICE O/P EST LOW 20 MIN: CPT | Mod: 25 | Performed by: MIDWIFE

## 2024-01-12 PROCEDURE — 90471 IMMUNIZATION ADMIN: CPT

## 2024-01-12 RX ORDER — AMOXICILLIN 250 MG
1 CAPSULE ORAL DAILY
Qty: 100 TABLET | Refills: 0 | Status: SHIPPED | OUTPATIENT
Start: 2024-01-12

## 2024-01-12 RX ORDER — ACETAMINOPHEN 325 MG/1
650 TABLET ORAL EVERY 6 HOURS PRN
Qty: 100 TABLET | Refills: 0 | Status: SHIPPED | OUTPATIENT
Start: 2024-01-12 | End: 2024-02-22

## 2024-01-12 RX ORDER — DEXTROAMPHETAMINE SACCHARATE, AMPHETAMINE ASPARTATE, DEXTROAMPHETAMINE SULFATE AND AMPHETAMINE SULFATE 2.5; 2.5; 2.5; 2.5 MG/1; MG/1; MG/1; MG/1
10 TABLET ORAL 2 TIMES DAILY
Qty: 60 TABLET | Refills: 0 | Status: SHIPPED | OUTPATIENT
Start: 2024-01-13 | End: 2024-02-12

## 2024-01-12 RX ORDER — IBUPROFEN 600 MG/1
600 TABLET, FILM COATED ORAL EVERY 6 HOURS PRN
Qty: 60 TABLET | Refills: 0 | Status: SHIPPED | OUTPATIENT
Start: 2024-01-12 | End: 2024-02-22

## 2024-01-12 NOTE — TELEPHONE ENCOUNTER
Last seen: 10/10/23  RTC: 3 months  Cancel: none  No-show: none  Next appt: 1/18/24     Incoming refill from Patient via [employer]    Medication requested:   Pending Prescriptions:                       Disp   Refills    amphetamine-dextroamphetamine (ADDERALL) *60 tab*0            Sig: Take 1 tablet (10 mg) by mouth 2 times daily        Last refill per       From chart note:   CONTINUE Adderall 10mg twice daily. Sent three additional refills.     Medication sent to provider for review.

## 2024-01-12 NOTE — PATIENT INSTRUCTIONS
Weeks 34 to 36 of Your Pregnancy: Care Instructions  Your belly has grown quite large. It's almost time to give birth! Your baby's lungs are almost ready to breathe air. The skull bones are firm enough to protect your baby's head. But they're soft enough to move down through the birth canal.    You might be wondering what to expect during labor. Because each birth is different, there's no way to know exactly what childbirth will be like for you. Talk to your doctor or midwife about any concerns you have.   You'll probably have a test for group B streptococcus (GBS). GBS is bacteria that can live in the vagina and rectum. GBS can make your baby sick after birth. If you test positive, you'll get antibiotics during labor.     Choose what type of pain relief you would like during labor.  You can choose from a few types, including medicine and non-medicine options. You may want to use several types of pain relief.     Know how medicines can help with pain during labor.  Some medicines lower anxiety and help with some of the pain. Others make your lower body numb so that you will feel less pain.     Tell your doctor about your pain medicine choice.  Do this before you start labor or very early in your labor. You may be able to change your mind during labor.     Learn about the stages of labor.    The first stage includes the early (latent) and active phases of labor. Contractions start in early labor. During active labor, contractions get stronger, last longer, and happen more often. And the cervix opens more rapidly.  The second stage starts when you're ready to push. During this stage, your baby is born.  During the third stage, you'll usually have a few more contractions to push out the placenta.   Follow-up care is a key part of your treatment and safety. Be sure to make and go to all appointments, and call your doctor if you are having problems. It's also a good idea to know your test results and keep a list of the  "medicines you take.  Where can you learn more?  Go to https://www.PubMatic.net/patiented  Enter B912 in the search box to learn more about \"Weeks 34 to 36 of Your Pregnancy: Care Instructions.\"  Current as of: 2023               Content Version: 13.8    7855-4522 [employer].   Care instructions adapted under license by your healthcare professional. If you have questions about a medical condition or this instruction, always ask your healthcare professional. [employer] disclaims any warranty or liability for your use of this information.      Group B Strep During Pregnancy: Care Instructions  Overview     Group B strep infection is caused by a type of bacteria. It's a different kind of bacteria than the kind that causes strep throat.  You may have this kind of bacteria in your body. Sometimes it may cause an infection, but most of the time it doesn't make you sick or cause symptoms. But if you pass the bacteria to your baby during the birth, it can cause serious health problems for your baby.  If you have this bacteria in your body, you will get antibiotics when you are in labor. Antibiotics help prevent problems for a  baby.  After birth, doctors will watch and may test your baby. If your baby tests positive for Group B strep, your baby will get antibiotics.  If you plan to breastfeed your baby, don't worry. It will be safe to breastfeed.  Follow-up care is a key part of your treatment and safety. Be sure to make and go to all appointments, and call your doctor if you are having problems. It's also a good idea to know your test results and keep a list of the medicines you take.  How can you care for yourself at home?  If your doctor has prescribed antibiotics, take them as directed. Do not stop taking them just because you feel better. You need to take the full course of antibiotics.  Tell your doctor if you are allergic to any antibiotic.  If you go into labor, or your " "water breaks, go to the hospital. Your doctor will give you antibiotics to help protect your baby from infection.  Tell the doctors and nurses if you have an allergy to penicillin.  Tell the doctors and nurses at the hospital that you tested positive for group B strep.  When should you call for help?   Call your doctor now or seek immediate medical care if:    You have symptoms of a urinary tract infection. These may include:  Pain or burning when you urinate.  A frequent need to urinate without being able to pass much urine.  Pain in the flank, which is just below the rib cage and above the waist on either side of the back.  Blood in your urine.  A fever.     You think you are in labor or your water has broken.     You have pain in your belly or pelvis.   Watch closely for changes in your health, and be sure to contact your doctor if you have any problems.  Where can you learn more?  Go to https://www.Burst.it.[employer]/patiented  Enter M001 in the search box to learn more about \"Group B Strep During Pregnancy: Care Instructions.\"  Current as of: June 13, 2023               Content Version: 13.8    0052-7260 [employer].   Care instructions adapted under license by your healthcare professional. If you have questions about a medical condition or this instruction, always ask your healthcare professional. [employer] disclaims any warranty or liability for your use of this information.      Circumcision in Infants: What to Expect at Home  Your Child's Recovery  After circumcision, your baby's penis may look red and swollen. It may have petroleum jelly and gauze on it. The gauze will likely come off when your baby urinates. Follow your doctor's directions about whether to put clean gauze back on your baby's penis or to leave the gauze off. If you need to remove gauze from the penis, use warm water to soak the gauze and gently loosen it.  The doctor may have used a Plastibell device to do the " circumcision. If so, your baby will have a plastic ring around the head of the penis. The ring should fall off by itself in 10 to 12 days.  A thin, yellow film may form over the area the day after the procedure. This is part of the normal healing process. It should go away in a few days.  Your baby may seem fussy while the area heals. It may hurt for your baby to urinate. This pain often gets better in 3 or 4 days. But it may last for up to 2 weeks.  Even though your baby's penis will likely start to feel better after 3 or 4 days, it may look worse. The penis often starts to look like it's getting better after about 7 to 10 days.  This care sheet gives you a general idea about how long it will take for your child to recover. But each child recovers at a different pace. Follow the steps below to help your child get better as quickly as possible.  How can you care for your child at home?  Activity    Let your baby rest as much as possible. Sleeping will help with recovery.     You can give your baby a sponge bath the day after surgery. Ask your doctor when it is okay to give your baby a bath.   Medicines    Your doctor will tell you if and when your child can restart any medicines. The doctor will also give you instructions about your child taking any new medicines.     Your doctor may recommend giving your baby acetaminophen (Tylenol) to help with pain after the procedure. Be safe with medicines. Give your child medicines exactly as prescribed. Call your doctor if you think your child is having a problem with a medicine.     Do not give your child two or more pain medicines at the same time unless the doctor told you to. Many pain medicines have acetaminophen, which is Tylenol. Too much acetaminophen (Tylenol) can be harmful.   Circumcision care    Always wash your hands before and after touching the circumcision area.     Gently wash your baby's penis with plain, warm water after each diaper change, and pat it dry.  "Do not use soap. Don't use hydrogen peroxide or alcohol. They can slow healing.     Do not try to remove the film that forms on the penis. The film will go away on its own.     Put plenty of petroleum jelly (such as Vaseline) on the circumcision area during each diaper change. This will prevent your baby's penis from sticking to the diaper while it heals.     Fasten your baby's diapers loosely so that there is less pressure on the penis while it heals.   Follow-up care is a key part of your child's treatment and safety. Be sure to make and go to all appointments, and call your doctor if your child is having problems. It's also a good idea to know your child's test results and keep a list of the medicines your child takes.  When should you call for help?   Call your doctor now or seek immediate medical care if:    Your baby has a fever over 100.4 F.     Your baby is extremely fussy or irritable, has a high-pitched cry, or refuses to eat.     Your baby does not have a wet diaper within 12 hours after the circumcision.     You find a spot of bleeding larger than a 2-inch [place] from the incision.     Your baby has signs of infection. Signs may include severe swelling; redness; a red streak on the shaft of the penis; or a thick, yellow discharge.   Watch closely for changes in your child's health, and be sure to contact your doctor if:    A Plastibell device was used for the circumcision and the ring has not fallen off after 10 to 12 days.   Where can you learn more?  Go to https://www.[employer].net/patiented  Enter S255 in the search box to learn more about \"Circumcision in Infants: What to Expect at Home.\"  Current as of: February 28, 2023               Content Version: 13.8    5716-2021 [employer].   Care instructions adapted under license by your healthcare professional. If you have questions about a medical condition or this instruction, always ask your healthcare professional. [employer] disclaims any warranty or liability for your use of this information.

## 2024-01-12 NOTE — PROGRESS NOTES
"Subjective:     38 year old  at 35w0d presents for a routine prenatal appointment.  No vaginal bleeding,  leakage of fluid, or change in vaginal discharge.  No contractions.  Good fetal movement.       No HA, visual changes, RUQ or epigastric pain.     Patient concerns: Feeling well overall.   - 1/3/24 US for s<d showed 28th percentile. Son measured small for dates too and was born 6lb 14oz, now is big for his age.   - Reviewed GBS swab and CBC next week  - RSV done today   - Discussed recommended postpartum medications- acetaminophen, ibuprofen and senna-docusate. Reviewed no use of ibuprofen during pregnancy. Pt accepts rx    Objective:  Vitals:    24 1251   BP: 112/75   Pulse: 67   Weight: 56.7 kg (125 lb)   Height: 1.6 m (5' 3\")    See OB flowsheet    Fundal heights: 30.5cm at 35w0d. Was 27cm at 32w5d.       Assessment/Plan     Encounter Diagnoses   Name Primary?    Normal pregnancy in first trimester Yes    Uterine size-date discrepancy in third trimester, 1/3 US 2th percentile     Abnormal genetic test during pregnancy      No orders of the defined types were placed in this encounter.    Orders Placed This Encounter   Medications    acetaminophen (TYLENOL) 325 MG tablet     Sig: Take 2 tablets (650 mg) by mouth every 6 hours as needed for mild pain Start after Delivery.     Dispense:  100 tablet     Refill:  0    ibuprofen (ADVIL/MOTRIN) 600 MG tablet     Sig: Take 1 tablet (600 mg) by mouth every 6 hours as needed for moderate pain Start after delivery     Dispense:  60 tablet     Refill:  0    senna-docusate (SENOKOT-S/PERICOLACE) 8.6-50 MG tablet     Sig: Take 1 tablet by mouth daily Start after delivery.     Dispense:  100 tablet     Refill:  0    respiratory syncytial virus vaccine, bivalent (ABRYSVO) injection 0.5 mL           2022    10:22 AM 2022     2:09 PM 2023    10:38 AM   PHQ-9 SCORE   PHQ-9 Total Score 5 6 4         2022    10:22 AM 2022     2:09 PM 2023    " 10:38 AM   PHQ   PHQ-9 Total Score 5 6 4   Q9: Thoughts of better off dead/self-harm past 2 weeks Not at all Not at all Not at all       GBS screening: next week  Labor signs discussed.   Reinforced daily fetal movement counts.  Reviewed why/how to contact provider if headache/visual changes/RUQ or epigastric pain, decreased fetal movement, vaginal bleeding, leakage of fluid.      Return for visit in 1 week  Call prn if questions or concerns.     DRISS Rutledge CNM

## 2024-01-18 ENCOUNTER — VIRTUAL VISIT (OUTPATIENT)
Dept: PSYCHIATRY | Facility: CLINIC | Age: 39
End: 2024-01-18
Attending: PSYCHIATRY & NEUROLOGY
Payer: COMMERCIAL

## 2024-01-18 ENCOUNTER — OFFICE VISIT (OUTPATIENT)
Dept: URGENT CARE | Facility: URGENT CARE | Age: 39
End: 2024-01-18
Payer: COMMERCIAL

## 2024-01-18 VITALS
WEIGHT: 121 LBS | DIASTOLIC BLOOD PRESSURE: 76 MMHG | SYSTOLIC BLOOD PRESSURE: 118 MMHG | HEART RATE: 70 BPM | RESPIRATION RATE: 18 BRPM | BODY MASS INDEX: 21.43 KG/M2 | OXYGEN SATURATION: 100 % | TEMPERATURE: 97.2 F

## 2024-01-18 DIAGNOSIS — J06.9 VIRAL URI: Primary | ICD-10-CM

## 2024-01-18 DIAGNOSIS — J02.9 SORE THROAT: ICD-10-CM

## 2024-01-18 DIAGNOSIS — F90.0 ADHD (ATTENTION DEFICIT HYPERACTIVITY DISORDER), INATTENTIVE TYPE: Primary | ICD-10-CM

## 2024-01-18 LAB
DEPRECATED S PYO AG THROAT QL EIA: NEGATIVE
FLUAV AG SPEC QL IA: NEGATIVE
FLUBV AG SPEC QL IA: NEGATIVE
GROUP A STREP BY PCR: NOT DETECTED

## 2024-01-18 PROCEDURE — 99213 OFFICE O/P EST LOW 20 MIN: CPT | Performed by: STUDENT IN AN ORGANIZED HEALTH CARE EDUCATION/TRAINING PROGRAM

## 2024-01-18 PROCEDURE — 99214 OFFICE O/P EST MOD 30 MIN: CPT | Mod: 95 | Performed by: PSYCHIATRY & NEUROLOGY

## 2024-01-18 PROCEDURE — 87651 STREP A DNA AMP PROBE: CPT | Performed by: STUDENT IN AN ORGANIZED HEALTH CARE EDUCATION/TRAINING PROGRAM

## 2024-01-18 PROCEDURE — 87804 INFLUENZA ASSAY W/OPTIC: CPT

## 2024-01-18 PROCEDURE — 87635 SARS-COV-2 COVID-19 AMP PRB: CPT | Performed by: STUDENT IN AN ORGANIZED HEALTH CARE EDUCATION/TRAINING PROGRAM

## 2024-01-18 RX ORDER — DEXTROAMPHETAMINE SACCHARATE, AMPHETAMINE ASPARTATE, DEXTROAMPHETAMINE SULFATE AND AMPHETAMINE SULFATE 2.5; 2.5; 2.5; 2.5 MG/1; MG/1; MG/1; MG/1
10 TABLET ORAL 2 TIMES DAILY
Qty: 60 TABLET | Refills: 0 | Status: ON HOLD | OUTPATIENT
Start: 2024-02-09 | End: 2024-02-06

## 2024-01-18 RX ORDER — DEXTROAMPHETAMINE SACCHARATE, AMPHETAMINE ASPARTATE, DEXTROAMPHETAMINE SULFATE AND AMPHETAMINE SULFATE 2.5; 2.5; 2.5; 2.5 MG/1; MG/1; MG/1; MG/1
10 TABLET ORAL 2 TIMES DAILY
Qty: 60 TABLET | Refills: 0 | Status: ON HOLD | OUTPATIENT
Start: 2024-03-11 | End: 2024-02-06

## 2024-01-18 RX ORDER — DEXTROAMPHETAMINE SACCHARATE, AMPHETAMINE ASPARTATE, DEXTROAMPHETAMINE SULFATE AND AMPHETAMINE SULFATE 2.5; 2.5; 2.5; 2.5 MG/1; MG/1; MG/1; MG/1
10 TABLET ORAL 2 TIMES DAILY
Qty: 60 TABLET | Refills: 0 | Status: ON HOLD | OUTPATIENT
Start: 2024-04-11 | End: 2024-02-06

## 2024-01-18 ASSESSMENT — PAIN SCALES - GENERAL: PAINLEVEL: MODERATE PAIN (5)

## 2024-01-18 NOTE — PATIENT INSTRUCTIONS
Use of Medications During Pregnancy    Allergies/Nasal Congestion: loratadine (Claritin), cetirizine (Zyrtec), diphenhydramine (Benadryl), saline nasal spray, Afrin nasal spray (do not use for more than 3 days), Sudafed    Headache/Fever: acetaminophen (Tylenol). *Report any fever over 100.4 or higher headache not relieved with Tylenol*    Sore Throat: Tylenol, throat lozenges (avoid menthol), salt water gargles    **DO NOT TAKE aspirin, ibuprofen products (motrin, Advil, Ketoralac, aleve), kaopectate, Pepto Bismol**

## 2024-01-18 NOTE — PROGRESS NOTES
ASSESSMENT & PLAN:   Diagnoses and all orders for this visit:  Viral URI  Sore throat  -     Streptococcus A Rapid Screen w/Reflex to PCR - Clinic Collect  -     Symptomatic COVID-19 Virus (Coronavirus) by PCR Nose  -     Influenza A & B Antigen - Clinic Collect  -     Group A Streptococcus PCR Throat Swab    URI symptoms x 3 days. Likely viral. Rapid strep negative, PCR pending. Influenza negative. COVID test pending. Currently 36 weeks pregnant. Supportive treatments discussed.    At the end of the encounter, I discussed results, diagnosis, medications. Discussed red flags for immediate return to clinic/ER, as well as indications for follow up if no improvement. Patient and/or caregiver understood and agreed to plan. Patient was stable for discharge.    Patient Instructions   Use of Medications During Pregnancy    Allergies/Nasal Congestion: loratadine (Claritin), cetirizine (Zyrtec), diphenhydramine (Benadryl), saline nasal spray, Afrin nasal spray (do not use for more than 3 days), Sudafed    Headache/Fever: acetaminophen (Tylenol). *Report any fever over 100.4 or higher headache not relieved with Tylenol*    Sore Throat: Tylenol, throat lozenges (avoid menthol), salt water gargles    **DO NOT TAKE aspirin, ibuprofen products (motrin, Advil, Ketoralac, aleve), kaopectate, Pepto Bismol**      ------------------------------------------------------------------------  SUBJECTIVE  History was obtained from patient.    Patient presents with:  Urgent Care  Pharyngitis: Sever throat pain since Monday.   Sinus Problem    HPI  Arlet Segura is a(n) 38 year old female presenting to urgent care for sore throat x 3 days. Also has congestion and low-grade fever. No cough. Son was sick earlier this week with pinkeye and cold symptoms.    Review of Systems    Current Outpatient Medications   Medication Sig Dispense Refill    acetaminophen (TYLENOL) 325 MG tablet Take 2 tablets (650 mg) by mouth every 6 hours as needed for  mild pain Start after Delivery. 100 tablet 0    amphetamine-dextroamphetamine (ADDERALL) 10 MG tablet Take 1 tablet (10 mg) by mouth 2 times daily 60 tablet 0    Prenatal Vit-Fe Fumarate-FA (PRENATAL MULTIVITAMIN PLUS IRON) 27-0.8 MG TABS per tablet Take 1 tablet by mouth daily      [START ON 2024] amphetamine-dextroamphetamine (ADDERALL) 10 MG tablet Take 1 tablet (10 mg) by mouth 2 times daily for 30 days 60 tablet 0    [START ON 3/11/2024] amphetamine-dextroamphetamine (ADDERALL) 10 MG tablet Take 1 tablet (10 mg) by mouth 2 times daily 60 tablet 0    [START ON 2024] amphetamine-dextroamphetamine (ADDERALL) 10 MG tablet Take 1 tablet (10 mg) by mouth 2 times daily 60 tablet 0    ibuprofen (ADVIL/MOTRIN) 600 MG tablet Take 1 tablet (600 mg) by mouth every 6 hours as needed for moderate pain Start after delivery 60 tablet 0    miconazole (MICONAZOLE 7) 100 MG vaginal suppository Place 1 suppository (100 mg) vaginally at bedtime (Patient not taking: Reported on 2023) 7 suppository 0    senna-docusate (SENOKOT-S/PERICOLACE) 8.6-50 MG tablet Take 1 tablet by mouth daily Start after delivery. 100 tablet 0     Problem List:  2023: Uterine size-date discrepancy in third trimester, 1/3 US 2th   percentile  2023: Abnormal genetic test during pregnancy  2023: Normal pregnancy in first trimester  2023: Laceration of labia majora, subsequent encounter  2023: Screening examination for lead poisoning  2023: Family history of diabetes mellitus  2023: Multigravida of advanced maternal age in first trimester  2021: Pyogenic granuloma  2021: Encounter for triage in pregnant patient  2021: Labor and delivery, indication for care  2021:  (normal spontaneous vaginal delivery)  2021: Uterine size date discrepancy pregnancy, unspecified   trimester  2021: Left ovarian cyst  2021: Change in mole  2021: Normal first pregnancy confirmed, antepartum  2019: H/O LEEP  2017:  Raynaud's disease without gangrene  2017-05: Hx of bulimia nervosa  2017-04: ADHD (attention deficit hyperactivity disorder), inattentive   type    No Known Allergies      OBJECTIVE  Vitals:    01/18/24 1219   BP: 118/76   Pulse: 70   Resp: 18   Temp: 97.2  F (36.2  C)   TempSrc: Temporal   SpO2: 100%   Weight: 54.9 kg (121 lb)     Physical Exam   GENERAL: healthy, alert, no acute distress.   PSYCH: mentation appears normal. Normal affect  HEAD: normocephalic, atraumatic.  EYE: PERRL. EOMs intact. No scleral injection bilaterally.   EAR: external ear normal. Bilateral ear canals normal and nonpainful. Bilateral TM intact, pearly, translucent without bulging.  NOSE: external nose atraumatic without lesions.  OROPHARYNX: moist mucous membranes. Posterior oropharynx erythematous. No exudate. Uvula midline. Patent airway.  LUNGS: no increased work of breathing. Clear lung sounds bilaterally. No wheezing, rhonchi, or rales.   CV: regular rate and rhythm. No clicks, murmurs, or rubs.    Results for orders placed or performed in visit on 01/18/24   Streptococcus A Rapid Screen w/Reflex to PCR - Clinic Collect     Status: Normal    Specimen: Throat; Swab   Result Value Ref Range    Group A Strep antigen Negative Negative   Influenza A & B Antigen - Clinic Collect     Status: Normal    Specimen: Nose; Swab   Result Value Ref Range    Influenza A antigen Negative Negative    Influenza B antigen Negative Negative    Narrative    Test results must be correlated with clinical data. If necessary, results should be confirmed by a molecular assay or viral culture.
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
## 2024-01-18 NOTE — PATIENT INSTRUCTIONS
PLAN                                                                                                       1) Meds:   CONTINUE Adderall 10mg twice daily. Sent three additional refills.      2) Other: None today.     3) RTC:  In two months.     4) CRISIS Numbers:      **For crisis resources, please see the information at the end of this document**     Patient Education      Thank you for coming to the [facility] [place] [facility].     Lab Testing:  If you had lab testing today and your results are reassuring or normal they will be mailed to you or sent through [employer] within 7 days. If the lab tests need quick action we will call you with the results. The phone number we will call with results is # [phone]. If this is not the best number please call our clinic and change the number.     Medication Refills:  If you need any refills please call your pharmacy and they will contact us. Our fax number for refills is [phone].   Three business days of notice are needed for general medication refill requests.   Five business days of notice are needed for controlled substance refill requests.   If you need to change to a different pharmacy, please contact the new pharmacy directly. The new pharmacy will help you get your medications transferred.     Contact Us:  Please call [phone] during business hours (8-5:00 M-F).   If you have medication related questions after clinic hours, or on the weekend, please call [phone].     Financial Assistance [phone]   Medical Records [phone]       MENTAL HEALTH CRISIS RESOURCES:  For a emergency help, please call 911 or go to the nearest Emergency Department.     Emergency Walk-In Options:   EmPATH Unit @ [facility] ([place]): [phone] - Specialized mental health emergency area designed to be calming  [facility] ([place]): [phone]  [facility] ([place]):  [phone]  [facility] ([place]): [phone]    [facility] Crisis Information:   [place]: [phone]  [place]: [phone]  [place] ZHAO) - Adult: [phone]     Child: [phone]  [place] - Adult: [phone]     Child: [phone]  [place]: [phone]  List of all [facility] resources:   https://[place].[facility]/dhs/people-we-serve/adults/health-care/mental-health/resources/crisis-contacts.jsp    National Crisis Information:   Crisis Text Line: Text  [place]  to 128877  Suicide & Crisis Lifeline: 988  National Suicide Prevention Lifeline: 0-144-938-TALK (1-922.682.6844)       For online chat options, visit https://suicidepreventionlifeline.org/chat/  Poison Control Center: 1-756.848.6479  Trans Lifeline: 8-454-458-8164 - Hotline for transgender people of all ages  The [employer]: 3-462-872-7565 - Hotline for LGBT youth     For Non-Emergency Support:   Fast Tracker: Mental Health & Substance Use Disorder Resources -   https://www.[place].org/

## 2024-01-18 NOTE — NURSING NOTE
Is the patient currently in the state of [place]? YES    Visit mode:VIDEO    If the visit is dropped, the patient can be reconnected by: VIDEO VISIT: Send to e-mail at: [email or web address]    Will anyone else be joining the visit? NO  (If patient encounters technical issues they should call [phone])    How would you like to obtain your AVS? MyChart    Are changes needed to the allergy or medication list? No    Reason for visit: RECHKERON MAST

## 2024-01-19 LAB — SARS-COV-2 RNA RESP QL NAA+PROBE: NEGATIVE

## 2024-01-23 NOTE — PATIENT INSTRUCTIONS
"Week 37 of Your Pregnancy: Care Instructions    Most babies are born between 37 and 40 weeks.   This is a good time to pack a bag to take with you to the birth. Then it will be ready to go when you are.     Learn about breastfeeding.  For example, find out about ways to hold your baby to make breastfeeding easier. And think about learning how to pump and store milk.     Know that crying is normal.  It's common for babies to cry 1 to 3 hours a day. Some cry more, and some cry less.     Learn why babies cry.  They may be hungry; have gas; need a diaper change; or feel cold, warm, tired, lonely, or tense. Sometimes they cry for unknown reasons.     Think about what will help you stay calm when your baby cries.  Taking slow, deep breaths can help. So can taking a break. It's okay to put your baby somewhere safe (like their crib) and walk away for a few minutes.     Learn about safe sleep for your baby.  Always put your baby to sleep on their back. Place them alone in a crib or bassinet with a firm, flat surface. Keep soft items like stuffed animals out of the crib.     Learn what to expect with  poop.  Your baby will have their own bowel patterns. Some babies have several bowel movements a day. Some have fewer.     Know that  babies will often have loose, yellow bowel movements.  Formula-fed babies have more formed stools. If your baby's poop looks like pellets, your baby is constipated.   Follow-up care is a key part of your treatment and safety. Be sure to make and go to all appointments, and call your doctor if you are having problems. It's also a good idea to know your test results and keep a list of the medicines you take.  Where can you learn more?  Go to https://www.[employer].net/patiented  Enter N257 in the search box to learn more about \"Week 37 of Your Pregnancy: Care Instructions.\"  Current as of: 2023               Content Version: 13.8    2818-8952 [employer].   Care " instructions adapted under license by your healthcare professional. If you have questions about a medical condition or this instruction, always ask your healthcare professional. [employer] disclaims any warranty or liability for your use of this information.

## 2024-01-24 ENCOUNTER — PRENATAL OFFICE VISIT (OUTPATIENT)
Dept: OBGYN | Facility: CLINIC | Age: 39
End: 2024-01-24
Payer: COMMERCIAL

## 2024-01-24 VITALS
SYSTOLIC BLOOD PRESSURE: 122 MMHG | HEART RATE: 65 BPM | BODY MASS INDEX: 21.62 KG/M2 | DIASTOLIC BLOOD PRESSURE: 84 MMHG | WEIGHT: 122 LBS | HEIGHT: 63 IN

## 2024-01-24 DIAGNOSIS — O09.523 MULTIGRAVIDA OF ADVANCED MATERNAL AGE IN THIRD TRIMESTER: Primary | ICD-10-CM

## 2024-01-24 PROCEDURE — 99207 PR PRENATAL VISIT: CPT | Performed by: ADVANCED PRACTICE MIDWIFE

## 2024-01-24 PROCEDURE — 87653 STREP B DNA AMP PROBE: CPT | Performed by: ADVANCED PRACTICE MIDWIFE

## 2024-01-24 PROCEDURE — 99211 OFF/OP EST MAY X REQ PHY/QHP: CPT | Performed by: ADVANCED PRACTICE MIDWIFE

## 2024-01-24 ASSESSMENT — PAIN SCALES - GENERAL: PAINLEVEL: NO PAIN (0)

## 2024-01-24 NOTE — PROGRESS NOTES
"Subjective:      38 year old  at 36w5d presents for a routine prenatal appointment.    Denies cramping/contractions, vaginal bleeding, discharge or leakage of fluid. Reports +fetal movement.  No HA, vision changes, ruq/epigastric pain.      Patient concerns: Feeling much better today. Reports she was really sick last week- very sore throat, congestion, difficult to eat and drink. Went to urgent care- tests all negative.    Objective:  Vitals:    24 0906   BP: 122/84   Pulse: 65   Weight: 55.3 kg (122 lb)   Height: 1.6 m (5' 3\")        See OB flowsheet    Assessment/Plan     Encounter Diagnosis   Name Primary?    Multigravida of advanced maternal age in third trimester Yes     Orders Placed This Encounter   Procedures    US OB Fetal Biophys Prf wo NonStrs Singls Sgl    CBC with Platelets    Group B strep PCR         Labor signs discussed. Reinforced daily fetal movement counts.  Reviewed why/how to contact provider if headache/visual changes/RUQ or epigastric pain, decreased fetal movement, vaginal bleeding, leakage of fluid.    - GBS obtained- self collect.  - CBC with plts ordered.  - PP OTC meds were sent last week.  -  Needs BPP with 40 week appt.  Order placed.     Continue scheduled prenatal care, RTC in 1 week and prn if questions or concerns.      DRISS Caldwell, STACEY   "

## 2024-01-25 LAB — GP B STREP DNA SPEC QL NAA+PROBE: NEGATIVE

## 2024-02-02 ENCOUNTER — PRENATAL OFFICE VISIT (OUTPATIENT)
Dept: OBGYN | Facility: CLINIC | Age: 39
End: 2024-02-02
Attending: STUDENT IN AN ORGANIZED HEALTH CARE EDUCATION/TRAINING PROGRAM
Payer: COMMERCIAL

## 2024-02-02 ENCOUNTER — LAB (OUTPATIENT)
Dept: LAB | Facility: CLINIC | Age: 39
End: 2024-02-02
Attending: STUDENT IN AN ORGANIZED HEALTH CARE EDUCATION/TRAINING PROGRAM
Payer: COMMERCIAL

## 2024-02-02 VITALS
SYSTOLIC BLOOD PRESSURE: 130 MMHG | WEIGHT: 124 LBS | BODY MASS INDEX: 21.97 KG/M2 | HEART RATE: 72 BPM | DIASTOLIC BLOOD PRESSURE: 89 MMHG

## 2024-02-02 DIAGNOSIS — O28.5 ABNORMAL GENETIC TEST DURING PREGNANCY: Primary | ICD-10-CM

## 2024-02-02 DIAGNOSIS — O09.523 MULTIGRAVIDA OF ADVANCED MATERNAL AGE IN THIRD TRIMESTER: ICD-10-CM

## 2024-02-02 LAB
ERYTHROCYTE [DISTWIDTH] IN BLOOD BY AUTOMATED COUNT: 12.4 % (ref 10–15)
HCT VFR BLD AUTO: 34.8 % (ref 35–47)
HGB BLD-MCNC: 11.7 G/DL (ref 11.7–15.7)
MCH RBC QN AUTO: 31 PG (ref 26.5–33)
MCHC RBC AUTO-ENTMCNC: 33.6 G/DL (ref 31.5–36.5)
MCV RBC AUTO: 92 FL (ref 78–100)
PLATELET # BLD AUTO: 361 10E3/UL (ref 150–450)
RBC # BLD AUTO: 3.78 10E6/UL (ref 3.8–5.2)
WBC # BLD AUTO: 8.8 10E3/UL (ref 4–11)

## 2024-02-02 PROCEDURE — 99207 PR PRENATAL VISIT: CPT | Mod: GC | Performed by: STUDENT IN AN ORGANIZED HEALTH CARE EDUCATION/TRAINING PROGRAM

## 2024-02-02 PROCEDURE — 99213 OFFICE O/P EST LOW 20 MIN: CPT | Performed by: STUDENT IN AN ORGANIZED HEALTH CARE EDUCATION/TRAINING PROGRAM

## 2024-02-02 PROCEDURE — 36415 COLL VENOUS BLD VENIPUNCTURE: CPT

## 2024-02-02 PROCEDURE — 85027 COMPLETE CBC AUTOMATED: CPT

## 2024-02-02 ASSESSMENT — PAIN SCALES - GENERAL: PAINLEVEL: NO PAIN (0)

## 2024-02-02 NOTE — PROGRESS NOTES
[facility]  Return OB Visit    S: Denies vaginal bleeding, vaginal discharge, LOF, contractions.  Some cramping, nothing intense, resolves with position changes. Reports good fetal movement. Denies headache, vision changes, RUQ pain, chest pain, SOB.    Pregnancy notable for  - high risk NIPT  - amniocentesis@ 17w2d- nl karyotype and microarray     O: LMP 2023 (Exact Date)   Weight gain: 6.985 kg (15 lb 6.4 oz)    Gen: Well-appearing, NAD    Fundal Height:  36  FHR: 138    A/P:  Arlet Segura is a 38 year old  at 38w0d by 8w0d US, here for return OB visit.    Prenatal care  - Rh pos, jeannine neg, rubella imm, infectious labs within normal limits  - Early , 28 wk   - S/p flu vaccine, covid, RSV, Tdap vaccines  - CBC, vit D, wnl at EOB visit  - BPP at 40 wk if still pregnant    Genetic screening &  diagnosis  - Normal NT, NIPT high risk , AFP screen negative  - Normal amniocentesis  - Level 2 US normal    Birth and postpartum preferences  - Declines 39w IOL, went into labor the day before scheduled late-term IOL last visit   - Discussed when to present to triage, labor warning signs  - Plans breastfeeding  - Discussed pediatrician plans, patient is interested in continuing with her current one for her prior child  - Post-partum contraception condoms    Return to clinic in 1 weeks. Discussed return precautions.    Staffed with Dr. Dean Fontanez MD  Obstetrics & Gynecology, PGY-1  2024 12:15 PM    I examined Arlet Segura with Dr. Fontanez and agree with the presentation, exam and plan of care documented in this note with edits by me.   Hillary Moran MD

## 2024-02-05 ENCOUNTER — ANESTHESIA (OUTPATIENT)
Dept: OBGYN | Facility: CLINIC | Age: 39
End: 2024-02-05
Payer: COMMERCIAL

## 2024-02-05 ENCOUNTER — HOSPITAL ENCOUNTER (INPATIENT)
Facility: CLINIC | Age: 39
LOS: 1 days | Discharge: HOME | End: 2024-02-06
Attending: REGISTERED NURSE | Admitting: REGISTERED NURSE
Payer: COMMERCIAL

## 2024-02-05 ENCOUNTER — ANESTHESIA EVENT (OUTPATIENT)
Dept: OBGYN | Facility: CLINIC | Age: 39
End: 2024-02-05
Payer: COMMERCIAL

## 2024-02-05 LAB
ABO/RH(D): NORMAL
ANTIBODY SCREEN: NEGATIVE
ERYTHROCYTE [DISTWIDTH] IN BLOOD BY AUTOMATED COUNT: 12.4 % (ref 10–15)
HCT VFR BLD AUTO: 35.8 % (ref 35–47)
HGB BLD-MCNC: 11.9 G/DL (ref 11.7–15.7)
HGB BLD-MCNC: 12.3 G/DL (ref 11.7–15.7)
MCH RBC QN AUTO: 30.6 PG (ref 26.5–33)
MCHC RBC AUTO-ENTMCNC: 34.4 G/DL (ref 31.5–36.5)
MCV RBC AUTO: 89 FL (ref 78–100)
PLATELET # BLD AUTO: 329 10E3/UL (ref 150–450)
RBC # BLD AUTO: 4.02 10E6/UL (ref 3.8–5.2)
SPECIMEN EXPIRATION DATE: NORMAL
T PALLIDUM AB SER QL: NONREACTIVE
WBC # BLD AUTO: 10.7 10E3/UL (ref 4–11)

## 2024-02-05 PROCEDURE — 85018 HEMOGLOBIN: CPT | Performed by: REGISTERED NURSE

## 2024-02-05 PROCEDURE — 370N000003 HC ANESTHESIA WARD SERVICE: Performed by: ANESTHESIOLOGY

## 2024-02-05 PROCEDURE — 250N000011 HC RX IP 250 OP 636

## 2024-02-05 PROCEDURE — 85027 COMPLETE CBC AUTOMATED: CPT | Performed by: REGISTERED NURSE

## 2024-02-05 PROCEDURE — 258N000003 HC RX IP 258 OP 636

## 2024-02-05 PROCEDURE — 86900 BLOOD TYPING SEROLOGIC ABO: CPT | Performed by: REGISTERED NURSE

## 2024-02-05 PROCEDURE — 36415 COLL VENOUS BLD VENIPUNCTURE: CPT | Performed by: REGISTERED NURSE

## 2024-02-05 PROCEDURE — 250N000013 HC RX MED GY IP 250 OP 250 PS 637: Performed by: REGISTERED NURSE

## 2024-02-05 PROCEDURE — 0UQMXZZ REPAIR VULVA, EXTERNAL APPROACH: ICD-10-PCS | Performed by: REGISTERED NURSE

## 2024-02-05 PROCEDURE — 722N000001 HC LABOR CARE VAGINAL DELIVERY SINGLE

## 2024-02-05 PROCEDURE — 250N000009 HC RX 250

## 2024-02-05 PROCEDURE — 59400 OBSTETRICAL CARE: CPT | Performed by: REGISTERED NURSE

## 2024-02-05 PROCEDURE — 258N000003 HC RX IP 258 OP 636: Performed by: REGISTERED NURSE

## 2024-02-05 PROCEDURE — 120N000002 HC R&B MED SURG/OB UMMC

## 2024-02-05 PROCEDURE — 0KQM0ZZ REPAIR PERINEUM MUSCLE, OPEN APPROACH: ICD-10-PCS | Performed by: REGISTERED NURSE

## 2024-02-05 PROCEDURE — 86780 TREPONEMA PALLIDUM: CPT | Performed by: REGISTERED NURSE

## 2024-02-05 PROCEDURE — 250N000011 HC RX IP 250 OP 636: Performed by: REGISTERED NURSE

## 2024-02-05 RX ORDER — OXYTOCIN 10 [USP'U]/ML
10 INJECTION, SOLUTION INTRAMUSCULAR; INTRAVENOUS
Status: DISCONTINUED | OUTPATIENT
Start: 2024-02-05 | End: 2024-02-06 | Stop reason: HOSPADM

## 2024-02-05 RX ORDER — MISOPROSTOL 200 UG/1
400 TABLET ORAL
Status: DISCONTINUED | OUTPATIENT
Start: 2024-02-05 | End: 2024-02-06 | Stop reason: HOSPADM

## 2024-02-05 RX ORDER — METOCLOPRAMIDE 10 MG/1
10 TABLET ORAL EVERY 6 HOURS PRN
Status: DISCONTINUED | OUTPATIENT
Start: 2024-02-05 | End: 2024-02-05

## 2024-02-05 RX ORDER — KETOROLAC TROMETHAMINE 30 MG/ML
30 INJECTION, SOLUTION INTRAMUSCULAR; INTRAVENOUS
Status: DISCONTINUED | OUTPATIENT
Start: 2024-02-05 | End: 2024-02-06 | Stop reason: HOSPADM

## 2024-02-05 RX ORDER — NALBUPHINE HYDROCHLORIDE 20 MG/ML
2.5-5 INJECTION, SOLUTION INTRAMUSCULAR; INTRAVENOUS; SUBCUTANEOUS EVERY 6 HOURS PRN
Status: DISCONTINUED | OUTPATIENT
Start: 2024-02-05 | End: 2024-02-05

## 2024-02-05 RX ORDER — LOPERAMIDE HCL 2 MG
4 CAPSULE ORAL
Status: DISCONTINUED | OUTPATIENT
Start: 2024-02-05 | End: 2024-02-05

## 2024-02-05 RX ORDER — BISACODYL 10 MG
10 SUPPOSITORY, RECTAL RECTAL DAILY PRN
Status: DISCONTINUED | OUTPATIENT
Start: 2024-02-05 | End: 2024-02-06 | Stop reason: HOSPADM

## 2024-02-05 RX ORDER — LOPERAMIDE HCL 2 MG
2 CAPSULE ORAL
Status: DISCONTINUED | OUTPATIENT
Start: 2024-02-05 | End: 2024-02-06 | Stop reason: HOSPADM

## 2024-02-05 RX ORDER — IBUPROFEN 800 MG/1
800 TABLET, FILM COATED ORAL EVERY 6 HOURS PRN
Status: DISCONTINUED | OUTPATIENT
Start: 2024-02-05 | End: 2024-02-06 | Stop reason: HOSPADM

## 2024-02-05 RX ORDER — ERYTHROMYCIN 5 MG/G
OINTMENT OPHTHALMIC
Status: DISCONTINUED
Start: 2024-02-05 | End: 2024-02-05 | Stop reason: HOSPADM

## 2024-02-05 RX ORDER — ONDANSETRON 4 MG/1
4 TABLET, ORALLY DISINTEGRATING ORAL EVERY 6 HOURS PRN
Status: DISCONTINUED | OUTPATIENT
Start: 2024-02-05 | End: 2024-02-05

## 2024-02-05 RX ORDER — METHYLERGONOVINE MALEATE 0.2 MG/ML
200 INJECTION INTRAVENOUS
Status: DISCONTINUED | OUTPATIENT
Start: 2024-02-05 | End: 2024-02-06 | Stop reason: HOSPADM

## 2024-02-05 RX ORDER — OXYTOCIN/0.9 % SODIUM CHLORIDE 30/500 ML
340 PLASTIC BAG, INJECTION (ML) INTRAVENOUS CONTINUOUS PRN
Status: DISCONTINUED | OUTPATIENT
Start: 2024-02-05 | End: 2024-02-05

## 2024-02-05 RX ORDER — ACETAMINOPHEN 325 MG/1
650 TABLET ORAL EVERY 4 HOURS PRN
Status: DISCONTINUED | OUTPATIENT
Start: 2024-02-05 | End: 2024-02-06 | Stop reason: HOSPADM

## 2024-02-05 RX ORDER — KETOROLAC TROMETHAMINE 30 MG/ML
30 INJECTION, SOLUTION INTRAMUSCULAR; INTRAVENOUS
Status: DISCONTINUED | OUTPATIENT
Start: 2024-02-05 | End: 2024-02-05

## 2024-02-05 RX ORDER — CARBOPROST TROMETHAMINE 250 UG/ML
250 INJECTION, SOLUTION INTRAMUSCULAR
Status: DISCONTINUED | OUTPATIENT
Start: 2024-02-05 | End: 2024-02-05

## 2024-02-05 RX ORDER — SODIUM CHLORIDE, SODIUM LACTATE, POTASSIUM CHLORIDE, CALCIUM CHLORIDE 600; 310; 30; 20 MG/100ML; MG/100ML; MG/100ML; MG/100ML
INJECTION, SOLUTION INTRAVENOUS
Status: COMPLETED
Start: 2024-02-05 | End: 2024-02-05

## 2024-02-05 RX ORDER — OXYTOCIN 10 [USP'U]/ML
10 INJECTION, SOLUTION INTRAMUSCULAR; INTRAVENOUS
Status: DISCONTINUED | OUTPATIENT
Start: 2024-02-05 | End: 2024-02-05

## 2024-02-05 RX ORDER — MISOPROSTOL 200 UG/1
400 TABLET ORAL
Status: DISCONTINUED | OUTPATIENT
Start: 2024-02-05 | End: 2024-02-05

## 2024-02-05 RX ORDER — LOPERAMIDE HCL 2 MG
4 CAPSULE ORAL
Status: DISCONTINUED | OUTPATIENT
Start: 2024-02-05 | End: 2024-02-06 | Stop reason: HOSPADM

## 2024-02-05 RX ORDER — OXYTOCIN/0.9 % SODIUM CHLORIDE 30/500 ML
PLASTIC BAG, INJECTION (ML) INTRAVENOUS
Status: COMPLETED
Start: 2024-02-05 | End: 2024-02-05

## 2024-02-05 RX ORDER — METOCLOPRAMIDE HYDROCHLORIDE 5 MG/ML
10 INJECTION INTRAMUSCULAR; INTRAVENOUS EVERY 6 HOURS PRN
Status: DISCONTINUED | OUTPATIENT
Start: 2024-02-05 | End: 2024-02-05

## 2024-02-05 RX ORDER — METHYLERGONOVINE MALEATE 0.2 MG/ML
200 INJECTION INTRAVENOUS
Status: DISCONTINUED | OUTPATIENT
Start: 2024-02-05 | End: 2024-02-05

## 2024-02-05 RX ORDER — LOPERAMIDE HCL 2 MG
2 CAPSULE ORAL
Status: DISCONTINUED | OUTPATIENT
Start: 2024-02-05 | End: 2024-02-05

## 2024-02-05 RX ORDER — PROCHLORPERAZINE MALEATE 10 MG
10 TABLET ORAL EVERY 6 HOURS PRN
Status: DISCONTINUED | OUTPATIENT
Start: 2024-02-05 | End: 2024-02-05

## 2024-02-05 RX ORDER — MISOPROSTOL 200 UG/1
800 TABLET ORAL
Status: DISCONTINUED | OUTPATIENT
Start: 2024-02-05 | End: 2024-02-06 | Stop reason: HOSPADM

## 2024-02-05 RX ORDER — LIDOCAINE HYDROCHLORIDE 10 MG/ML
INJECTION, SOLUTION EPIDURAL; INFILTRATION; INTRACAUDAL; PERINEURAL
Status: COMPLETED
Start: 2024-02-05 | End: 2024-02-05

## 2024-02-05 RX ORDER — MISOPROSTOL 200 UG/1
TABLET ORAL
Status: DISCONTINUED
Start: 2024-02-05 | End: 2024-02-05 | Stop reason: HOSPADM

## 2024-02-05 RX ORDER — NALOXONE HYDROCHLORIDE 0.4 MG/ML
0.2 INJECTION, SOLUTION INTRAMUSCULAR; INTRAVENOUS; SUBCUTANEOUS
Status: DISCONTINUED | OUTPATIENT
Start: 2024-02-05 | End: 2024-02-05

## 2024-02-05 RX ORDER — FENTANYL/ROPIVACAINE/NS/PF 2MCG/ML-.1
PLASTIC BAG, INJECTION (ML) EPIDURAL
Status: DISCONTINUED | OUTPATIENT
Start: 2024-02-05 | End: 2024-02-05

## 2024-02-05 RX ORDER — PHYTONADIONE 1 MG/.5ML
INJECTION, EMULSION INTRAMUSCULAR; INTRAVENOUS; SUBCUTANEOUS
Status: DISCONTINUED
Start: 2024-02-05 | End: 2024-02-05 | Stop reason: HOSPADM

## 2024-02-05 RX ORDER — SODIUM CHLORIDE, SODIUM LACTATE, POTASSIUM CHLORIDE, CALCIUM CHLORIDE 600; 310; 30; 20 MG/100ML; MG/100ML; MG/100ML; MG/100ML
INJECTION, SOLUTION INTRAVENOUS CONTINUOUS
Status: DISCONTINUED | OUTPATIENT
Start: 2024-02-05 | End: 2024-02-05

## 2024-02-05 RX ORDER — OXYTOCIN/0.9 % SODIUM CHLORIDE 30/500 ML
100-340 PLASTIC BAG, INJECTION (ML) INTRAVENOUS CONTINUOUS PRN
Status: DISCONTINUED | OUTPATIENT
Start: 2024-02-05 | End: 2024-02-05

## 2024-02-05 RX ORDER — DOCUSATE SODIUM 100 MG/1
100 CAPSULE, LIQUID FILLED ORAL DAILY
Status: DISCONTINUED | OUTPATIENT
Start: 2024-02-05 | End: 2024-02-06 | Stop reason: HOSPADM

## 2024-02-05 RX ORDER — ONDANSETRON 2 MG/ML
4 INJECTION INTRAMUSCULAR; INTRAVENOUS EVERY 6 HOURS PRN
Status: DISCONTINUED | OUTPATIENT
Start: 2024-02-05 | End: 2024-02-05

## 2024-02-05 RX ORDER — TRANEXAMIC ACID 10 MG/ML
1 INJECTION, SOLUTION INTRAVENOUS EVERY 30 MIN PRN
Status: DISCONTINUED | OUTPATIENT
Start: 2024-02-05 | End: 2024-02-05

## 2024-02-05 RX ORDER — NALOXONE HYDROCHLORIDE 0.4 MG/ML
0.4 INJECTION, SOLUTION INTRAMUSCULAR; INTRAVENOUS; SUBCUTANEOUS
Status: DISCONTINUED | OUTPATIENT
Start: 2024-02-05 | End: 2024-02-05

## 2024-02-05 RX ORDER — CITRIC ACID/SODIUM CITRATE 334-500MG
30 SOLUTION, ORAL ORAL
Status: DISCONTINUED | OUTPATIENT
Start: 2024-02-05 | End: 2024-02-05

## 2024-02-05 RX ORDER — FENTANYL CITRATE 50 UG/ML
100 INJECTION, SOLUTION INTRAMUSCULAR; INTRAVENOUS
Status: DISCONTINUED | OUTPATIENT
Start: 2024-02-05 | End: 2024-02-05

## 2024-02-05 RX ORDER — OXYTOCIN 10 [USP'U]/ML
INJECTION, SOLUTION INTRAMUSCULAR; INTRAVENOUS
Status: DISCONTINUED
Start: 2024-02-05 | End: 2024-02-05 | Stop reason: WASHOUT

## 2024-02-05 RX ORDER — CARBOPROST TROMETHAMINE 250 UG/ML
250 INJECTION, SOLUTION INTRAMUSCULAR
Status: DISCONTINUED | OUTPATIENT
Start: 2024-02-05 | End: 2024-02-06 | Stop reason: HOSPADM

## 2024-02-05 RX ORDER — TRANEXAMIC ACID 10 MG/ML
1 INJECTION, SOLUTION INTRAVENOUS EVERY 30 MIN PRN
Status: DISCONTINUED | OUTPATIENT
Start: 2024-02-05 | End: 2024-02-06 | Stop reason: HOSPADM

## 2024-02-05 RX ORDER — IBUPROFEN 800 MG/1
800 TABLET, FILM COATED ORAL
Status: DISCONTINUED | OUTPATIENT
Start: 2024-02-05 | End: 2024-02-05

## 2024-02-05 RX ORDER — FENTANYL CITRATE-0.9 % NACL/PF 10 MCG/ML
100 PLASTIC BAG, INJECTION (ML) INTRAVENOUS EVERY 5 MIN PRN
Status: DISCONTINUED | OUTPATIENT
Start: 2024-02-05 | End: 2024-02-05

## 2024-02-05 RX ORDER — OXYTOCIN/0.9 % SODIUM CHLORIDE 30/500 ML
340 PLASTIC BAG, INJECTION (ML) INTRAVENOUS CONTINUOUS PRN
Status: DISCONTINUED | OUTPATIENT
Start: 2024-02-05 | End: 2024-02-06 | Stop reason: HOSPADM

## 2024-02-05 RX ORDER — MISOPROSTOL 200 UG/1
800 TABLET ORAL
Status: DISCONTINUED | OUTPATIENT
Start: 2024-02-05 | End: 2024-02-05

## 2024-02-05 RX ORDER — MODIFIED LANOLIN
OINTMENT (GRAM) TOPICAL
Status: DISCONTINUED | OUTPATIENT
Start: 2024-02-05 | End: 2024-02-06 | Stop reason: HOSPADM

## 2024-02-05 RX ORDER — PROCHLORPERAZINE 25 MG
25 SUPPOSITORY, RECTAL RECTAL EVERY 12 HOURS PRN
Status: DISCONTINUED | OUTPATIENT
Start: 2024-02-05 | End: 2024-02-05

## 2024-02-05 RX ORDER — HYDROCORTISONE 25 MG/G
CREAM TOPICAL 3 TIMES DAILY PRN
Status: DISCONTINUED | OUTPATIENT
Start: 2024-02-05 | End: 2024-02-06 | Stop reason: HOSPADM

## 2024-02-05 RX ADMIN — Medication 340 ML/HR: at 03:14

## 2024-02-05 RX ADMIN — KETOROLAC TROMETHAMINE 30 MG: 30 INJECTION, SOLUTION INTRAMUSCULAR; INTRAVENOUS at 03:58

## 2024-02-05 RX ADMIN — LIDOCAINE HYDROCHLORIDE 3 ML: 10 INJECTION, SOLUTION EPIDURAL; INFILTRATION; INTRACAUDAL; PERINEURAL at 03:16

## 2024-02-05 RX ADMIN — IBUPROFEN 800 MG: 800 TABLET, FILM COATED ORAL at 16:27

## 2024-02-05 RX ADMIN — ACETAMINOPHEN 650 MG: 325 TABLET, FILM COATED ORAL at 08:08

## 2024-02-05 RX ADMIN — SODIUM CHLORIDE, POTASSIUM CHLORIDE, SODIUM LACTATE AND CALCIUM CHLORIDE 1000 ML: 600; 310; 30; 20 INJECTION, SOLUTION INTRAVENOUS at 01:16

## 2024-02-05 RX ADMIN — IBUPROFEN 800 MG: 800 TABLET, FILM COATED ORAL at 22:24

## 2024-02-05 RX ADMIN — Medication: at 02:02

## 2024-02-05 RX ADMIN — DOCUSATE SODIUM 100 MG: 100 CAPSULE, LIQUID FILLED ORAL at 08:09

## 2024-02-05 RX ADMIN — SODIUM CHLORIDE, POTASSIUM CHLORIDE, SODIUM LACTATE AND CALCIUM CHLORIDE: 600; 310; 30; 20 INJECTION, SOLUTION INTRAVENOUS at 02:55

## 2024-02-05 RX ADMIN — ACETAMINOPHEN 650 MG: 325 TABLET, FILM COATED ORAL at 16:28

## 2024-02-05 RX ADMIN — ACETAMINOPHEN 650 MG: 325 TABLET, FILM COATED ORAL at 12:07

## 2024-02-05 RX ADMIN — IBUPROFEN 800 MG: 800 TABLET, FILM COATED ORAL at 10:03

## 2024-02-05 RX ADMIN — ACETAMINOPHEN 650 MG: 325 TABLET, FILM COATED ORAL at 20:06

## 2024-02-05 ASSESSMENT — ACTIVITIES OF DAILY LIVING (ADL)
ADLS_ACUITY_SCORE: 18
ADLS_ACUITY_SCORE: 35
ADLS_ACUITY_SCORE: 18

## 2024-02-05 NOTE — ANESTHESIA PROCEDURE NOTES
"Epidural catheter Procedure Note    Pre-Procedure   Staff -        Anesthesiologist:  Kevon Lua MD       Resident/Fellow: Francisco Rizo MD       Performed By: resident       Location: OB       Pre-Anesthestic Checklist: patient identified, IV checked, risks and benefits discussed, informed consent, monitors and equipment checked, pre-op evaluation, at physician/surgeon's request and post-op pain management  Timeout:       Correct Patient: Yes        Correct Procedure: Yes        Correct Site: Yes        Correct Position: Yes   Procedure Documentation  Procedure: epidural catheter       Patient Position: sitting       Patient Prep/Sterile Barriers: sterile gloves, mask, patient draped       Skin prep: Chloraprep       Local skin infiltrated with 3 mL of 1% lidocaine.        Insertion Site: L4-5. (midline approach).       Technique: LORT saline        MANNIE at 4 cm.       Needle Type: ToPHHHOTO Incy needle       Needle Gauge: 17.        Needle Length (Inches): 3.5        Catheter: 19 G.          Catheter threaded easily.         4 cm epidural space.         Threaded 8 cm at skin.         # of attempts: 1 and  # of redirects:  0    Assessment/Narrative         Test dose of 3 mL lidocaine 1.5% w/ 1:200,000 epinephrine at 01:38 CST.         Test dose negative, 3 minutes after injection, for signs of intravascular, subdural, or intrathecal injection.       Insertion/Infusion Method: LORT saline       Aspiration negative for Heme or CSF via Epidural Catheter.      FOR [facility] ([facility] ONLY:   Pain Team Contact information: please page the Pain Team Via [employer]. Search \"Pain\". During daytime hours, please page the attending first. At night please page the resident first.      "

## 2024-02-05 NOTE — PLAN OF CARE
Goal Outcome Evaluation:         Shift [id number]    VSS. Voiding with assistance. Taking tylenol and ibuprofen as needed. Bonding well with infant in room. Patient is independentlybreast feeding followed by hand expressing every 2-3 hours.

## 2024-02-05 NOTE — H&P
ADMIT NOTE  =================  38w3d    Arlet Segura is a 38 year old female with an Patient's last menstrual period was 2023 (exact date). and Estimated Date of Delivery: 2024 is admitted to the [facility] on 2024 at 1:03 AM in active labor.     HPI  ================  Patient presents to labor and delivery reporting contractions with increasing intensity since .     Denies fever, cough, SOB or chest pain. Denies having contact with anyone who is Covid-19 positive. Pt has had Covid-19 Vaccinations.   Contractions- every 3-5 minutes  Fetal movement- active  ROM- no.  Vaginal bleeding- none  GBS- negative  FOB- is involved, Alex    Weight gain- 124 - 106 lbs, Total weight gain- 18 lbs  Height- 5ft 3in  BMI- 18.8  First prenatal visit at 8+5 weeks, Total visits- 9    PROBLEM LIST  =================  Patient Active Problem List    Diagnosis Date Noted    Uterine size-date discrepancy in third trimester, 1/3 US 28th percentile 2023     Priority: Medium     /3- 28th percentile  - FH less than dates, growth us ordered      Abnormal genetic test during pregnancy 2023     Priority: Medium     NIPT positive for Monosomy X  Amnio NORMAL!!!      Normal pregnancy in first trimester 2023     Priority: Medium     [facility] Patient Provider Group choice: CNM group  Partner's name: Alex  [x]TERAB folder  [x]Dating  [x] 1st trimester screening: MFM referral placed for 1st trimester screening place  [x]Offer AFP after 15 wks  [x]Fetal anatomy US ordered  [x]Rubella, varicella immune  [x]Hep B immune   [x]Pap - up tod ate due 2025  [x] No added risk for PRE-E  [x] GDM risk, recommended early GCT and hgb A1C  [x]No need for utox in labor  [x]COVID vaccine completed-plans new COVID vax soon  _____________________________________  [x]EOB folder  [x]PP Contraception plan: condoms  [x]Labor plans:  [x]: no  [x]Infant feeding plan: breast  [x]FLU  shot  [x]TDAP given  [x]RSV given  [] Water birth interest  [x]GCT, passed  ________________________________________  [x] OTC PP meds sent  []PP plans, time off, support system discussed, resources offered  []Planning CS-ERAS pkt      Laceration of labia majora, subsequent encounter 07/07/2023     Priority: Medium     Hx bilateral labial lacerations with first birth and struggles with pain and may want repaired during this delivery      Screening examination for lead poisoning 07/07/2023     Priority: Medium     Possible exposure added to intake labs       Family history of diabetes mellitus 07/07/2023     Priority: Medium     Sibling with Type 1 DM - may declined 1hr GCT       Multigravida of advanced maternal age in first trimester 07/07/2023     Priority: Medium    Pyogenic granuloma 09/16/2021     Priority: Medium     Added automatically from request for surgery [id number]      Left ovarian cyst 03/17/2021     Priority: Medium    Change in mole 02/16/2021     Priority: Medium     Pt has new mole on inside of left thigh. It is 3-4 mm in diameter, irregularly shaped, dark brown with black regions, and just recently appeared.       H/O LEEP 05/28/2019     Priority: Medium     07/05/17: LSIL Pap, + HR HPV types 16 and other.  07/17/17:[place] Bx CHRISTIANE 3  08/24/17: LEEP CHRISTIANE I, margins free of Dysplasia.  5/28/19: NIL Pap, + HR HPV (not 16 or 18) result. Plan [place].   07/08/19: [place] Bx CHRISTIANE I, ECC Neg for Dysplasia. Plan cotest in 1 year. (done at the [facility])  2/2021 NIL HPV positive not 16/18, repeat pap in one year  2/2022: NIl HPV neg --> repeat in 3 yrs.      Raynaud's disease without gangrene 07/05/2017     Priority: Medium    Hx of bulimia nervosa 05/10/2017     Priority: Medium     2/1/21: Pt feels well supported with current mental health team, denies any current issues.       ADHD (attention deficit hyperactivity disorder), inattentive type 04/12/2017     Priority: Medium     Adderal 5mg BID          HISTORIES  ============  No Known Allergies  Past Medical History:   Diagnosis Date    Abnormal cervical Pap smear with positive HPV DNA test     last pap 2013 nl    ADHD     Breast disorder 2019    fibroadenoma    Bulimia since 2014    Cervical high risk HPV (human papillomavirus) test positive 2019    See problem list.     Normal first pregnancy confirmed, antepartum 2021    [facility] CNM pt Partner's name: Alex [ ] Entered on Epic list [x ] NOB folder [x ] Dating [ ] First tri screen ordered; declined [ ] QS/AFP ordered declined [ ] Fetal anatomy US ordered [x ] Rubella immune [ x] Hep B immune [x] Pap--NILM 2021  [ x] NO plan utox in labor  _____________________________________ [x ] EOB folder [ ] PP Contraception plan: If tubal,consent date: [x ] Labor plans: epidural     Past Surgical History:   Procedure Laterality Date    BIOPSY  2019    breast biopsy    GYN SURGERY  2017    leep    TOOTH EXTRACTION     .  Family History   Problem Relation Age of Onset    Thyroid Disease Mother         hypo    Hypertension Father     Breast Cancer Maternal Grandmother 70    Hemochromatosis Maternal Grandfather     Pacemaker Paternal Grandmother     No Known Problems Paternal Grandfather     Hypertension Brother     Cancer Brother         oral-tongue (HPV related)    Diabetes Sister         type 1 dx age 3    Depression Sister     Psychotic Disorder Maternal Aunt         bi-polar     Social History     Tobacco Use    Smoking status: Never    Smokeless tobacco: Never   Substance Use Topics    Alcohol use: Not Currently     Comment: Five drinks a week, average     OB History    Para Term  AB Living   2 1 1 0 0 1   SAB IAB Ectopic Multiple Live Births   0 0 0 0 1      # Outcome Date GA Lbr Darren/2nd Weight Sex Delivery Anes PTL Lv   2 Current            1 Term 21 40w6d 04:06 / 00:58 3.12 kg (6 lb 14.1 oz) M Vag-Spont EPI N FRANCIS      Name: CINTHYA SHAH-JESSICA      Apgar1:  9  Apgar5: 9      Obstetric Comments   NONE           LABS:   ===========  Prenatal Labs:  Rhogam not indicated   Lab Results   Component Value Date    ABO A 02/01/2021    RH Pos 02/01/2021    AS Negative 07/07/2023    RUQIGG Positive 07/07/2023    HEPBANG Nonreactive 07/07/2023    HGB 11.7 02/02/2024    HIAGAB Nonreactive 07/07/2023    GLU1 111 12/13/2023     Rubella immune   GBS negative   Other labs:  COVID-19 PCR Results          1/18/2024    12:25   COVID-19 PCR Results   SARS CoV2 PCR Negative      COVID-19 Antibody Results, Testing for Immunity           No data to display               No results found for this or any previous visit (from the past 24 hour(s)).    ROS  =========  Pt denies significant respiratory, cardiovacular, GI, or muscular/skeletalcomplaints.    See RN data base ROS.     PHYSICAL EXAM:  ===============  LMP 05/07/2023 (Exact Date)   General appearance: uncomfortable with contractions  GENERAL APPEARANCE: healthy, alert and no distress  RESP: even, unlabored breathing  CV: well perfused   ABDOMEN:  soft, nontender, no epigastric pain  SKIN: no suspicious lesions or rashes  NEURO: Denies headache, blurred vision, other vision changes  PSYCH: mentation appears normal. and affect normal/bright  Legs: No edema     Abdomen: gravid, vertex fetus per Leopold's, non-tender between contractions.   Cephalic presentation confirmed by BSUS  EFW-  6 lbs.   CONTRACTIONS: every 2-4 minutes  FETAL HEART TONES: continuous EFM- baseline 120 with moderate variability and positive accelerations. No decelerations.  PELVIC EXAM: 7/ 100%/ Mid/ soft/ -3   EL SCORE: 9  BLOODY SHOW: no   ROM:no  FLUID: none  ROMPLUS: not done    # Pain Assessment:      9/14/2021     8:52 AM   Current Pain Score   Pain score (0-10) 1   - Arlet is experiencing pain due to labor contractions. Pain management was discussed and the plan was created in a collaborative fashion.  Arlet's response to the current  recommendations: engaged  - Desires epidural, will begin working towards one asap.       ASSESSMENT:  ==============  IUP @ 38w3d admitted in active labor   NST REACTIVE  Fetal Heart Tones - category one  GBS- negative    Patient Active Problem List   Diagnosis    ADHD (attention deficit hyperactivity disorder), inattentive type    Hx of bulimia nervosa    Raynaud's disease without gangrene    H/O LEEP    Change in mole    Left ovarian cyst    Pyogenic granuloma    Normal pregnancy in first trimester    Laceration of labia majora, subsequent encounter    Screening examination for lead poisoning    Family history of diabetes mellitus    Multigravida of advanced maternal age in first trimester    Abnormal genetic test during pregnancy    Uterine size-date discrepancy in third trimester, 1/3 US 28th percentile       PLAN:  ===========  Admit - see IP orders  pain medication options of nitrous oxide, fentanyl IV and epidural anesthesia reviewed with pt. Pt is interested in epidural  Ambulation, hydration, position changes, birthing ball and tub options to facilitate labor reviewed with pt .  Anticipate   DRISS Fernandez CNM

## 2024-02-05 NOTE — PLAN OF CARE
Goal Outcome Evaluation:  Data: VSS and postpartum checks WNL. Patient eating and drinking normally. Patient able to empty bladder independently and up ambulating. Patient performing self care and able to care for infant. Fundus at UU  and firm  without massage.  lochia scant and  no blood clots.   Action: Patient taking Tylenol and Ibuprofen for pain and pain tolerable. Encouraged patient to breast  feed every 2 - 3 hours and to monitor for cues to feed infant. Patient education done( education record).   Response: Patient participating in infant's care.. Support/ spouse present at bedside and attentive to infant and patient. Independent with breast feeding and no complain of nipple soreness.   Plan: Continue with the plan of cares.

## 2024-02-05 NOTE — PROGRESS NOTES
Data: Patient admitted to room 473 at 0055. Patient is a . Prenatal record reviewed.   OB History    Para Term  AB Living   2 1 1 0 0 1   SAB IAB Ectopic Multiple Live Births   0 0 0 0 1      # Outcome Date GA Lbr Darren/2nd Weight Sex Delivery Anes PTL Lv   2 Current            1 Term 21 40w6d 04:06 / 00:58 3.12 kg (6 lb 14.1 oz) M Vag-Spont EPI N FRANCIS      Name: ARMAND SHAH      Apgar1: 9  Apgar5: 9      Obstetric Comments   NONE      .  Medical History:   Past Medical History:   Diagnosis Date    Abnormal cervical Pap smear with positive HPV DNA test     last pap 2013 nl    ADHD     Breast disorder 2019    fibroadenoma    Bulimia since 2014    Cervical high risk HPV (human papillomavirus) test positive 2019    See problem list.     Normal first pregnancy confirmed, antepartum 2021    [facility] CNM pt Partner's name: Alex [ ] Entered on Epic list [x ] NOB folder [x ] Dating [ ] First tri screen ordered; declined [ ] QS/AFP ordered declined [ ] Fetal anatomy US ordered [x ] Rubella immune [ x] Hep B immune [x] Pap--NILM 2021  [ x] NO plan utox in labor  _____________________________________ [x ] EOB folder [ ] PP Contraception plan: If tubal,consent date: [x ] Labor plans: epidural   .  Gestational age 38w3d. Vital signs per doc flowsheet. Fetal movement present. Patient reports Laboring   as reason for admission. Support persons Alex present.  Action: Verbal consent for EFM, external fetal monitors applied. Admission assessment completed. Patient and support persons educated on labor process. Patient instructed to report change in fetal movement, contractions, vaginal leaking of fluid or bleeding, abdominal pain, or any concerns related to the pregnancy to her nurse/physician. Patient oriented to room, call light in reach.   Response: Pancho Alcazar CNM nformed of pt arrival. Plan per provider is admit to L&D. Patient verbalized understanding of education and  verbalized agreement with plan.

## 2024-02-05 NOTE — PROGRESS NOTES
Data: Arlet Segura transferred to 7133 via wheelchair at 0535. Baby transferred via parent's arms.  Action: Receiving unit notified of transfer: Yes. Patient and family notified of room change. Report given to Reyna at bedside. Belongings sent to receiving unit. Accompanied by Registered Nurse. Oriented patient to surroundings. Call light within reach. ID bands double-checked with receiving RN.  Response: Patient tolerated transfer and is stable.

## 2024-02-05 NOTE — ANESTHESIA PREPROCEDURE EVALUATION
Anesthesia Pre-Procedure Evaluation    Patient: Arlet Segura   MRN: [id number] : 1985        Procedure :           Past Medical History:   Diagnosis Date     Abnormal cervical Pap smear with positive HPV DNA test     last pap 2013 nl     ADHD      Breast disorder 2019    fibroadenoma     Bulimia since 2014     Cervical high risk HPV (human papillomavirus) test positive 2019    See problem list.      Normal first pregnancy confirmed, antepartum 2021    [facility] CNM pt Partner's name: Alex [ ] Entered on Epic list [x ] NOB folder [x ] Dating [ ] First tri screen ordered; declined [ ] QS/AFP ordered declined [ ] Fetal anatomy US ordered [x ] Rubella immune [ x] Hep B immune [x] Pap--NILM 2021  [ x] NO plan utox in labor  _____________________________________ [x ] EOB folder [ ] PP Contraception plan: If tubal,consent date: [x ] Labor plans: epidural      Past Surgical History:   Procedure Laterality Date     BIOPSY  2019    breast biopsy     GYN SURGERY  2017    leep     TOOTH EXTRACTION        No Known Allergies   Social History     Tobacco Use     Smoking status: Never     Smokeless tobacco: Never   Substance Use Topics     Alcohol use: Not Currently     Comment: Five drinks a week, average      Wt Readings from Last 1 Encounters:   24 56.2 kg (124 lb)        Anesthesia Evaluation   Pt has had prior anesthetic. Type: OB Labor Epidural.    No history of anesthetic complications       ROS/MED HX  ENT/Pulmonary:  - neg pulmonary ROS     Neurologic:  - neg neurologic ROS     Cardiovascular:  - neg cardiovascular ROS     METS/Exercise Tolerance:     Hematologic:  - neg hematologic  ROS     Musculoskeletal:       GI/Hepatic:  - neg GI/hepatic ROS     Renal/Genitourinary:       Endo:  - neg endo ROS     Psychiatric/Substance Use:  - neg psychiatric ROS     Infectious Disease:       Malignancy:       Other:            Physical Exam    Airway        Mallampati: II   TM  "distance: > 3 FB   Neck ROM: full   Mouth opening: > 3 cm    Respiratory Devices and Support         Dental  no notable dental history         Cardiovascular   cardiovascular exam normal          Pulmonary   pulmonary exam normal              OUTSIDE LABS:  CBC:   Lab Results   Component Value Date    WBC 8.8 02/02/2024    WBC 7.7 11/28/2023    HGB 11.7 02/02/2024    HGB 11.5 (L) 11/28/2023    HCT 34.8 (L) 02/02/2024    HCT 33.3 (L) 11/28/2023     02/02/2024     11/28/2023     BMP:   Lab Results   Component Value Date     02/18/2014    POTASSIUM 4.0 02/18/2014    CHLORIDE 103 02/18/2014    CO2 26 02/18/2014    BUN 7 02/18/2014    CR 0.77 02/18/2014    GLC 85 02/18/2014     COAGS: No results found for: \"PTT\", \"INR\", \"FIBR\"  POC:   Lab Results   Component Value Date    HCG Negative 08/24/2017     HEPATIC:   Lab Results   Component Value Date    ALBUMIN 3.5 11/28/2023    PROTTOTAL 6.2 (L) 11/28/2023    ALT 11 11/28/2023    AST 19 11/28/2023    ALKPHOS 72 11/28/2023    BILITOTAL <0.2 11/28/2023     OTHER:   Lab Results   Component Value Date    A1C 4.8 07/07/2023    AMANDA 9.7 02/18/2014    TSH 5.08 (H) 04/12/2017    T4 0.87 04/12/2017    CRP <2.9 08/29/2017    SED 6 03/12/2015       Anesthesia Plan    ASA Status:  2       Anesthesia Type: Epidural.              Consents    Anesthesia Plan(s) and associated risks, benefits, and realistic alternatives discussed. Questions answered and patient/representative(s) expressed understanding.     - Discussed:     - Discussed with:  Patient            Postoperative Care            Comments:           neg OB ROS.      Francisco Rizo MD    I have reviewed the pertinent notes and labs in the chart from the past 30 days and (re)examined the patient.  Any updates or changes from those notes are reflected in this note.                  "

## 2024-02-05 NOTE — PROVIDER NOTIFICATION
02/05/24 0238   Provider Notification   Provider Name/Title Pancho Alcazar CNM   Method of Notification Phone   Notification Reason Membrane Status     SROM with meconium, NICU notified.

## 2024-02-05 NOTE — PROGRESS NOTES
Post Partum Note  SIGNIFICANT PROBLEMS:  Patient Active Problem List    Diagnosis Date Noted    Normal pregnancy in first trimester 07/07/2023     Priority: High     [facility] Patient Provider Group choice: CNM group  Partner's name: Alex  [x]NOB folder  [x]Dating  [x] 1st trimester screening: MFM referral placed for 1st trimester screening place  [x]Offer AFP after 15 wks  [x]Fetal anatomy US ordered  [x]Rubella, varicella immune  [x]Hep B immune   [x]Pap - up tod ate due 2/2025  [x] No added risk for PRE-E  [x] GDM risk, recommended early GCT and hgb A1C  [x]No need for utox in labor  [x]COVID vaccine completed-plans new COVID vax soon  _____________________________________  [x]EOB folder  [x]PP Contraception plan: condoms  [x]Labor plans:  [x]: no  [x]Infant feeding plan: breast  [x]FLU shot  [x]TDAP given  [x]RSV given  [] Water birth interest  [x]GCT, passed  ________________________________________  [x] OTC PP meds sent  []PP plans, time off, support system discussed, resources offered  []Planning CS-ERAS pkt      Uterine size-date discrepancy in third trimester, 1/3 US 28th percentile 12/29/2023     Priority: Medium     1/3- 28th percentile  12/27- FH less than dates, growth us ordered      Abnormal genetic test during pregnancy 08/29/2023     Priority: Medium     NIPT positive for Monosomy X  Amnio NORMAL!!!      Laceration of labia majora, subsequent encounter 07/07/2023     Priority: Medium     Hx bilateral labial lacerations with first birth and struggles with pain and may want repaired during this delivery      Screening examination for lead poisoning 07/07/2023     Priority: Medium     Possible exposure added to intake labs       Family history of diabetes mellitus 07/07/2023     Priority: Medium     Sibling with Type 1 DM - may declined 1hr GCT       Multigravida of advanced maternal age in first trimester 07/07/2023     Priority: Medium    Pyogenic granuloma 09/16/2021      Priority: Medium     Added automatically from request for surgery [id number]      Left ovarian cyst 2021     Priority: Medium    Change in mole 2021     Priority: Medium     Pt has new mole on inside of left thigh. It is 3-4 mm in diameter, irregularly shaped, dark brown with black regions, and just recently appeared.       H/O LEEP 2019     Priority: Medium     17: LSIL Pap, + HR HPV types 16 and other.  17:[place] Bx CHRISTIANE 3  17: LEEP CHRISTIANE I, margins free of Dysplasia.  19: NIL Pap, + HR HPV (not 16 or 18) result. Plan [place].   19: [place] Bx CHRISTIANE I, ECC Neg for Dysplasia. Plan cotest in 1 year. (done at the [facility])  2021 NIL HPV positive not 16/18, repeat pap in one year  2022: NIl HPV neg --> repeat in 3 yrs.      Raynaud's disease without gangrene 2017     Priority: Medium    Hx of bulimia nervosa 05/10/2017     Priority: Medium     21: Pt feels well supported with current mental health team, denies any current issues.       ADHD (attention deficit hyperactivity disorder), inattentive type 2017     Priority: Medium     Adderal 5mg BID         INTERVAL HISTORY:  /75 (Cuff Size: Adult Small)   Pulse 59   Temp 99.1  F (37.3  C) (Oral)   Resp 18   LMP 2023 (Exact Date)   SpO2 100%   .ipvitals  Pt stable, baby is rooming in. Tired but feels well  Breast feeding status:initiated  Complications since 2 hours post delivery: None  # Pain Assessment:      2024    10:48 AM   Current Pain Score   Patient currently in pain? yes   - Arlet is experiencing pain due to . Pain management was discussed and the plan was created in a collaborative fashion.  Arlet's response to the current recommendations: engaged  - tylenol, ibuprofen    Patient is tolerating activity well, Voiding without difficulty, cramping is relieved by Ibuprophen, lochia is decreasing and patient denies clots.  Perineal pain is is relieved by Ibuprophen.  The perineum  laceration is well approximated    Physical Exam:  General: Bright affect, loving with baby. Family supportive.   Breasts: soft, nontender, nipples intact, no cracking, redness or bruising.   Abdomen: soft, nontender, fundus below U.   Lochia: small amount, rubra, no clots or odor.   Perineum: well-approximated.   Extremities: no edema, Nataly's negative.     Postpartum hemoglobin   Hemoglobin   Date Value Ref Range Status   02/05/2024 11.9 11.7 - 15.7 g/dL Final   06/11/2021 10.6 (L) 11.7 - 15.7 g/dL Final     Blood type   Lab Results   Component Value Date    ABO A 02/01/2021       Lab Results   Component Value Date    RH Pos 02/01/2021     Rubella status   Lab Results   Component Value Date    RUQIGG Positive 07/07/2023     Rubella: immune  History of depression: eating disorder in past. Feels stable now. Has resources. No hx of depression, no meds. Postpartum depression warning signs reviewed.    ASSESSMENT/PLAN:  Normal postpartum exam , Stable day of delivery  Complications:none  Plan d/c home tomorrow. Home Visit Ordered- Yes: breastfeeding  RTC 2 weeks and 6 weeks  Postpartum warning s/s reviewed, including bleeding/clots, fever, mastitis, or depression  Continue prenatal vitamins  Birthcontrol planned:Condoms  Current Discharge Medication List        CONTINUE these medications which have NOT CHANGED    Details   !! amphetamine-dextroamphetamine (ADDERALL) 10 MG tablet Take 1 tablet (10 mg) by mouth 2 times daily  Qty: 60 tablet, Refills: 0    Associated Diagnoses: ADHD (attention deficit hyperactivity disorder), inattentive type      Prenatal Vit-Fe Fumarate-FA (PRENATAL MULTIVITAMIN PLUS IRON) 27-0.8 MG TABS per tablet Take 1 tablet by mouth daily    Associated Diagnoses: Iron deficiency      senna-docusate (SENOKOT-S/PERICOLACE) 8.6-50 MG tablet Take 1 tablet by mouth daily Start after delivery.  Qty: 100 tablet, Refills: 0    Associated Diagnoses: Normal pregnancy in first trimester      acetaminophen  (TYLENOL) 325 MG tablet Take 2 tablets (650 mg) by mouth every 6 hours as needed for mild pain Start after Delivery.  Qty: 100 tablet, Refills: 0    Associated Diagnoses: Normal pregnancy in first trimester      !! amphetamine-dextroamphetamine (ADDERALL) 10 MG tablet Take 1 tablet (10 mg) by mouth 2 times daily for 30 days  Qty: 60 tablet, Refills: 0    Associated Diagnoses: ADHD (attention deficit hyperactivity disorder), inattentive type      !! amphetamine-dextroamphetamine (ADDERALL) 10 MG tablet Take 1 tablet (10 mg) by mouth 2 times daily  Qty: 60 tablet, Refills: 0    Associated Diagnoses: ADHD (attention deficit hyperactivity disorder), inattentive type      !! amphetamine-dextroamphetamine (ADDERALL) 10 MG tablet Take 1 tablet (10 mg) by mouth 2 times daily  Qty: 60 tablet, Refills: 0    Associated Diagnoses: ADHD (attention deficit hyperactivity disorder), inattentive type      ibuprofen (ADVIL/MOTRIN) 600 MG tablet Take 1 tablet (600 mg) by mouth every 6 hours as needed for moderate pain Start after delivery  Qty: 60 tablet, Refills: 0    Associated Diagnoses: Normal pregnancy in first trimester      miconazole (MICONAZOLE 7) 100 MG vaginal suppository Place 1 suppository (100 mg) vaginally at bedtime  Qty: 7 suppository, Refills: 0    Associated Diagnoses: Yeast infection of the vagina       !! - Potential duplicate medications found. Please discuss with provider.        DRISS Simons CNM

## 2024-02-05 NOTE — PROGRESS NOTES
of viable Female with Pancho Alcazar CNM in attendance. NICU and Resource RACHEL TILLEY present. Infant with spontaneous cry to mothers abdomen, dried and stimulated. Apgars 8/9. Placenta delivered without complications, pitocin bolused, 2nd degree laceration, repairs done. Hillary cares provided. Mother and baby in stable condition.

## 2024-02-05 NOTE — L&D DELIVERY NOTE
Delivery Summary    Arlet Segura MRN# [id number]   Age: 38 year old YOB: 1985   Delivery Note    Labor Course:   Flora [facility] the [facility] on 2024 at 0103 reporting contractions increasing in intensity since 2024 at 2100. On admission cervical exam was 7/ 100%/ Mid/ soft/ -3  with contractions every 2-4 minutes apart and she desired an epidural. She received an epidural at 0147 and spontaneously ruptured at 0238 for moderately meconium stained fluid. She progressed well and was completely dilated at 0302. Fetal heart tracing was category I with early decelerations present immediately prior to second stage.     Delivery Course:  Pt became complete at 0302 and started pushing at 0306. Delivered a live female infant at 0313. Shoulders delivered without difficulty with maternal effort and Alex (FOB) helped guide baby on mom's abdomen. No nuchal cord. IV pitocin started after delivery of infant per protocol. Umbilical cord was double clamped and cut by Alex after the cord stopped pulsing. NICU present for delivery for meconium fluid and dismissed because  was spontaneous and stable. Cord segment cut for gases per protocol, not sent. Cord blood obtained. Placenta spontaneously delivered intact at 0321 without difficulty via Schultze mechanism. Inspection of vagina and perineum revealed a second degree laceration and a right labial laceration. 2nd degree Laceration was repaired in the usual fashion with 3-0 vicryl and the right labial laceration was repaired with 4 interrupted sutures using 4-0 vicryl. 1% lidocaine was infiltrated and a small piece of tissue hanging from a previous labial laceration trimmed and repair approximated evenly with patient's verbal consent.  Fundus is firm and midline.  Mom and baby are stable.      IUP at 38+3 weeks gestation delivered on 2024.     delivery of a viable Female infant.  Weight : pending  Apgars of 8 at 1 minute and 9  at 5 minutes.  Labor was spontaneous.  Medications administered  in labor:  Pain Rx Epidural; Antibiotics No  Perineum: 2nd degree and right labial laceration  Placenta-mechanism: spontaneous, intact,  with a 3 vessel cord. IV oxytocin was given.  QBL was 330 ml.  Anticipated Discharge Date: 02/06/2024  Complications of pregnancy, labor and delivery: uterine size discrepancy in the third trimester, abnormal genetic screening (NIPT high risk for monosomy X with amniocentesis), AMA  Others: ADHD, History of a LEEP procedure  Birth attendants:DRISS Noel CNM, Ginny OTERO       Imelda AbdifatahArlet [[id number]]      Labor Event Times      Latent labor onset date/time: 2/5/2024 0000    Dilation complete date: 2/5/24 Complete time:  3:02 AM   Start pushing date/time: 2/5/2024 0306          Rupture date/time: 2/5/24 0238   Rupture type: Spontaneous Rupture of Membranes  Fluid color: Meconium  Fluid odor: Normal     Augmentation: None       Delivery/Placenta Date and Time      Delivery Date: 2/5/24 Delivery Time:  3:13 AM   Placenta Date/Time: 2/5/2024  3:21 AM  Oxytocin given at the time of delivery: after delivery of baby  Delivering clinician: Yesika Alcazar APRN CNM   Other personnel present at delivery:  Provider Role   Salma Gastelum, RN Delivery Nurse   Bre Reddy RN Registered Nurse             Vaginal Counts       Initial count performed by 2 team members:  Two Team Members   Ginny Paris RN         [place] Suture Needles Sponges (RETIRED) Instruments   Initial counts 2  5    Added to count  2     Relief counts       Final counts 2 2 5            Placed during labor Accounted for at the end of labor   FSE     IUPC     Cervidil                    Final count performed by 2 team members:  Two Team Members   Ginny Paris RN      Final count correct?: Yes  Pre-Birth Team Brief: Complete  Post-Birth Team Debrief: Complete       Apgars    Living status: Living    "1 Minute 5 Minute 10 Minute 15 Minute 20 Minute   Skin color: 1  1       Heart rate: 2  2       Reflex irritability: 2  2       Muscle tone: 2  2       Respiratory effort: 1  2       Total: 8  9       Apgars assigned by: MECHE FANG RN       Cord      Vessels: 3 Vessels    Cord Complications: None               Cord Blood Disposition: Discard    Gases Sent?: No    Delayed cord clamping?: Yes    Cord Clamping Delay (seconds): >120 seconds    Stem cell collection?: No            Resuscitation    Methods: None   Care at Delivery: Viable female  delivered to maternal abdomen. Dried and stimulated with warm blankets.  gave a lusty cry. No further resuscitation measures needed. NICU team was present at delivery due to the presence of meconium stained fluid. No interventions needed by the team.       [place] Measurements      Weight: 6 lb 13.4 oz Length: 1' 7\"     Head circumference: 33 cm           Skin to Skin and Feeding Plan      Skin to skin initiation date/time: 1841    Skin to skin with: Mother  Skin to skin end date/time:     Breastfeeding initiated date/time: 2024 040       Labor Events and Shoulder Dystocia    Fetal Tracing Prior to Delivery: Category 1  Fetal Tracing Comments: early decelerations present  Shoulder dystocia present?: Neg       Delivery (Maternal) (Provider to Complete) ([id number])    Episiotomy: None  Perineal lacerations: 2nd Repaired?: Yes     Labial laceration: right Repaired?: Yes   Repair suture: 3-0 Vicryl, 4-0 Vicryl  Number of repair packets: 2  Genital tract inspection done: Pos       Blood Loss  Mother: Flora Segura ISAC #[id number]     Start of Mother's Information      Delivery Blood Loss  24 1513 - 24 1532      Delivery QBL (mL) Hospital Encounter 330 mL    Total  330 mL               End of Mother's Information  Mother: Flora Segura ISAC #[id number]                Delivery - Provider to Complete ([id number])    Delivering clinician: " Yesika Alcazar APRN CNM  Delivery Type (Choose the 1 that will go to the Birth History): Vaginal, Spontaneous                         Other personnel:  Provider Role   Salma Gastelum, RN Delivery Nurse   Bre Reddy RN Registered Nurse                    Placenta    Date/Time: 2/5/2024  3:21 AM  Removal: Spontaneous  Disposition: Hospital disposal             Anesthesia    Method: Epidural  Cervical dilation at placement: 4-7                    Presentation and Position    Presentation: Vertex     Occiput Anterior                   I agree with the PFSH and ROS as completed by Ginny Dos Santos except for changes made by me. The remainder of the encounter was performed by me and scribed by Ginny Dos Santos. The scribed note accurately reflects my personal services and decisions made by me.     DRISS Fernandez CNM, APRN CNM

## 2024-02-06 ENCOUNTER — TELEPHONE (OUTPATIENT)
Dept: OBGYN | Facility: CLINIC | Age: 39
End: 2024-02-06
Payer: COMMERCIAL

## 2024-02-06 ENCOUNTER — MEDICAL CORRESPONDENCE (OUTPATIENT)
Dept: HEALTH INFORMATION MANAGEMENT | Facility: CLINIC | Age: 39
End: 2024-02-06
Payer: COMMERCIAL

## 2024-02-06 VITALS
HEART RATE: 64 BPM | OXYGEN SATURATION: 100 % | RESPIRATION RATE: 16 BRPM | SYSTOLIC BLOOD PRESSURE: 136 MMHG | DIASTOLIC BLOOD PRESSURE: 93 MMHG | TEMPERATURE: 98.1 F

## 2024-02-06 PROCEDURE — 250N000013 HC RX MED GY IP 250 OP 250 PS 637: Performed by: REGISTERED NURSE

## 2024-02-06 RX ADMIN — ACETAMINOPHEN 650 MG: 325 TABLET, FILM COATED ORAL at 06:16

## 2024-02-06 RX ADMIN — DOCUSATE SODIUM 100 MG: 100 CAPSULE, LIQUID FILLED ORAL at 07:47

## 2024-02-06 RX ADMIN — ACETAMINOPHEN 650 MG: 325 TABLET, FILM COATED ORAL at 10:33

## 2024-02-06 RX ADMIN — ACETAMINOPHEN 650 MG: 325 TABLET, FILM COATED ORAL at 00:16

## 2024-02-06 RX ADMIN — IBUPROFEN 800 MG: 800 TABLET, FILM COATED ORAL at 06:17

## 2024-02-06 ASSESSMENT — ACTIVITIES OF DAILY LIVING (ADL)
ADLS_ACUITY_SCORE: 18

## 2024-02-06 NOTE — PLAN OF CARE
Goal Outcome Evaluation:  VSS and postpartum assessments WDL.  Up ad maddy with steady gait and independent with cares.  Bonding well with infant.  Breastfeeding on cue independently.  Pain managed with tylenol, ibuprofen, and hot packs.  present and supportive. Reminded patient to work on paperwork and discharge videos. Will continue with postpartum cares and education per plan of care.

## 2024-02-06 NOTE — TELEPHONE ENCOUNTER
----- Message from Portia Lopez RN sent at 2024  8:53 AM CST -----  Can you please call patient and schedule for 2 wk PP with RN team? Thanks!  ----- Message -----  From: Dana Howard APRN CNM  Sent: 2024   8:52 AM CST  To: s Rn-[facility]!  This patient delivered  and would like to be scheduled for 2 week phone check in!   birth.   2nd baby, doing well in the hospital, breastfeeding, no mood concerns    Here is her problem list for more details:  Patient Active Problem List:     ADHD (attention deficit hyperactivity disorder), inattentive type     Hx of bulimia nervosa     Raynaud's disease without gangrene     H/O LEEP     Change in mole     Left ovarian cyst     Pyogenic granuloma     Normal pregnancy in first trimester     Laceration of labia majora, subsequent encounter     Screening examination for lead poisoning     Family history of diabetes mellitus     Multigravida of advanced maternal age in first trimester     Abnormal genetic test during pregnancy     Uterine size-date discrepancy in third trimester, 1/3 US 28th percentile          Thank you!  DRISS Webb CNM

## 2024-02-06 NOTE — PROGRESS NOTES
SW acknowledges consult for elevated Edinberg. SW reached out to primary nurse as colleague informed SW that SW consult has been cancelled. CARLOS was told that the provider met with julito about Edinberg and julito thought she answered some of the questions wrong. Julito completed the Edinberg again and scored an 8 instead of a 12. Primary nurse informed CARLOS that Julito is connected to provider for mental health and does not want to see CARLOS.     No further intervention needed from social work at this time.   Please re-consult if needed.

## 2024-02-06 NOTE — PLAN OF CARE
Goal Outcome Evaluation:       Pt VSS, postpartum assessment WDL.  Pt bonding well with baby and breastfeeding on cue.  Pain managed with tylenol and ibuprofen.  Pt is ambulating independently, voiding spontaneously, and tolerating regular diet.  Continue with plan of care.

## 2024-02-06 NOTE — PROVIDER NOTIFICATION
Delivered - 8:24 am  Patient EDS is a 12, no reports of self harm. 0800 BP was 136/93. Used a smaller cuff due to patient's small arm circumference. Prior BPs were the regular sized cuff and too large for her.

## 2024-02-06 NOTE — PLAN OF CARE
Data: Vital signs stable, postpartum assessments within normal limits.   Eating and drinking without nausea or vomiting.  Up ad maddy, and voiding without difficulty. Passing gas.  Feeding baby independently-  breastfeeding  Pain managed with tylenol and ibuprofen. Pt states she is comfortable.  Perineum appears to be healing well, no s/s infection.  Discharge outcomes on care plan met.   Action: Review of care plan, teaching, and discharge instructions done. Discharge medications prescribed before delivery.  Patient has them.  Response: Patient states understanding and comfort with her discharge instructions, discharge medications, and plan of care. All questions addressed. She will discharge home.

## 2024-02-06 NOTE — DISCHARGE SUMMARY
[facility] Discharge Summary    Arlet Segura MRN# [id number]   Age: 38 year old YOB: 1985     Date of Admission:  2/5/2024  Date of Discharge::  2/6/2024  Admitting Physician:  DRISS Akhtar CNM  Discharge Physician:  DRISS Wiggins, STACEY      [facility]: [facility]          Admission Diagnoses:   Labor and delivery indication for care or intervention [O75.9]          Discharge Diagnosis:     Normal spontaneous vaginal delivery  Intrauterine pregnancy at 38 weeks gestation  Perineal laceration - 2nd degree. Repaired           Procedures:     Procedure(s): Repair of second degree perineal laceration       No additional procedures performed during this admission           Medications Prior to Admission:     Medications Prior to Admission   Medication Sig Dispense Refill Last Dose    amphetamine-dextroamphetamine (ADDERALL) 10 MG tablet Take 1 tablet (10 mg) by mouth 2 times daily 60 tablet 0 2/4/2024    Prenatal Vit-Fe Fumarate-FA (PRENATAL MULTIVITAMIN PLUS IRON) 27-0.8 MG TABS per tablet Take 1 tablet by mouth daily   2/4/2024    senna-docusate (SENOKOT-S/PERICOLACE) 8.6-50 MG tablet Take 1 tablet by mouth daily Start after delivery. 100 tablet 0 2/4/2024    acetaminophen (TYLENOL) 325 MG tablet Take 2 tablets (650 mg) by mouth every 6 hours as needed for mild pain Start after Delivery. 100 tablet 0     ibuprofen (ADVIL/MOTRIN) 600 MG tablet Take 1 tablet (600 mg) by mouth every 6 hours as needed for moderate pain Start after delivery 60 tablet 0     [DISCONTINUED] amphetamine-dextroamphetamine (ADDERALL) 10 MG tablet Take 1 tablet (10 mg) by mouth 2 times daily for 30 days 60 tablet 0     [DISCONTINUED] amphetamine-dextroamphetamine (ADDERALL) 10 MG tablet Take 1 tablet (10 mg) by mouth 2 times daily 60 tablet 0     [DISCONTINUED] amphetamine-dextroamphetamine (ADDERALL) 10 MG tablet Take 1 tablet (10 mg) by mouth 2 times daily 60 tablet 0      [DISCONTINUED] miconazole (MICONAZOLE 7) 100 MG vaginal suppository Place 1 suppository (100 mg) vaginally at bedtime (Patient not taking: Reported on 11/28/2023) 7 suppository 0            Discharge Medications:     Current Discharge Medication List        CONTINUE these medications which have NOT CHANGED    Details   !! amphetamine-dextroamphetamine (ADDERALL) 10 MG tablet Take 1 tablet (10 mg) by mouth 2 times daily  Qty: 60 tablet, Refills: 0    Associated Diagnoses: ADHD (attention deficit hyperactivity disorder), inattentive type      Prenatal Vit-Fe Fumarate-FA (PRENATAL MULTIVITAMIN PLUS IRON) 27-0.8 MG TABS per tablet Take 1 tablet by mouth daily    Associated Diagnoses: Iron deficiency      senna-docusate (SENOKOT-S/PERICOLACE) 8.6-50 MG tablet Take 1 tablet by mouth daily Start after delivery.  Qty: 100 tablet, Refills: 0    Associated Diagnoses: Normal pregnancy in first trimester      acetaminophen (TYLENOL) 325 MG tablet Take 2 tablets (650 mg) by mouth every 6 hours as needed for mild pain Start after Delivery.  Qty: 100 tablet, Refills: 0    Associated Diagnoses: Normal pregnancy in first trimester      ibuprofen (ADVIL/MOTRIN) 600 MG tablet Take 1 tablet (600 mg) by mouth every 6 hours as needed for moderate pain Start after delivery  Qty: 60 tablet, Refills: 0    Associated Diagnoses: Normal pregnancy in first trimester      miconazole (MICONAZOLE 7) 100 MG vaginal suppository Place 1 suppository (100 mg) vaginally at bedtime  Qty: 7 suppository, Refills: 0    Associated Diagnoses: Yeast infection of the vagina       !! - Potential duplicate medications found. Please discuss with provider.                Consultations:   No consultations were requested during this admission          Brief History of Labor:   Labor Course:   Flora presented to the birthplace on 02/05/2024 at 0103 reporting contractions increasing in intensity since 02/04/2024 at 2100. On admission cervical exam was 7/ 100%/ Mid/  soft/ -3  with contractions every 2-4 minutes apart and she desired an epidural. She received an epidural at 0147 and spontaneously ruptured at 0238 for moderately meconium stained fluid. She progressed well and was completely dilated at 0302. Fetal heart tracing was category I with early decelerations present immediately prior to second stage.      Delivery Course:  Pt became complete at 0302 and started pushing at 0306. Delivered a live female infant at 0313. Shoulders delivered without difficulty with maternal effort and Alex (FOB) helped guide baby on mom's abdomen. No nuchal cord. IV pitocin started after delivery of infant per protocol. Umbilical cord was double clamped and cut by Alex after the cord stopped pulsing. NICU present for delivery for meconium fluid and dismissed because  was spontaneous and stable. Cord segment cut for gases per protocol, not sent. Cord blood obtained. Placenta spontaneously delivered intact at 0321 without difficulty via Schultze mechanism. Inspection of vagina and perineum revealed a second degree laceration and a right labial laceration. 2nd degree Laceration was repaired in the usual fashion with 3-0 vicryl and the right labial laceration was repaired with 4 interrupted sutures using 4-0 vicryl. 1% lidocaine was infiltrated and a small piece of tissue hanging from a previous labial laceration trimmed and repair approximated evenly with patient's verbal consent. Fundus is firm and midline. Mom and baby are stable.      Assessment Day of Discharge      Patient Active Problem List   Diagnosis    ADHD (attention deficit hyperactivity disorder), inattentive type    Hx of bulimia nervosa    Raynaud's disease without gangrene    H/O LEEP    Change in mole    Left ovarian cyst    Pyogenic granuloma    Normal pregnancy in first trimester    Laceration of labia majora, subsequent encounter    Screening examination for lead poisoning    Family history of diabetes mellitus     "Multigravida of advanced maternal age in first trimester    Abnormal genetic test during pregnancy    Uterine size-date discrepancy in third trimester, 1/3 US 28th percentile       Vital signs:  Temp: 98.1  F (36.7  C) Temp src: Oral BP: (!) 136/93 Pulse: 64   Resp: 16   O2 Device: None (Room air)        Estimated body mass index is 21.97 kg/m  as calculated from the following:    Height as of 24: 1.6 m (5' 3\").    Weight as of 24: 56.2 kg (124 lb).    Breasts: Soft, filling  Nipples: Intact, Non-tender  Abdomen: Soft, Non-tender    Uterus: Fundus Firm, Non-tender, located 2cm below the umbilicus   Lochia: Rubra, appropriate amount    Perineum:  2nd degree laceration , periurethral lacerations both Well-approximated, healing well.   Lower Extremities: no Edema Bilateral         Hospital Course:   The patient's hospital course was unremarkable.  On discharge, pain was well controlled on tylenol and ibuprofen, using hot packs. Vaginal bleeding is similar to peak menstrual flow.  Voiding without difficulty.  Ambulating well and tolerating a normal diet.  No fever.  Breastfeeding well.  Infant is stable.  No bowel movement, +flatus. Mood stable - EDPS score 12, discussed mood and denies any concern for depression/anxiety , has partner and other family and friends for support. Arlet Segura was discharged on post-partum day #1.    Post-partum hemoglobin:   Hemoglobin   Date Value Ref Range Status   2024 11.9 11.7 - 15.7 g/dL Final   2021 10.6 (L) 11.7 - 15.7 g/dL Final      Rh:positive, Rhogam given N/A  Rubella status: immune  Plan for contraception: condoms  Reviewed Chapter One of  [facility] including warning signs of postpartum, activity level, avoiding IC for 6 weeks, Tub soaks BID, Kegels, abdominal exercises, breast care,  postpartum depression/anxiety.  Reviewed how to establish/maintain milk supply, nipple care, pumping for storage, fore/hind milk, wet/dirty diapers to " expect each day, resources prn if questions or concerns.  Pt verbalized understanding with teach back.          Discharge Instructions and Follow-Up:     Discharge diet: Regular, Iron Rich, High Fiber, Minimum 80oz water daily. Continue PNV   Discharge activity: Pelvic rest: abstain from intercourse and do not use tampons for 6 week(s)   Discharge follow-up: Follow up with  [facility] at 6wks in clinic.   RN will call to check in at 2wks   Wound care: Drink plenty of fluids  Ice to area for comfort   Keep wound clean and dry   Avoid constipation   Sitz bath TID            Discharge Disposition:     Discharged to home 2/6/24      Kae MALDONADO, am serving as a scribe; to document services personally performed by  DRISS Wiggins, STACEY based on data collection and the provider's statements to me.     BRANDEN Hernandez  I agree with the PFSH and ROS as completed by the student, except for changes made by me. The remainder of the encounter scribed by the student. The scribed note accurately reflects my personal services and decisions made by me.  Dana Howard, RADHA, STACEY, APRN

## 2024-02-06 NOTE — DISCHARGE INSTRUCTIONS
"Postpartum: Care Instructions  Overview  After childbirth (postpartum period), your body goes through many changes. Some of these changes happen over several weeks. In the hours after delivery, your body will begin to recover from childbirth while it prepares to breastfeed your . You may feel emotional during this time. Your hormones can shift your mood without warning for no clear reason.  In the first couple of weeks after childbirth, it's common to have emotions that change from happy to sad. You may find it hard to sleep. You may cry a lot. This is called the \"baby blues.\" These overwhelming emotions often go away within a couple of days or weeks. But it's important to discuss your feelings with your doctor.  It's easy to get too tired and overwhelmed during the first weeks after childbirth. Don't try to do too much. Get rest whenever you can, accept help from others, and eat well and drink plenty of fluids.  In the first couple of weeks after you give birth, your doctor or midwife may want to check in with you and make a plan for any follow-up care you may need. You will likely have a complete postpartum visit in the first 3 months after delivery. At that time, your doctor or midwife will check on your recovery from childbirth and see how you're doing with your emotions. You may also discuss your concerns or questions.  Follow-up care is a key part of your treatment and safety. Be sure to make and go to all appointments, and call your doctor if you are having problems. It's also a good idea to know your test results and keep a list of the medicines you take.  How can you care for yourself at home?  Sleep or rest when your baby sleeps.  Get help with household chores from family or friends, if you can. Don't try to do it all yourself.  If you have hemorrhoids or swelling or pain around the opening of your vagina, try using cold and heat. You can put ice or a cold pack on the area for 10 to 20 minutes at a " time. Put a thin cloth between the ice and your skin. Also try sitting in a few inches of warm water (sitz bath) 3 times a day and after bowel movements.  Take pain medicines exactly as directed.  If the doctor gave you a prescription medicine for pain, take it as prescribed.  If you are not taking a prescription pain medicine, ask your doctor if you can take an over-the-counter medicine.  Eat more fiber to avoid constipation. Include foods such as whole-grain breads and cereals, raw vegetables, raw and dried fruits, and beans.  Drink plenty of fluids. If you have kidney, heart, or liver disease and have to limit fluids, talk with your doctor before you increase the amount of fluids you drink.  Do not rinse inside your vagina with fluids (douche).  If you have stitches, keep the area clean by pouring or spraying warm water over the area outside your vagina and anus after you use the toilet.  Keep a list of questions to ask your doctor or midwife. Your questions might be about:  Changes in your breasts, such as lumps or soreness.  When to expect your menstrual period to start again.  What form of birth control is best for you.  Weight you have put on during the pregnancy.  Exercise options.  What foods and drinks are best for you, especially if you are breastfeeding.  Problems you might be having with breastfeeding.  When you can have sex. You may want to talk about lubricants for your vagina.  Any feelings of sadness or restlessness that you are having.  When should you call for help?  Share this information with your partner, family, or a friend. They can help you watch for warning signs.  Call 911  anytime you think you may need emergency care. For example, call if:    You have thoughts of harming yourself, your baby, or another person.     You passed out (lost consciousness).     You have chest pain, are short of breath, or cough up blood.     You have a seizure.   Where to get help 24 hours a day, 7 days a week    If you or someone you know talks about suicide, self-harm, a mental health crisis, a substance use crisis, or any other kind of emotional distress, get help right away. You can:    Call the Suicide and Crisis Lifeline at 988.     Call 3-272-627-TALK (1-891.335.2195).     Text HOME to 884677 to access the Crisis Text Line.   Consider saving these numbers in your phone.  Go to [employer] for more information or to chat online.  Call your doctor now or seek immediate medical care if:    You have signs of hemorrhage (too much bleeding), such as:  Heavy vaginal bleeding. This means that you are soaking through one or more pads in an hour. Or you pass blood clots bigger than an egg.  Feeling dizzy or lightheaded, or you feel like you may faint.  Feeling so tired or weak that you cannot do your usual activities.  A fast or irregular heartbeat.  New or worse belly pain.     You have signs of infection, such as:  A fever.  Frequent or painful urination or blood in your urine.  Vaginal discharge that smells bad.  New or worse belly pain.     You have symptoms of a blood clot in your leg (called a deep vein thrombosis), such as:  Pain in the calf, back of the knee, thigh, or groin.  Swelling in the leg or groin.  A color change on the leg or groin. The skin may be reddish or purplish, depending on your usual skin color.     You have signs of preeclampsia, such as:  Sudden swelling of your face, hands, or feet.  New vision problems (such as dimness, blurring, or seeing spots).  A severe headache.     You have signs of heart failure, such as:  New or increased shortness of breath.  New or worse swelling in your legs, ankles, or feet.  Sudden weight gain, such as more than 2 to 3 pounds in a day or 5 pounds in a week.  Feeling so tired or weak that you cannot do your usual activities.     You had spinal or epidural pain relief and have:  New or worse back pain.  Increased pain, swelling, warmth, or redness at the injection  "site.  Tingling, weakness, or numbness in your legs or groin.   Watch closely for changes in your health, and be sure to contact your doctor if:    Your vaginal bleeding isn't decreasing.     You feel sad, anxious, or hopeless for more than a few days.     You are having problems with your breasts or breastfeeding.   Where can you learn more?  Go to https://www.[employer].net/patiented  Enter Z768 in the search box to learn more about \"Postpartum: Care Instructions.\"  Current as of: July 11, 2023               Content Version: 13.8    1584-0923 [employer].   Care instructions adapted under license by your healthcare professional. If you have questions about a medical condition or this instruction, always ask your healthcare professional. [employer] disclaims any warranty or liability for your use of this information.      "

## 2024-02-12 ENCOUNTER — MYC REFILL (OUTPATIENT)
Dept: PSYCHIATRY | Facility: CLINIC | Age: 39
End: 2024-02-12
Payer: COMMERCIAL

## 2024-02-12 DIAGNOSIS — F90.0 ADHD (ATTENTION DEFICIT HYPERACTIVITY DISORDER), INATTENTIVE TYPE: ICD-10-CM

## 2024-02-12 NOTE — TELEPHONE ENCOUNTER
Last Seen 1/18  RTC 2 months  Cancel None  No-Show None    Next Appt 3/14    Incoming Refill From patient via [employer]    Medication Requested   amphetamine-dextroamphetamine (ADDERALL) 10 MG tablet     Directions   Take 1 tablet (10 mg) by mouth 2 times daily     Qty 60    Last Refill       Medication pended and routed to provider for approval.

## 2024-02-13 RX ORDER — DEXTROAMPHETAMINE SACCHARATE, AMPHETAMINE ASPARTATE, DEXTROAMPHETAMINE SULFATE AND AMPHETAMINE SULFATE 2.5; 2.5; 2.5; 2.5 MG/1; MG/1; MG/1; MG/1
10 TABLET ORAL 2 TIMES DAILY
Qty: 60 TABLET | Refills: 0 | Status: SHIPPED | OUTPATIENT
Start: 2024-02-13 | End: 2024-03-12

## 2024-02-21 ASSESSMENT — EDINBURGH POSTNATAL DEPRESSION SCALE (EPDS)
THINGS HAVE BEEN GETTING ON TOP OF ME: NO, MOST OF THE TIME I HAVE COPED QUITE WELL
I HAVE FELT SAD OR MISERABLE: NOT VERY OFTEN
TOTAL SCORE: 7
THE THOUGHT OF HARMING MYSELF HAS OCCURRED TO ME: NEVER
I HAVE BEEN ANXIOUS OR WORRIED FOR NO GOOD REASON: HARDLY EVER
I HAVE FELT SCARED OR PANICKY FOR NO GOOD REASON: NO, NOT MUCH
I HAVE BEEN SO UNHAPPY THAT I HAVE BEEN CRYING: NO, NEVER
I HAVE BEEN ABLE TO LAUGH AND SEE THE FUNNY SIDE OF THINGS: AS MUCH AS I ALWAYS COULD
I HAVE LOOKED FORWARD WITH ENJOYMENT TO THINGS: AS MUCH AS I EVER DID
I HAVE BEEN SO UNHAPPY THAT I HAVE HAD DIFFICULTY SLEEPING: NOT VERY OFTEN
I HAVE BLAMED MYSELF UNNECESSARILY WHEN THINGS WENT WRONG: YES, SOME OF THE TIME

## 2024-02-22 ENCOUNTER — PRENATAL OFFICE VISIT (OUTPATIENT)
Dept: OBGYN | Facility: CLINIC | Age: 39
End: 2024-02-22
Attending: ADVANCED PRACTICE MIDWIFE
Payer: COMMERCIAL

## 2024-02-22 VITALS
BODY MASS INDEX: 18.96 KG/M2 | WEIGHT: 107 LBS | HEIGHT: 63 IN | HEART RATE: 71 BPM | SYSTOLIC BLOOD PRESSURE: 115 MMHG | DIASTOLIC BLOOD PRESSURE: 81 MMHG

## 2024-02-22 PROBLEM — O28.5 ABNORMAL GENETIC TEST DURING PREGNANCY: Status: RESOLVED | Noted: 2023-08-29 | Resolved: 2024-02-22

## 2024-02-22 PROCEDURE — 99214 OFFICE O/P EST MOD 30 MIN: CPT | Performed by: ADVANCED PRACTICE MIDWIFE

## 2024-02-22 PROCEDURE — 99207 PR POST PARTUM EXAM: CPT | Performed by: ADVANCED PRACTICE MIDWIFE

## 2024-02-22 ASSESSMENT — PAIN SCALES - GENERAL: PAINLEVEL: NO PAIN (0)

## 2024-02-22 NOTE — PROGRESS NOTES
"SUBJECTIVE  38 year old  presents for 6 week post partum visit s/p  delivery on 2024 for breastfeeding and mood check and BP check.   Patient denies headache, visual changes or epigastric pain.  Reports lochia is similar to menstrual cycle.      Patient reports mood as stable, Coping well with support from , parents, friends.      Patient reports that infant is breastfeeding with good latch to both breasts. Minimal nipple pain. No concerns. Is pumping as well as breastfeeding and  gives bottle with breastmilk for nighttime feeding.   OBJECTIVE  /81   Pulse 71   Ht 1.6 m (5' 3\")   Wt 48.5 kg (107 lb)   LMP 2023 (Exact Date)   Breastfeeding Yes   BMI 18.95 kg/m    General- no apparent distress  Physical exam deferred until 6 week PP visit    ASSESSMENT/PLAN  2 days/weeks postpartum s/p  delivery  - No signs or symptoms of PPD. Breastfeeding is going well.   - Planning condoms for contraception.  - RTO in 4 weeks for 6 week check and prn if questions or concerns. Appointment scheduled.   I, BRANDEN Ontiveros, am serving as a scribe; to document services personally performed by  DRISS Brunson CNM based on data collection and the provider's statements to me.   Ally Branham    I agree with the PFSH and ROS as completed by BRANDEN Ontiveros, except for changes made by me. The remainder of the encounter was performed by me and scribed by BRANDEN Ontiveros,. The scribed note accurately reflects my personal services and decisions made by me.   DRISS Hernandez CNM    " No foreign retain objects per Houston Liu, Radiology staff.

## 2024-03-12 ENCOUNTER — MYC REFILL (OUTPATIENT)
Dept: PSYCHIATRY | Facility: CLINIC | Age: 39
End: 2024-03-12
Payer: COMMERCIAL

## 2024-03-12 DIAGNOSIS — F90.0 ADHD (ATTENTION DEFICIT HYPERACTIVITY DISORDER), INATTENTIVE TYPE: ICD-10-CM

## 2024-03-12 RX ORDER — DEXTROAMPHETAMINE SACCHARATE, AMPHETAMINE ASPARTATE, DEXTROAMPHETAMINE SULFATE AND AMPHETAMINE SULFATE 2.5; 2.5; 2.5; 2.5 MG/1; MG/1; MG/1; MG/1
10 TABLET ORAL 2 TIMES DAILY
Qty: 60 TABLET | Refills: 0 | Status: SHIPPED | OUTPATIENT
Start: 2024-03-12 | End: 2024-07-09

## 2024-03-12 NOTE — TELEPHONE ENCOUNTER
Last seen: 01/18/2024  RTC: 2 months  Cancel: 0  No-show: 0  Next appt: 03/14/2024     Incoming refill from Patient via [employer]    Medication requested:   Pending Prescriptions:                       Disp   Refills    amphetamine-dextroamphetamine (ADDERALL) *60 tab*0            Sig: Take 1 tablet (10 mg) by mouth 2 times daily        Last refill per       From chart note:   CONTINUE Adderall 10mg twice daily. Sent three additional refills.        Medication unable to be refilled by RN due to criteria not met as indicated.                 []Eligibility - not seen in the last year              []Supervision - no future appointment              []Compliance - no shows, cancellations or lapse in therapy              []Verification - order discrepancy              [x]Controlled medication              []Medication not included in policy              []90-day supply request              []Other:

## 2024-03-14 NOTE — PROGRESS NOTES
Video- Visit Details  Type of service:  video visit for medication management  Time of service:    Date:  05/11/2022    Video Start Time:  11:30 AM       Video End Time: 12:00 PM   Reason for video visit:  Services only offered telehealth  Originating Site (patient location):  [facility]   Location- Patient's home   Distant Site (provider location):  Remote location  Mode of Communication:  Video Conference via [employer]  Consent:  Patient has given verbal consent for video visit?: Yes        [facility]  Psychiatry Clinic  PSYCHIATRY PROGRESS NOTE     CARE TEAM:  PCP- Dorothy Holder.  Arlet Segura is a 36 year old   patient who uses the name Flora and pronouns she, her.    DIAGNOSES                                                                                 ADHD, inattentive type  Bulimia Nervousa, purging type    ASSESSMENT                                                                                All is good at home and work. Discussed going back to pre-pregnancy dose of   15mg BID and adding selective serotonin reuptake inhibitor in a few weeks. Instead,  I will start with 10mg BID with plan to add selective serotonin reuptake inhibitor, for  B/P, as the lower dose will minimize risk for Serotonin Syndrome symptoms. Can   increase stimulant later if needed. Prefer Flora completely stop breast-feeding before  increasing Adderall. The pt knows how to access eating disorder programs if needed.  Flora will contact me in 2 weeks in order to initiate selective serotonin reuptake inhibitor,   Zoloft, once she is sure she is tolerating the Adderall increase. Will provide definition  of Serotonin Syndrome at that time. Increase to 10mg BID now (after stop BF), then   add Zoloft in 2 weeks after contacting me, then consider increase Adderall to   15mg BID later. Also, will ask Flora to get lab work (LFTs), haven't done that in some time.    [place]: checked today, no indication of  3/13/2024  continue asa/statin.    misuse     PLAN                                                                                           1) Meds  -increase Adderall 10mg tab: take one tab (10mg) BID after stop breast-feeding  -consider starting Zoloft  2 weeks, see above    2) Other: Labs- no LFTs for some time, will obtain  3) RTC:   June 22 1030  4) CRISIS Numbers:   Provided routinely in AVS          PERTINENT BACKGROUND                                            [evals 2014, 2017]   Last documented Dec 2021.  Med History:  Adderall XR 30mg helpful for attentional probs   as well as purging episodes, since about 2014, IR d/t sleep probs with XR;  2018 Prozac   was added, dosed up to 60mg with no improvement in mood (?B/P)      INTERIM HISTORY                                                                         Since the last visit:  -last seen in Dec 2021, continues to do well  -Luis Miguel 8 mos doing well at   -things are going well at work and home, knee deep in basement revamp  -almost done weaning  -does endorse some anxiety, normal kid-related kinds of worries, no obsessions  -no depression  -prefers higher Adderall, pre-preg dose, works much better and distant hx supports that  -binge-purge (B/P) 1-2x/ week same as last visit  -discussed inc Adderall dose, breast-feeding, selective serotonin reuptake inhibitor --see above    Recent Symptoms:  No depressed mood, anhedonia, insomnia, racing thoughts,   cognitive and eating disorder sxs as described above.  Adverse Effects:  none  Pertinent Negatives:  No suicidal or violent ideation, psychosis and adrian  Recent Substance Use:  none reported    SOCIAL and FAMILY HISTORY                          [per pt report]            Family Hx- maternal aunt w/bipolar disorder, sister w/depression, HTN in father and   brother, DM in sister and thyroid dz in mother  Social Hx- Originally from [place], [place] and moved to [place] in 2014 for graduate school.   School performance has been without difficulty  or delay. Good social support with friends   and family.  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 Lives in a house with  Alex,  infant son Luis Miguel and her dog 'Johanny'. Likes to play soccer, garden, run and ski.    MEDICAL HISTORY  and ALLERGY     ALLERGIES: Patient has no known allergies.  Avoid use of Wellbutrin (d/t BN)     Patient Active Problem List   Diagnosis     ADHD (attention deficit hyperactivity disorder), inattentive type     Hx of bulimia nervosa     Raynaud's disease without gangrene     H/O LEEP     Change in mole     Left ovarian cyst     Pyogenic granuloma     MEDICAL REVIEW OF SYSTEMS                                                               no additional symptoms to that documented above  CURRENT MEDS       Current Outpatient Medications   Medication Sig Dispense Refill     amphetamine-dextroamphetamine (ADDERALL) 5 MG tablet Take one tab (5mg) by mouth BID 60 tablet 0     estradiol (ESTRACE) 0.1 MG/GM vaginal cream Place 2 g vaginally twice a week 42.5 g 1     Prenatal Vit-Fe Fumarate-FA (PRENATAL MULTIVITAMIN PLUS IRON) 27-0.8 MG TABS per tablet Take 1 tablet by mouth daily       VITALS                                                                                                  Pulse Readings from Last 3 Encounters:   04/08/22 76   02/04/22 78   10/20/21 70     BP Readings from Last 3 Encounters:   04/08/22 102/71   02/04/22 116/75   10/20/21 115/81     MENTAL STATUS EXAM                                                        Alertness: alert  and oriented  Appearance: well groomed  Behavior/Demeanor: cooperative, pleasant and calm, with good  eye contact   Speech: regular rate and rhythm  Language: no obvious problem  Psychomotor: normal or unremarkable  Mood: description consistent with euthymia  Affect: full range; was congruent to mood; was congruent to content  Thought Process/Associations: unremarkable  Thought Content:  Reports none;  Denies suicidal & violent ideation and  delusions   Perception:  Reports none;  Denies hallucinations  Insight: good  Judgment: good  Cognition: (6) oriented: time, person, and place  attention span: intact  concentration: intact  recent memory: intact  remote memory: intact  fund of knowledge: appropriate  Gait and Station: N/A (telehealth)    LABS and DATA     PHQ9 Today:  None today    No lab results found.  (LFTs)    PSYCHOTROPIC DRUG INTERACTIONS   none  MANAGEMENT:  N/A    RISK STATEMENT for SAFETY   Arlet Seguar did not appear to be an imminent safety risk to self or others.    TREATMENT RISK STATEMENT:  The risks, benefits, alternatives and potential adverse   effects have been discussed and are understood by the pt. The pt understands the risks of   using street drugs or alcohol. There are no medical contraindications, the pt agrees to   treatment with the ability to do so. The pt knows to call the clinic for any problems or to   access emergency care if needed.  Medical and substance use concerns are documented   above.  Psychotropic drug interaction check was done, including changes made today.    ATTENDING PHYSICIAN:  Candida Redd MD

## 2024-03-22 ENCOUNTER — PRENATAL OFFICE VISIT (OUTPATIENT)
Dept: OBGYN | Facility: CLINIC | Age: 39
End: 2024-03-22
Attending: ADVANCED PRACTICE MIDWIFE
Payer: COMMERCIAL

## 2024-03-22 VITALS
HEIGHT: 63 IN | SYSTOLIC BLOOD PRESSURE: 119 MMHG | DIASTOLIC BLOOD PRESSURE: 87 MMHG | HEART RATE: 76 BPM | WEIGHT: 105 LBS | BODY MASS INDEX: 18.61 KG/M2

## 2024-03-22 PROCEDURE — 99213 OFFICE O/P EST LOW 20 MIN: CPT | Performed by: REGISTERED NURSE

## 2024-03-22 PROCEDURE — 99207 PR POST PARTUM EXAM: CPT | Performed by: REGISTERED NURSE

## 2024-03-22 ASSESSMENT — ANXIETY QUESTIONNAIRES
GAD7 TOTAL SCORE: 2
5. BEING SO RESTLESS THAT IT IS HARD TO SIT STILL: NOT AT ALL
6. BECOMING EASILY ANNOYED OR IRRITABLE: SEVERAL DAYS
3. WORRYING TOO MUCH ABOUT DIFFERENT THINGS: NOT AT ALL
GAD7 TOTAL SCORE: 2
1. FEELING NERVOUS, ANXIOUS, OR ON EDGE: NOT AT ALL
7. FEELING AFRAID AS IF SOMETHING AWFUL MIGHT HAPPEN: NOT AT ALL
2. NOT BEING ABLE TO STOP OR CONTROL WORRYING: NOT AT ALL

## 2024-03-22 ASSESSMENT — PATIENT HEALTH QUESTIONNAIRE - PHQ9
SUM OF ALL RESPONSES TO PHQ QUESTIONS 1-9: 3
5. POOR APPETITE OR OVEREATING: SEVERAL DAYS

## 2024-03-22 NOTE — PATIENT INSTRUCTIONS
Thank you for trusting us with your care!     If you need to contact us for questions about:  Symptoms, Scheduling & Medical Questions; Non-urgent (2-3 day response) Radha message, Urgent (needing response today) [phone] (if after 3:30pm next day response)   Prescriptions: Please call your Pharmacy   Billing: Shelly [phone] or [facility]:[phone]

## 2024-03-22 NOTE — PROGRESS NOTES
Chief Complaint   Patient presents with    Post Partum Exam     DD 2024          SUBJECTIVE:   Arlet Segura is here for her 6-week postpartum checkup.     PHQ-9 score:   Hx of Abuse:  No    Delivery Date: 2024.    Delivering provider:  Yesika Alcazar CNM .    Type of delivery:  .  Perineum:  tear, with repair.     Delivery complications: None  Infant gender:  girl, weight 6 pounds 8.1 oz.  Feeding Method:   and Bottlefed.  Complications reported with feeding:  none, infant thriving .    Bleeding:  None.  Duration:  .  Menses resumed:  No  Bowel/Urinary problems:  No    Contraception Planned:  condoms  She  has not had intercourse since delivery..         Carissa Astorga LPN

## 2024-03-22 NOTE — PROGRESS NOTES
"SUBJECTIVE:  Arlet (\"Flora\") ISAC Segura is a 38 year old  who presents to clinic today for her 6 week postpartum follow up visit.   She delivered her baby Sherri via  complicated by 2nd degree perineal laceration. She is doing well. The laceration has healed without issue. Her mood has been stable and she has been well supported by her  and family. He returned to work this week and she will be returning to work on . She works at the [employer].  She has been walking for exercise. Good PO intake. Good breast milk supply. She has been pumping. Baby Sherri had an episode of oral thrush so she started taking a probiotic.   She reports no headaches, dizziness, vision changes, chest pain, shortness of breath, RUQ abdominal pain, swelling.     ================================================================  ROS: 10 point ROS neg other than the symptoms noted above in the HPI.   S/p  2024 @ 38+3, female, 2nd degree perineal laceration- repaired    General: Feeling well overall.  Baby Sherri is doing well.   Breast: Breastfeeding and pumping well, both breasts. No nipple pain. Baby Sherri has thrush so has paused breastfeeding at the moment.   Bleeding: Stopped after 2 weeks. Menses has not resumed.   Bowel: having regular BMs , not needing stool softener.     Bladder:  voiding without difficulty.   Bottom: Feels it is healing well. Has not resumed intercourse.     Mood: Feels well supported by  and family.   Contraception: Plans condoms.     EXAM:  /87   Pulse 76   Ht 1.6 m (5' 3\")   Wt 47.6 kg (105 lb)   Breastfeeding Yes   BMI 18.60 kg/m      General: healthy, alert, and no distress  Psych: normal affect and mood, negative for anxiety and depression  Breast: declined. No concerns.   Abdomen: Benign, Soft, flat, non-tender, and No masses, organomegaly  Vulva:  Normal genitalia  Vagina:  normal with good muscle tone and without discharge, laceration well healed. "     PHQ-9: 3  ETELVINA-7: 2    ASSESSMENT:   Encounter Diagnosis   Name Primary?    Routine postpartum follow-up Yes      Normal postpartum exam after   Pregnancy was complicated by:  none.      PLAN:  No orders of the defined types were placed in this encounter.     No orders of the defined types were placed in this encounter.     Contraception methods discussed  Signs and symptoms of postpartum depression/anxiety discussed and resources offered  Discussed calcium intake, vitamins and supplements including Vitamin D  Exercise encouraged  Follow up in 1 year    Patient seen and discussed with Yesika NAQVI CNM.    Kaleigh Harrell, MS3    I agree with the PFSH and ROS as completed by Kaleigh Harrell except for changes made by me. The remainder of the encounter was performed by me and scribed by Kaleigh Harrell. The scribed note accurately reflects my personal services and decisions made by me.     DRISS Fernandez CNM

## 2024-04-02 ENCOUNTER — VIRTUAL VISIT (OUTPATIENT)
Dept: PSYCHIATRY | Facility: CLINIC | Age: 39
End: 2024-04-02
Attending: PSYCHIATRY & NEUROLOGY
Payer: COMMERCIAL

## 2024-04-02 DIAGNOSIS — F90.0 ADHD (ATTENTION DEFICIT HYPERACTIVITY DISORDER), INATTENTIVE TYPE: Primary | ICD-10-CM

## 2024-04-02 DIAGNOSIS — Z86.59 HX OF BULIMIA NERVOSA: ICD-10-CM

## 2024-04-02 PROCEDURE — 99214 OFFICE O/P EST MOD 30 MIN: CPT | Mod: 95 | Performed by: PSYCHIATRY & NEUROLOGY

## 2024-04-02 RX ORDER — DEXTROAMPHETAMINE SACCHARATE, AMPHETAMINE ASPARTATE, DEXTROAMPHETAMINE SULFATE AND AMPHETAMINE SULFATE 2.5; 2.5; 2.5; 2.5 MG/1; MG/1; MG/1; MG/1
10 TABLET ORAL 2 TIMES DAILY
Qty: 60 TABLET | Refills: 0 | Status: SHIPPED | OUTPATIENT
Start: 2024-05-10 | End: 2024-06-09

## 2024-04-02 RX ORDER — DEXTROAMPHETAMINE SACCHARATE, AMPHETAMINE ASPARTATE, DEXTROAMPHETAMINE SULFATE AND AMPHETAMINE SULFATE 2.5; 2.5; 2.5; 2.5 MG/1; MG/1; MG/1; MG/1
10 TABLET ORAL 2 TIMES DAILY
Qty: 60 TABLET | Refills: 0 | Status: SHIPPED | OUTPATIENT
Start: 2024-06-10 | End: 2024-07-02

## 2024-04-02 RX ORDER — DEXTROAMPHETAMINE SACCHARATE, AMPHETAMINE ASPARTATE, DEXTROAMPHETAMINE SULFATE AND AMPHETAMINE SULFATE 2.5; 2.5; 2.5; 2.5 MG/1; MG/1; MG/1; MG/1
10 TABLET ORAL 2 TIMES DAILY
Qty: 60 TABLET | Refills: 0 | Status: SHIPPED | OUTPATIENT
Start: 2024-04-10 | End: 2024-05-10

## 2024-04-02 ASSESSMENT — PAIN SCALES - GENERAL: PAINLEVEL: NO PAIN (0)

## 2024-04-02 NOTE — NURSING NOTE
Is the patient currently in the state of [place]? YES    Visit mode:VIDEO    If the visit is dropped, the patient can be reconnected by: VIDEO VISIT: Text to cell phone:   Telephone Information:   Mobile [phone]    and VIDEO VISIT: Send to e-mail at: [email or web address]    Will anyone else be joining the visit? NO  (If patient encounters technical issues they should call [phone])    How would you like to obtain your AVS? MyChart    Are changes needed to the allergy or medication list? No    Are refills needed on medications prescribed by this physician? No    Reason for visit: RECHECK    Jermaine MAST

## 2024-04-02 NOTE — PATIENT INSTRUCTIONS
PLAN                                                                                                       1) Meds:     CONTINUE Adderall 10mg twice daily. Sent three additional refills.      2) Other: None today.     3) RTC:  In three months.     4) CRISIS Numbers:     **For crisis resources, please see the information at the end of this document**     Patient Education      Thank you for coming to the [facility].     Lab Testing:  If you had lab testing today and your results are reassuring or normal they will be mailed to you or sent through [employer] within 7 days. If the lab tests need quick action we will call you with the results. The phone number we will call with results is # [phone]. If this is not the best number please call our clinic and change the number.     Medication Refills:  If you need any refills please call your pharmacy and they will contact us. Our fax number for refills is [phone].   Three business days of notice are needed for general medication refill requests.   Five business days of notice are needed for controlled substance refill requests.   If you need to change to a different pharmacy, please contact the new pharmacy directly. The new pharmacy will help you get your medications transferred.     Contact Us:  Please call [phone] during business hours (8-5:00 M-F).   If you have medication related questions after clinic hours, or on the weekend, please call [phone].     Financial Assistance [phone]   Medical Records [phone]       MENTAL HEALTH CRISIS RESOURCES:  For a emergency help, please call 911 or go to the nearest Emergency Department.     Emergency Walk-In Options:   [facility] Unit @ [facility] ([place]): [phone] - Specialized mental health emergency area designed to be calming  [facility] ([place]): [phone]  [facility] ([place]):  [phone]  [facility] ([place]): [phone]    [facility] Crisis Information:   [place]: [phone]  [place]: [phone]  [place] ZHAO) - Adult: [phone]     Child: [phone]  [place] - Adult: [phone]     Child: [phone]  [place]: [phone]  List of all [facility] resources:   [email or web address]/dhs/people-we-serve/adults/health-care/mental-health/resources/crisis-contacts.jsp    National Crisis Information:   Crisis Text Line: Text  [place]  to 475205  Suicide & Crisis Lifeline: 988  National Suicide Prevention Lifeline: 7-359-124-TALK (1-114.830.5439)       For online chat options, visit https://suicidepreventionlifeline.org/chat/  Poison Control Center: 1-547.587.9738  Trans Lifeline: 7-652-945-7472 - Hotline for transgender people of all ages  The [employer]: 7-800-824-6475 - Hotline for LGBT youth     For Non-Emergency Support:   Fast Tracker: Mental Health & Substance Use Disorder Resources -   https://www.[facility].org/
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
## 2024-04-11 ENCOUNTER — MEDICAL CORRESPONDENCE (OUTPATIENT)
Dept: HEALTH INFORMATION MANAGEMENT | Facility: CLINIC | Age: 39
End: 2024-04-11
Payer: COMMERCIAL

## 2024-07-02 ENCOUNTER — MYC REFILL (OUTPATIENT)
Dept: PSYCHIATRY | Facility: CLINIC | Age: 39
End: 2024-07-02
Payer: COMMERCIAL

## 2024-07-02 DIAGNOSIS — F90.0 ADHD (ATTENTION DEFICIT HYPERACTIVITY DISORDER), INATTENTIVE TYPE: ICD-10-CM

## 2024-07-02 NOTE — TELEPHONE ENCOUNTER
Last seen: 4/2/24  RTC: 3 months  Cancel: none  No-show: none  Next appt: 7/9/24     Incoming refill from Patient via [employer]    Medication requested:   Pending Prescriptions:                       Disp   Refills    amphetamine-dextroamphetamine (ADDERALL) *60 tab*0            Sig: Take 1 tablet (10 mg) by mouth 2 times daily        Last refill per       From chart note:   CONTINUE Adderall 10mg twice daily.      Medication unable to be refilled by RN due to criteria not met as indicated.                 []Eligibility - not seen in the last year              []Supervision - no future appointment              []Compliance - no shows, cancellations or lapse in therapy              []Verification - order discrepancy              [x]Controlled medication              []Medication not included in policy              []90-day supply request              []Other:      Per chart review, patient should have refill remaining. Writer spoke with [facility], who states they can only have three refills profiled at one time, so there is no remaining refills available.

## 2024-07-03 RX ORDER — DEXTROAMPHETAMINE SACCHARATE, AMPHETAMINE ASPARTATE, DEXTROAMPHETAMINE SULFATE AND AMPHETAMINE SULFATE 2.5; 2.5; 2.5; 2.5 MG/1; MG/1; MG/1; MG/1
10 TABLET ORAL 2 TIMES DAILY
Qty: 60 TABLET | Refills: 0 | Status: SHIPPED | OUTPATIENT
Start: 2024-07-03

## 2024-07-07 ENCOUNTER — HEALTH MAINTENANCE LETTER (OUTPATIENT)
Age: 39
End: 2024-07-07

## 2024-07-09 ENCOUNTER — VIRTUAL VISIT (OUTPATIENT)
Dept: PSYCHIATRY | Facility: CLINIC | Age: 39
End: 2024-07-09
Attending: PSYCHIATRY & NEUROLOGY
Payer: COMMERCIAL

## 2024-07-09 DIAGNOSIS — F90.0 ADHD (ATTENTION DEFICIT HYPERACTIVITY DISORDER), INATTENTIVE TYPE: Primary | ICD-10-CM

## 2024-07-09 PROCEDURE — 99214 OFFICE O/P EST MOD 30 MIN: CPT | Mod: 95 | Performed by: PSYCHIATRY & NEUROLOGY

## 2024-07-09 PROCEDURE — G2211 COMPLEX E/M VISIT ADD ON: HCPCS | Mod: 95 | Performed by: PSYCHIATRY & NEUROLOGY

## 2024-07-09 RX ORDER — DEXTROAMPHETAMINE SACCHARATE, AMPHETAMINE ASPARTATE, DEXTROAMPHETAMINE SULFATE AND AMPHETAMINE SULFATE 2.5; 2.5; 2.5; 2.5 MG/1; MG/1; MG/1; MG/1
10 TABLET ORAL 2 TIMES DAILY
Qty: 60 TABLET | Refills: 0 | Status: SHIPPED | OUTPATIENT
Start: 2024-08-06

## 2024-07-09 RX ORDER — DEXTROAMPHETAMINE SACCHARATE, AMPHETAMINE ASPARTATE, DEXTROAMPHETAMINE SULFATE AND AMPHETAMINE SULFATE 2.5; 2.5; 2.5; 2.5 MG/1; MG/1; MG/1; MG/1
10 TABLET ORAL 2 TIMES DAILY
Qty: 60 TABLET | Refills: 0 | Status: SHIPPED | OUTPATIENT
Start: 2024-10-05 | End: 2024-11-04

## 2024-07-09 RX ORDER — DEXTROAMPHETAMINE SACCHARATE, AMPHETAMINE ASPARTATE, DEXTROAMPHETAMINE SULFATE AND AMPHETAMINE SULFATE 2.5; 2.5; 2.5; 2.5 MG/1; MG/1; MG/1; MG/1
10 TABLET ORAL 2 TIMES DAILY
Qty: 60 TABLET | Refills: 0 | Status: SHIPPED | OUTPATIENT
Start: 2024-09-05 | End: 2024-10-05
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
## 2024-07-09 NOTE — NURSING NOTE
Current patient location:  At work     Is the patient currently in the state of [place]? YES    Visit mode:VIDEO    If the visit is dropped, the patient can be reconnected by: VIDEO VISIT: Send to e-mail at: [email or web address]    Will anyone else be joining the visit? NO  (If patient encounters technical issues they should call [phone]    How would you like to obtain your AVS? MyChart    Are changes needed to the allergy or medication list? Pt stated no changes to allergies and Pt stated no med changes    Are refills needed on medications prescribed by this physician? NO    Reason for visit: SOM GROVEF

## 2024-07-09 NOTE — PATIENT INSTRUCTIONS
PLAN                                                                                                       1) Meds:   CONTINUE Adderall 10mg twice daily. Sent three additional refills.      2) Other: None today.     3) RTC:  In three months.     4) CRISIS Numbers: For crisis resources, please see the information at the end of this document.    Patient Education      Thank you for coming to the [facility] [place] [facility].     Lab Testing:  If you had lab testing today and your results are reassuring or normal they will be mailed to you or sent through [employer] within 7 days. If the lab tests need quick action we will call you with the results. The phone number we will call with results is # [phone]. If this is not the best number please call our clinic and change the number.     Medication Refills:  If you need any refills please call your pharmacy and they will contact us. Our fax number for refills is [phone].   Three business days of notice are needed for general medication refill requests.   Five business days of notice are needed for controlled substance refill requests.   If you need to change to a different pharmacy, please contact the new pharmacy directly. The new pharmacy will help you get your medications transferred.     Contact Us:  Please call [phone] during business hours (8-5:00 M-F).   If you have medication related questions after clinic hours, or on the weekend, please call [phone].     Financial Assistance [phone]   Medical Records [phone]       MENTAL HEALTH CRISIS RESOURCES:  For a emergency help, please call 911 or go to the nearest Emergency Department.     Emergency Walk-In Options:   EmPATH Unit @ [facility] ([place]): [phone] - Specialized mental health emergency area designed to be calming  [facility] ([place]): [phone]  [facility] ([place]):  [phone]  [facility] ([place]): [phone]    [facility] Crisis Information:   [place]: [phone]  [place]: [phone]  Tiffanie ZHAO) - Adult: [phone]     Child: [phone]  [place] - Adult: [phone]     Child: [phone]  [place]: [phone]  List of all [facility] resources:   https://[place].[facility]/dhs/people-we-serve/adults/health-care/mental-health/resources/crisis-contacts.jsp    National Crisis Information:   Crisis Text Line: Text  [place]  to 147723  Suicide & Crisis Lifeline: 988  National Suicide Prevention Lifeline: 6-260-527-TALK (1-428.337.3481)       For online chat options, visit https://suicidepreventionlifeline.org/chat/  Poison Control Center: 1-430.221.5402  Trans Lifeline: 9-123-492-4810 - Hotline for transgender people of all ages  The [employer] Project: 2-496-541-7603 - Hotline for LGBT youth     For Non-Emergency Support:   Fast Tracker: Mental Health & Substance Use Disorder Resources -   https://www.[employer].org/

## 2024-08-21 NOTE — PROVIDER NOTIFICATION
09/05/21 1130   Provider Notification   Provider Name/Title Moose Degroot   Method of Notification Electronic Page   Notification Reason Labor Status   Patient having early decelerations with contractions. On 6 mu of pitocin and comfortable with the epidural. Will straight cath her at 1200 and Moose IBARRA will check cervix at that time. P: Continue to monitor.    I have seen and examined this patient and fully participated in the care of this patient as the teaching attending.  The service was shared with the EARL.  I reviewed and verified the documentation.

## 2024-11-01 ENCOUNTER — MYC REFILL (OUTPATIENT)
Dept: PSYCHIATRY | Facility: CLINIC | Age: 39
End: 2024-11-01
Payer: COMMERCIAL

## 2024-11-01 DIAGNOSIS — F90.0 ADHD (ATTENTION DEFICIT HYPERACTIVITY DISORDER), INATTENTIVE TYPE: ICD-10-CM

## 2024-11-01 RX ORDER — DEXTROAMPHETAMINE SACCHARATE, AMPHETAMINE ASPARTATE, DEXTROAMPHETAMINE SULFATE AND AMPHETAMINE SULFATE 2.5; 2.5; 2.5; 2.5 MG/1; MG/1; MG/1; MG/1
10 TABLET ORAL 2 TIMES DAILY
Qty: 60 TABLET | Refills: 0 | Status: SHIPPED | OUTPATIENT
Start: 2024-11-01

## 2024-11-01 NOTE — TELEPHONE ENCOUNTER
Per med tab:     Disp Refills Start End BEE    amphetamine-dextroamphetamine (ADDERALL) 10 MG tablet 60 tablet 0 10/5/2024 11/4/2024 No   Sig - Route: Take 1 tablet (10 mg) by mouth 2 times daily for 30 days - Oral   Sent to pharmacy as: Amphetamine-Dextroamphetamine 10 MG Oral Tablet (Adderall)   Class: E-Prescribe   Earliest Fill Date: 10/1/2024   Order: [id number]   E-Prescribing Status: Receipt confirmed by pharmacy (7/9/2024  1:20 PM CDT)   Per  last refilled: 10/3 #60, 9/4 #60, 8/6 #60    Will route to provider for approval

## 2024-11-01 NOTE — TELEPHONE ENCOUNTER
Last seen: 7/9  RTC: 3 months  Cancel: None  No-show: 10/17  Next appt: 12/10      Medication requested:   amphetamine-dextroamphetamine (ADDERALL) 10 MG tablet     From chart note:   1) Meds:   CONTINUE Adderall 10mg twice daily. Sent three additional refills.        Refills sent to RN for final review

## 2024-12-10 ENCOUNTER — VIRTUAL VISIT (OUTPATIENT)
Dept: PSYCHIATRY | Facility: CLINIC | Age: 39
End: 2024-12-10
Attending: PSYCHIATRY & NEUROLOGY
Payer: COMMERCIAL

## 2024-12-10 DIAGNOSIS — F90.0 ADHD (ATTENTION DEFICIT HYPERACTIVITY DISORDER), INATTENTIVE TYPE: Primary | ICD-10-CM

## 2024-12-10 RX ORDER — DEXTROAMPHETAMINE SACCHARATE, AMPHETAMINE ASPARTATE, DEXTROAMPHETAMINE SULFATE AND AMPHETAMINE SULFATE 2.5; 2.5; 2.5; 2.5 MG/1; MG/1; MG/1; MG/1
10 TABLET ORAL 2 TIMES DAILY
Qty: 60 TABLET | Refills: 0 | Status: SHIPPED | OUTPATIENT
Start: 2024-12-27 | End: 2025-01-26

## 2024-12-10 RX ORDER — DEXTROAMPHETAMINE SACCHARATE, AMPHETAMINE ASPARTATE, DEXTROAMPHETAMINE SULFATE AND AMPHETAMINE SULFATE 2.5; 2.5; 2.5; 2.5 MG/1; MG/1; MG/1; MG/1
10 TABLET ORAL 2 TIMES DAILY
Qty: 60 TABLET | Refills: 0 | Status: SHIPPED | OUTPATIENT
Start: 2025-02-21 | End: 2025-03-23

## 2024-12-10 RX ORDER — DEXTROAMPHETAMINE SACCHARATE, AMPHETAMINE ASPARTATE, DEXTROAMPHETAMINE SULFATE AND AMPHETAMINE SULFATE 2.5; 2.5; 2.5; 2.5 MG/1; MG/1; MG/1; MG/1
10 TABLET ORAL 2 TIMES DAILY
Qty: 60 TABLET | Refills: 0 | Status: SHIPPED | OUTPATIENT
Start: 2025-01-24 | End: 2025-02-23

## 2024-12-10 ASSESSMENT — PAIN SCALES - GENERAL: PAINLEVEL_OUTOF10: NO PAIN (0)
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
## 2024-12-10 NOTE — PATIENT INSTRUCTIONS
PLAN                                                                                                       1) Meds:   CONTINUE Adderall 10mg twice daily. Sent three additional refills.      2) Other: None today.     3) RTC:  In three months.     4) CRISIS Numbers:      **For crisis resources, please see the information at the end of this document**   Patient Education    Thank you for coming to the [facility] [place] [facility].     Lab Testing:  If you had lab testing today and your results are reassuring or normal they will be mailed to you or sent through [employer] within 7 days. If the lab tests need quick action we will call you with the results. The phone number we will call with results is # [phone]. If this is not the best number please call our clinic and change the number.     Medication Refills:  If you need any refills please call your pharmacy and they will contact us. Our fax number for refills is [phone].   Three business days of notice are needed for general medication refill requests.   Five business days of notice are needed for controlled substance refill requests.   If you need to change to a different pharmacy, please contact the new pharmacy directly. The new pharmacy will help you get your medications transferred.     Contact Us:  Please call [phone] during business hours (8-5:00 M-F).   If you have medication related questions after clinic hours, or on the weekend, please call [phone].     Financial Assistance [phone]   Medical Records [phone]       MENTAL HEALTH CRISIS RESOURCES:  For a emergency help, please call 911 or go to the nearest Emergency Department.     Emergency Walk-In Options:   EmPATH Unit @ [facility] ([place]): [phone] - Specialized mental health emergency area designed to be calming  [facility] ([place]): [phone]  [facility] ([place]):  [phone]  [facility] ([place]): [phone]    [facility] Crisis Information:   [place]: [phone]  [place]: [phone]  Tiffanie ZHAO) - Adult: [phone]     Child: [phone]  [place] - Adult: [phone]     Child: [phone]  [place]: [phone]  List of all [facility] resources:   https://mn.[facility]/dhs/people-we-serve/adults/health-care/mental-health/resources/crisis-contacts.jsp    National Crisis Information:   Crisis Text Line: Text  [place]  to 067123  Suicide & Crisis Lifeline: 988  National Suicide Prevention Lifeline: 7-778-636-TALK (1-611.297.5443)       For online chat options, visit https://suicidepreventionlifeline.org/chat/  Poison Control Center: 1-935.598.8577  Trans Lifeline: 4-264-388-7288 - Hotline for transgender people of all ages  The [employer] Project: 7-443-972-5022 - Hotline for LGBT youth     For Non-Emergency Support:   Fast Tracker: Mental Health & Substance Use Disorder Resources -   https://www.[employer].org/

## 2024-12-10 NOTE — NURSING NOTE
Current patient location:  At work    Is the patient currently in the state of [place]? YES    Visit mode:VIDEO    If the visit is dropped, the patient can be reconnected by:VIDEO VISIT: Send to e-mail at: [email or web address]    Will anyone else be joining the visit? NO  (If patient encounters technical issues they should call [phone]    Are changes needed to the allergy or medication list? No    Are refills needed on medications prescribed by this physician? NO    Rooming Documentation:  Questionnaire(s) completed    Reason for visit: RECHKERON MAST

## 2024-12-23 ENCOUNTER — MYC MEDICAL ADVICE (OUTPATIENT)
Dept: PSYCHIATRY | Facility: CLINIC | Age: 39
End: 2024-12-23
Payer: COMMERCIAL

## 2024-12-23 DIAGNOSIS — F90.0 ADHD (ATTENTION DEFICIT HYPERACTIVITY DISORDER), INATTENTIVE TYPE: ICD-10-CM

## 2024-12-23 RX ORDER — DEXTROAMPHETAMINE SACCHARATE, AMPHETAMINE ASPARTATE, DEXTROAMPHETAMINE SULFATE AND AMPHETAMINE SULFATE 2.5; 2.5; 2.5; 2.5 MG/1; MG/1; MG/1; MG/1
10 TABLET ORAL 2 TIMES DAILY
Qty: 60 TABLET | Refills: 0 | Status: SHIPPED | OUTPATIENT
Start: 2024-12-23

## 2025-04-17 ENCOUNTER — VIRTUAL VISIT (OUTPATIENT)
Dept: PSYCHIATRY | Facility: CLINIC | Age: 40
End: 2025-04-17
Attending: PSYCHIATRY & NEUROLOGY
Payer: COMMERCIAL

## 2025-04-17 DIAGNOSIS — F90.0 ADHD (ATTENTION DEFICIT HYPERACTIVITY DISORDER), INATTENTIVE TYPE: Primary | ICD-10-CM

## 2025-04-17 RX ORDER — DEXTROAMPHETAMINE SACCHARATE, AMPHETAMINE ASPARTATE MONOHYDRATE, DEXTROAMPHETAMINE SULFATE AND AMPHETAMINE SULFATE 5; 5; 5; 5 MG/1; MG/1; MG/1; MG/1
20 CAPSULE, EXTENDED RELEASE ORAL DAILY
Qty: 30 CAPSULE | Refills: 0 | Status: SHIPPED | OUTPATIENT
Start: 2025-04-17 | End: 2025-05-17

## 2025-04-17 RX ORDER — DEXTROAMPHETAMINE SACCHARATE, AMPHETAMINE ASPARTATE, DEXTROAMPHETAMINE SULFATE AND AMPHETAMINE SULFATE 2.5; 2.5; 2.5; 2.5 MG/1; MG/1; MG/1; MG/1
10 TABLET ORAL DAILY
Qty: 30 TABLET | Refills: 0 | Status: SHIPPED | OUTPATIENT
Start: 2025-05-17 | End: 2025-06-16

## 2025-04-17 RX ORDER — DEXTROAMPHETAMINE SACCHARATE, AMPHETAMINE ASPARTATE MONOHYDRATE, DEXTROAMPHETAMINE SULFATE AND AMPHETAMINE SULFATE 5; 5; 5; 5 MG/1; MG/1; MG/1; MG/1
20 CAPSULE, EXTENDED RELEASE ORAL DAILY
Qty: 30 CAPSULE | Refills: 0 | Status: SHIPPED | OUTPATIENT
Start: 2025-05-17 | End: 2025-06-16

## 2025-04-17 RX ORDER — DEXTROAMPHETAMINE SACCHARATE, AMPHETAMINE ASPARTATE, DEXTROAMPHETAMINE SULFATE AND AMPHETAMINE SULFATE 2.5; 2.5; 2.5; 2.5 MG/1; MG/1; MG/1; MG/1
10 TABLET ORAL DAILY
Qty: 30 TABLET | Refills: 0 | Status: SHIPPED | OUTPATIENT
Start: 2025-06-16 | End: 2025-07-16

## 2025-04-17 RX ORDER — DEXTROAMPHETAMINE SACCHARATE, AMPHETAMINE ASPARTATE, DEXTROAMPHETAMINE SULFATE AND AMPHETAMINE SULFATE 2.5; 2.5; 2.5; 2.5 MG/1; MG/1; MG/1; MG/1
10 TABLET ORAL DAILY
Qty: 30 TABLET | Refills: 0 | Status: SHIPPED | OUTPATIENT
Start: 2025-04-17 | End: 2025-05-17

## 2025-04-17 RX ORDER — DEXTROAMPHETAMINE SACCHARATE, AMPHETAMINE ASPARTATE MONOHYDRATE, DEXTROAMPHETAMINE SULFATE AND AMPHETAMINE SULFATE 5; 5; 5; 5 MG/1; MG/1; MG/1; MG/1
20 CAPSULE, EXTENDED RELEASE ORAL DAILY
Qty: 30 CAPSULE | Refills: 0 | Status: SHIPPED | OUTPATIENT
Start: 2025-06-16 | End: 2025-07-16

## 2025-04-17 ASSESSMENT — PAIN SCALES - GENERAL: PAINLEVEL_OUTOF10: NO PAIN (0)
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
## 2025-04-17 NOTE — NURSING NOTE
Current patient location: [address] [place] [id number]    Is the patient currently in the state of [place]? YES    Visit mode: VIDEO    If the visit is dropped, the patient can be reconnected by:VIDEO VISIT: Text to cell phone:   Telephone Information:   Mobile [phone]       Will anyone else be joining the visit? NO  (If patient encounters technical issues they should call [phone]    Are changes needed to the allergy or medication list? No    Are refills needed on medications prescribed by this physician? Will discuss with provider before refills are sent     Rooming Documentation:  Questionnaire(s) completed    Reason for visit: RECHECK    Marina GROVEF

## 2025-04-17 NOTE — PATIENT INSTRUCTIONS
PLAN                                                                                                       1) Meds:     DISCONTINUE Adderall 10mg twice daily.     START Adderall XR 20 mg once daily in the morning.     After 2-3 days of knowing how you respond to Adderall XR, ok to add an afternoon dose of Adderall 5-10 mg.     Ok to cut the short acting Adderall in half to create a 5 mg dose. Do not cute Adderall XR in half.     2) Other: None today.     3) RTC:  In three months.     4) CRISIS Numbers:     **For crisis resources, please see the information at the end of this document**   Patient Education    Thank you for coming to the [facility] [place] [facility].     Lab Testing:  If you had lab testing today and your results are reassuring or normal they will be mailed to you or sent through RF Code within 7 days. If the lab tests need quick action we will call you with the results. The phone number we will call with results is # [phone]. If this is not the best number please call our clinic and change the number.     Medication Refills:  If you need any refills please call your pharmacy and they will contact us. Our fax number for refills is [phone].   Three business days of notice are needed for general medication refill requests.   Five business days of notice are needed for controlled substance refill requests.   If you need to change to a different pharmacy, please contact the new pharmacy directly. The new pharmacy will help you get your medications transferred.     Contact Us:  Please call [phone] during business hours (8-5:00 M-F).   If you have medication related questions after clinic hours, or on the weekend, please call [phone].     Financial Assistance [phone]   Medical Records [phone]       MENTAL HEALTH CRISIS RESOURCES:  For a emergency help, please call 911 or go to the nearest Emergency Department.     Emergency Walk-In Options:    [facility] ([place]): [phone] - Specialized mental health emergency area designed to be calming  [facility] ([place]): [phone]  [facility] ([place]): [phone]  [facility] ([place]): [phone]    County Crisis Information:   [place]: [phone]  [place]: [phone]  [place] ([place]) - Adult: [phone]     Child: [phone]  [place] - Adult: [phone]     Child: [phone]  [place]: [phone]  List of all [facility] resources:   [email or web address]/dhs/people-[email or web address]/health-care/mental-health/resources/crisis-contacts.jsp    National Crisis Information:   Crisis Text Line: Text  [place]  to 379101  Suicide & Crisis Lifeline: 988  National Suicide Prevention Lifeline: 1-696-080-NOQO (1-126.855.7381)       For online chat options, visit https://suicidepreventionlifeline.org/chat/  Poison Control Center: 1-695.495.2573  Trans Lifeline: 1-243.423.8828 - Hotline for transgender people of all ages  The [employer]: 9-002-979-8523 - Hotline for LGBT youth     For Non-Emergency Support:   Fast Tracker: Mental Health & Substance Use Disorder Resources -   https://www.[employer].org/

## 2025-07-13 ENCOUNTER — HEALTH MAINTENANCE LETTER (OUTPATIENT)
Age: 40
End: 2025-07-13

## 2025-07-14 ENCOUNTER — MYC REFILL (OUTPATIENT)
Dept: PSYCHIATRY | Facility: CLINIC | Age: 40
End: 2025-07-14
Payer: COMMERCIAL

## 2025-07-14 DIAGNOSIS — F90.0 ADHD (ATTENTION DEFICIT HYPERACTIVITY DISORDER), INATTENTIVE TYPE: ICD-10-CM

## 2025-07-14 RX ORDER — DEXTROAMPHETAMINE SACCHARATE, AMPHETAMINE ASPARTATE, DEXTROAMPHETAMINE SULFATE AND AMPHETAMINE SULFATE 2.5; 2.5; 2.5; 2.5 MG/1; MG/1; MG/1; MG/1
10 TABLET ORAL DAILY
Qty: 30 TABLET | Refills: 0 | Status: SHIPPED | OUTPATIENT
Start: 2025-07-14

## 2025-07-14 RX ORDER — DEXTROAMPHETAMINE SACCHARATE, AMPHETAMINE ASPARTATE MONOHYDRATE, DEXTROAMPHETAMINE SULFATE AND AMPHETAMINE SULFATE 5; 5; 5; 5 MG/1; MG/1; MG/1; MG/1
20 CAPSULE, EXTENDED RELEASE ORAL DAILY
Qty: 30 CAPSULE | Refills: 0 | Status: SHIPPED | OUTPATIENT
Start: 2025-07-14

## 2025-08-13 ENCOUNTER — MYC REFILL (OUTPATIENT)
Dept: PSYCHIATRY | Facility: CLINIC | Age: 40
End: 2025-08-13
Payer: COMMERCIAL

## 2025-08-13 DIAGNOSIS — F90.0 ADHD (ATTENTION DEFICIT HYPERACTIVITY DISORDER), INATTENTIVE TYPE: ICD-10-CM

## 2025-08-13 RX ORDER — DEXTROAMPHETAMINE SACCHARATE, AMPHETAMINE ASPARTATE, DEXTROAMPHETAMINE SULFATE AND AMPHETAMINE SULFATE 2.5; 2.5; 2.5; 2.5 MG/1; MG/1; MG/1; MG/1
10 TABLET ORAL DAILY
Qty: 30 TABLET | Refills: 0 | Status: SHIPPED | OUTPATIENT
Start: 2025-08-13

## 2025-08-13 RX ORDER — DEXTROAMPHETAMINE SACCHARATE, AMPHETAMINE ASPARTATE MONOHYDRATE, DEXTROAMPHETAMINE SULFATE AND AMPHETAMINE SULFATE 5; 5; 5; 5 MG/1; MG/1; MG/1; MG/1
20 CAPSULE, EXTENDED RELEASE ORAL DAILY
Qty: 30 CAPSULE | Refills: 0 | Status: SHIPPED | OUTPATIENT
Start: 2025-08-13

## (undated) RX ORDER — LIDOCAINE HYDROCHLORIDE 10 MG/ML
INJECTION, SOLUTION EPIDURAL; INFILTRATION; INTRACAUDAL; PERINEURAL
Status: DISPENSED
Start: 2021-04-13